# Patient Record
Sex: MALE | Race: WHITE | NOT HISPANIC OR LATINO | Employment: OTHER | URBAN - METROPOLITAN AREA
[De-identification: names, ages, dates, MRNs, and addresses within clinical notes are randomized per-mention and may not be internally consistent; named-entity substitution may affect disease eponyms.]

---

## 2017-01-11 ENCOUNTER — ANTI-COAG VISIT (OUTPATIENT)
Dept: CARDIOLOGY | Facility: CLINIC | Age: 62
End: 2017-01-11
Payer: MEDICARE

## 2017-01-11 DIAGNOSIS — Z86.718 HISTORY OF DVT (DEEP VEIN THROMBOSIS): ICD-10-CM

## 2017-01-11 DIAGNOSIS — Z86.711 HISTORY OF PULMONARY EMBOLISM: ICD-10-CM

## 2017-01-11 DIAGNOSIS — Z79.01 CHRONIC ANTICOAGULATION: ICD-10-CM

## 2017-01-11 DIAGNOSIS — Z79.01 LONG TERM (CURRENT) USE OF ANTICOAGULANTS: Primary | ICD-10-CM

## 2017-01-11 LAB
CTP QC/QA: NORMAL
INR PPP: 2.8 (ref 2–3)

## 2017-01-11 PROCEDURE — 99211 OFF/OP EST MAY X REQ PHY/QHP: CPT | Mod: 25,S$GLB,,

## 2017-01-11 PROCEDURE — 85610 PROTHROMBIN TIME: CPT | Mod: QW,S$GLB,,

## 2017-01-11 NOTE — PROGRESS NOTES
Patient's INR is on an upward trend. I am increasing his vitamin K intake to account for this change in INR. Patient is stable on this dose. He will continue this dose until follow-up. I advised him and his wife to contact us with any changes or problems.

## 2017-02-01 ENCOUNTER — TELEPHONE (OUTPATIENT)
Dept: SMOKING CESSATION | Facility: CLINIC | Age: 62
End: 2017-02-01

## 2017-02-02 ENCOUNTER — ANTI-COAG VISIT (OUTPATIENT)
Dept: CARDIOLOGY | Facility: CLINIC | Age: 62
End: 2017-02-02
Payer: MEDICARE

## 2017-02-02 DIAGNOSIS — Z86.711 HISTORY OF PULMONARY EMBOLISM: ICD-10-CM

## 2017-02-02 DIAGNOSIS — Z86.718 HISTORY OF DVT (DEEP VEIN THROMBOSIS): ICD-10-CM

## 2017-02-02 DIAGNOSIS — Z79.01 CHRONIC ANTICOAGULATION: Primary | ICD-10-CM

## 2017-02-02 LAB — INR PPP: 2.7 (ref 2–3)

## 2017-02-02 PROCEDURE — 85610 PROTHROMBIN TIME: CPT | Mod: QW,S$GLB,, | Performed by: PHARMACIST

## 2017-02-02 PROCEDURE — 99211 OFF/OP EST MAY X REQ PHY/QHP: CPT | Mod: 25,S$GLB,, | Performed by: PHARMACIST

## 2017-02-02 NOTE — PROGRESS NOTES
The patient's INR was within therapeutic range today.  The patient denies any changes in medications, any missed doses, or changes in dietary habits.  The patient will continue the warfarin regimen without any changes.  He will f/u with us in 4 weeks.  The patient understands to contact us if there are any new changes with medications, health, or dietary habits.

## 2017-02-14 ENCOUNTER — TELEPHONE (OUTPATIENT)
Dept: SMOKING CESSATION | Facility: CLINIC | Age: 62
End: 2017-02-14

## 2017-03-02 ENCOUNTER — ANTI-COAG VISIT (OUTPATIENT)
Dept: CARDIOLOGY | Facility: CLINIC | Age: 62
End: 2017-03-02
Payer: MEDICARE

## 2017-03-02 DIAGNOSIS — Z79.01 CHRONIC ANTICOAGULATION: Primary | ICD-10-CM

## 2017-03-02 DIAGNOSIS — Z86.718 HISTORY OF DVT (DEEP VEIN THROMBOSIS): ICD-10-CM

## 2017-03-02 DIAGNOSIS — Z86.711 HISTORY OF PULMONARY EMBOLISM: ICD-10-CM

## 2017-03-02 LAB — INR PPP: 2.1 (ref 2–3)

## 2017-03-02 PROCEDURE — 99211 OFF/OP EST MAY X REQ PHY/QHP: CPT | Mod: 25,S$GLB,, | Performed by: PHARMACIST

## 2017-03-02 PROCEDURE — 85610 PROTHROMBIN TIME: CPT | Mod: QW,S$GLB,, | Performed by: PHARMACIST

## 2017-03-02 NOTE — PROGRESS NOTES
Patient denies any changes in diet, medications, or health that would effect warfarin therapy.  INR on lower end of range. Since he cannot return until 1 month, I will boost dose today then rechallenge this dose to assure INR does not continue to trend down.

## 2017-03-03 ENCOUNTER — HOSPITAL ENCOUNTER (OUTPATIENT)
Dept: RADIOLOGY | Facility: OTHER | Age: 62
Discharge: HOME | End: 2017-03-03
Attending: ORTHOPAEDIC SURGERY
Payer: MEDICARE

## 2017-03-03 DIAGNOSIS — M75.121 COMPLETE TEAR OF RIGHT ROTATOR CUFF: ICD-10-CM

## 2017-03-03 PROCEDURE — 73221 MRI JOINT UPR EXTREM W/O DYE: CPT | Mod: 26,RT,, | Performed by: RADIOLOGY

## 2017-03-03 PROCEDURE — 73221 MRI JOINT UPR EXTREM W/O DYE: CPT | Mod: TC,RT

## 2017-03-08 ENCOUNTER — CLINICAL SUPPORT (OUTPATIENT)
Dept: SMOKING CESSATION | Facility: CLINIC | Age: 62
End: 2017-03-08
Payer: COMMERCIAL

## 2017-03-08 DIAGNOSIS — F17.200 NICOTINE DEPENDENCE: Primary | ICD-10-CM

## 2017-03-08 PROCEDURE — 99407 BEHAV CHNG SMOKING > 10 MIN: CPT | Mod: S$GLB,,, | Performed by: INTERNAL MEDICINE

## 2017-03-13 DIAGNOSIS — F41.8 DEPRESSION WITH ANXIETY: ICD-10-CM

## 2017-03-13 RX ORDER — ESCITALOPRAM OXALATE 20 MG/1
20 TABLET ORAL DAILY
Qty: 90 TABLET | Refills: 3 | Status: SHIPPED | OUTPATIENT
Start: 2017-03-13 | End: 2017-07-29 | Stop reason: SDUPTHER

## 2017-03-13 NOTE — TELEPHONE ENCOUNTER
----- Message from Sallie Snyder MA sent at 3/13/2017 10:17 AM CDT -----  Contact: [phone]  Type: Rx    Name of medication(s): escitalopram oxalate (LEXAPRO) 20 MG tablet    Is this a refill? New rx? Refill    Who prescribed medication?    Pharmacy Name, Phone, & Location: [facility] [address]    Comments: Please advise. Thanks!

## 2017-03-14 ENCOUNTER — OFFICE VISIT (OUTPATIENT)
Dept: INTERNAL MEDICINE | Facility: CLINIC | Age: 62
End: 2017-03-14
Payer: MEDICARE

## 2017-03-14 ENCOUNTER — CLINICAL SUPPORT (OUTPATIENT)
Dept: INFECTIOUS DISEASES | Facility: CLINIC | Age: 62
End: 2017-03-14
Payer: MEDICARE

## 2017-03-14 VITALS
BODY MASS INDEX: 25.78 KG/M2 | SYSTOLIC BLOOD PRESSURE: 103 MMHG | WEIGHT: 151 LBS | DIASTOLIC BLOOD PRESSURE: 58 MMHG | HEART RATE: 78 BPM | RESPIRATION RATE: 17 BRPM | HEIGHT: 64 IN | TEMPERATURE: 98 F

## 2017-03-14 DIAGNOSIS — Z90.81 S/P SPLENECTOMY: ICD-10-CM

## 2017-03-14 DIAGNOSIS — F41.9 ANXIETY: Primary | ICD-10-CM

## 2017-03-14 DIAGNOSIS — R73.02 IGT (IMPAIRED GLUCOSE TOLERANCE): ICD-10-CM

## 2017-03-14 DIAGNOSIS — Z87.898 HISTORY OF SEIZURE: ICD-10-CM

## 2017-03-14 DIAGNOSIS — E78.5 HYPERLIPIDEMIA, UNSPECIFIED HYPERLIPIDEMIA TYPE: ICD-10-CM

## 2017-03-14 DIAGNOSIS — Z86.711 HISTORY OF PULMONARY EMBOLISM: ICD-10-CM

## 2017-03-14 DIAGNOSIS — Z23 NEED FOR MENINGITIS VACCINATION: ICD-10-CM

## 2017-03-14 DIAGNOSIS — F33.40 MDD (RECURRENT MAJOR DEPRESSIVE DISORDER) IN REMISSION: ICD-10-CM

## 2017-03-14 DIAGNOSIS — Z79.01 CHRONIC ANTICOAGULATION: ICD-10-CM

## 2017-03-14 DIAGNOSIS — Z23 NEED FOR STREPTOCOCCUS PNEUMONIAE VACCINATION: ICD-10-CM

## 2017-03-14 DIAGNOSIS — I70.0 AORTIC ATHEROSCLEROSIS: ICD-10-CM

## 2017-03-14 DIAGNOSIS — I25.10 ATHEROSCLEROSIS OF NATIVE CORONARY ARTERY OF NATIVE HEART WITHOUT ANGINA PECTORIS: ICD-10-CM

## 2017-03-14 PROCEDURE — 99999 PR PBB SHADOW E&M-EST. PATIENT-LVL III: CPT | Mod: PBBFAC,,, | Performed by: INTERNAL MEDICINE

## 2017-03-14 PROCEDURE — 99214 OFFICE O/P EST MOD 30 MIN: CPT | Mod: S$GLB,,, | Performed by: INTERNAL MEDICINE

## 2017-03-14 PROCEDURE — 90670 PCV13 VACCINE IM: CPT | Mod: S$GLB,,, | Performed by: INTERNAL MEDICINE

## 2017-03-14 PROCEDURE — 99999 PR PBB SHADOW E&M-EST. PATIENT-LVL I: CPT | Mod: PBBFAC,,,

## 2017-03-14 PROCEDURE — 90471 IMMUNIZATION ADMIN: CPT | Mod: S$GLB,,, | Performed by: INTERNAL MEDICINE

## 2017-03-14 PROCEDURE — G0009 ADMIN PNEUMOCOCCAL VACCINE: HCPCS | Mod: 59,S$GLB,, | Performed by: INTERNAL MEDICINE

## 2017-03-14 PROCEDURE — 1160F RVW MEDS BY RX/DR IN RCRD: CPT | Mod: S$GLB,,, | Performed by: INTERNAL MEDICINE

## 2017-03-14 PROCEDURE — 99499 UNLISTED E&M SERVICE: CPT | Mod: S$PBB,,, | Performed by: INTERNAL MEDICINE

## 2017-03-14 PROCEDURE — 90734 MENACWYD/MENACWYCRM VACC IM: CPT | Mod: S$GLB,,, | Performed by: INTERNAL MEDICINE

## 2017-03-14 NOTE — PROGRESS NOTES
"Subjective:       Patient ID: Edu Choi is a 61 y.o. male.    Chief Complaint: Depression; Anxiety; and Hyperlipidemia    HPI presents w/ wife.     R shoulder pain - seeing outside ortho. Just had MRI last Friday. Has follow up appt tomorrow. Pain is less severe. Take tylenol prn. No swelling.     H/O DVT and PE - on coumadin. Follows w/ coumadin clinic. Last INR was 2.1.   No issues w/ bleed. No blood in the stool. No SOB/leg swelling/CP.     Depression/anxiety - at last visit 9/2016, increased lexapro from 10 to 20mg daily. No depression/anxiety. Wakes up early.     HLD on atorva 40mg daily. Atherosclerosis of coronary a - on atorva 40 daily. No muscle pains other than the right arm.     Epilepsy - keppra 1000mg bid. Last seizure about a year ago. 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   Saw Dr. Ventura 12/16/16 - f/u in 1 yr.    2d echo 5/2/15 - concentric hypertrophy but otherwise normal studies.    Reports walking daily. No trouble walking.     Review of Systems  as above in HPI.     Objective:      Physical Exam    BP (!) 103/58  Pulse 78  Temp 97.5 °F (36.4 °C) (Oral)   Resp 17  Ht 5' 4" (1.626 m)  Wt 68.5 kg (151 lb 0.2 oz)  BMI 25.92 kg/m2    Gen - A+OX4, NAD  HEENT - PERRL, OP clear. MMM.   Neck - no LAD  CV - RRR, no m/r  Chest - CTAB, no wheezing/rhonchi  Abd - S/NT/ND/+BS  Ext -2 + B radial pulses. No LE edema.       CT LUNG SCREEN 6/2016  Narrative   CT LUNG SCREENING EXAMINATION    Technique: CT of the thorax was performed with low dose, lung screening protocol.  No contrast was administered.  Sagittal and coronal reconstructions were obtained.    Comparison: Chest CT 4/2015.    Findings:    Lungs: There is a 1mm nodule within the inferior aspect of the anterior segment of the right upper lung. This lesion is unchanged when compared to prior CT. The clinical course a bone or significance of this lesion is uncertain. No additional lesions are detected. Incidental noted is " made of an azygos fissure.    Pleura: No effusion.    Heart and pericardium: Normal size without effusion.    Aorta and vasculature: Dense atherosclerosis  including coronary arteries.    Chest wall and skeletal structures: There is irregularity of the right clavicle, unchanged when compared to the prior CT. Several remote left rib fractures are present.    Upper abdomen: Unremarkable.   Impression   1 mm nodule within the inferior aspect of the anterior segment of the right upper lung. This is unchanged when compared to the previous chest CT examination any clinical significance is uncertain.    Dense atherosclerosis of the aorta and coronary vasculature.    Other findings as above.           Assessment/Plan     Edu was seen today for depression, anxiety and hyperlipidemia.    Diagnoses and all orders for this visit:    Anxiety - controlled on Lexapro 20 mg daily.  -     Comprehensive metabolic panel; Future    MDD (recurrent major depressive disorder) in remission - as above.  -     Comprehensive metabolic panel; Future  -     TSH; Future    Hyperlipidemia, unspecified hyperlipidemia type - continue atorvastatin 40 mg daily.  -     Lipid panel; Future  -     Comprehensive metabolic panel; Future    History of seizure - continue Keppra 1000 mg twice a day.  -     Levetiracetam level; Future  -     Comprehensive metabolic panel; Future    Aortic atherosclerosis - continue atorvastatin 40 mg daily.  -     Lipid panel; Future    Atherosclerosis of native coronary artery of native heart without angina pectoris - continue atorvastatin 40 mg daily.  -     Lipid panel; Future    S/P splenectomy - s/p pneumovax 2015.  -     Meningococcal Conjugate - MCV4P (MENACTRA)  -     Ambulatory referral to Infectious Disease Injection Dept  -     Pneumococcal Conjugate Vaccine (13 Valent) (IM); Future    Need for meningitis vaccination  -     Meningococcal Conjugate - MCV4P (MENACTRA)  -     Ambulatory referral to Infectious  Disease Injection Dept    IGT (impaired glucose tolerance)  -     Comprehensive metabolic panel; Future  -     Hemoglobin A1c; Future    History of pulmonary embolism - cont coumadin and f/u w/ coumadin clinic.  -     CBC auto differential; Future    Chronic anticoagulation  -     CBC auto differential; Future    Need for Streptococcus pneumoniae vaccination  -     Pneumococcal Conjugate Vaccine (13 Valent) (IM); Future    Return in about 6 months (around 9/14/2017).      Gladis Blankenship MD  Department of Internal Medicine - [facility]  2:50 PM

## 2017-03-14 NOTE — MR AVS SNAPSHOT
[facility]  [address]  [facility] [address]  Phone: [phone]  Fax: [phone]                  Edu Choi   3/14/2017 2:40 PM   Office Visit    Description:  Male : 1955   Provider:  Gladis Blankenship MD   Department:  [facility]           Reason for Visit     Depression     Anxiety     Hyperlipidemia           Diagnoses this Visit        Comments    Anxiety    -  Primary     MDD (recurrent major depressive disorder) in remission         Hyperlipidemia, unspecified hyperlipidemia type         History of seizure         Aortic atherosclerosis         Atherosclerosis of native coronary artery of native heart without angina pectoris         S/P splenectomy         Need for meningitis vaccination         IGT (impaired glucose tolerance)         History of pulmonary embolism         Chronic anticoagulation         Need for Streptococcus pneumoniae vaccination                To Do List           Future Appointments        Provider Department Dept Phone    3/14/2017 3:50 PM INJECTION, INFECTIOUS DISEASES [facility] [phone]    3/18/2017 8:40 AM LAB, APPOINTMENT [facility] [phone]    3/29/2017 3:30 PM Mp Storey, PharmD [facility] Coumadin [phone]      Goals (5 Years of Data)     None      Follow-Up and Disposition     Return in about 6 months (around 2017).      [facility]     [facility] Care [facility] -  Assistance  Registered nurses in the [facility] provide clinical advisement, health education, appointment booking, and other advisory services.  Call for this free service at [phone].             Medications           Message regarding Medications     Verify the changes and/or additions to your medication regime listed below are the same as discussed with your clinician today.  If any of these changes or additions are incorrect, please notify  "your healthcare provider.        STOP taking these medications     nicotine (NICODERM CQ) 7 mg/24 hr Place 1 patch onto the skin once daily.           Verify that the below list of medications is an accurate representation of the medications you are currently taking.  If none reported, the list may be blank. If incorrect, please contact your healthcare provider. Carry this list with you in case of emergency.           Current Medications     atorvastatin (LIPITOR) 40 MG tablet Take 1 tablet (40 mg total) by mouth every evening.    escitalopram oxalate (LEXAPRO) 20 MG tablet Take 1 tablet (20 mg total) by mouth once daily.    levetiracetam (KEPPRA) 1000 MG tablet Take 1 tablet (1,000 mg total) by mouth 2 (two) times daily.    warfarin (COUMADIN) 5 MG tablet Take 1 tablet (5 mg total) by mouth Daily.           Clinical Reference Information           Your Vitals Were     BP Pulse Temp Resp Height Weight    103/58 78 97.5 °F (36.4 °C) (Oral) 17 5' 4" (1.626 m) 68.5 kg (151 lb 0.2 oz)    BMI                25.92 kg/m2          Blood Pressure          Most Recent Value    BP  (!)  103/58      Allergies as of 3/14/2017     No Known Drug Allergies      Immunizations Administered on Date of Encounter - 3/14/2017     Name Date Dose VIS Date Route    MENINGOCOCCAL  Incomplete 0.5 mL 3/31/2016 Intramuscular      Orders Placed During Today's Visit      Normal Orders This Visit    Ambulatory referral to Infectious Disease Injection Dept     Meningococcal Conjugate - MCV4P (MENACTRA)     Future Labs/Procedures Expected by Expires    CBC auto differential  3/14/2017 5/13/2018    Comprehensive metabolic panel  3/14/2017 (Approximate) 5/13/2018    Hemoglobin A1c  3/14/2017 (Approximate) 5/13/2018    Levetiracetam level  3/14/2017 5/13/2018    Lipid panel  3/14/2017 5/13/2018    TSH  3/14/2017 5/13/2018    Pneumococcal Conjugate Vaccine (13 Valent) (IM)  As directed 3/14/2018      [facility] Sign-Up     Activating your [facility] " account is as easy as 1-2-3!     1) Visit [email or web address], select Sign Up Now, enter this activation code and your date of birth, then select Next.  Activation code not generated  Current Patient Portal Status: Account disabled      2) Create a username and password to use when you visit [facility] in the future and select a security question in case you lose your password and select Next.    3) Enter your e-mail address and click Sign Up!    Additional Information  If you have questions, please e-mail [email or web address] or call [phone] to talk to our [facility] staff. Remember, [facility] is NOT to be used for urgent needs. For medical emergencies, dial 911.         Smoking Cessation     If you would like to quit smoking:   You may be eligible for free services if you are a [place] resident and started smoking cigarettes before September 1, 1988.  Call the Smoking Cessation Trust ([facility]) toll free at (029) [phone] or ([phone].   Call 2-969-QUIT-NOW if you do not meet the above criteria.            Language Assistance Services     ATTENTION: Language assistance services are available, free of charge. Please call [phone].      ATENCIÓN: Si habla español, tiene a justin disposición servicios gratuitos de asistencia lingüística. Llame al [phone].     ANISH Ý: N?u b?n nói Ti?ng Vi?t, có các d?ch v? h? tr? ngôn ng? mi?n phí dành cho b?n. G?i s? [phone].         [facility] complies with applicable Federal civil rights laws and does not discriminate on the basis of race, color, national origin, age, disability, or sex.

## 2017-03-18 ENCOUNTER — LAB VISIT (OUTPATIENT)
Dept: LAB | Facility: HOSPITAL | Age: 62
End: 2017-03-18
Attending: INTERNAL MEDICINE
Payer: MEDICARE

## 2017-03-18 DIAGNOSIS — I70.0 AORTIC ATHEROSCLEROSIS: ICD-10-CM

## 2017-03-18 DIAGNOSIS — F41.9 ANXIETY: ICD-10-CM

## 2017-03-18 DIAGNOSIS — I25.10 ATHEROSCLEROSIS OF NATIVE CORONARY ARTERY OF NATIVE HEART WITHOUT ANGINA PECTORIS: ICD-10-CM

## 2017-03-18 DIAGNOSIS — Z87.898 HISTORY OF SEIZURE: ICD-10-CM

## 2017-03-18 DIAGNOSIS — F33.40 MDD (RECURRENT MAJOR DEPRESSIVE DISORDER) IN REMISSION: ICD-10-CM

## 2017-03-18 DIAGNOSIS — R73.02 IGT (IMPAIRED GLUCOSE TOLERANCE): ICD-10-CM

## 2017-03-18 DIAGNOSIS — E78.5 HYPERLIPIDEMIA, UNSPECIFIED HYPERLIPIDEMIA TYPE: ICD-10-CM

## 2017-03-18 DIAGNOSIS — Z79.01 CHRONIC ANTICOAGULATION: ICD-10-CM

## 2017-03-18 DIAGNOSIS — Z86.711 HISTORY OF PULMONARY EMBOLISM: ICD-10-CM

## 2017-03-18 LAB
ALBUMIN SERPL BCP-MCNC: 3.9 G/DL
ALP SERPL-CCNC: 88 U/L
ALT SERPL W/O P-5'-P-CCNC: 37 U/L
ANION GAP SERPL CALC-SCNC: 9 MMOL/L
AST SERPL-CCNC: 32 U/L
BASOPHILS # BLD AUTO: 0.05 K/UL
BASOPHILS NFR BLD: 0.6 %
BILIRUB SERPL-MCNC: 0.4 MG/DL
BUN SERPL-MCNC: 19 MG/DL
CALCIUM SERPL-MCNC: 9.3 MG/DL
CHLORIDE SERPL-SCNC: 107 MMOL/L
CHOLEST/HDLC SERPL: 2.4 {RATIO}
CO2 SERPL-SCNC: 24 MMOL/L
CREAT SERPL-MCNC: 1 MG/DL
DIFFERENTIAL METHOD: ABNORMAL
EOSINOPHIL # BLD AUTO: 0.2 K/UL
EOSINOPHIL NFR BLD: 2.1 %
ERYTHROCYTE [DISTWIDTH] IN BLOOD BY AUTOMATED COUNT: 14.7 %
EST. GFR  (AFRICAN AMERICAN): >60 ML/MIN/1.73 M^2
EST. GFR  (NON AFRICAN AMERICAN): >60 ML/MIN/1.73 M^2
GLUCOSE SERPL-MCNC: 99 MG/DL
HCT VFR BLD AUTO: 42.9 %
HDL/CHOLESTEROL RATIO: 41 %
HDLC SERPL-MCNC: 144 MG/DL
HDLC SERPL-MCNC: 59 MG/DL
HGB BLD-MCNC: 14.4 G/DL
LDLC SERPL CALC-MCNC: 75.6 MG/DL
LYMPHOCYTES # BLD AUTO: 2.2 K/UL
LYMPHOCYTES NFR BLD: 28.5 %
MCH RBC QN AUTO: 31.9 PG
MCHC RBC AUTO-ENTMCNC: 33.6 %
MCV RBC AUTO: 95 FL
MONOCYTES # BLD AUTO: 1 K/UL
MONOCYTES NFR BLD: 12.5 %
NEUTROPHILS # BLD AUTO: 4.4 K/UL
NEUTROPHILS NFR BLD: 56 %
NONHDLC SERPL-MCNC: 85 MG/DL
PLATELET # BLD AUTO: 277 K/UL
PMV BLD AUTO: 11.6 FL
POTASSIUM SERPL-SCNC: 4.6 MMOL/L
PROT SERPL-MCNC: 7.4 G/DL
RBC # BLD AUTO: 4.52 M/UL
SODIUM SERPL-SCNC: 140 MMOL/L
TRIGL SERPL-MCNC: 47 MG/DL
TSH SERPL DL<=0.005 MIU/L-ACNC: 1.94 UIU/ML
WBC # BLD AUTO: 7.79 K/UL

## 2017-03-18 PROCEDURE — 80053 COMPREHEN METABOLIC PANEL: CPT

## 2017-03-18 PROCEDURE — 36415 COLL VENOUS BLD VENIPUNCTURE: CPT

## 2017-03-18 PROCEDURE — 80061 LIPID PANEL: CPT

## 2017-03-18 PROCEDURE — 84443 ASSAY THYROID STIM HORMONE: CPT

## 2017-03-18 PROCEDURE — 83036 HEMOGLOBIN GLYCOSYLATED A1C: CPT

## 2017-03-18 PROCEDURE — 85025 COMPLETE CBC W/AUTO DIFF WBC: CPT

## 2017-03-18 PROCEDURE — 80177 DRUG SCRN QUAN LEVETIRACETAM: CPT

## 2017-03-20 ENCOUNTER — TELEPHONE (OUTPATIENT)
Dept: INTERNAL MEDICINE | Facility: CLINIC | Age: 62
End: 2017-03-20

## 2017-03-20 LAB
ESTIMATED AVG GLUCOSE: 140 MG/DL
HBA1C MFR BLD HPLC: 6.5 %

## 2017-03-21 LAB — LEVETIRACETAM SERPL-MCNC: 46.4 UG/ML (ref 3–60)

## 2017-03-24 ENCOUNTER — TELEPHONE (OUTPATIENT)
Dept: INTERNAL MEDICINE | Facility: CLINIC | Age: 62
End: 2017-03-24

## 2017-03-24 DIAGNOSIS — R73.02 IGT (IMPAIRED GLUCOSE TOLERANCE): Primary | ICD-10-CM

## 2017-03-24 NOTE — TELEPHONE ENCOUNTER
Called pt. No answer. Left message for pt to call back.    If he calls back, please let hime know that his cholesterol is controlled. Keppra is at a good level. His a1c is in the diabetes range but his fasting sugar is in the normal range. I would like to repeat his a1c to confirm diabetes and make sure it's not a lab error.

## 2017-03-29 ENCOUNTER — ANTI-COAG VISIT (OUTPATIENT)
Dept: CARDIOLOGY | Facility: CLINIC | Age: 62
End: 2017-03-29
Payer: MEDICARE

## 2017-03-29 DIAGNOSIS — Z79.01 CHRONIC ANTICOAGULATION: Primary | ICD-10-CM

## 2017-03-29 DIAGNOSIS — Z86.718 HISTORY OF DVT (DEEP VEIN THROMBOSIS): ICD-10-CM

## 2017-03-29 DIAGNOSIS — Z86.711 HISTORY OF PULMONARY EMBOLISM: ICD-10-CM

## 2017-03-29 LAB — INR PPP: 2.7 (ref 2–3)

## 2017-03-29 PROCEDURE — 85610 PROTHROMBIN TIME: CPT | Mod: QW,S$GLB,, | Performed by: PHARMACIST

## 2017-03-29 NOTE — PROGRESS NOTES
Patient reports taking dose as prescribed. He reports no missed doses.  Patient was re-educated on situations that would require placing a call to the coumadin clinic, including bleeding or unusual bruising issues, changes in health, diet or medications, upcoming procedures that require warfarin interruption, and missed coumadin dose(s). Patient expressed understanding that avoidance of consistency with these parameters could cause fluctuations in INR, leading to more frequent visits and increase risk of adverse events.

## 2017-04-15 ENCOUNTER — LAB VISIT (OUTPATIENT)
Dept: LAB | Facility: HOSPITAL | Age: 62
End: 2017-04-15
Attending: INTERNAL MEDICINE
Payer: MEDICARE

## 2017-04-15 DIAGNOSIS — R73.02 IGT (IMPAIRED GLUCOSE TOLERANCE): ICD-10-CM

## 2017-04-15 PROCEDURE — 83036 HEMOGLOBIN GLYCOSYLATED A1C: CPT

## 2017-04-15 PROCEDURE — 36415 COLL VENOUS BLD VENIPUNCTURE: CPT

## 2017-04-16 LAB
ESTIMATED AVG GLUCOSE: 140 MG/DL
HBA1C MFR BLD HPLC: 6.5 %

## 2017-04-18 ENCOUNTER — TELEPHONE (OUTPATIENT)
Dept: INTERNAL MEDICINE | Facility: CLINIC | Age: 62
End: 2017-04-18

## 2017-04-18 DIAGNOSIS — E11.9 NEWLY DIAGNOSED DIABETES: ICD-10-CM

## 2017-04-18 NOTE — TELEPHONE ENCOUNTER
Called and spoke w/ pt.     Please schedule for DM education for the end May (29, 30, 31) and then send appt reminder. Thanks!

## 2017-04-18 NOTE — TELEPHONE ENCOUNTER
Called pt. No answer. Left message for pt to call back.    If pt calls back, please let him know: Repeat a1c is 6.5, which means pt is in fact diabetic. I am going to refer him to diabetes education. Also please see if pt can come back sooner (in the next month or so) so that I can further discuss with him in regards to diabetes, risks and things to help improve the sugars.

## 2017-04-26 ENCOUNTER — ANTI-COAG VISIT (OUTPATIENT)
Dept: CARDIOLOGY | Facility: CLINIC | Age: 62
End: 2017-04-26
Payer: MEDICARE

## 2017-04-26 DIAGNOSIS — Z86.711 HISTORY OF PULMONARY EMBOLISM: ICD-10-CM

## 2017-04-26 DIAGNOSIS — Z86.718 HISTORY OF DVT (DEEP VEIN THROMBOSIS): ICD-10-CM

## 2017-04-26 DIAGNOSIS — Z79.01 CHRONIC ANTICOAGULATION: Primary | ICD-10-CM

## 2017-04-26 LAB — INR PPP: 2.5 (ref 2–3)

## 2017-04-26 PROCEDURE — 99211 OFF/OP EST MAY X REQ PHY/QHP: CPT | Mod: 25,S$GLB,, | Performed by: PHARMACIST

## 2017-04-26 PROCEDURE — 85610 PROTHROMBIN TIME: CPT | Mod: QW,S$GLB,, | Performed by: PHARMACIST

## 2017-04-26 NOTE — PROGRESS NOTES
Patient denies any changes in diet, medications, or health that would effect warfarin therapy.  Patient inquired about having salads. Therefore, I  re-educated him on dietary considerations with coumadin Patient expressed understanding that avoidance of consistency with these parameters could cause fluctuations in INR, leading to more frequent visits and increase risk of adverse events.

## 2017-05-30 ENCOUNTER — CLINICAL SUPPORT (OUTPATIENT)
Dept: DIABETES | Facility: CLINIC | Age: 62
End: 2017-05-30
Payer: MEDICARE

## 2017-05-30 DIAGNOSIS — E11.9 NEWLY DIAGNOSED DIABETES: Primary | ICD-10-CM

## 2017-05-30 PROCEDURE — G0108 DIAB MANAGE TRN  PER INDIV: HCPCS | Mod: S$GLB,,, | Performed by: DIETITIAN, REGISTERED

## 2017-05-30 NOTE — PROGRESS NOTES
Diabetes Education  Author: Eugenia Lang RD  Date: 5/30/2017    Diabetes Education Visit  Diabetes Education Record Assessment/Progress: Initial (newly diagnosed)    Diabetes Type  Diabetes Type : Type II (A1c 6.5)    Diabetes History  Diabetes Diagnosis: 0-1 year    Nutrition  Meal Planning:  (drinks instant tea; will drink sweet tea sometimes)  Meal Plan 24 Hour Recall - Breakfast:  (coffee with splenda and whole milk; sometimes skips breakfast)  Meal Plan 24 Hour Recall - Lunch:  (sandwhich, with chips or cookies)  Meal Plan 24 Hour Recall - Dinner:  (pasta with cheese and meat)  Meal Plan 24 Hour Recall - Snack:  (cookies, watermelon)    Monitoring   Self Monitoring :  (did not have meter until today)    Exercise   Exercise Type:  (goes to the [employer] 1-2 times per week; rides stationary bike)    Current Diabetes Treatment   Current Treatment:  (no DM meds yet)    Social History  Preferred Learning Method: Face to Face  Primary Support: Self, Spouse (wife present at visit)  Alcohol Use: Rarely    Barriers to Change  Barriers to Change: None  Learning Challenges : None    Readiness to Learn   Readiness to Learn : Acceptance    Cultural Influences  Cultural Influences: No      Diabetes Education Assessment/Progress  Acute Complications (preventing, detecting, and treating acute complications): Discussion, Individual Session, Competent (verbalizes/demonstrates), Written Materials Provided, Instructed (Reviewed hypo symptoms and how to treat. Discussed pt not at risk for lows.)    Chronic Complications (preventing, detecting, and treating chronic complications): Discussion, Competent (verbalizes/demonstrates), Individual Session, Written Materials Provided, Instructed (Encouraged regular feet checks and eye appointment yearly.)    Diabetes Disease Process (diabetes disease process and treatment options): Discussion, Written Materials Provided, Individual Session, Competent (verbalizes/demonstrates)    Nutrition  (Incorporating nutritional management into one's lifestyle): Discussion, Instructed, Written Materials Provided, Individual Session, Competent (verbalizes/demonstrates) (Discussed carb vs non-carb foods. Reviewed appropriate amount of carb servings to have with meals/snacks. Discussed appropriate serving sizes of individual carb items. Discussed multiple meal/snack ideas amenable to pt.)    Physical Activity (incorporating physical activity into one's lifestyle): Discussion, Competent (verbalizes/demonstrates), Individual Session, Written Materials Provided, Instructed (Not currently meeting recommendations. Encouraged 3 hours weekly. )    Monitoring (monitoring blood glucose/other parameters & using results): Demonstration, Discussion, Return Demonstration, Instructed, Individual Session, Written Materials Provided, Competent (verbalizes/demonstrates) (Provided True Metrix meter today; trained on usage. Fasting BG at visit: 82 mg/dL. Instructed pt to check BG once daily several times a week at varying times. Bring logs to all appts. Logs provided. )    Goal Setting and Problem Solving (verbalizes behavior change strategies & sets realistic goals): Discussion, Individual Session        Goals  Physical Activity: Set (Increase PA to 3 hours weekly)  Monitoring: Set (SMBG several times per week at varying times)         Diabetes Care Plan/Intervention  Education Plan/Intervention: Individual Follow-Up DSMT    Diabetes Meal Plan  Restrictions: Restricted Carbohydrate  Carbohydrate Per Meal: 45-60g  Carbohydrate Per Snack : 7-15g    Education Units of Time   Time Spent: 60 min      Health Maintenance Topics with due status: Not Due       Topic Last Completion Date    TETANUS VACCINE 02/11/2014    Pneumococcal PPSV23 (Medium Risk) 01/01/2015    Influenza Vaccine 09/13/2016    Colonoscopy 10/14/2016    Lipid Panel 03/18/2017     Health Maintenance Due   Topic Date Due    Zoster Vaccine  03/25/2013

## 2017-06-07 ENCOUNTER — ANTI-COAG VISIT (OUTPATIENT)
Dept: CARDIOLOGY | Facility: CLINIC | Age: 62
End: 2017-06-07
Payer: MEDICARE

## 2017-06-07 DIAGNOSIS — Z79.01 CHRONIC ANTICOAGULATION: Primary | ICD-10-CM

## 2017-06-07 DIAGNOSIS — Z86.718 HISTORY OF DVT (DEEP VEIN THROMBOSIS): ICD-10-CM

## 2017-06-07 DIAGNOSIS — Z86.711 HISTORY OF PULMONARY EMBOLISM: ICD-10-CM

## 2017-06-07 LAB — INR PPP: 2.2 (ref 2–3)

## 2017-06-07 PROCEDURE — 85610 PROTHROMBIN TIME: CPT | Mod: QW,S$GLB,, | Performed by: PHARMACIST

## 2017-06-15 DIAGNOSIS — G40.909 SEIZURE DISORDER: ICD-10-CM

## 2017-06-15 DIAGNOSIS — E11.9 DIABETES MELLITUS WITHOUT COMPLICATION: Primary | ICD-10-CM

## 2017-06-15 RX ORDER — CALCIUM CITRATE/VITAMIN D3 200MG-6.25
1 TABLET ORAL DAILY
Qty: 100 STRIP | Refills: 11 | Status: ON HOLD | OUTPATIENT
Start: 2017-06-15 | End: 2020-03-29 | Stop reason: HOSPADM

## 2017-06-15 RX ORDER — LEVETIRACETAM 1000 MG/1
TABLET ORAL
Qty: 180 TABLET | Refills: 3 | Status: SHIPPED | OUTPATIENT
Start: 2017-06-15 | End: 2018-05-08 | Stop reason: SDUPTHER

## 2017-06-15 RX ORDER — BLOOD-GLUCOSE CONTROL, NORMAL
1 EACH MISCELLANEOUS DAILY
Qty: 100 EACH | Refills: 11 | Status: ON HOLD | OUTPATIENT
Start: 2017-06-15 | End: 2020-03-29 | Stop reason: HOSPADM

## 2017-06-22 ENCOUNTER — OFFICE VISIT (OUTPATIENT)
Dept: INTERNAL MEDICINE | Facility: CLINIC | Age: 62
End: 2017-06-22
Payer: MEDICARE

## 2017-06-22 ENCOUNTER — LAB VISIT (OUTPATIENT)
Dept: LAB | Facility: HOSPITAL | Age: 62
End: 2017-06-22
Attending: INTERNAL MEDICINE
Payer: MEDICARE

## 2017-06-22 VITALS
BODY MASS INDEX: 24.96 KG/M2 | WEIGHT: 146.19 LBS | TEMPERATURE: 99 F | RESPIRATION RATE: 18 BRPM | DIASTOLIC BLOOD PRESSURE: 60 MMHG | HEART RATE: 82 BPM | HEIGHT: 64 IN | SYSTOLIC BLOOD PRESSURE: 106 MMHG

## 2017-06-22 DIAGNOSIS — R56.9 SEIZURE: ICD-10-CM

## 2017-06-22 DIAGNOSIS — Z79.01 CHRONIC ANTICOAGULATION: ICD-10-CM

## 2017-06-22 DIAGNOSIS — Z23 NEED FOR ZOSTER VACCINATION: ICD-10-CM

## 2017-06-22 DIAGNOSIS — Z72.0 TOBACCO USE: ICD-10-CM

## 2017-06-22 DIAGNOSIS — E11.9 DIABETES MELLITUS TYPE 2 IN NONOBESE: ICD-10-CM

## 2017-06-22 DIAGNOSIS — I70.0 ATHEROSCLEROSIS OF AORTA: ICD-10-CM

## 2017-06-22 DIAGNOSIS — R63.4 WEIGHT LOSS: ICD-10-CM

## 2017-06-22 DIAGNOSIS — Z90.81 S/P SPLENECTOMY: ICD-10-CM

## 2017-06-22 DIAGNOSIS — I25.10 ATHEROSCLEROSIS OF NATIVE CORONARY ARTERY OF NATIVE HEART WITHOUT ANGINA PECTORIS: ICD-10-CM

## 2017-06-22 DIAGNOSIS — Z12.83 SKIN CANCER SCREENING: ICD-10-CM
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
## 2017-06-22 DIAGNOSIS — R20.2 PARESTHESIA OF SKIN: ICD-10-CM

## 2017-06-22 DIAGNOSIS — I95.9 HYPOTENSION, UNSPECIFIED HYPOTENSION TYPE: ICD-10-CM

## 2017-06-22 DIAGNOSIS — Z23 NEED FOR PROPHYLACTIC VACCINATION AGAINST HEMOPHILUS INFLUENZA TYPE B (HIB): ICD-10-CM

## 2017-06-22 DIAGNOSIS — G62.9 NEUROPATHY: ICD-10-CM
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
## 2017-06-22 LAB
ALBUMIN SERPL BCP-MCNC: 4.2 G/DL
ALP SERPL-CCNC: 86 U/L
ALT SERPL W/O P-5'-P-CCNC: 27 U/L
ANION GAP SERPL CALC-SCNC: 9 MMOL/L
AST SERPL-CCNC: 29 U/L
BASOPHILS # BLD AUTO: 0.05 K/UL
BASOPHILS NFR BLD: 0.6 %
BILIRUB SERPL-MCNC: 0.4 MG/DL
BILIRUB UR QL STRIP: NEGATIVE
BUN SERPL-MCNC: 19 MG/DL
CALCIUM SERPL-MCNC: 9.5 MG/DL
CHLORIDE SERPL-SCNC: 105 MMOL/L
CLARITY UR REFRACT.AUTO: CLEAR
CO2 SERPL-SCNC: 25 MMOL/L
COLOR UR AUTO: YELLOW
CORTIS SERPL-MCNC: 6.3 UG/DL
CREAT SERPL-MCNC: 1.2 MG/DL
CREAT UR-MCNC: 153 MG/DL
DIFFERENTIAL METHOD: ABNORMAL
EOSINOPHIL # BLD AUTO: 0.1 K/UL
EOSINOPHIL NFR BLD: 1.4 %
ERYTHROCYTE [DISTWIDTH] IN BLOOD BY AUTOMATED COUNT: 13.9 %
EST. GFR  (AFRICAN AMERICAN): >60 ML/MIN/1.73 M^2
EST. GFR  (NON AFRICAN AMERICAN): >60 ML/MIN/1.73 M^2
GLUCOSE SERPL-MCNC: 91 MG/DL
GLUCOSE UR QL STRIP: NEGATIVE
HCT VFR BLD AUTO: 41.5 %
HGB BLD-MCNC: 13.5 G/DL
HGB UR QL STRIP: NEGATIVE
INR PPP: 1.1
KETONES UR QL STRIP: NEGATIVE
LEUKOCYTE ESTERASE UR QL STRIP: NEGATIVE
LYMPHOCYTES # BLD AUTO: 2.8 K/UL
LYMPHOCYTES NFR BLD: 35.2 %
MCH RBC QN AUTO: 31.8 PG
MCHC RBC AUTO-ENTMCNC: 32.5 %
MCV RBC AUTO: 98 FL
MICROALBUMIN UR DL<=1MG/L-MCNC: 7 UG/ML
MICROALBUMIN/CREATININE RATIO: 4.6 UG/MG
MICROSCOPIC COMMENT: ABNORMAL
MONOCYTES # BLD AUTO: 1 K/UL
MONOCYTES NFR BLD: 13.1 %
NEUTROPHILS # BLD AUTO: 3.9 K/UL
NEUTROPHILS NFR BLD: 49.4 %
NITRITE UR QL STRIP: NEGATIVE
NON-SQ EPI CELLS #/AREA URNS AUTO: 1 /HPF
PH UR STRIP: 5 [PH] (ref 5–8)
PLATELET # BLD AUTO: 293 K/UL
PMV BLD AUTO: 11.7 FL
POTASSIUM SERPL-SCNC: 4.6 MMOL/L
PROT SERPL-MCNC: 7.6 G/DL
PROT UR QL STRIP: NEGATIVE
PROTHROMBIN TIME: 11.6 SEC
RBC # BLD AUTO: 4.24 M/UL
RBC #/AREA URNS AUTO: 0 /HPF (ref 0–4)
SODIUM SERPL-SCNC: 139 MMOL/L
SP GR UR STRIP: 1.02 (ref 1–1.03)
TSH SERPL DL<=0.005 MIU/L-ACNC: 1.74 UIU/ML
URN SPEC COLLECT METH UR: NORMAL
UROBILINOGEN UR STRIP-ACNC: NEGATIVE EU/DL
VIT B12 SERPL-MCNC: 530 PG/ML
WBC # BLD AUTO: 7.87 K/UL
WBC #/AREA URNS AUTO: 1 /HPF (ref 0–5)

## 2017-06-22 PROCEDURE — 80177 DRUG SCRN QUAN LEVETIRACETAM: CPT

## 2017-06-22 PROCEDURE — 99215 OFFICE O/P EST HI 40 MIN: CPT | Mod: S$GLB,,, | Performed by: INTERNAL MEDICINE

## 2017-06-22 PROCEDURE — 80053 COMPREHEN METABOLIC PANEL: CPT

## 2017-06-22 PROCEDURE — 84443 ASSAY THYROID STIM HORMONE: CPT

## 2017-06-22 PROCEDURE — 82533 TOTAL CORTISOL: CPT

## 2017-06-22 PROCEDURE — 99999 PR PBB SHADOW E&M-EST. PATIENT-LVL V: CPT | Mod: PBBFAC,,, | Performed by: INTERNAL MEDICINE

## 2017-06-22 PROCEDURE — 85610 PROTHROMBIN TIME: CPT

## 2017-06-22 PROCEDURE — 82607 VITAMIN B-12: CPT

## 2017-06-22 PROCEDURE — 3044F HG A1C LEVEL LT 7.0%: CPT | Mod: S$GLB,,, | Performed by: INTERNAL MEDICINE

## 2017-06-22 PROCEDURE — 99499 UNLISTED E&M SERVICE: CPT | Mod: S$GLB,,, | Performed by: INTERNAL MEDICINE

## 2017-06-22 PROCEDURE — 85025 COMPLETE CBC W/AUTO DIFF WBC: CPT

## 2017-06-22 NOTE — PROGRESS NOTES
"INTERNAL MEDICINE ANNUAL VISIT NOTE      CHIEF COMPLAINT     Chief Complaint   Patient presents with    Annual Exam       HPI     Edu Choi is a 62 y.o. C male who presents for his annual exam.    R shoulder pain - seeing outside ortho. Just had MRI last Friday. Has follow up appt tomorrow. Pain is less severe. Take tylenol prn. No swelling.      H/O DVT and PE - on coumadin. Follows w/ coumadin clinic. Last INR was 2.2 6/7/17.   No issues w/ bleed. No blood in the stool. No SOB/leg swelling/CP.      Depression/anxiety - at last visit 9/2016, increased lexapro from 10 to 20mg daily. No depression/anxiety. Wakes up early.      HLD on atorva 40mg daily. Atherosclerosis of coronary a - on atorva 40 daily. No muscle pains other than the right arm.      Epilepsy - keppra 1000mg bid. 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   Last seizure was last Wed. He got up early to brush his teeth. Wife heard "thud" and rushed to find pt on the floor w/ tightness on all extremities and unresponsive (similar to his previous seizures) - reports seizing lasted for a min. He's confused after. 15 min later, he's able to converse w/ wife again. No incontinence. Pt doesn't remember anything about the episode. Didn't get evaluation.   Saw Dr. Ventura 12/16/16 - f/u in 1 yr.  No palpitations/CP/SOB.      2d echo 5/2/15 - concentric hypertrophy but otherwise normal studies.    DM2 - was in preDM range up until 3 mo ago and a1c is 6.5.  DM education 5/30/17  Checks glucose intermittently and has been running .  Weight loss of about 5lbs since last visit.   Exercises at [employer] a few times but not often.  Hasn't had eye exam in a while.     Constipation - chronic. No abdominal pain/nausea/vomiting/issues w/ urination. Good appetite. Denies any pain anywhere.   No fevers/chills. Pt reports feeling a little dizzy this morning but not anymore.    Past Medical History:  Past Medical History:   Diagnosis Date    Depression  "    DVT (deep venous thrombosis)     Hyperlipidemia     Measles complicated by meningitis     during childhood, with subsequent seizures    Newly diagnosed diabetes 4/18/2017    PE (pulmonary embolism)     Pre-diabetes     Seizures     Splenomegaly     Thrombophlebitis leg     bilateral legs       Past Surgical History:  Past Surgical History:   Procedure Laterality Date    ANAL FISTULOTOMY      COLONOSCOPY N/A 10/14/2016    Procedure: COLONOSCOPY;  Surgeon: Edu Stratton MD;  Location: [address]);  Service: Endoscopy;  Laterality: N/A;  Patient may hold Coumadin x 3 days prior to procedure with out Lovenox bridge as per C.C./svn/coumadin lab 1st    SPLENECTOMY, TOTAL         Allergies:  Review of patient's allergies indicates:   Allergen Reactions    No known drug allergies        Home Medications:  Prior to Admission medications    Medication Sig Start Date End Date Taking? Authorizing Provider   escitalopram oxalate (LEXAPRO) 20 MG tablet Take 1 tablet (20 mg total) by mouth once daily. 3/13/17 3/13/18 Yes Gladis Blankenship MD   lancets 30 gauge Misc 1 lancet by Misc.(Non-Drug; Combo Route) route once daily. 6/15/17 6/15/18 Yes Gladis Blankenship MD   levetiracetam (KEPPRA) 1000 MG tablet TAKE 1 TABLET TWICE DAILY 6/15/17  Yes Gladis Blankenship MD   TRUE METRIX GLUCOSE TEST STRIP Strp 1 strip by Misc.(Non-Drug; Combo Route) route once daily. 6/15/17  Yes Gladis Blankenship MD   warfarin (COUMADIN) 5 MG tablet Take 1 tablet (5 mg total) by mouth Daily. 5/30/16  Yes Gladis Blankenship MD   atorvastatin (LIPITOR) 40 MG tablet Take 1 tablet (40 mg total) by mouth every evening. 9/13/16 9/13/17  Gladis Blankenship MD       Family History:  Family History   Problem Relation Age of Onset    Thrombophlebitis Father     Heart failure Father     Heart attack Father     Hypertension Father        Social History:  Social History   Substance Use Topics    Smoking status: Current Every Day Smoker     Packs/day: 0.25     Years: 33.00    Smokeless  "tobacco: Never Used    Alcohol use No       Review of Systems:  Review of Systems Comprehensive review of systems otherwise negative. See history/subjective section for more details.    Health Maintainence:   Td 2/11/14  Flu yearly in the fall  Prevnar 3/14/17  Pneumovax 2015  Zoster - need one.  Meningococcal 3-14-17  Hib - went through his hospitalization records and no mention of Hib vaccine. Will update.  C-SCOPE 10/14/16  Hep C screening 5/30/16  CT lung screening 6/6/16 - 1mm nodule at the RUL, unchanged compared to previous CT. Dense atherosclerosis of aorta and coronary vasculature.    PHYSICAL EXAM     BP (!) 88/52   Pulse 82   Temp 98.6 °F (37 °C) (Oral)   Resp 18   Ht 5' 4" (1.626 m)   Wt 66.3 kg (146 lb 2.6 oz)   BMI 25.09 kg/m²     GEN - A+OX4, NAD   Psych - flat affect.  HEENT - PERRL, EOMI, OP clear. MMM.   Neck - No thyromegaly or cervical LAD. No thyroid masses felt.  CV - RRR, no m/r   Chest - CTAB, no wheezing or rhonchi  Abd - S/NT/ND/+BS.   Ext - 1+BDP and radial pulses. No LE edema.   Protective Sensation (w/ 10 gram monofilament):  Right: Decreased  Left: Decreased    Visual Inspection:  Normal -  Bilateral and Onychomycosis -  Bilateral    Pedal Pulses:   Right: Present  Left: Present    Posterior tibialis:   Right:Present  Left: Present      Neuro - PERRL, EOMI, no nystagmus, eyebrow raise, facial sensation, hearing, m of mastication, smile, palatal raise, shoulder shrug, tongue protrusion symmetric and intact. 5/5 BUE and BLE strength. Sensation to light touch intact throughout. 2+ DTRs. Normal gait.   MSK - No spinal tenderness to palpation. Normal gait.   Skin - No rash. Some atypical nevi of L shoulder. Actinic keratosis of RUE. BUE w/ small bruises.    LABS     Previous labs reviewed.    ASSESSMENT/PLAN     Edu Choi is a 62 y.o. male with  Edu was seen today for annual exam.    Diagnoses and all orders for this visit:    Fall, initial encounter  -     CT Head Without " Contrast; Future; Expected date: 06/22/2017  -     Levetiracetam level; Future; Expected date: 06/22/2017  -     Ambulatory Referral to Neurology  -     X-Ray Chest PA And Lateral; Future; Expected date: 06/22/2017    Diabetes mellitus type 2 in nonobese - diet control. Foot exam done today.  -     Ambulatory Referral to Optometry  -     MICROALBUMIN / CREATININE RATIO URINE  -     Exercise stress echo; Future    Seizure - f/u w/ Dr. Ventura  -     CT Head Without Contrast; Future; Expected date: 06/22/2017  -     Comprehensive metabolic panel; Future; Expected date: 06/22/2017  -     Levetiracetam level; Future; Expected date: 06/22/2017  -     Ambulatory Referral to Neurology  -     Urinalysis  -     Urinalysis Microscopic    Chronic anticoagulation  -     CT Head Without Contrast; Future; Expected date: 06/22/2017  -     Protime-INR; Future; Expected date: 06/22/2017  -     CBC auto differential; Future; Expected date: 06/22/2017  -     X-Ray Chest PA And Lateral; Future; Expected date: 06/22/2017    S/P splenectomy  -     (In Office Administered) HiB (PRP-OMP) Conjugate Vaccine 3 Dose (IM); Future  -     Ambulatory referral to Infectious Disease Injection Dept    Need for prophylactic vaccination against Hemophilus influenza type B (Hib)  -     (In Office Administered) HiB (PRP-OMP) Conjugate Vaccine 3 Dose (IM); Future  -     Ambulatory referral to Infectious Disease Injection Dept    Need for zoster vaccination  -     Ambulatory referral to Infectious Disease Injection Dept  -     (In Office Administered) Zoster Vaccine - Live; Future    Tobacco use  -     Exercise stress echo; Future    Skin cancer screening  -     Ambulatory Referral to Dermatology    Hypotension, unspecified hypotension type - Not orthostatic. BP returned to 100-106/58-61s.   -     TSH; Future; Expected date: 06/22/2017  -     CORTISOL, RANDOM; Future; Expected date: 06/22/2017  -     IN OFFICE EKG 12-LEAD (to Muse)    Weight loss  -      TSH; Future; Expected date: 06/22/2017    Atherosclerosis of native coronary artery of native heart without angina pectoris - LDL 75.6. On atorvastatin 40mg daily.  -     IN OFFICE EKG 12-LEAD (to Muse)  -     Exercise stress echo; Future    Atherosclerosis of aorta - LDL 75.6. On atorvastatin 40mg daily.  On coumadin.      RTC in 2 months, sooner if needed and depending on labs.    Gladis Blankenship MD  Department of Internal Medicine - [facility]  9:03 AM

## 2017-06-26 ENCOUNTER — TELEPHONE (OUTPATIENT)
Dept: INTERNAL MEDICINE | Facility: CLINIC | Age: 62
End: 2017-06-26

## 2017-06-26 LAB — LEVETIRACETAM SERPL-MCNC: 49.2 UG/ML (ref 3–60)

## 2017-06-26 NOTE — LETTER
June 26, 2017    Edu Choi  [address] [place] [id number]           [facility] - Internal Medicine  Internal Medicine  [address]  [place] [address]  Phone: [phone]  Fax: [phone]   Dear Mr. Edu Choi:    Below are the results from your recent visit:    Resulted Orders   Protime-INR   Result Value Ref Range    Prothrombin Time 11.6 9.0 - 12.5 sec    INR 1.1 0.8 - 1.2      Comment:      Coumadin Therapy:  2.0 - 3.0 for INR for all indicators except mechanical heart valves  and antiphospholipid syndromes which should use 2.5 - 3.5.     CBC auto differential   Result Value Ref Range    WBC 7.87 3.90 - 12.70 K/uL    RBC 4.24 (L) 4.60 - 6.20 M/uL    Hemoglobin 13.5 (L) 14.0 - 18.0 g/dL    Hematocrit 41.5 40.0 - 54.0 %    MCV 98 82 - 98 fL    MCH 31.8 (H) 27.0 - 31.0 pg    MCHC 32.5 32.0 - 36.0 %    RDW 13.9 11.5 - 14.5 %    Platelets 293 150 - 350 K/uL    MPV 11.7 9.2 - 12.9 fL    Gran # 3.9 1.8 - 7.7 K/uL    Lymph # 2.8 1.0 - 4.8 K/uL    Mono # 1.0 0.3 - 1.0 K/uL    Eos # 0.1 0.0 - 0.5 K/uL    Baso # 0.05 0.00 - 0.20 K/uL    Gran% 49.4 38.0 - 73.0 %    Lymph% 35.2 18.0 - 48.0 %    Mono% 13.1 4.0 - 15.0 %    Eosinophil% 1.4 0.0 - 8.0 %    Basophil% 0.6 0.0 - 1.9 %    Differential Method Automated    Comprehensive metabolic panel   Result Value Ref Range    Sodium 139 136 - 145 mmol/L    Potassium 4.6 3.5 - 5.1 mmol/L    Chloride 105 95 - 110 mmol/L    CO2 25 23 - 29 mmol/L    Glucose 91 70 - 110 mg/dL    BUN, Bld 19 8 - 23 mg/dL    Creatinine 1.2 0.5 - 1.4 mg/dL    Calcium 9.5 8.7 - 10.5 mg/dL    Total Protein 7.6 6.0 - 8.4 g/dL    Albumin 4.2 3.5 - 5.2 g/dL    Total Bilirubin 0.4 0.1 - 1.0 mg/dL      Comment:      For infants and newborns, interpretation of results should be based  on gestational age, weight and in agreement with clinical  observations.  Premature Infant recommended reference ranges:  Up to 24 hours.............<8.0 mg/dL  Up to 48 hours............<12.0 mg/dL  3-5  days..................<15.0 mg/dL  6-29 days.................<15.0 mg/dL      Alkaline Phosphatase 86 55 - 135 U/L    AST 29 10 - 40 U/L    ALT 27 10 - 44 U/L    Anion Gap 9 8 - 16 mmol/L    eGFR if African American >60.0 >60 mL/min/1.73 m^2    eGFR if non African American >60.0 >60 mL/min/1.73 m^2      Comment:      Calculation used to obtain the estimated glomerular filtration  rate (eGFR) is the CKD-EPI equation. Since race is unknown   in our information system, the eGFR values for   -American and Non--American patients are given   for each creatinine result.     Levetiracetam level   Result Value Ref Range    Levetiracetam Lvl 49.2 3.0 - 60.0 ug/mL      Comment:      Steady state trough serum or plasma levels following  doses of 1000 to 3000 mg/Day:  3 to 37 ug/mL. The same  dosage regimen will typically result in peak levels of   10 to 60 ug/mL, at approximately 1.5 hours post dose.  If applicable, any drug confirmation testing reported  here was developed and the performance characteristics  determined by [facility]. This   confirmation testing has not been cleared or approved  by the FDA. The laboratory is regulated under CLIA as  qualified to perform high-complexity testing. This test  is used for patient testing purposes. It should not be  regarded as investigational or for research.  Test performed at [facility],  [address] , [place]  [address]     [phone]  Noel Hyatt MD  - Medical Director     TSH   Result Value Ref Range    TSH 1.739 0.400 - 4.000 uIU/mL   CORTISOL, RANDOM   Result Value Ref Range    Cortisol 6.3 ug/dL      Comment:      Cortisol Reference Range:  Before 10:00 am: 4.46-22.7 ug/dL  After 5:00 pm:    1.7-14.1 ug/dL     Vitamin B12   Result Value Ref Range    Vitamin B-12 530 210 - 950 pg/mL     Your INR (blood clotting ability) is normal. Blood counts, liver, kidney and thyroid functions are normal except for persistent mild  anemia. Your vitamin B12 is normal. Your cortisol is in the normal range.  Keppra level is normal.  Continue current dose of Keppra.  Proceed with CT scan as discussed.      Sincerely,    Gladis Blankenship MD

## 2017-06-27 NOTE — ADDENDUM NOTE
Encounter addended by: Jackeline Hou MA on: 6/27/2017  1:44 PM<BR>    Actions taken: Letter status changed

## 2017-07-10 ENCOUNTER — HOSPITAL ENCOUNTER (OUTPATIENT)
Dept: CARDIOLOGY | Facility: CLINIC | Age: 62
Discharge: HOME | End: 2017-07-10
Payer: MEDICARE

## 2017-07-10 DIAGNOSIS — Z72.0 TOBACCO USE: ICD-10-CM

## 2017-07-10 DIAGNOSIS — E11.9 DIABETES MELLITUS TYPE 2 IN NONOBESE: ICD-10-CM

## 2017-07-10 DIAGNOSIS — I25.10 ATHEROSCLEROSIS OF NATIVE CORONARY ARTERY OF NATIVE HEART WITHOUT ANGINA PECTORIS: ICD-10-CM

## 2017-07-10 LAB
DIASTOLIC DYSFUNCTION: NO
RETIRED EF AND QEF - SEE NOTES: 60 (ref 55–65)

## 2017-07-10 PROCEDURE — 93000 ELECTROCARDIOGRAM COMPLETE: CPT | Mod: S$GLB,,, | Performed by: INTERNAL MEDICINE

## 2017-07-10 PROCEDURE — 93351 STRESS TTE COMPLETE: CPT | Mod: S$GLB,,, | Performed by: INTERNAL MEDICINE

## 2017-07-10 PROCEDURE — 93321 DOPPLER ECHO F-UP/LMTD STD: CPT | Mod: S$GLB,,, | Performed by: INTERNAL MEDICINE

## 2017-07-12 ENCOUNTER — HOSPITAL ENCOUNTER (OUTPATIENT)
Dept: RADIOLOGY | Facility: HOSPITAL | Age: 62
Discharge: HOME | End: 2017-07-12
Attending: INTERNAL MEDICINE
Payer: MEDICARE

## 2017-07-12 DIAGNOSIS — R56.9 SEIZURE: ICD-10-CM
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
## 2017-07-12 DIAGNOSIS — Z79.01 CHRONIC ANTICOAGULATION: ICD-10-CM

## 2017-07-12 PROCEDURE — 70450 CT HEAD/BRAIN W/O DYE: CPT | Mod: 26,,, | Performed by: RADIOLOGY

## 2017-07-12 PROCEDURE — 70450 CT HEAD/BRAIN W/O DYE: CPT | Mod: TC

## 2017-07-13 ENCOUNTER — TELEPHONE (OUTPATIENT)
Dept: INTERNAL MEDICINE | Facility: CLINIC | Age: 62
End: 2017-07-13

## 2017-07-13 DIAGNOSIS — Z86.73 HISTORY OF CVA (CEREBROVASCULAR ACCIDENT): ICD-10-CM

## 2017-07-14 ENCOUNTER — OFFICE VISIT (OUTPATIENT)
Dept: NEUROLOGY | Facility: CLINIC | Age: 62
End: 2017-07-14
Payer: MEDICARE

## 2017-07-14 VITALS
DIASTOLIC BLOOD PRESSURE: 59 MMHG | HEIGHT: 64 IN | WEIGHT: 148.13 LBS | SYSTOLIC BLOOD PRESSURE: 100 MMHG | BODY MASS INDEX: 25.29 KG/M2 | HEART RATE: 69 BPM

## 2017-07-14 DIAGNOSIS — G40.209 PARTIAL EPILEPSY WITH IMPAIRMENT OF CONSCIOUSNESS: Primary | Chronic | ICD-10-CM

## 2017-07-14 DIAGNOSIS — F41.8 DEPRESSION WITH ANXIETY: ICD-10-CM

## 2017-07-14 PROCEDURE — 99499 UNLISTED E&M SERVICE: CPT | Mod: S$GLB,,, | Performed by: PSYCHIATRY & NEUROLOGY

## 2017-07-14 PROCEDURE — 99999 PR PBB SHADOW E&M-EST. PATIENT-LVL III: CPT | Mod: PBBFAC,,, | Performed by: PSYCHIATRY & NEUROLOGY

## 2017-07-14 PROCEDURE — 99214 OFFICE O/P EST MOD 30 MIN: CPT | Mod: S$GLB,,, | Performed by: PSYCHIATRY & NEUROLOGY

## 2017-07-14 NOTE — LETTER
July 14, 2017      Gladis Blankenship MD  [address]  [place] [address]           [facility] Neurology  [address] [place] [address], [address]  [facility] [address]  Phone: [phone]          Patient: Edu Choi   MR Number: [id number]   YOB: 1955   Date of Visit: 7/14/2017       Dear Dr. Gladis Blankenship:    Thank you for referring Edu Choi to me for evaluation. Attached you will find relevant portions of my assessment and plan of care.    If you have questions, please do not hesitate to call me. I look forward to following Edu Choi along with you.    Sincerely,    Bismark Ventura MD    Enclosure  CC:  No Recipients    If you would like to receive this communication electronically, please contact [email or web address] or [phone] to request more information on [employer] Link access.    For providers and/or their staff who would like to refer a patient to [facility], please contact us through our one-stop-shop provider referral line, [facility] , at [phone].    If you feel you have received this communication in error or would no longer like to receive these types of communications, please e-mail [email or web address]

## 2017-07-14 NOTE — PATIENT INSTRUCTIONS
Discussed with patient and spouse.  Seizure precautions including compliance stressed.  Recommend OTC melatonin for sleep.  Continue Keppra.

## 2017-07-14 NOTE — PROGRESS NOTES
Subjective:       Patient ID: Edu Choi is a 62 y.o. male.    Chief Complaint:  No chief complaint on file.      History of Present Illness  HPI   This is a 62-year-old male who was being followed by me with a history of seizures since age 4 related to complications secondary to measles during childhood. Seizures are described as transient loss of consciousness during which time he may display repetitive behaviors and he has no recollection of the episode. He has had occasional generalized convulsions that are infrequent.  He had a seizure in May 2017, noted on awakening in the morning.  He did report that he had been having sleep difficulty and is not sure if he missed his medicine the night before.  Prior to that his last seizure was in 2015.  He has had seizures since being on Keppra 1000 mg twice a day except for that.   He continues to be on Coumadin for his bleeding disorder. His spouse was present today.      A CT scan of the brain done in May 2015 was normal.  An EEG done at around the same time was abnormal. The findings were consistent with left hemispheric cerebral dysfunction that is maximal and epileptogenic in the left temporal head region. In addition, there were scattered sharp waves in the right frontopolar head region, which if significant, would indicate that technically, the patient had secondarily generalized epilepsy.  The patient had a CT scan of the brain done more recently after he returns his head.  This showed evidence of small vessel ischemic disease and possible remote infarcts in the cerebellar region.  However the patient denied neurological symptoms.  His spouse does report that he is continued to smoke cigarettes.  He has been through a smoking cessation program at [facility] but relapsed.       Review of Systems  Review of Systems   Constitutional: Negative.    HENT: Negative for hearing loss.    Eyes: Negative.  Negative for visual disturbance.   Respiratory: Negative.  Negative  for shortness of breath.    Cardiovascular: Negative.  Negative for chest pain and palpitations.   Gastrointestinal: Negative.    Genitourinary: Negative.    Musculoskeletal: Negative.  Negative for neck pain and neck stiffness.   Skin: Negative.    Neurological: Positive for seizures.   Psychiatric/Behavioral: Negative for behavioral problems, confusion, decreased concentration and sleep disturbance. The patient is nervous/anxious.        Objective:      Neurologic Exam      Physical Exam   Constitutional: He appears well-developed and well-nourished.   HENT:   Head: Normocephalic and atraumatic.   Eyes:   Fundus examination shows sharp disc margins.   Neck: Normal range of motion. Neck supple. Carotid bruit is not present.   Neurological: He is alert. He has normal reflexes. He displays no atrophy. No cranial nerve deficit (visual fields were normal at bedside testing.  EOM were normal.  No facial asymmetry was noted.  Facial sensation is symmetrical.  Corneals and gag reflexes were normal.  Tongue and palate movements were normal.  Shoulder shrug was normal.) or sensory deficit. He exhibits normal muscle tone. He displays a negative Romberg sign. Coordination and gait normal.   Mental status examination: Patient is fully oriented and able to give an adequate history.  Recall of recent and past information is good.  Immediate recall is normal.  Attention span and concentration was mildly impaired.  Fund of knowledge was fair.  Judgment and insight is normal.  Language functions are intact with no evidence of aphasia or dysarthria.  Comprehension is unimpaired.  Affect is appropriate, mood was even.  No thought disorder is noted.   Vitals reviewed.        Assessment:        1. Partial epilepsy with impairment of consciousness    2. Depression with anxiety             Plan:       Discussed with patient and spouse.  Seizure precautions including compliance stressed.  Recommend OTC melatonin for sleep.  Continue  Keppra.  Follow-up in one year if stable.

## 2017-07-19 ENCOUNTER — CLINICAL SUPPORT (OUTPATIENT)
Dept: INFECTIOUS DISEASES | Facility: CLINIC | Age: 62
End: 2017-07-19
Payer: MEDICARE

## 2017-07-19 ENCOUNTER — ANTI-COAG VISIT (OUTPATIENT)
Dept: CARDIOLOGY | Facility: CLINIC | Age: 62
End: 2017-07-19
Payer: MEDICARE

## 2017-07-19 ENCOUNTER — INITIAL CONSULT (OUTPATIENT)
Dept: OPTOMETRY | Facility: CLINIC | Age: 62
End: 2017-07-19
Payer: MEDICARE

## 2017-07-19 DIAGNOSIS — Z86.711 HISTORY OF PULMONARY EMBOLISM: ICD-10-CM

## 2017-07-19 DIAGNOSIS — Z86.718 HISTORY OF DVT (DEEP VEIN THROMBOSIS): ICD-10-CM

## 2017-07-19 DIAGNOSIS — H26.9 CORTICAL CATARACT OF BOTH EYES: ICD-10-CM

## 2017-07-19 DIAGNOSIS — Z23 NEED FOR ZOSTER VACCINATION: ICD-10-CM

## 2017-07-19 DIAGNOSIS — Z23 NEED FOR PROPHYLACTIC VACCINATION AGAINST HEMOPHILUS INFLUENZA TYPE B (HIB): ICD-10-CM

## 2017-07-19 DIAGNOSIS — H52.203 ASTIGMATISM OF BOTH EYES, UNSPECIFIED TYPE: ICD-10-CM

## 2017-07-19 DIAGNOSIS — E11.9 TYPE 2 DIABETES MELLITUS WITHOUT OPHTHALMIC MANIFESTATIONS: ICD-10-CM

## 2017-07-19 DIAGNOSIS — Z79.01 CHRONIC ANTICOAGULATION: Primary | ICD-10-CM

## 2017-07-19 DIAGNOSIS — H52.4 PRESBYOPIA OU: ICD-10-CM

## 2017-07-19 DIAGNOSIS — Z90.81 S/P SPLENECTOMY: ICD-10-CM

## 2017-07-19 DIAGNOSIS — H25.13 NUCLEAR SCLEROSIS OF BOTH EYES: Primary | ICD-10-CM

## 2017-07-19 LAB — INR PPP: 1.9 (ref 2–3)

## 2017-07-19 PROCEDURE — 99211 OFF/OP EST MAY X REQ PHY/QHP: CPT | Mod: 25,S$GLB,, | Performed by: PHARMACIST

## 2017-07-19 PROCEDURE — 92015 DETERMINE REFRACTIVE STATE: CPT | Mod: S$GLB,,, | Performed by: OPTOMETRIST

## 2017-07-19 PROCEDURE — 99999 PR PBB SHADOW E&M-EST. PATIENT-LVL I: CPT | Mod: PBBFAC,,,

## 2017-07-19 PROCEDURE — 92004 COMPRE OPH EXAM NEW PT 1/>: CPT | Mod: S$GLB,,, | Performed by: OPTOMETRIST

## 2017-07-19 PROCEDURE — 90648 HIB PRP-T VACCINE 4 DOSE IM: CPT | Mod: S$GLB,,, | Performed by: INTERNAL MEDICINE

## 2017-07-19 PROCEDURE — 90471 IMMUNIZATION ADMIN: CPT | Mod: S$GLB,,, | Performed by: INTERNAL MEDICINE

## 2017-07-19 PROCEDURE — 85610 PROTHROMBIN TIME: CPT | Mod: QW,S$GLB,, | Performed by: PHARMACIST

## 2017-07-19 PROCEDURE — 99999 PR PBB SHADOW E&M-EST. PATIENT-LVL II: CPT | Mod: PBBFAC,,, | Performed by: OPTOMETRIST

## 2017-07-19 PROCEDURE — 99499 UNLISTED E&M SERVICE: CPT | Mod: S$GLB,,, | Performed by: OPTOMETRIST

## 2017-07-19 NOTE — PROGRESS NOTES
INr is slightly low today. Patient reports he may have forgotten one day last week. Otherwise, Patient denies any changes in diet, medications, or health that would effect warfarin therapy.

## 2017-07-19 NOTE — LETTER
July 22, 2017      Gladis Blankenship MD  [address]  [facility] [address]           [facility] Optometry  [address]  [facility] [address]  Phone: [phone]  Fax: [phone]          Patient: Edu Choi   MR Number: [id number]   YOB: 1955   Date of Visit: 7/19/2017       Dear Dr. Gladis Blankenship:    Thank you for referring Edu Choi to me for evaluation. Attached you will find relevant portions of my assessment and plan of care.    If you have questions, please do not hesitate to call me. I look forward to following Edu Choi along with you.    Sincerely,        Enclosure  CC:  No Recipients    If you would like to receive this communication electronically, please contact [email or web address] or [phone] to request more information on [employer] Link access.    For providers and/or their staff who would like to refer a patient to [facility], please contact us through our one-stop-shop provider referral line, [facility], at [phone].    If you feel you have received this communication in error or would no longer like to receive these types of communications, please e-mail [email or web address]

## 2017-07-19 NOTE — PROGRESS NOTES
HPI     Diabetic Eye Exam    Additional comments: Diabetic eye examination.  No acute ocular/vision   problems.  Reports no noticeable decrease in VA.            Comments   Patient's age: 62 y.o. WM   Occupation:  Retired.   Approximate date of last eye examination:  5 yrs ago   Name of last eye doctor seen: Unsure   Wears glasses? yes     If yes, wears  Full-time or part-time?  Full time   Present glasses are: Bifocal, SV Distance, SV Reading?  Bifocals   Approximate age of present glasses:  3 yrs old    Got new glasses following last exam, or subsequently?:  Yes    Any problem with VA with glasses?  No  Wears CLs?:  No  Headaches?  No  Eye pain/discomfort?  No                                                                                     Flashes?  No  Floaters?  No  Diplopia/Double vision?  No  Patient's Ocular History:         Any eye surgeries? No         Any eye injury?  No         Any treatment for eye disease?  No  Family history of eye disease?  no  Significant patient medical history:         1. Diabetes?  yes       If yes, IDDM or NIDDM?   No meds at present - diagnosed approximately   six months ago.   ..Hemoglobin A1C       Date                     Value               Ref Range             Status                04/15/2017               6.5 (H)             4.5 - 6.2 %           Final              Comment:    According to ADA guidelines, hemoglobin A1C <7.0% represents  optimal   control in non-pregnant diabetic patients.  Different  metrics may apply   to specific populations.   Standards of Medical Care in Diabetes -   2016.  For the purpose of screening for the presence of diabetes:  <5.7%       Consistent with the absence of diabetes  5.7-6.4%  Consistent with   increasing risk for diabetes   (prediabetes)  >or=6.5%  Consistent with   diabetes  Currently no consensus exists for use of hemoglobin A1C  for   diagnosis of diabetes for children.         03/18/2017               6.5 (H)             4.5  - 6.2 %           Final              Comment:    According to ADA guidelines, hemoglobin A1C <7.0% represents  optimal   control in non-pregnant diabetic patients.  Different  metrics may apply   to specific populations.   Standards of Medical Care in Diabetes -   2016.  For the purpose of screening for the presence of diabetes:  <5.7%       Consistent with the absence of diabetes  5.7-6.4%  Consistent with   increasing risk for diabetes   (prediabetes)  >or=6.5%  Consistent with   diabetes  Currently no consensus exists for use of hemoglobin A1C  for   diagnosis of diabetes for children.         09/24/2016               6.3 (H)             4.5 - 6.2 %           Final              Comment:    According to ADA guidelines, hemoglobin A1C <7.0% represents  optimal   control in non-pregnant diabetic patients.  Different  metrics may apply   to specific populations.   Standards of Medical Care in Diabetes -   2016.  For the purpose of screening for the presence of diabetes:  <5.7%       Consistent with the absence of diabetes  5.7-6.4%  Consistent with   increasing risk for diabetes   (prediabetes)  >or=6.5%  Consistent with   diabetes  Currently no consensus exists for use of hemoglobin A1C  for   diagnosis of diabetes for children.    ----------   2. HBP?  No              3. Other (describe):   Epilepsy.     ! OTC eyedrops currently using:  No   ! Prescription eye meds currently using:  No   ! Any history of allergy/adverse reaction to any eye meds used   previously?  No    ! Any history of allergy/adverse reaction to eyedrops used during prior   eye exam(s)? No    ! Any history of allergy/adverse reaction to Novacaine or similar meds?   No   ! Any history of allergy/adverse reaction to Epinephrine or similar meds?   No    ! Patient okay with use of anesthetic eyedrops to check eye pressure?    yes        ! Patient okay with use of eyedrops to dilate pupils today?  yes   !  Allergies/Medications/Medical History/Family  "History reviewed today?    Yes       PD =   61/57  Desired reading distance =  14.5"                                                                Last edited by Georgi Ponce, OD on 7/19/2017 10:22 AM. (History)            Assessment /Plan     For exam results, see Encounter Report.    1. Nuclear sclerosis of both eyes     2. Cortical cataract of both eyes     3. Astigmatism of both eyes, unspecified type     4. Presbyopia OU     5. Type 2 diabetes mellitus without ophthalmic manifestations                Early nuclear sclerosis of lens of both eyes, consistent with age.  Early peripheral cortical cataract in both eyes.   No need for cataract surgery in either eye.  No evidence of diabetic retinopathy in either eye.  Hyperopia with astigmatism in each eye, with satisfactory correctable VA in each eye.  Presbyopia.  New spectacle lens RX issued for full-time wear.  Recheck in 12 months - or prior if any problems or changes in vision noted in the interim.         "

## 2017-07-19 NOTE — PATIENT INSTRUCTIONS
Early nuclear sclerosis of lens of both eyes, consistent with age.  Early peripheral cortical cataract in both eyes.   No need for cataract surgery in either eye.  No evidence of diabetic retinopathy in either eye.  Hyperopia with astigmatism in each eye, with satisfactory correctable VA in each eye.  Presbyopia.  New spectacle lens RX issued for full-time wear.  Recheck in 12 months - or prior if any problems or changes in vision noted in the interim.

## 2017-07-19 NOTE — PROGRESS NOTES
Patient received Act Hib vaccine. Tolerated well and left in NAD. Put off shingles vaccine till granddaughter fully vaccinated.

## 2017-07-29 DIAGNOSIS — F41.8 DEPRESSION WITH ANXIETY: ICD-10-CM

## 2017-07-29 DIAGNOSIS — E78.5 HYPERLIPIDEMIA, UNSPECIFIED HYPERLIPIDEMIA TYPE: ICD-10-CM

## 2017-07-30 RX ORDER — ESCITALOPRAM OXALATE 20 MG/1
TABLET ORAL
Qty: 90 TABLET | Refills: 3 | Status: SHIPPED | OUTPATIENT
Start: 2017-07-30 | End: 2018-07-16 | Stop reason: SDUPTHER

## 2017-07-30 RX ORDER — ATORVASTATIN CALCIUM 40 MG/1
TABLET, FILM COATED ORAL
Qty: 90 TABLET | Refills: 3 | Status: SHIPPED | OUTPATIENT
Start: 2017-07-30 | End: 2018-07-09 | Stop reason: SDUPTHER

## 2017-08-11 ENCOUNTER — ANTI-COAG VISIT (OUTPATIENT)
Dept: CARDIOLOGY | Facility: CLINIC | Age: 62
End: 2017-08-11
Payer: MEDICARE

## 2017-08-11 DIAGNOSIS — Z86.711 HISTORY OF PULMONARY EMBOLISM: ICD-10-CM

## 2017-08-11 DIAGNOSIS — Z79.01 CHRONIC ANTICOAGULATION: Primary | ICD-10-CM

## 2017-08-11 DIAGNOSIS — Z86.718 HISTORY OF DVT (DEEP VEIN THROMBOSIS): ICD-10-CM

## 2017-08-11 LAB — INR PPP: 1.6 (ref 2–3)

## 2017-08-11 PROCEDURE — 99211 OFF/OP EST MAY X REQ PHY/QHP: CPT | Mod: 25,S$GLB,, | Performed by: PHARMACIST

## 2017-08-11 PROCEDURE — 85610 PROTHROMBIN TIME: CPT | Mod: QW,S$GLB,, | Performed by: PHARMACIST

## 2017-08-11 NOTE — PROGRESS NOTES
INR continues to hover low with no reported changes. I will increase his weekly dose. Patient was re-educated on situations that would require placing a call to the coumadin clinic, including bleeding or unusual bruising issues, changes in health, diet or medications, upcoming procedures that require warfarin interruption, and missed coumadin dose(s). Patient expressed understanding that avoidance of consistency with these parameters could cause fluctuations in INR, leading to more frequent visits and increase risk of adverse events.

## 2017-08-21 ENCOUNTER — ANTI-COAG VISIT (OUTPATIENT)
Dept: CARDIOLOGY | Facility: CLINIC | Age: 62
End: 2017-08-21
Payer: MEDICARE

## 2017-08-21 DIAGNOSIS — Z79.01 CHRONIC ANTICOAGULATION: Primary | ICD-10-CM

## 2017-08-21 DIAGNOSIS — Z86.711 HISTORY OF PULMONARY EMBOLISM: ICD-10-CM

## 2017-08-21 DIAGNOSIS — Z86.718 HISTORY OF DVT (DEEP VEIN THROMBOSIS): ICD-10-CM

## 2017-08-21 LAB — INR PPP: 3.1 (ref 2–3)

## 2017-08-21 PROCEDURE — 99211 OFF/OP EST MAY X REQ PHY/QHP: CPT | Mod: 25,S$GLB,, | Performed by: PHARMACIST

## 2017-08-21 PROCEDURE — 85610 PROTHROMBIN TIME: CPT | Mod: QW,S$GLB,, | Performed by: PHARMACIST

## 2017-08-21 NOTE — PROGRESS NOTES
Patient denies any changes in diet, medications, or health that would effect warfarin therapy.  Patient was re-educated on situations that would require placing a call to the coumadin clinic, including bleeding or unusual bruising issues, changes in health, diet or medications, upcoming procedures that require warfarin interruption, and missed coumadin dose(s). Patient expressed understanding that avoidance of consistency with these parameters could cause fluctuations in INR, leading to more frequent visits and increase risk of adverse events.     I will lower slightly to avoid continued rise of INR.

## 2017-08-22 ENCOUNTER — CLINICAL SUPPORT (OUTPATIENT)
Dept: SMOKING CESSATION | Facility: CLINIC | Age: 62
End: 2017-08-22
Payer: COMMERCIAL

## 2017-08-22 DIAGNOSIS — F17.200 NICOTINE DEPENDENCE: Primary | ICD-10-CM

## 2017-08-22 PROCEDURE — 99407 BEHAV CHNG SMOKING > 10 MIN: CPT | Mod: S$GLB,,, | Performed by: INTERNAL MEDICINE

## 2017-08-30 ENCOUNTER — INITIAL CONSULT (OUTPATIENT)
Dept: DERMATOLOGY | Facility: CLINIC | Age: 62
End: 2017-08-30
Payer: MEDICARE

## 2017-08-30 DIAGNOSIS — L82.1 SEBORRHEIC KERATOSES: ICD-10-CM

## 2017-08-30 DIAGNOSIS — L70.0 OPEN COMEDONE: ICD-10-CM

## 2017-08-30 DIAGNOSIS — D23.9 INTRADERMAL NEVUS: ICD-10-CM

## 2017-08-30 DIAGNOSIS — L57.0 ACTINIC KERATOSIS: Primary | ICD-10-CM

## 2017-08-30 PROCEDURE — 99999 PR PBB SHADOW E&M-EST. PATIENT-LVL II: CPT | Mod: PBBFAC,,, | Performed by: DERMATOLOGY

## 2017-08-30 PROCEDURE — 17000 DESTRUCT PREMALG LESION: CPT | Mod: S$GLB,,, | Performed by: DERMATOLOGY

## 2017-08-30 PROCEDURE — 3008F BODY MASS INDEX DOCD: CPT | Mod: S$GLB,,, | Performed by: DERMATOLOGY

## 2017-08-30 PROCEDURE — 99202 OFFICE O/P NEW SF 15 MIN: CPT | Mod: 25,S$GLB,, | Performed by: DERMATOLOGY

## 2017-08-30 NOTE — PROGRESS NOTES
"  Subjective:       Patient ID:  Edu Choi is a 62 y.o. male who presents for   Chief Complaint   Patient presents with    Skin Check     ubse     Mr. Choi is a 61 yo man here for UBSE. His PCP was concerned about a "crusty spot" on his R forearm present x 2-3 months; asymptomatic. No prior treatments.  He does not wear hats or sunscreen.     Denies personal or family h/o skin cancer    Past Medical History:   Diagnosis Date    Atherosclerosis of aorta 6/22/2017    Atherosclerosis of native coronary artery of native heart without angina pectoris 6/22/2017    Blood clotting tendency     Depression     Diabetes mellitus type 2 in nonobese     DVT (deep venous thrombosis)     Hyperlipidemia     Measles complicated by meningitis     during childhood, with subsequent seizures    Newly diagnosed diabetes 4/18/2017    PE (pulmonary embolism)     Seizures     Splenomegaly     Thrombophlebitis leg     bilateral legs     Review of Systems   Skin: Negative for itching, rash, daily sunscreen use, activity-related sunscreen use and recent sunburn.   Hematologic/Lymphatic: Does not bruise/bleed easily.        Objective:    Physical Exam   Constitutional: He appears well-developed and well-nourished. No distress.   Neurological: He is alert and oriented to person, place, and time. He is not disoriented.   Psychiatric: He has a normal mood and affect.   Skin:   Areas Examined (abnormalities noted in diagram):   Head / Face Inspection Performed  Neck Inspection Performed  Chest / Axilla Inspection Performed  Abdomen Inspection Performed  Back Inspection Performed  RUE Inspected  LUE Inspection Performed                   Diagram Legend     Erythematous scaling macule/papule c/w actinic keratosis       Vascular papule c/w angioma      Pigmented verrucoid papule/plaque c/w seborrheic keratosis      Yellow umbilicated papule c/w sebaceous hyperplasia      Irregularly shaped tan macule c/w lentigo     1-2 mm " smooth white papules consistent with Milia      Movable subcutaneous cyst with punctum c/w epidermal inclusion cyst      Subcutaneous movable cyst c/w pilar cyst      Firm pink to brown papule c/w dermatofibroma      Pedunculated fleshy papule(s) c/w skin tag(s)      Evenly pigmented macule c/w junctional nevus     Mildly variegated pigmented, slightly irregular-bordered macule c/w mildly atypical nevus      Flesh colored to evenly pigmented papule c/w intradermal nevus       Pink pearly papule/plaque c/w basal cell carcinoma      Erythematous hyperkeratotic cursted plaque c/w SCC      Surgical scar with no sign of skin cancer recurrence      Open and closed comedones      Inflammatory papules and pustules      Verrucoid papule consistent consistent with wart     Erythematous eczematous patches and plaques     Dystrophic onycholytic nail with subungual debris c/w onychomycosis     Umbilicated papule    Erythematous-base heme-crusted tan verrucoid plaque consistent with inflamed seborrheic keratosis     Erythematous Silvery Scaling Plaque c/w Psoriasis     See annotation      Assessment / Plan:        Actinic keratosis  Treated one lesion on R forearm with cryodestruction.  Explained lesion is from chronic sun damage and is technically pre-cancerous.  Will need annual skin checks.     Seborrheic keratoses  Reassurance given to patient. No treatment is necessary.   Treatment of benign, asymptomatic lesions may be considered cosmetic.    Intradermal nevus  Pink papule on back c/w IDN.   Reassurance given to patient. No treatment is necessary.   Treatment of benign, asymptomatic lesions may be considered cosmetic.    Open comedone  Nodular elastosis of Favre and Racchristellet from smoking.  Patient not bothered by this.     RTC 1 year.

## 2017-08-30 NOTE — LETTER
September 3, 2017      Gladis Blankenship MD  [address]  [place] [address]           [facility] - Dermatology  [address]  [place] [address]  Phone: [phone]  Fax: [phone]          Patient: Edu Choi   MR Number: [id number]   YOB: 1955   Date of Visit: 8/30/2017       Dear Dr. Gladis Blankenship:    Thank you for referring Edu Choi to me for evaluation. Attached you will find relevant portions of my assessment and plan of care.    If you have questions, please do not hesitate to call me. I look forward to following Edu Choi along with you.    Sincerely,    Lianet Day MD    Enclosure  CC:  No Recipients    If you would like to receive this communication electronically, please contact [email or web address] or [phone] to request more information on [facility] Link access.    For providers and/or their staff who would like to refer a patient to [facility], please contact us through our one-stop-shop provider referral line, [facility], at [phone].    If you feel you have received this communication in error or would no longer like to receive these types of communications, please e-mail [email or web address]

## 2017-09-06 ENCOUNTER — ANTI-COAG VISIT (OUTPATIENT)
Dept: CARDIOLOGY | Facility: CLINIC | Age: 62
End: 2017-09-06
Payer: MEDICARE

## 2017-09-06 DIAGNOSIS — Z86.711 HISTORY OF PULMONARY EMBOLISM: ICD-10-CM

## 2017-09-06 DIAGNOSIS — Z79.01 CHRONIC ANTICOAGULATION: Primary | ICD-10-CM

## 2017-09-06 DIAGNOSIS — Z86.718 HISTORY OF DVT (DEEP VEIN THROMBOSIS): ICD-10-CM

## 2017-09-06 LAB — INR PPP: 1.6 (ref 2–3)

## 2017-09-06 PROCEDURE — 85610 PROTHROMBIN TIME: CPT | Mod: QW,S$GLB,, | Performed by: PHARMACIST

## 2017-09-06 PROCEDURE — 99211 OFF/OP EST MAY X REQ PHY/QHP: CPT | Mod: 25,S$GLB,, | Performed by: PHARMACIST

## 2017-09-06 NOTE — PROGRESS NOTES
Patient denies any changes in diet, medications, or health that would effect warfarin therapy.  INR low today with no changes. I will increase weekly dose and watch closely for now. Patient was re-educated on situations that would require placing a call to the coumadin clinic, including bleeding or unusual bruising issues, changes in health, diet or medications, upcoming procedures that require warfarin interruption, and missed coumadin dose(s). Patient expressed understanding that avoidance of consistency with these parameters could cause fluctuations in INR, leading to more frequent visits and increase risk of adverse events.

## 2017-09-19 ENCOUNTER — CLINICAL SUPPORT (OUTPATIENT)
Dept: INFECTIOUS DISEASES | Facility: CLINIC | Age: 62
End: 2017-09-19
Payer: MEDICARE

## 2017-09-19 PROCEDURE — 90736 HZV VACCINE LIVE SUBQ: CPT | Mod: S$GLB,,, | Performed by: INTERNAL MEDICINE

## 2017-09-19 PROCEDURE — 90471 IMMUNIZATION ADMIN: CPT | Mod: S$GLB,,, | Performed by: INTERNAL MEDICINE

## 2017-09-19 NOTE — PROGRESS NOTES
Pt received the Zoster vaccination. Vaccination handout provided. Pt educated that this vaccination is a live vaccination. Pt reports he will not be around small children or immunocompromised adults for the next week. Pt tolerated the injection well. Pt left the unit in NAD.

## 2017-09-21 ENCOUNTER — ANTI-COAG VISIT (OUTPATIENT)
Dept: CARDIOLOGY | Facility: CLINIC | Age: 62
End: 2017-09-21
Payer: MEDICARE

## 2017-09-21 DIAGNOSIS — Z79.01 CHRONIC ANTICOAGULATION: Primary | ICD-10-CM

## 2017-09-21 DIAGNOSIS — Z86.718 HISTORY OF DVT (DEEP VEIN THROMBOSIS): ICD-10-CM

## 2017-09-21 DIAGNOSIS — Z86.711 HISTORY OF PULMONARY EMBOLISM: ICD-10-CM

## 2017-09-21 LAB — INR PPP: 3.4 (ref 2–3)

## 2017-09-21 PROCEDURE — 85610 PROTHROMBIN TIME: CPT | Mod: QW,S$GLB,, | Performed by: PHARMACIST

## 2017-09-21 PROCEDURE — 99211 OFF/OP EST MAY X REQ PHY/QHP: CPT | Mod: 25,S$GLB,, | Performed by: PHARMACIST

## 2017-09-21 NOTE — PROGRESS NOTES
INR elevated. Patient denies any changes in diet, medications, or health that would effect warfarin therapy.  Patient was re-educated on situations that would require placing a call to the coumadin clinic, including bleeding or unusual bruising issues, changes in health, diet or medications, upcoming procedures that require warfarin interruption, and missed coumadin dose(s). Patient expressed understanding that avoidance of consistency with these parameters could cause fluctuations in INR, leading to more frequent visits and increase risk of adverse events.

## 2017-09-26 DIAGNOSIS — Z86.711 HISTORY OF PULMONARY EMBOLISM: ICD-10-CM

## 2017-09-26 DIAGNOSIS — Z79.01 CHRONIC ANTICOAGULATION: ICD-10-CM

## 2017-09-26 DIAGNOSIS — Z86.718 HISTORY OF DVT (DEEP VEIN THROMBOSIS): ICD-10-CM

## 2017-09-26 RX ORDER — WARFARIN SODIUM 5 MG/1
TABLET ORAL
Qty: 90 TABLET | Refills: 3 | Status: SHIPPED | OUTPATIENT
Start: 2017-09-26 | End: 2018-11-26 | Stop reason: SDUPTHER

## 2017-09-27 ENCOUNTER — IMMUNIZATION (OUTPATIENT)
Dept: INTERNAL MEDICINE | Facility: CLINIC | Age: 62
End: 2017-09-27
Payer: MEDICARE

## 2017-09-27 ENCOUNTER — LAB VISIT (OUTPATIENT)
Dept: LAB | Facility: HOSPITAL | Age: 62
End: 2017-09-27
Payer: MEDICARE

## 2017-09-27 ENCOUNTER — OFFICE VISIT (OUTPATIENT)
Dept: INTERNAL MEDICINE | Facility: CLINIC | Age: 62
End: 2017-09-27
Payer: MEDICARE

## 2017-09-27 VITALS
HEIGHT: 64 IN | HEART RATE: 78 BPM | BODY MASS INDEX: 25.16 KG/M2 | SYSTOLIC BLOOD PRESSURE: 104 MMHG | TEMPERATURE: 98 F | RESPIRATION RATE: 16 BRPM | DIASTOLIC BLOOD PRESSURE: 56 MMHG | WEIGHT: 147.38 LBS

## 2017-09-27 DIAGNOSIS — E11.9 DIABETES MELLITUS TYPE 2 IN NONOBESE: ICD-10-CM

## 2017-09-27 DIAGNOSIS — D64.9 NORMOCYTIC ANEMIA: ICD-10-CM

## 2017-09-27 DIAGNOSIS — Z79.01 CHRONIC ANTICOAGULATION: ICD-10-CM

## 2017-09-27 DIAGNOSIS — R10.9 ABDOMINAL PAIN, UNSPECIFIED LOCATION: ICD-10-CM

## 2017-09-27 DIAGNOSIS — E11.9 DIABETES MELLITUS TYPE 2 IN NONOBESE: Primary | ICD-10-CM

## 2017-09-27 DIAGNOSIS — G47.19 EXCESSIVE DAYTIME SLEEPINESS: ICD-10-CM

## 2017-09-27 DIAGNOSIS — Z86.718 HISTORY OF DVT (DEEP VEIN THROMBOSIS): ICD-10-CM

## 2017-09-27 DIAGNOSIS — R06.83 SNORING: ICD-10-CM

## 2017-09-27 DIAGNOSIS — Z86.711 HISTORY OF PULMONARY EMBOLISM: ICD-10-CM

## 2017-09-27 LAB
BASOPHILS # BLD AUTO: 0.06 K/UL
BASOPHILS NFR BLD: 0.8 %
DIFFERENTIAL METHOD: ABNORMAL
EOSINOPHIL # BLD AUTO: 0.2 K/UL
EOSINOPHIL NFR BLD: 1.9 %
ERYTHROCYTE [DISTWIDTH] IN BLOOD BY AUTOMATED COUNT: 13.7 %
ESTIMATED AVG GLUCOSE: 126 MG/DL
FERRITIN SERPL-MCNC: 75 NG/ML
HBA1C MFR BLD HPLC: 6 %
HCT VFR BLD AUTO: 41 %
HGB BLD-MCNC: 13.5 G/DL
INR PPP: 1.4
IRON SERPL-MCNC: 115 UG/DL
LYMPHOCYTES # BLD AUTO: 3.4 K/UL
LYMPHOCYTES NFR BLD: 41.9 %
MCH RBC QN AUTO: 31.7 PG
MCHC RBC AUTO-ENTMCNC: 32.9 G/DL
MCV RBC AUTO: 96 FL
MONOCYTES # BLD AUTO: 1.2 K/UL
MONOCYTES NFR BLD: 14.6 %
NEUTROPHILS # BLD AUTO: 3.3 K/UL
NEUTROPHILS NFR BLD: 40.7 %
PLATELET # BLD AUTO: 269 K/UL
PMV BLD AUTO: 10.7 FL
PROTHROMBIN TIME: 15 SEC
RBC # BLD AUTO: 4.26 M/UL
SATURATED IRON: 31 %
TOTAL IRON BINDING CAPACITY: 374 UG/DL
TRANSFERRIN SERPL-MCNC: 253 MG/DL
WBC # BLD AUTO: 7.99 K/UL

## 2017-09-27 PROCEDURE — G0008 ADMIN INFLUENZA VIRUS VAC: HCPCS | Mod: S$GLB,,, | Performed by: INTERNAL MEDICINE

## 2017-09-27 PROCEDURE — 85610 PROTHROMBIN TIME: CPT

## 2017-09-27 PROCEDURE — 3008F BODY MASS INDEX DOCD: CPT | Mod: S$GLB,,, | Performed by: INTERNAL MEDICINE

## 2017-09-27 PROCEDURE — 85025 COMPLETE CBC W/AUTO DIFF WBC: CPT

## 2017-09-27 PROCEDURE — 83036 HEMOGLOBIN GLYCOSYLATED A1C: CPT

## 2017-09-27 PROCEDURE — 99999 PR PBB SHADOW E&M-EST. PATIENT-LVL IV: CPT | Mod: PBBFAC,,, | Performed by: INTERNAL MEDICINE

## 2017-09-27 PROCEDURE — 82728 ASSAY OF FERRITIN: CPT

## 2017-09-27 PROCEDURE — 90686 IIV4 VACC NO PRSV 0.5 ML IM: CPT | Mod: S$GLB,,, | Performed by: INTERNAL MEDICINE

## 2017-09-27 PROCEDURE — 83540 ASSAY OF IRON: CPT

## 2017-09-27 PROCEDURE — 99214 OFFICE O/P EST MOD 30 MIN: CPT | Mod: S$GLB,,, | Performed by: INTERNAL MEDICINE

## 2017-09-27 PROCEDURE — 36415 COLL VENOUS BLD VENIPUNCTURE: CPT

## 2017-09-27 PROCEDURE — 99499 UNLISTED E&M SERVICE: CPT | Mod: S$GLB,,, | Performed by: INTERNAL MEDICINE

## 2017-09-27 NOTE — PROGRESS NOTES
"Subjective:       Patient ID: Edu Choi is a 62 y.o. male.    Chief Complaint: Diabetes; Hyperlipidemia; and Fatigue    HPI   C/o diffuse weakness. Reports feels pretty good int he mornings but then by the afternoon sometimes feels like he's about to fall. Fatigued also. Takes naps in the daytime. No falls. Pt reports rides the lawnmower to cut the grass. But still w/ some difficulty w/ fatigue even when he rides the lawnmower.   No wheezing. C/o R sided CP in the past, which is less frequent and not exertional.   TSH 6/22/17 - WNL  Cortisol normal. B12 normal.   Sleeps about 7-8 hours at night - wakes up about 1-2x/day; falls back asleep pretty quickly. When waking up, does feel refreshed but soon after feels tired again. Naps. Can fall asleep any time.   On lexapro 20mg daily for anxiety/depression.     DM2 - A1C 3/2017 was 6.5. Checks glucose at home and 70s-100s. Diet controlled.   Eye 7/19/17  Foot exam-6/22/17  A1c 4/15/17  Urine microalbumin 6/22/17 neg  LDL-75.6 on 3/18/17.  Atorvastatin 40 mg daily.    H/O DVT and PE - on coumadin. Follows w/ coumadin clinic. Last INR was 2.2 6/7/17. No bloody stools/abdominal pain/diarrhea/melena. No fevers/chills/rhinorrhea/congestion. No night sweats. Changed diet so did have some weight loss.     No HA. Cough a few days ago. No wheezing/SOB.    Review of Systems  as above in HPI.     Objective:      Physical Exam    BP (!) 104/56   Pulse 78   Temp 98.3 °F (36.8 °C) (Oral)   Resp 16   Ht 5' 4" (1.626 m)   Wt 66.9 kg (147 lb 6.4 oz)   BMI 25.30 kg/m²     GEN - A+OX4, NAD   Psych - flat affect.  HEENT - PERRL, EOMI, OP clear. MMM.   Neck - No thyromegaly or cervical LAD. No thyroid masses felt.  CV - RRR, no m/r   Chest - CTAB, no wheezing or rhonchi  Abd - S/NT/ND/+BS. Bulge in the ventral abdomen on exertion but no definitive defect noted. Some pain in the RUQ when sitting up and laying down on exam. No rebound or guarding.  Ext - 1+BDP and radial pulses. No " LE edema.   Neuro - PERRL, EOMI, no nystagmus, eyebrow raise, facial sensation, hearing, m of mastication, smile, palatal raise, shoulder shrug, tongue protrusion symmetric and intact. 5/5 BUE and BLE strength. Sensation to light touch intact throughout. 2+ DTRs. Normal gait.   Skin - abdominal scar well healed.    Labs reviewed.    Assessment/Plan     Edu was seen today for diabetes, hyperlipidemia and fatigue.    Diagnoses and all orders for this visit:    Diabetes mellitus type 2 in nonobese  -     Hemoglobin A1c; Future    Excessive daytime sleepiness - uncertain cause of fatigue. Possible CHRIS? If neg, will refer to sleep medicine MD.   -     Polysomnogram (CPAP will be added if patient meets diagnostic criteria.); Future    Snoring  -     Polysomnogram (CPAP will be added if patient meets diagnostic criteria.); Future    Normocytic anemia  -     CBC auto differential; Future  -     Ferritin; Future  -     Iron and TIBC; Future    Chronic anticoagulation - f/u w/ coumadin clinic.     Abdominal pain, unspecified location  -     US Abdomen Complete; Future    Flu vaccine today.  Return in about 4 months (around 1/27/2018).      Gladis Blankenship MD  Department of Internal Medicine - [facility]  2:47 PM

## 2017-09-28 ENCOUNTER — ANTI-COAG VISIT (OUTPATIENT)
Dept: CARDIOLOGY | Facility: CLINIC | Age: 62
End: 2017-09-28

## 2017-09-28 DIAGNOSIS — Z79.01 CHRONIC ANTICOAGULATION: ICD-10-CM

## 2017-09-28 DIAGNOSIS — Z86.718 HISTORY OF DVT (DEEP VEIN THROMBOSIS): ICD-10-CM

## 2017-09-28 DIAGNOSIS — Z86.711 HISTORY OF PULMONARY EMBOLISM: ICD-10-CM

## 2017-10-03 ENCOUNTER — TELEPHONE (OUTPATIENT)
Dept: SLEEP MEDICINE | Facility: OTHER | Age: 62
End: 2017-10-03

## 2017-10-06 ENCOUNTER — ANTI-COAG VISIT (OUTPATIENT)
Dept: CARDIOLOGY | Facility: CLINIC | Age: 62
End: 2017-10-06
Payer: MEDICARE

## 2017-10-06 DIAGNOSIS — Z79.01 CHRONIC ANTICOAGULATION: Primary | ICD-10-CM

## 2017-10-06 DIAGNOSIS — Z86.718 HISTORY OF DVT (DEEP VEIN THROMBOSIS): ICD-10-CM

## 2017-10-06 DIAGNOSIS — Z86.711 HISTORY OF PULMONARY EMBOLISM: ICD-10-CM

## 2017-10-06 LAB — INR PPP: 3 (ref 2–3)

## 2017-10-06 PROCEDURE — 99211 OFF/OP EST MAY X REQ PHY/QHP: CPT | Mod: 25,S$GLB,, | Performed by: PHARMACIST

## 2017-10-06 PROCEDURE — 85610 PROTHROMBIN TIME: CPT | Mod: QW,S$GLB,, | Performed by: PHARMACIST

## 2017-10-06 NOTE — PROGRESS NOTES
INR increased rapidly since last week but this is with the boosted 5mg doses.  Over the next week he will receive a lower weekly dose.  We will watch closely. Patient was re-educated on situations that would require placing a call to the coumadin clinic, including bleeding or unusual bruising issues, changes in health, diet or medications, upcoming procedures that require warfarin interruption, and missed coumadin dose(s). Patient expressed understanding that avoidance of consistency with these parameters could cause fluctuations in INR, leading to more frequent visits and increase risk of adverse events.

## 2017-10-07 ENCOUNTER — HOSPITAL ENCOUNTER (OUTPATIENT)
Dept: RADIOLOGY | Facility: HOSPITAL | Age: 62
Discharge: HOME | End: 2017-10-07
Attending: INTERNAL MEDICINE
Payer: MEDICARE

## 2017-10-07 DIAGNOSIS — R10.9 ABDOMINAL PAIN, UNSPECIFIED LOCATION: ICD-10-CM

## 2017-10-07 PROCEDURE — 76700 US EXAM ABDOM COMPLETE: CPT | Mod: 26,,, | Performed by: RADIOLOGY

## 2017-10-07 PROCEDURE — 76700 US EXAM ABDOM COMPLETE: CPT | Mod: TC

## 2017-10-12 ENCOUNTER — TELEPHONE (OUTPATIENT)
Dept: INTERNAL MEDICINE | Facility: CLINIC | Age: 62
End: 2017-10-12

## 2017-10-12 DIAGNOSIS — K80.20 CALCULUS OF GALLBLADDER WITHOUT CHOLECYSTITIS WITHOUT OBSTRUCTION: ICD-10-CM

## 2017-10-12 NOTE — TELEPHONE ENCOUNTER
Spoke w/ pt in regards to cholelithiasis. No issues currently. Would like to defer gen surg referral. If worsens, will let me know. If fevers/chills/intractable n/v or intol to PO, need to go to ED.

## 2017-10-16 ENCOUNTER — ANTI-COAG VISIT (OUTPATIENT)
Dept: CARDIOLOGY | Facility: CLINIC | Age: 62
End: 2017-10-16
Payer: MEDICARE

## 2017-10-16 DIAGNOSIS — Z86.718 HISTORY OF DVT (DEEP VEIN THROMBOSIS): ICD-10-CM

## 2017-10-16 DIAGNOSIS — Z86.711 HISTORY OF PULMONARY EMBOLISM: ICD-10-CM

## 2017-10-16 DIAGNOSIS — Z79.01 CHRONIC ANTICOAGULATION: Primary | ICD-10-CM

## 2017-10-16 LAB — INR PPP: 2.3 (ref 2–3)

## 2017-10-16 PROCEDURE — 85610 PROTHROMBIN TIME: CPT | Mod: QW,S$GLB,, | Performed by: PHARMACIST

## 2017-10-30 ENCOUNTER — ANTI-COAG VISIT (OUTPATIENT)
Dept: CARDIOLOGY | Facility: CLINIC | Age: 62
End: 2017-10-30
Payer: MEDICARE

## 2017-10-30 DIAGNOSIS — Z86.711 HISTORY OF PULMONARY EMBOLISM: ICD-10-CM

## 2017-10-30 DIAGNOSIS — Z79.01 CHRONIC ANTICOAGULATION: Primary | ICD-10-CM

## 2017-10-30 DIAGNOSIS — Z86.718 HISTORY OF DVT (DEEP VEIN THROMBOSIS): ICD-10-CM

## 2017-10-30 LAB — INR PPP: 1.6 (ref 2–3)

## 2017-10-30 PROCEDURE — 99211 OFF/OP EST MAY X REQ PHY/QHP: CPT | Mod: 25,S$GLB,, | Performed by: PHARMACIST

## 2017-10-30 PROCEDURE — 85610 PROTHROMBIN TIME: CPT | Mod: QW,S$GLB,, | Performed by: PHARMACIST

## 2017-10-30 NOTE — PROGRESS NOTES
INR low today at 1.6. Patient denies any changes in diet, medications, or health that would effect warfarin therapy.  I will increase his weekly warfarin dose at this time. Patient was re-educated on situations that would require placing a call to the coumadin clinic, including bleeding or unusual bruising issues, changes in health, diet or medications, upcoming procedures that require warfarin interruption, and missed coumadin dose(s). Patient expressed understanding that avoidance of consistency with these parameters could cause fluctuations in INR, leading to more frequent visits and increase risk of adverse events.

## 2017-11-13 ENCOUNTER — ANTI-COAG VISIT (OUTPATIENT)
Dept: CARDIOLOGY | Facility: CLINIC | Age: 62
End: 2017-11-13
Payer: MEDICARE

## 2017-11-13 DIAGNOSIS — Z79.01 CHRONIC ANTICOAGULATION: Primary | ICD-10-CM

## 2017-11-13 DIAGNOSIS — Z86.718 HISTORY OF DVT (DEEP VEIN THROMBOSIS): ICD-10-CM

## 2017-11-13 DIAGNOSIS — Z86.711 HISTORY OF PULMONARY EMBOLISM: ICD-10-CM

## 2017-11-13 LAB — INR PPP: 1.8 (ref 2–3)

## 2017-11-13 PROCEDURE — 99211 OFF/OP EST MAY X REQ PHY/QHP: CPT | Mod: 25,S$GLB,, | Performed by: PHARMACIST

## 2017-11-13 PROCEDURE — 85610 PROTHROMBIN TIME: CPT | Mod: QW,S$GLB,, | Performed by: PHARMACIST

## 2017-11-13 NOTE — PROGRESS NOTES
INR continues to hover low. Patient reports no changes or problems, no new meds. I will increase dose and keep close watch. Patient was re-educated on situations that would require placing a call to the coumadin clinic, including bleeding or unusual bruising issues, changes in health, diet or medications, upcoming procedures that require warfarin interruption, and missed coumadin dose(s). Patient expressed understanding that avoidance of consistency with these parameters could cause fluctuations in INR, leading to more frequent visits and increase risk of adverse events.

## 2017-11-17 ENCOUNTER — HOSPITAL ENCOUNTER (OUTPATIENT)
Dept: SLEEP MEDICINE | Facility: HOSPITAL | Age: 62
Discharge: HOME | End: 2017-11-17
Attending: INTERNAL MEDICINE
Payer: MEDICARE

## 2017-11-17 DIAGNOSIS — G47.19 EXCESSIVE DAYTIME SLEEPINESS: ICD-10-CM

## 2017-11-17 DIAGNOSIS — R06.83 SNORING: ICD-10-CM

## 2017-11-17 DIAGNOSIS — G47.33 OSA (OBSTRUCTIVE SLEEP APNEA): ICD-10-CM

## 2017-11-17 PROCEDURE — 95810 PR POLYSOMNOGRAPHY, 4 OR MORE: ICD-10-PCS | Mod: 26,,, | Performed by: PSYCHIATRY & NEUROLOGY

## 2017-11-17 PROCEDURE — 95810 POLYSOM 6/> YRS 4/> PARAM: CPT | Mod: 26,,, | Performed by: PSYCHIATRY & NEUROLOGY

## 2017-11-17 PROCEDURE — 95810 POLYSOM 6/> YRS 4/> PARAM: CPT

## 2017-11-18 NOTE — PROGRESS NOTES
Edu Choi was admitted to the [facility] ambulatory for Gladis Blankenship MD.  The patient is scheduled to have a PSG and if criteria are met will do a split.  The call system, wire purpose and procedures were explained to the patient. The difference in the two procedures was explained. The patient understands he may be asked to wear a mask.  Questions and concerns were addressed.    The EKG revealed:  NSR    The following leads were fixed due to artifacts:  nasal pressure replaced    The patient did not qualify for a split study. Hypopneas/apneas with de-saturations to the 80's > in supine REM. Leg movements were observed.     The patient felt tired  in the AM.     The patient has a ride home.    The patient was given the post-test expectations sheet with contact information.

## 2017-11-21 ENCOUNTER — ANTI-COAG VISIT (OUTPATIENT)
Dept: CARDIOLOGY | Facility: CLINIC | Age: 62
End: 2017-11-21
Payer: MEDICARE

## 2017-11-21 ENCOUNTER — TELEPHONE (OUTPATIENT)
Dept: INTERNAL MEDICINE | Facility: CLINIC | Age: 62
End: 2017-11-21

## 2017-11-21 DIAGNOSIS — Z86.718 HISTORY OF DVT (DEEP VEIN THROMBOSIS): ICD-10-CM

## 2017-11-21 DIAGNOSIS — Z79.01 CHRONIC ANTICOAGULATION: Primary | ICD-10-CM

## 2017-11-21 DIAGNOSIS — Z86.711 HISTORY OF PULMONARY EMBOLISM: ICD-10-CM

## 2017-11-21 LAB — INR PPP: 2 (ref 2–3)

## 2017-11-21 PROCEDURE — 85610 PROTHROMBIN TIME: CPT | Mod: QW,S$GLB,, | Performed by: PHARMACIST

## 2017-11-21 PROCEDURE — 99211 OFF/OP EST MAY X REQ PHY/QHP: CPT | Mod: 25,S$GLB,, | Performed by: PHARMACIST

## 2017-11-21 NOTE — TELEPHONE ENCOUNTER
----- Message from Alison Ponce sent at 11/21/2017  3:23 PM CST -----  Self  [phone]    Pt is having some dental work done and he need to talk to you about his warfarin he is on.  Please call dental done on Monday

## 2017-11-21 NOTE — PROGRESS NOTES
Patient reports that yesterday am he had a breakthrough seizure and had a fall and cut his lip and had a small laceration behind his ear. He reports he was taken to [facility] and his wife reports that Head scan was normal and the Drs there did not change or add any new medications. He did miss his coumadin last night due to forgetting to take it due to the stress of the day. INR on lower end and will likely drop more since we are not yet seeing the full impact of yesterday's missed coumadin dose. Patient was re-educated on situations that would require placing a call to the coumadin clinic, including bleeding or unusual bruising issues, changes in health, diet or medications, upcoming procedures that require warfarin interruption, and missed coumadin dose(s). Patient expressed understanding that avoidance of consistency with these parameters could cause fluctuations in INR, leading to more frequent visits and increase risk of adverse events.

## 2017-11-22 ENCOUNTER — TELEPHONE (OUTPATIENT)
Dept: NEUROLOGY | Facility: CLINIC | Age: 62
End: 2017-11-22

## 2017-11-22 NOTE — TELEPHONE ENCOUNTER
----- Message from Jordan Bob sent at 11/22/2017 10:12 AM CST -----  Contact: Pt's Spouse Nicole called [phone]  Pt had a seizure Monday and was treated at [facility].  He was told he needed to make a [facility] f/u apt with his Neurologist.  I checked the schedule in [place]  Dr Ventura is booked until Dec 29th and then I checked the [facility] schedule he's booked until Jan 2nd.  Please call pt's wife to discuss making him an apt.  I also informed pt's wife she may have to take him to the [facility] location because he's not coming to [place] for a couple of weeks.

## 2017-11-22 NOTE — PROGRESS NOTES
"I received a clearance form for the pt to schedule a "full mouth extraction." I have tried to call his dental office - [facility] in [place], but have never been able to get through, to confirm a date for this procedure.  I have tried to reach the pt to get a good phone number, but had to LMFCB with him as well.  "

## 2017-11-27 ENCOUNTER — OFFICE VISIT (OUTPATIENT)
Dept: INTERNAL MEDICINE | Facility: CLINIC | Age: 62
End: 2017-11-27
Payer: MEDICARE

## 2017-11-27 VITALS
RESPIRATION RATE: 16 BRPM | HEART RATE: 93 BPM | HEIGHT: 64 IN | SYSTOLIC BLOOD PRESSURE: 110 MMHG | OXYGEN SATURATION: 96 % | WEIGHT: 145.5 LBS | BODY MASS INDEX: 24.84 KG/M2 | DIASTOLIC BLOOD PRESSURE: 60 MMHG

## 2017-11-27 DIAGNOSIS — Z48.02 VISIT FOR SUTURE REMOVAL: Primary | ICD-10-CM

## 2017-11-27 PROCEDURE — 99024 POSTOP FOLLOW-UP VISIT: CPT | Mod: S$GLB,,, | Performed by: NURSE PRACTITIONER

## 2017-11-27 PROCEDURE — 99212 OFFICE O/P EST SF 10 MIN: CPT | Mod: S$GLB,,, | Performed by: NURSE PRACTITIONER

## 2017-11-27 PROCEDURE — 99499 UNLISTED E&M SERVICE: CPT | Mod: S$GLB,,, | Performed by: NURSE PRACTITIONER

## 2017-11-27 PROCEDURE — 99999 PR PBB SHADOW E&M-EST. PATIENT-LVL IV: CPT | Mod: PBBFAC,,, | Performed by: NURSE PRACTITIONER

## 2017-11-27 NOTE — PROCEDURES
Suture Removal  Date/Time: 11/27/2017 5:21 PM  Performed by: IFEOMA EARLY  Authorized by: IFEOMA EARLY   Body area: head/neck  Location details: scalp  Description of findings: 2 sutures posterior to left ear, well healed laceration   Wound Appearance: clean and well healed  Sutures Removed: 2  Post-removal: no dressing applied  Complications: No  Estimated blood loss (mL): 0  Specimens: No  Implants: No  Patient tolerance: Patient tolerated the procedure well with no immediate complications

## 2017-11-27 NOTE — PROGRESS NOTES
I was able to reach the pt this morning.  He did confirm that all his teeth will be pulled with this procedure and knows to inform us once a date is determined.  Clearance faxed to [facility] (P: [phone]; F: [phone]).  Of note, he was approved, per Dr. Blankenship, to hold coumadin without lovenox for a c-scope last year and I am providing him the same approval for this procedure - to hold coumadin 3-4 days prior without lovenox.

## 2017-11-28 ENCOUNTER — ANTI-COAG VISIT (OUTPATIENT)
Dept: CARDIOLOGY | Facility: CLINIC | Age: 62
End: 2017-11-28
Payer: MEDICARE

## 2017-11-28 DIAGNOSIS — Z79.01 CHRONIC ANTICOAGULATION: ICD-10-CM

## 2017-11-28 DIAGNOSIS — Z86.718 HISTORY OF DVT (DEEP VEIN THROMBOSIS): ICD-10-CM

## 2017-11-28 DIAGNOSIS — Z79.01 LONG-TERM (CURRENT) USE OF ANTICOAGULANTS: Primary | ICD-10-CM

## 2017-11-28 DIAGNOSIS — Z86.711 HISTORY OF PULMONARY EMBOLISM: ICD-10-CM

## 2017-11-28 LAB — INR PPP: 3.6 (ref 2–3)

## 2017-11-28 PROCEDURE — 85610 PROTHROMBIN TIME: CPT | Mod: QW,S$GLB,,

## 2017-11-28 NOTE — PROGRESS NOTES
I have reviewed the nurse's initial findings and agree with their assessment.  I have personally spoken with and assessed the patient in clinic to devise care plan.

## 2017-11-28 NOTE — PROGRESS NOTES
INR high today for no apparent reason. Bruise on arm from use. Denies any bleeding or changes. Patient has upcoming teeth extraction procedure 12/14 and will need instructions at next visit. Will hold dose today and then resume weekly dose and follow-up in 10 days. Advised him and his wife to call with any changes or concerns.

## 2017-11-28 NOTE — PROGRESS NOTES
Subjective:       Patient ID: Edu Choi is a 62 y.o. male.    Chief Complaint: Suture / Staple Removal    Mr. Choi fell while having a seizure 7 days ago. He was transported via EMS to [facility] and the laceration behind his left ear was sutured. He is due to have the sutures removed.        Suture / Staple Removal   The sutures were placed 7 to 10 days ago. He tried nothing since the wound repair. The treatment provided significant relief. There has been no drainage from the wound. There is no redness present. There is no swelling present. There is no pain present.     Review of Systems   Constitutional: Negative for fever.   HENT: Negative for facial swelling.    Eyes: Negative for visual disturbance.   Respiratory: Negative for shortness of breath.    Cardiovascular: Negative for chest pain.   Gastrointestinal: Negative for diarrhea, nausea and vomiting.   Genitourinary: Negative for dysuria.   Musculoskeletal: Negative for neck pain.   Skin: Positive for wound.   Neurological: Positive for seizures.   Psychiatric/Behavioral: Negative for confusion.       Objective:      Physical Exam   Constitutional: He is oriented to person, place, and time. He appears well-developed and well-nourished. No distress.   HENT:   Head: Normocephalic and atraumatic.   Eyes: No scleral icterus.   Neck: Normal range of motion. Neck supple.   Cardiovascular: Normal rate, regular rhythm and normal heart sounds.    Lymphadenopathy:     He has no cervical adenopathy.   Neurological: He is alert and oriented to person, place, and time.   Skin: Skin is warm and dry. He is not diaphoretic.        Psychiatric: His behavior is normal.   Nursing note and vitals reviewed.      Assessment:       1. Visit for suture removal        Plan:   1. Visit for suture removal  - SUTURE REMOVAL      Pt has been given instructions populated from [employer] database and has verbalized understanding of the after visit summary and information contained  wherein.    Follow up with a primary care provider. May go to ER for acute shortness of breath, lightheadedness, fever, or any other emergent complaints or changes in condition.

## 2017-12-01 ENCOUNTER — TELEPHONE (OUTPATIENT)
Dept: INTERNAL MEDICINE | Facility: CLINIC | Age: 62
End: 2017-12-01

## 2017-12-01 DIAGNOSIS — G47.33 OSA (OBSTRUCTIVE SLEEP APNEA): Primary | ICD-10-CM

## 2017-12-01 NOTE — TELEPHONE ENCOUNTER
Please call and let pt know that his sleep study did show sleep apnea (he stopped breathing on average of 21.6 times in an hour) with decreased oxygenation while sleeping. I recommend getting a CPAP titration study to determine what's the optimal pressure for CPAP to prevent sleep apnea. I'll order the CPAP titration. He'll have to call to schedule. Once that's done, I can place the orders for the CPAP machine. Please see if he prefers a full face mask or a nasal mask.

## 2017-12-08 ENCOUNTER — ANTI-COAG VISIT (OUTPATIENT)
Dept: CARDIOLOGY | Facility: CLINIC | Age: 62
End: 2017-12-08
Payer: MEDICARE

## 2017-12-08 DIAGNOSIS — Z86.718 HISTORY OF DVT (DEEP VEIN THROMBOSIS): ICD-10-CM

## 2017-12-08 DIAGNOSIS — Z79.01 CHRONIC ANTICOAGULATION: Primary | ICD-10-CM

## 2017-12-08 DIAGNOSIS — Z86.711 HISTORY OF PULMONARY EMBOLISM: ICD-10-CM

## 2017-12-08 LAB — INR PPP: 3.6 (ref 2–3)

## 2017-12-08 PROCEDURE — 85610 PROTHROMBIN TIME: CPT | Mod: QW,S$GLB,, | Performed by: PHARMACIST

## 2017-12-08 NOTE — PROGRESS NOTES
Quick follow up for high INR 11/28. INR still high today for no apparent reason. Patient denies any bleeding, bruising, or changes. He has an upcoming teeth extraction on 12/14. Verbal and written procedure instructions given to him and his wife. Will hold coumadin today and resume weekly dose for 1 day till patient starts holding for procedure. Will resume weekly dose when dentist approves until follow-up appointment 12/21. Advised him and his wife to call with any changes or concerns.

## 2017-12-08 NOTE — TELEPHONE ENCOUNTER
Spoke to patient, results given via phone as instructed, patient expressed understanding verbally, no additional questions.  Pt does not know about mask type, will call after study or will follow up when results are received

## 2017-12-14 ENCOUNTER — TELEPHONE (OUTPATIENT)
Dept: INTERNAL MEDICINE | Facility: CLINIC | Age: 62
End: 2017-12-14

## 2017-12-14 NOTE — TELEPHONE ENCOUNTER
----- Message from Marsha Howard sent at 12/14/2017  2:20 PM CST -----  Contact: Marylou/[phone]      Patient is returning a phone call.  Who left a message for the patient: Jackeline  Does patient know what this is regarding: returning call    Comments: patient has a appointment at 3 pm. Please call and advise. URGENT. Thank you!!!

## 2017-12-15 ENCOUNTER — TELEPHONE (OUTPATIENT)
Dept: SLEEP MEDICINE | Facility: OTHER | Age: 62
End: 2017-12-15

## 2017-12-21 ENCOUNTER — OFFICE VISIT (OUTPATIENT)
Dept: INTERNAL MEDICINE | Facility: CLINIC | Age: 62
End: 2017-12-21
Payer: MEDICARE

## 2017-12-21 ENCOUNTER — ANTI-COAG VISIT (OUTPATIENT)
Dept: CARDIOLOGY | Facility: CLINIC | Age: 62
End: 2017-12-21
Payer: MEDICARE

## 2017-12-21 VITALS
RESPIRATION RATE: 16 BRPM | TEMPERATURE: 99 F | WEIGHT: 147 LBS | HEART RATE: 84 BPM | SYSTOLIC BLOOD PRESSURE: 114 MMHG | HEIGHT: 64 IN | DIASTOLIC BLOOD PRESSURE: 58 MMHG | BODY MASS INDEX: 25.1 KG/M2

## 2017-12-21 DIAGNOSIS — Z86.711 HISTORY OF PULMONARY EMBOLISM: ICD-10-CM

## 2017-12-21 DIAGNOSIS — Z79.01 CHRONIC ANTICOAGULATION: ICD-10-CM

## 2017-12-21 DIAGNOSIS — Z79.01 LONG-TERM (CURRENT) USE OF ANTICOAGULANTS: Primary | ICD-10-CM

## 2017-12-21 DIAGNOSIS — Z01.818 PREOPERATIVE CLEARANCE: ICD-10-CM

## 2017-12-21 DIAGNOSIS — I95.9 HYPOTENSION, UNSPECIFIED HYPOTENSION TYPE: Primary | ICD-10-CM

## 2017-12-21 DIAGNOSIS — Z86.718 HISTORY OF DVT (DEEP VEIN THROMBOSIS): ICD-10-CM

## 2017-12-21 DIAGNOSIS — G40.909 SEIZURE DISORDER: ICD-10-CM

## 2017-12-21 LAB — INR PPP: 3.9 (ref 2–3)

## 2017-12-21 PROCEDURE — 85610 PROTHROMBIN TIME: CPT | Mod: QW,S$GLB,,

## 2017-12-21 PROCEDURE — 99214 OFFICE O/P EST MOD 30 MIN: CPT | Mod: S$GLB,,, | Performed by: INTERNAL MEDICINE

## 2017-12-21 PROCEDURE — 99999 PR PBB SHADOW E&M-EST. PATIENT-LVL III: CPT | Mod: PBBFAC,,, | Performed by: INTERNAL MEDICINE

## 2017-12-21 PROCEDURE — 99211 OFF/OP EST MAY X REQ PHY/QHP: CPT | Mod: 25,S$GLB,,

## 2017-12-21 NOTE — PROGRESS NOTES
Quick follow up for high INR on 12/8. INR high today. Patient states that he took an extra 2.5 mg yesterday because he confused the day and wasn't sure if he took it or not. Patient denies any bleeding, bruising or other changes. We will hold his coumadin tonight and decrease his weekly dose. Follow up in 1 week. Advised patient to notify us of any changes or concerns.

## 2017-12-21 NOTE — PROGRESS NOTES
"Subjective:       Patient ID: Edu Choi is a 62 y.o. male.    Chief Complaint: Follow-up; Advice Only (pt wants dental work); and Fall (from seizure caused laceration behind left ear)    HPI   Had a seizure Thanksgiving morning - wife heard him fall and found him on the floor. Called EMS. Found to have low BP. Brought to [facility]. Had laceration behind the ear and s/p repair. BP returned to normal then. And by the time he came around from seizure alert and oriented and was discharged. Has h/o seizures d/o. On Keppra 1000mg BID. Has upcoming appt w/ Dr. Ventura.     Needs dental clearance. Apparently at the last dental appointment, pt had local anesthesia and it caused his BP to drop. The dentist's office gave him oxygen and he felt better.  Pt's BP is always on the lower side of normal. Random cortisol is WNL. Exercise stress echo 7/10/17 although exercise capacity is severely impaired. Neg but sensitivity impaired 2/2 not reaching target HR.   Sometimes w/ dizziness when standing.    Sleep study w/ CHRIS. Scheduled CPAP titration in feb per wife.     Review of Systems  no CP/palpitations/SOB. Comprehensive review of systems otherwise negative. See history/subjective section for more details.    Objective:      Physical Exam    BP (!) 114/58   Pulse 84   Temp 98.5 °F (36.9 °C) (Oral)   Resp 16   Ht 5' 4" (1.626 m)   Wt 66.7 kg (147 lb)   BMI 25.23 kg/m²     GEN - A+OX4, NAD   Psych - flat affect.  HEENT - PERRL, EOMI, OP clear. MMM.   Neck - No thyromegaly or cervical LAD. No thyroid masses felt.  CV - RRR, no m/r   Chest - CTAB, no wheezing or rhonchi  Abd - S/NT/ND/+BS. Bulge in the ventral abdomen on exertion but no definitive defect noted. Some pain in the RUQ when sitting up and laying down on exam. No rebound or guarding.  Ext - 1+BDP and radial pulses. No LE edema.     Labs reviewed.    Assessment/Plan     Edu was seen today for follow-up, advice only and fall.    Diagnoses and all orders for this " visit:    Hypotension, unspecified hypotension type - response to anesthesia. Recommend against same anesthesia that caused the response. Consider tylenol the morning of an possibly try topical anesthesia instead. Will refer to cardiology to determine if further work up needed.  -     Ambulatory Referral to Cardiology    Preoperative clearance  -     Ambulatory Referral to Cardiology    Seizure disorder - f/u w/ Dr. Ventura. On keppra 1000mg BID, which is unchanged.     No Follow-up on file.      Gladis Blankenship MD  Department of Internal Medicine - [facility]  3:05 PM

## 2017-12-21 NOTE — PROGRESS NOTES
Pt initially seen by Miracle RAMESH. I have reviewed her initial findings and agree with her assessment. He also reported that his dental work was not done due to his BP. He confirmed greens once per week. Care plan made together. INR trending on high end. Decrease dose

## 2017-12-22 ENCOUNTER — CLINICAL SUPPORT (OUTPATIENT)
Dept: CARDIOLOGY | Facility: CLINIC | Age: 62
End: 2017-12-22
Attending: INTERNAL MEDICINE
Payer: MEDICARE

## 2017-12-22 ENCOUNTER — TELEPHONE (OUTPATIENT)
Dept: CARDIOLOGY | Facility: CLINIC | Age: 62
End: 2017-12-22

## 2017-12-22 ENCOUNTER — OFFICE VISIT (OUTPATIENT)
Dept: CARDIOLOGY | Facility: CLINIC | Age: 62
End: 2017-12-22
Payer: MEDICARE

## 2017-12-22 VITALS
DIASTOLIC BLOOD PRESSURE: 58 MMHG | HEART RATE: 68 BPM | WEIGHT: 149.69 LBS | BODY MASS INDEX: 25.56 KG/M2 | HEIGHT: 64 IN | SYSTOLIC BLOOD PRESSURE: 100 MMHG

## 2017-12-22 DIAGNOSIS — Z79.01 CHRONIC ANTICOAGULATION: ICD-10-CM

## 2017-12-22 DIAGNOSIS — I95.9 HYPOTENSION, UNSPECIFIED HYPOTENSION TYPE: ICD-10-CM

## 2017-12-22 DIAGNOSIS — Z86.718 HISTORY OF DVT (DEEP VEIN THROMBOSIS): ICD-10-CM

## 2017-12-22 DIAGNOSIS — Z72.0 TOBACCO ABUSE: ICD-10-CM

## 2017-12-22 DIAGNOSIS — G40.209 PARTIAL EPILEPSY WITH IMPAIRMENT OF CONSCIOUSNESS: Primary | Chronic | ICD-10-CM

## 2017-12-22 DIAGNOSIS — G47.33 OSA (OBSTRUCTIVE SLEEP APNEA): ICD-10-CM

## 2017-12-22 DIAGNOSIS — G40.209 PARTIAL EPILEPSY WITH IMPAIRMENT OF CONSCIOUSNESS: Chronic | ICD-10-CM

## 2017-12-22 DIAGNOSIS — I25.10 ATHEROSCLEROSIS OF NATIVE CORONARY ARTERY OF NATIVE HEART WITHOUT ANGINA PECTORIS: Chronic | ICD-10-CM

## 2017-12-22 DIAGNOSIS — E78.00 PURE HYPERCHOLESTEROLEMIA: ICD-10-CM

## 2017-12-22 DIAGNOSIS — E11.9 DIABETES MELLITUS TYPE 2 IN NONOBESE: ICD-10-CM

## 2017-12-22 DIAGNOSIS — Z86.711 HISTORY OF PULMONARY EMBOLISM: ICD-10-CM

## 2017-12-22 LAB — INTERNAL CAROTID STENOSIS: NORMAL

## 2017-12-22 PROCEDURE — 99204 OFFICE O/P NEW MOD 45 MIN: CPT | Mod: S$GLB,,, | Performed by: INTERNAL MEDICINE

## 2017-12-22 PROCEDURE — 99999 PR PBB SHADOW E&M-EST. PATIENT-LVL III: CPT | Mod: PBBFAC,,, | Performed by: INTERNAL MEDICINE

## 2017-12-22 PROCEDURE — 93880 EXTRACRANIAL BILAT STUDY: CPT | Mod: S$GLB,,, | Performed by: INTERNAL MEDICINE

## 2017-12-22 PROCEDURE — 99499 UNLISTED E&M SERVICE: CPT | Mod: S$GLB,,, | Performed by: INTERNAL MEDICINE

## 2017-12-22 NOTE — PROGRESS NOTES
Subjective:   Patient ID:  Edu Choi is a 62 y.o. male who presents for evaluation of Hypotension      HPI: He is accompanied today by his wife. He has a history of recurrent DVT/ PE and is on lifelong anticoagulation. He recently went to have his teeth pulled and after receiving lidocaine, his blood pressure dropped, and he developed ptosis of his right eye and pain and and numbness in his right shoulder and hand. The procedure was aborted, and his symptoms subsequently resolved and have not recurred. He has CAD as seen on a CT scan done in June. He subsequently had an exercise stress echo done, however he only exercise for 1.8 minutes and achieved 69% of his MPHR. His LV function was normal.  He has no exertional symptoms. He is actively smoking. He denies any history of stroke. He has a seizure about once every few month. The last was Thanksgiving week.    ECG: (7/10/17): NSR 74.    Past Medical History:   Diagnosis Date    Atherosclerosis of aorta 6/22/2017    Atherosclerosis of native coronary artery of native heart without angina pectoris 6/22/2017    Blood clotting tendency     Depression     Diabetes mellitus type 2 in nonobese     DVT (deep venous thrombosis)     Hyperlipidemia     Measles complicated by meningitis     during childhood, with subsequent seizures    Newly diagnosed diabetes 4/18/2017    PE (pulmonary embolism)     Seizures     Splenomegaly     Thrombophlebitis leg     bilateral legs       Past Surgical History:   Procedure Laterality Date    ANAL FISTULOTOMY      COLONOSCOPY N/A 10/14/2016    Procedure: COLONOSCOPY;  Surgeon: Edu Stratton MD;  Location: [facility] ([address] [place] [address]);  Service: Endoscopy;  Laterality: N/A;  Patient may hold Coumadin x 3 days prior to procedure with out Lovenox bridge as per C.C./svn/coumadin lab 1st    SPLENECTOMY, TOTAL         Social History     Social History    Marital status:      Spouse name: N/A    Number of children: N/A     Years of education: N/A     Social History Main Topics    Smoking status: Current Every Day Smoker     Packs/day: 0.25     Years: 33.00    Smokeless tobacco: Never Used    Alcohol use No    Drug use: No    Sexual activity: No     Other Topics Concern    None     Social History Narrative    None       Family History   Problem Relation Age of Onset    Thrombophlebitis Father     Heart failure Father     Heart attack Father     Hypertension Father     Melanoma Neg Hx        Patient's Medications   New Prescriptions    No medications on file   Previous Medications    ATORVASTATIN (LIPITOR) 40 MG TABLET    TAKE 1 TABLET EVERY EVENING    ESCITALOPRAM OXALATE (LEXAPRO) 20 MG TABLET    TAKE 1 TABLET ONE TIME DAILY    LANCETS 30 GAUGE MISC    1 lancet by Misc.(Non-Drug; Combo Route) route once daily.    LEVETIRACETAM (KEPPRA) 1000 MG TABLET    TAKE 1 TABLET TWICE DAILY    TRUE METRIX GLUCOSE TEST STRIP STRP    1 strip by Misc.(Non-Drug; Combo Route) route once daily.    WARFARIN (COUMADIN) 5 MG TABLET    TAKE 1 TABLET EVERY DAY   Modified Medications    No medications on file   Discontinued Medications    No medications on file       Review of Systems   Constitution: Negative for weakness, malaise/fatigue and weight gain.   HENT: Negative for hearing loss.    Eyes: Negative for visual disturbance.   Cardiovascular: Negative for chest pain, claudication, dyspnea on exertion, leg swelling, near-syncope, orthopnea, palpitations, paroxysmal nocturnal dyspnea and syncope.   Respiratory: Negative for cough, shortness of breath, sleep disturbances due to breathing, snoring and wheezing.    Endocrine: Negative for cold intolerance, heat intolerance, polydipsia, polyphagia and polyuria.   Hematologic/Lymphatic: Negative for bleeding problem. Does not bruise/bleed easily.   Skin: Negative for rash and suspicious lesions.   Musculoskeletal: Negative for arthritis, falls, joint pain, muscle weakness and myalgias.  "  Gastrointestinal: Negative for abdominal pain, change in bowel habit, constipation, diarrhea, heartburn, hematochezia, melena and nausea.   Genitourinary: Negative for hematuria and nocturia.   Neurological: Positive for excessive daytime sleepiness and seizures. Negative for dizziness, headaches, light-headedness and loss of balance.   Psychiatric/Behavioral: Negative for depression. The patient is not nervous/anxious.    Allergic/Immunologic: Negative for environmental allergies.       BP (!) 100/58   Pulse 68   Ht 5' 4" (1.626 m)   Wt 67.9 kg (149 lb 11.1 oz)   BMI 25.69 kg/m²     Objective:   Physical Exam   Constitutional: He is oriented to person, place, and time. He appears well-developed and well-nourished.        HENT:   Head: Normocephalic and atraumatic.   Mouth/Throat: Oropharynx is clear and moist.   Eyes: Conjunctivae and EOM are normal. Pupils are equal, round, and reactive to light. No scleral icterus.   Neck: Normal range of motion. Neck supple. No hepatojugular reflux and no JVD present. No tracheal deviation present. No thyromegaly present.   Cardiovascular: Normal rate, regular rhythm, normal heart sounds and intact distal pulses.  PMI is not displaced.    Pulses:       Carotid pulses are 2+ on the right side, and 2+ on the left side.       Radial pulses are 0 on the right side, and 2+ on the left side.        Dorsalis pedis pulses are 0 on the right side, and 2+ on the left side.        Posterior tibial pulses are 2+ on the right side, and 2+ on the left side.   Pulmonary/Chest: Effort normal and breath sounds normal.   Abdominal: Soft. Bowel sounds are normal. He exhibits no distension and no mass. There is no hepatosplenomegaly. There is no tenderness.   Musculoskeletal: He exhibits no edema or tenderness.   Lymphadenopathy:     He has no cervical adenopathy.   Neurological: He is alert and oriented to person, place, and time.   Skin: Skin is warm and dry. No rash noted. No cyanosis or " erythema. Nails show no clubbing.   Psychiatric: He has a normal mood and affect. His speech is normal and behavior is normal.       Lab Results   Component Value Date     06/22/2017    K 4.6 06/22/2017     06/22/2017    CO2 25 06/22/2017    BUN 19 06/22/2017    CREATININE 1.2 06/22/2017    GLU 91 06/22/2017    HGBA1C 6.0 (H) 09/27/2017    MG 2.1 05/11/2015    AST 29 06/22/2017    ALT 27 06/22/2017    ALBUMIN 4.2 06/22/2017    PROT 7.6 06/22/2017    BILITOT 0.4 06/22/2017    WBC 7.99 09/27/2017    HGB 13.5 (L) 09/27/2017    HCT 41.0 09/27/2017    MCV 96 09/27/2017     09/27/2017    INR 3.9 12/21/2017    INR 1.4 (H) 09/27/2017    TSH 1.739 06/22/2017    CHOL 144 03/18/2017    HDL 59 03/18/2017    LDLCALC 75.6 03/18/2017    TRIG 47 03/18/2017     (H) 05/02/2015       Assessment:     1. Partial epilepsy with impairment of consciousness    2. Atherosclerosis of native coronary artery of native heart without angina pectoris : I am ordering a pharm SPECT given the inconclusive nature of his DIONNA. He is on high intensity therapy with atorvastatin. He is not on asa, but he is high bleeding risk being on coumadin with his seizure disorder.   3. Pure hypercholesterolemia : Continue atorvastatin.   4. Chronic anticoagulation    5. History of DVT (deep vein thrombosis)    6. History of pulmonary embolism : He is on chronic anti-coagulation with coumadin.   7. Diabetes mellitus type 2 in nonobese    8. CHRIS (obstructive sleep apnea) : He is currently being evaluated by sleep medicine.   9. Tobacco abuse : Smoking cessation encouraged.   10. Hypotension, unspecified hypotension type : He had some neurologic symptoms during this episode which was likely a vagal episode. I am checking carotid US for further evaluation.       Plan:     Edu was seen today for hypotension.    Diagnoses and all orders for this visit:    Partial epilepsy with impairment of consciousness  -     CAR Ultrasound doppler carotid  bliateral; Future  -     EKG 12-LEAD; Future  -     NM Multi Pharm Stress Cardiac Component; Future    Atherosclerosis of native coronary artery of native heart without angina pectoris  -     CAR Ultrasound doppler carotid bliateral; Future  -     EKG 12-LEAD; Future  -     NM Multi Pharm Stress Cardiac Component; Future    Pure hypercholesterolemia  -     CAR Ultrasound doppler carotid bliateral; Future  -     EKG 12-LEAD; Future  -     NM Multi Pharm Stress Cardiac Component; Future    Chronic anticoagulation  -     CAR Ultrasound doppler carotid bliateral; Future  -     EKG 12-LEAD; Future  -     NM Multi Pharm Stress Cardiac Component; Future    History of DVT (deep vein thrombosis)  -     CAR Ultrasound doppler carotid bliateral; Future  -     EKG 12-LEAD; Future  -     NM Multi Pharm Stress Cardiac Component; Future    History of pulmonary embolism    Diabetes mellitus type 2 in nonobese  -     CAR Ultrasound doppler carotid bliateral; Future  -     EKG 12-LEAD; Future  -     NM Multi Pharm Stress Cardiac Component; Future    CHRIS (obstructive sleep apnea)  -     CAR Ultrasound doppler carotid bliateral; Future  -     EKG 12-LEAD; Future  -     NM Multi Pharm Stress Cardiac Component; Future    Tobacco abuse    Hypotension, unspecified hypotension type  -     CAR Ultrasound doppler carotid bliateral; Future  -     EKG 12-LEAD; Future  -     NM Multi Pharm Stress Cardiac Component; Future        Thank you for allowing me to participate in this patient's care. Please do not hesitate to contact me with any questions or concerns.

## 2017-12-22 NOTE — TELEPHONE ENCOUNTER
Results of Carotid u/s were given to patient.    Notes Recorded by Nelli Tong MD on 12/22/2017 at 3:28 PM CST  Mild bilateral carotid plaque is present without any focal obstruction.

## 2017-12-22 NOTE — LETTER
December 22, 2017      Gladis Blankenship MD  [address]  [place] [address]           [facility]  [address]  [place] [address]  Phone: [phone]          Patient: Edu Choi   MR Number: [id number]   YOB: 1955   Date of Visit: 12/22/2017       Dear Dr. Gladis Blankenship:    Thank you for referring Edu Choi to me for evaluation. Attached you will find relevant portions of my assessment and plan of care.    If you have questions, please do not hesitate to call me. I look forward to following Edu Choi along with you.    Sincerely,    Nelli Tong MD    Enclosure  CC:  No Recipients    If you would like to receive this communication electronically, please contact [email or web address] or [phone] to request more information on [employer] access.    For providers and/or their staff who would like to refer a patient to [facility], please contact us through our one-stop-shop provider referral line, [facility], at [phone].    If you feel you have received this communication in error or would no longer like to receive these types of communications, please e-mail [email or web address]

## 2017-12-27 ENCOUNTER — ANTI-COAG VISIT (OUTPATIENT)
Dept: CARDIOLOGY | Facility: CLINIC | Age: 62
End: 2017-12-27
Payer: MEDICARE

## 2017-12-27 DIAGNOSIS — Z79.01 CHRONIC ANTICOAGULATION: Primary | ICD-10-CM

## 2017-12-27 DIAGNOSIS — Z86.718 HISTORY OF DVT (DEEP VEIN THROMBOSIS): ICD-10-CM

## 2017-12-27 DIAGNOSIS — Z86.711 HISTORY OF PULMONARY EMBOLISM: ICD-10-CM

## 2017-12-27 LAB — INR PPP: 4.3 (ref 2–3)

## 2017-12-27 PROCEDURE — 85610 PROTHROMBIN TIME: CPT | Mod: QW,S$GLB,, | Performed by: INTERNAL MEDICINE

## 2017-12-27 NOTE — PROGRESS NOTES
Quick follow-up for elevated INR and decreased dose 12/21. INR higher today. Patient denies any bleeding, bruising or diet changes. He states that he did have a glass of wine for [place] which is a change. Will hold coumadin dose today and decrease weekly dose and follow-up in 1 week. Advised him and his wife to call with any changes or concerns.

## 2017-12-28 NOTE — PROGRESS NOTES
Pt seen by Stacie RAMESH. I have reviewed her initial findings and agree with her assessment and plan

## 2017-12-29 ENCOUNTER — CLINICAL SUPPORT (OUTPATIENT)
Dept: CARDIOLOGY | Facility: CLINIC | Age: 62
End: 2017-12-29
Attending: INTERNAL MEDICINE
Payer: MEDICARE

## 2017-12-29 ENCOUNTER — LAB VISIT (OUTPATIENT)
Dept: LAB | Facility: HOSPITAL | Age: 62
End: 2017-12-29
Attending: INTERNAL MEDICINE
Payer: MEDICARE

## 2017-12-29 ENCOUNTER — OFFICE VISIT (OUTPATIENT)
Dept: NEUROLOGY | Facility: CLINIC | Age: 62
End: 2017-12-29
Payer: MEDICARE

## 2017-12-29 ENCOUNTER — TELEPHONE (OUTPATIENT)
Dept: INTERNAL MEDICINE | Facility: CLINIC | Age: 62
End: 2017-12-29

## 2017-12-29 VITALS
DIASTOLIC BLOOD PRESSURE: 64 MMHG | WEIGHT: 151.69 LBS | HEART RATE: 79 BPM | SYSTOLIC BLOOD PRESSURE: 118 MMHG | BODY MASS INDEX: 25.9 KG/M2 | HEIGHT: 64 IN

## 2017-12-29 DIAGNOSIS — I25.10 ATHEROSCLEROSIS OF NATIVE CORONARY ARTERY OF NATIVE HEART WITHOUT ANGINA PECTORIS: Chronic | ICD-10-CM

## 2017-12-29 DIAGNOSIS — Z86.718 HISTORY OF DVT (DEEP VEIN THROMBOSIS): ICD-10-CM

## 2017-12-29 DIAGNOSIS — F41.8 DEPRESSION WITH ANXIETY: ICD-10-CM

## 2017-12-29 DIAGNOSIS — E11.9 DIABETES MELLITUS TYPE 2 IN NONOBESE: ICD-10-CM

## 2017-12-29 DIAGNOSIS — E78.00 PURE HYPERCHOLESTEROLEMIA: ICD-10-CM

## 2017-12-29 DIAGNOSIS — I95.9 HYPOTENSION, UNSPECIFIED HYPOTENSION TYPE: ICD-10-CM

## 2017-12-29 DIAGNOSIS — Z20.828 EXPOSURE TO THE FLU: Primary | ICD-10-CM

## 2017-12-29 DIAGNOSIS — G40.209 PARTIAL EPILEPSY WITH IMPAIRMENT OF CONSCIOUSNESS: Chronic | ICD-10-CM

## 2017-12-29 DIAGNOSIS — Z79.01 CHRONIC ANTICOAGULATION: ICD-10-CM

## 2017-12-29 DIAGNOSIS — G40.209 PARTIAL EPILEPSY WITH IMPAIRMENT OF CONSCIOUSNESS: Primary | Chronic | ICD-10-CM

## 2017-12-29 DIAGNOSIS — I70.0 ATHEROSCLEROSIS OF AORTA: ICD-10-CM

## 2017-12-29 DIAGNOSIS — G47.33 OSA (OBSTRUCTIVE SLEEP APNEA): ICD-10-CM

## 2017-12-29 DIAGNOSIS — Z86.73 HISTORY OF CVA (CEREBROVASCULAR ACCIDENT): ICD-10-CM

## 2017-12-29 PROCEDURE — A9502 TC99M TETROFOSMIN: HCPCS | Mod: S$GLB,,, | Performed by: INTERNAL MEDICINE

## 2017-12-29 PROCEDURE — 78452 HT MUSCLE IMAGE SPECT MULT: CPT | Mod: S$GLB,,, | Performed by: INTERNAL MEDICINE

## 2017-12-29 PROCEDURE — 99999 PR PBB SHADOW E&M-EST. PATIENT-LVL I: CPT | Mod: PBBFAC,,,

## 2017-12-29 PROCEDURE — 36415 COLL VENOUS BLD VENIPUNCTURE: CPT | Mod: PO

## 2017-12-29 PROCEDURE — 99214 OFFICE O/P EST MOD 30 MIN: CPT | Mod: S$GLB,,, | Performed by: PSYCHIATRY & NEUROLOGY

## 2017-12-29 PROCEDURE — 99999 PR PBB SHADOW E&M-EST. PATIENT-LVL III: CPT | Mod: PBBFAC,,, | Performed by: PSYCHIATRY & NEUROLOGY

## 2017-12-29 PROCEDURE — 80177 DRUG SCRN QUAN LEVETIRACETAM: CPT | Mod: PO

## 2017-12-29 PROCEDURE — 93015 CV STRESS TEST SUPVJ I&R: CPT | Mod: S$GLB,,, | Performed by: INTERNAL MEDICINE

## 2017-12-29 PROCEDURE — 99499 UNLISTED E&M SERVICE: CPT | Mod: S$GLB,,, | Performed by: PSYCHIATRY & NEUROLOGY

## 2017-12-29 RX ORDER — WARFARIN SODIUM 5 MG/1
TABLET ORAL
COMMUNITY
End: 2017-12-29

## 2017-12-29 RX ORDER — ATORVASTATIN CALCIUM 40 MG/1
40 TABLET, FILM COATED ORAL
COMMUNITY
End: 2017-12-29

## 2017-12-29 RX ORDER — LEVETIRACETAM 1000 MG/1
1000 TABLET ORAL
COMMUNITY
End: 2017-12-29

## 2017-12-29 RX ORDER — OSELTAMIVIR PHOSPHATE 75 MG/1
75 CAPSULE ORAL DAILY
Qty: 10 CAPSULE | Refills: 0 | Status: SHIPPED | OUTPATIENT
Start: 2017-12-29 | End: 2018-01-31

## 2017-12-29 NOTE — TELEPHONE ENCOUNTER
----- Message from Marylou Wick sent at 12/29/2017  8:57 AM CST -----  Contact: pt's wife [phone]  Pt's wife called to request if Dr Blankenship could call in an Rx for the Flu preventative medicine.  Their grandson was diagnosed yesterday with the flu.  They would like to use the [facility] in [place].  Please call pt's wife to let her know if and when she can expect to have this done.  They can be reached at [phone]    Thanks  taj

## 2017-12-29 NOTE — PROGRESS NOTES
Subjective:       Patient ID: Edu Choi is a 62 y.o. male.    Chief Complaint:  Seizures      History of Present Illness  HPI   This is a 62-year-old male who was being followed by me with a history of seizures since age 4 related to complications secondary to measles during childhood. Seizures are described as transient loss of consciousness during which time he may display repetitive behaviors and he has no recollection of the episode. He has had occasional generalized convulsions that are infrequent.  He had a seizure in May 2017, noted on awakening in the morning.  He did report that he had been having sleep difficulty and is not sure if he missed his medicine the night before.  Subsequently was seen at the [facility] emergency room after having had a seizure in November 2017.  He apparently had a witnessed seizure at home described as generalized convulsion with loss of consciousness.  A small laceration behind the ear.  At presentation, patient had a GCS of 13, which has improved to 15 over the course of his ED stay. Exam shows small 1.5 laceration behind the left ear.  Emergent CT of the head and c-spine without evidence of ICH or bony injury. CXR WNL. EKG shows no ischemic changes nor arrhythmia.  He denied missing his medications.  He continues to be on Coumadin for his bleeding disorder. His spouse was present today.      A CT scan of the brain done in May 2015 was normal.  An EEG done at around the same time was abnormal. The findings were consistent with left hemispheric cerebral dysfunction that is maximal and epileptogenic in the left temporal head region. In addition, there were scattered sharp waves in the right frontopolar head region, which if significant, would indicate that technically, the patient had secondarily generalized epilepsy.         Review of Systems  Review of Systems   Constitutional: Negative.    HENT: Negative for hearing loss.    Eyes: Negative.  Negative for visual disturbance.    Respiratory: Negative.  Negative for shortness of breath.    Cardiovascular: Negative.  Negative for chest pain and palpitations.   Gastrointestinal: Negative.    Genitourinary: Negative.    Musculoskeletal: Negative.  Negative for neck pain and neck stiffness.   Skin: Negative.    Neurological: Positive for seizures.   Psychiatric/Behavioral: Negative for behavioral problems, confusion, decreased concentration and sleep disturbance. The patient is nervous/anxious.        Objective:      Neurologic Exam      Physical Exam   Constitutional: He appears well-developed and well-nourished.   HENT:   Head: Normocephalic and atraumatic.   Eyes:   Fundus examination shows sharp disc margins.   Neck: Normal range of motion. Neck supple. Carotid bruit is not present.   Neurological: He is alert. He has normal reflexes. He displays no atrophy. No cranial nerve deficit (visual fields were normal at bedside testing.  EOM were normal.  No facial asymmetry was noted.  Facial sensation is symmetrical.  Corneals and gag reflexes were normal.  Tongue and palate movements were normal.  Shoulder shrug was normal.) or sensory deficit. He exhibits normal muscle tone. He displays a negative Romberg sign. Coordination and gait normal.   Mental status examination: Patient is fully oriented and able to give an adequate history.  Recall of recent and past information is good.  Immediate recall is normal.  Attention span and concentration was mildly impaired.  Fund of knowledge was fair.  Judgment and insight is normal.  Language functions are intact with no evidence of aphasia or dysarthria.  Comprehension is unimpaired.  Affect is appropriate, mood was even.  No thought disorder is noted.   Vitals reviewed.        Assessment:        1. Partial epilepsy with impairment of consciousness    2. Depression with anxiety    3. Diabetes mellitus type 2 in nonobese    4. History of CVA (cerebrovascular accident)    5. History of DVT (deep vein  thrombosis)    6. Atherosclerosis of aorta    7. Atherosclerosis of native coronary artery of native heart without angina pectoris             Plan:       Discussed with patient and spouse.  Seizure precautions including compliance stressed.  Recommend OTC melatonin for sleep.  Continue Keppra.  We will get a Keppra level done and contact patient with results to see if any changes need to be made.  Follow-up in 6 months if stable.

## 2017-12-29 NOTE — PROGRESS NOTES
Patient verified by 2 identifiers and allergies reviewed.  22g IV placed to Rt AC.  Nuclear Regadenoson testing explained to patient, patient verbalized understanding & consent signed.  Regadenosen & Myoview administered, IV flushed post testing.  Post study discharge instructions reviewed with patient, patient verbalized understanding.  Patient returned to waiting room in stable condition to await stress images.

## 2017-12-29 NOTE — PATIENT INSTRUCTIONS
Discussed with patient and spouse.  Seizure precautions including compliance stressed.  Recommend OTC melatonin for sleep.  Continue Keppra.  We will get a Keppra level done and contact patient with results to see if any changes need to be made.

## 2018-01-02 LAB — LEVETIRACETAM SERPL-MCNC: 27.6 UG/ML (ref 3–60)

## 2018-01-03 ENCOUNTER — TELEPHONE (OUTPATIENT)
Dept: CARDIOLOGY | Facility: CLINIC | Age: 63
End: 2018-01-03

## 2018-01-03 ENCOUNTER — ANTI-COAG VISIT (OUTPATIENT)
Dept: CARDIOLOGY | Facility: CLINIC | Age: 63
End: 2018-01-03
Payer: MEDICARE

## 2018-01-03 DIAGNOSIS — Z86.711 HISTORY OF PULMONARY EMBOLISM: ICD-10-CM

## 2018-01-03 DIAGNOSIS — Z79.01 CHRONIC ANTICOAGULATION: Primary | ICD-10-CM

## 2018-01-03 DIAGNOSIS — Z86.718 HISTORY OF DVT (DEEP VEIN THROMBOSIS): ICD-10-CM

## 2018-01-03 LAB — INR PPP: 2.6 (ref 2–3)

## 2018-01-03 PROCEDURE — 85610 PROTHROMBIN TIME: CPT | Mod: QW,S$GLB,, | Performed by: PHARMACIST

## 2018-01-03 NOTE — TELEPHONE ENCOUNTER
----- Message from Nelli Tong MD sent at 1/2/2018  8:50 AM CST -----  The stress test was normal. There was no evidence of any significant blockages in the coronary arteries.

## 2018-01-03 NOTE — PROGRESS NOTES
Quick follow up from high INR 12/27 and a decreased dose. INR in normal range. Patient and his wife reeducated verbally and written on vitamin k foods and importance on consistency. Denies any bleeding, bruising or other changes. Will maintain weekly dose until follow up in 10 days. Advised patient and wife to call with any changes or concerns.

## 2018-01-04 NOTE — PROGRESS NOTES
Pt seen by Stacie RAMESH . I have reviewed her initial findings and agree with her assessment and plan

## 2018-01-12 ENCOUNTER — ANTI-COAG VISIT (OUTPATIENT)
Dept: CARDIOLOGY | Facility: CLINIC | Age: 63
End: 2018-01-12
Payer: MEDICARE

## 2018-01-12 DIAGNOSIS — Z79.01 CHRONIC ANTICOAGULATION: Primary | ICD-10-CM

## 2018-01-12 DIAGNOSIS — Z86.711 HISTORY OF PULMONARY EMBOLISM: ICD-10-CM

## 2018-01-12 DIAGNOSIS — Z86.718 HISTORY OF DVT (DEEP VEIN THROMBOSIS): ICD-10-CM

## 2018-01-12 LAB — INR PPP: >8 (ref 2–3)

## 2018-01-12 PROCEDURE — 99211 OFF/OP EST MAY X REQ PHY/QHP: CPT | Mod: 25,S$GLB,, | Performed by: PHARMACIST

## 2018-01-12 PROCEDURE — 85610 PROTHROMBIN TIME: CPT | Mod: QW,S$GLB,, | Performed by: PHARMACIST

## 2018-01-12 NOTE — PROGRESS NOTES
Patient reports no bleeding or bruising, no new medications and no diet changes.  He reports no alcohol intake, no stress, no extra doses of warfarin and no recent illness.  I am asking him to have greens today and hold coumadin over the weekend.  He will return on Monday for a repeat INR.  I reminded the patient to call with any problems, changes or questions before the next visit.

## 2018-01-15 ENCOUNTER — ANTI-COAG VISIT (OUTPATIENT)
Dept: CARDIOLOGY | Facility: CLINIC | Age: 63
End: 2018-01-15
Payer: MEDICARE

## 2018-01-15 ENCOUNTER — PES CALL (OUTPATIENT)
Dept: ADMINISTRATIVE | Facility: CLINIC | Age: 63
End: 2018-01-15

## 2018-01-15 DIAGNOSIS — Z86.718 HISTORY OF DVT (DEEP VEIN THROMBOSIS): ICD-10-CM

## 2018-01-15 DIAGNOSIS — Z79.01 CHRONIC ANTICOAGULATION: Primary | ICD-10-CM

## 2018-01-15 DIAGNOSIS — Z86.711 HISTORY OF PULMONARY EMBOLISM: ICD-10-CM

## 2018-01-15 LAB — INR PPP: 4.6 (ref 2–3)

## 2018-01-15 PROCEDURE — 85610 PROTHROMBIN TIME: CPT | Mod: QW,S$GLB,, | Performed by: PHARMACIST

## 2018-01-15 NOTE — PROGRESS NOTES
Quick follow up from high INR 1/12. INR is high today. Patient reports no bleeding, bruising or other changes. He has held coumadin as instructed. Will hold for the next 2 days and recheck on 1/17. Patient advised to call with any concerns or changes.

## 2018-01-15 NOTE — PROGRESS NOTES
Pt seen by Terri RAMESH. I have reviewed her initial findings and agree with her assessment and plan

## 2018-01-17 ENCOUNTER — ANTI-COAG VISIT (OUTPATIENT)
Dept: CARDIOLOGY | Facility: CLINIC | Age: 63
End: 2018-01-17
Payer: MEDICARE

## 2018-01-17 DIAGNOSIS — Z86.711 HISTORY OF PULMONARY EMBOLISM: ICD-10-CM

## 2018-01-17 DIAGNOSIS — Z79.01 CHRONIC ANTICOAGULATION: Primary | ICD-10-CM

## 2018-01-17 DIAGNOSIS — Z86.718 HISTORY OF DVT (DEEP VEIN THROMBOSIS): ICD-10-CM

## 2018-01-17 LAB — INR PPP: 2.1 (ref 2–3)

## 2018-01-17 PROCEDURE — 85610 PROTHROMBIN TIME: CPT | Mod: QW,S$GLB,, | Performed by: PHARMACIST

## 2018-01-17 NOTE — PROGRESS NOTES
Quick follow-up for elevated INR 1/12 and 1/15. INR within normal range today. Patient with bruises from use. Denies any bleeding or other changes. Will try a new lower weekly dose until follow-up next week. Advised him and his wife to call with any changes or concerns.

## 2018-01-17 NOTE — PROGRESS NOTES
Pt seen by Stacie RAMESH. I have reviewed her documentation and agree with her assessment and plan.

## 2018-01-24 ENCOUNTER — ANTI-COAG VISIT (OUTPATIENT)
Dept: CARDIOLOGY | Facility: CLINIC | Age: 63
End: 2018-01-24
Payer: MEDICARE

## 2018-01-24 DIAGNOSIS — Z86.711 HISTORY OF PULMONARY EMBOLISM: ICD-10-CM

## 2018-01-24 DIAGNOSIS — Z79.01 CHRONIC ANTICOAGULATION: ICD-10-CM

## 2018-01-24 DIAGNOSIS — Z79.01 LONG TERM (CURRENT) USE OF ANTICOAGULANTS: Primary | ICD-10-CM

## 2018-01-24 DIAGNOSIS — Z86.718 HISTORY OF DVT (DEEP VEIN THROMBOSIS): ICD-10-CM

## 2018-01-24 LAB — INR PPP: 1.7 (ref 2–3)

## 2018-01-24 PROCEDURE — 99211 OFF/OP EST MAY X REQ PHY/QHP: CPT | Mod: 25,S$GLB,, | Performed by: PHARMACIST

## 2018-01-24 PROCEDURE — 85610 PROTHROMBIN TIME: CPT | Mod: QW,S$GLB,, | Performed by: PHARMACIST

## 2018-01-24 NOTE — PROGRESS NOTES
Quick follow-up from decreased dose 1/17. INR low today. Patient with bruises from use. Denies any bleeding or changes. Will increase weekly dose with increase starting today and follow-up in 1 week. Advised him and his wife to call with any changes or concerns.

## 2018-01-31 ENCOUNTER — OFFICE VISIT (OUTPATIENT)
Dept: INTERNAL MEDICINE | Facility: CLINIC | Age: 63
End: 2018-01-31
Payer: MEDICARE

## 2018-01-31 ENCOUNTER — ANTI-COAG VISIT (OUTPATIENT)
Dept: CARDIOLOGY | Facility: CLINIC | Age: 63
End: 2018-01-31
Payer: MEDICARE

## 2018-01-31 VITALS
SYSTOLIC BLOOD PRESSURE: 104 MMHG | TEMPERATURE: 99 F | WEIGHT: 146.19 LBS | HEIGHT: 64 IN | BODY MASS INDEX: 24.96 KG/M2 | DIASTOLIC BLOOD PRESSURE: 52 MMHG | RESPIRATION RATE: 16 BRPM | HEART RATE: 95 BPM

## 2018-01-31 DIAGNOSIS — Z79.01 CHRONIC ANTICOAGULATION: ICD-10-CM

## 2018-01-31 DIAGNOSIS — E78.5 HYPERLIPIDEMIA ASSOCIATED WITH TYPE 2 DIABETES MELLITUS: ICD-10-CM

## 2018-01-31 DIAGNOSIS — G40.909 SEIZURE DISORDER: ICD-10-CM

## 2018-01-31 DIAGNOSIS — I77.9 BILATERAL CAROTID ARTERY DISEASE: ICD-10-CM

## 2018-01-31 DIAGNOSIS — Z86.711 HISTORY OF PULMONARY EMBOLISM: ICD-10-CM

## 2018-01-31 DIAGNOSIS — E11.9 CONTROLLED TYPE 2 DIABETES MELLITUS WITHOUT COMPLICATION, WITHOUT LONG-TERM CURRENT USE OF INSULIN: Primary | ICD-10-CM

## 2018-01-31 DIAGNOSIS — Z86.718 HISTORY OF DVT (DEEP VEIN THROMBOSIS): ICD-10-CM

## 2018-01-31 DIAGNOSIS — E11.69 HYPERLIPIDEMIA ASSOCIATED WITH TYPE 2 DIABETES MELLITUS: ICD-10-CM

## 2018-01-31 DIAGNOSIS — Z79.01 CHRONIC ANTICOAGULATION: Primary | ICD-10-CM

## 2018-01-31 DIAGNOSIS — F33.40 MDD (RECURRENT MAJOR DEPRESSIVE DISORDER) IN REMISSION: ICD-10-CM

## 2018-01-31 DIAGNOSIS — I70.0 AORTIC ATHEROSCLEROSIS: ICD-10-CM

## 2018-01-31 LAB
CREAT UR-MCNC: 242 MG/DL
INR PPP: 2.8 (ref 2–3)
MICROALBUMIN UR DL<=1MG/L-MCNC: 13 UG/ML
MICROALBUMIN/CREATININE RATIO: 5.4 UG/MG

## 2018-01-31 PROCEDURE — 82043 UR ALBUMIN QUANTITATIVE: CPT

## 2018-01-31 PROCEDURE — 99214 OFFICE O/P EST MOD 30 MIN: CPT | Mod: S$GLB,,, | Performed by: INTERNAL MEDICINE

## 2018-01-31 PROCEDURE — 99999 PR PBB SHADOW E&M-EST. PATIENT-LVL III: CPT | Mod: PBBFAC,,, | Performed by: INTERNAL MEDICINE

## 2018-01-31 PROCEDURE — 3008F BODY MASS INDEX DOCD: CPT | Mod: S$GLB,,, | Performed by: INTERNAL MEDICINE

## 2018-01-31 PROCEDURE — 99499 UNLISTED E&M SERVICE: CPT | Mod: S$GLB,,, | Performed by: INTERNAL MEDICINE

## 2018-01-31 PROCEDURE — 85610 PROTHROMBIN TIME: CPT | Mod: QW,S$GLB,, | Performed by: PHARMACIST

## 2018-01-31 NOTE — PROGRESS NOTES
Quick follow-up for low INR 1/24. INR within normal range today. Patient states that he did not eat his servings of greens this past week but will resume his normal intake and be consistent. Denies any bleeding, bruising or other changes. Will maintain weekly dose until follow-up in 2 weeks. Advised him and his wife to call with any changes or concerns.

## 2018-01-31 NOTE — PROGRESS NOTES
"Subjective:       Patient ID: Edu Choi is a 62 y.o. male.    Chief Complaint: follow up for DM    HPI   DM2 - checks glucose at home and 70s-100s. Diet controlled.   A1C 9/27/17 - 6.  Eye 7/19/17  Foot exam-6/22/17  A1c 9/27/17  Urine microalbumin 6/22/17 neg  LDL-75.6 on 3/18/17.  Atorvastatin 40 mg daily.    B carotid US 12/22/17 - 20-39% B ICA stenosis.   Stress test neg.    H/O DVT and PE - on coumadin. Follows w/ coumadin clinic.   No bloody stools or hematuria. Does have constipation.     Excessive daytime sleepiness. Home sleep study done 12/1/7, which showed mod CHRIS. Recommended CPAP titration study - scheduled for titration on Friday. FFM preferred.     Seizure d/o. keppra 1000mg BID. Last seizure w/in last few months. Follows w/ Dr. Ventura. Keppra level WNL.     Moods been doing well. On lexapro.    CT chest - aorta atherosclerosis.    Review of Systems  Comprehensive review of systems otherwise negative. See history/subjective section for more details.    Objective:      Physical Exam    BP (!) 104/52   Pulse 95   Temp 98.5 °F (36.9 °C) (Oral)   Resp 16   Ht 5' 4" (1.626 m)   Wt 66.3 kg (146 lb 2.6 oz)   BMI 25.09 kg/m²     GEN - A+OX4, NAD   HEENT - PERRL, EOMI, OP clear  Neck - No thyromegaly or cervical LAD. No thyroid masses felt.  CV - RRR, no m/r   Chest - CTAB, no wheezing or rhonchi  Abd - S/NT/ND/+BS.   Ext - 1+BDP and radial pulses. No LE edema.   Feet - dec sensation to monofilament. No open lesions.  Skin - No rash.    Labs reviewed.    Assessment/Plan     Edu was seen today for hypotension.    Diagnoses and all orders for this visit:    Controlled type 2 diabetes mellitus without complication, without long-term current use of insulin - diet control  -     Hemoglobin A1c; Future  -     MICROALBUMIN / CREATININE RATIO URINE    Bilateral carotid artery disease - on atorvastatin daily.   -     Comprehensive metabolic panel; Future  -     Lipid panel; Future    History of DVT (deep " vein thrombosis) - lifetime coumadin.     Chronic anticoagulation - INR today at goal. Cont coumadin.   -     CBC auto differential; Future    Seizure disorder - cont keppra 1000mg BID.     Hyperlipidemia associated with type 2 diabetes mellitus - on atorvastatin daily.   -     Comprehensive metabolic panel; Future  -     Lipid panel; Future    Aortic atherosclerosis - on atorvastatin daily.     MDD (recurrent major depressive disorder) in remission  Controlled on Lexapro.    Constipation - Miralax over the counter daily. Drink 8 glasses of 8oz water daily. High fiber diet (can use metamucil OTC)    Follow-up in about 5 months (around 6/30/2018).      Gladis Blankenship MD  Department of Internal Medicine - [facility]  3:20 PM

## 2018-02-02 ENCOUNTER — HOSPITAL ENCOUNTER (OUTPATIENT)
Dept: SLEEP MEDICINE | Facility: HOSPITAL | Age: 63
Discharge: HOME | End: 2018-02-02
Attending: INTERNAL MEDICINE
Payer: MEDICARE

## 2018-02-02 DIAGNOSIS — G47.33 OSA (OBSTRUCTIVE SLEEP APNEA): ICD-10-CM

## 2018-02-02 PROCEDURE — 95811 POLYSOM 6/>YRS CPAP 4/> PARM: CPT

## 2018-02-02 PROCEDURE — 95811 POLYSOM 6/>YRS CPAP 4/> PARM: CPT | Mod: 26,,, | Performed by: PSYCHIATRY & NEUROLOGY

## 2018-02-02 PROCEDURE — 95811 PR POLYSOMNOGRAPHY W/CPAP: ICD-10-PCS | Mod: 26,,, | Performed by: PSYCHIATRY & NEUROLOGY

## 2018-02-03 ENCOUNTER — LAB VISIT (OUTPATIENT)
Dept: LAB | Facility: HOSPITAL | Age: 63
End: 2018-02-03
Attending: INTERNAL MEDICINE
Payer: MEDICARE

## 2018-02-03 DIAGNOSIS — E78.5 HYPERLIPIDEMIA ASSOCIATED WITH TYPE 2 DIABETES MELLITUS: ICD-10-CM

## 2018-02-03 DIAGNOSIS — Z79.01 CHRONIC ANTICOAGULATION: ICD-10-CM

## 2018-02-03 DIAGNOSIS — E11.9 CONTROLLED TYPE 2 DIABETES MELLITUS WITHOUT COMPLICATION, WITHOUT LONG-TERM CURRENT USE OF INSULIN: ICD-10-CM

## 2018-02-03 DIAGNOSIS — E11.69 HYPERLIPIDEMIA ASSOCIATED WITH TYPE 2 DIABETES MELLITUS: ICD-10-CM

## 2018-02-03 DIAGNOSIS — I77.9 BILATERAL CAROTID ARTERY DISEASE: ICD-10-CM

## 2018-02-03 LAB
ALBUMIN SERPL BCP-MCNC: 3.9 G/DL
ALP SERPL-CCNC: 91 U/L
ALT SERPL W/O P-5'-P-CCNC: 20 U/L
ANION GAP SERPL CALC-SCNC: 8 MMOL/L
AST SERPL-CCNC: 19 U/L
BASOPHILS # BLD AUTO: 0.05 K/UL
BASOPHILS NFR BLD: 0.7 %
BILIRUB SERPL-MCNC: 0.4 MG/DL
BUN SERPL-MCNC: 13 MG/DL
CALCIUM SERPL-MCNC: 9.5 MG/DL
CHLORIDE SERPL-SCNC: 105 MMOL/L
CHOLEST SERPL-MCNC: 120 MG/DL
CHOLEST/HDLC SERPL: 2.5 {RATIO}
CO2 SERPL-SCNC: 25 MMOL/L
CREAT SERPL-MCNC: 1.1 MG/DL
DIFFERENTIAL METHOD: ABNORMAL
EOSINOPHIL # BLD AUTO: 0.2 K/UL
EOSINOPHIL NFR BLD: 2.4 %
ERYTHROCYTE [DISTWIDTH] IN BLOOD BY AUTOMATED COUNT: 13.9 %
EST. GFR  (AFRICAN AMERICAN): >60 ML/MIN/1.73 M^2
EST. GFR  (NON AFRICAN AMERICAN): >60 ML/MIN/1.73 M^2
ESTIMATED AVG GLUCOSE: 120 MG/DL
GLUCOSE SERPL-MCNC: 100 MG/DL
HBA1C MFR BLD HPLC: 5.8 %
HCT VFR BLD AUTO: 42.1 %
HDLC SERPL-MCNC: 48 MG/DL
HDLC SERPL: 40 %
HGB BLD-MCNC: 13.9 G/DL
LDLC SERPL CALC-MCNC: 56.6 MG/DL
LYMPHOCYTES # BLD AUTO: 2.2 K/UL
LYMPHOCYTES NFR BLD: 30.1 %
MCH RBC QN AUTO: 31.5 PG
MCHC RBC AUTO-ENTMCNC: 33 G/DL
MCV RBC AUTO: 96 FL
MONOCYTES # BLD AUTO: 1.1 K/UL
MONOCYTES NFR BLD: 15.5 %
NEUTROPHILS # BLD AUTO: 3.7 K/UL
NEUTROPHILS NFR BLD: 51.2 %
NONHDLC SERPL-MCNC: 72 MG/DL
PLATELET # BLD AUTO: 304 K/UL
PMV BLD AUTO: 11.2 FL
POTASSIUM SERPL-SCNC: 5.1 MMOL/L
PROT SERPL-MCNC: 7.2 G/DL
RBC # BLD AUTO: 4.41 M/UL
SODIUM SERPL-SCNC: 138 MMOL/L
TRIGL SERPL-MCNC: 77 MG/DL
WBC # BLD AUTO: 7.21 K/UL

## 2018-02-03 PROCEDURE — 36415 COLL VENOUS BLD VENIPUNCTURE: CPT

## 2018-02-03 PROCEDURE — 80061 LIPID PANEL: CPT

## 2018-02-03 PROCEDURE — 83036 HEMOGLOBIN GLYCOSYLATED A1C: CPT

## 2018-02-03 PROCEDURE — 80053 COMPREHEN METABOLIC PANEL: CPT

## 2018-02-03 PROCEDURE — 85025 COMPLETE CBC W/AUTO DIFF WBC: CPT

## 2018-02-03 NOTE — PROGRESS NOTES
"Education was done via explanation of sleep study process and procedure. All questions were answeered.    Pt. tolerated CPAP well. Pt had poor sleep efficiency. Optimal pressure of 7 appearered to have eliminated most respiratory events. REM sleep was obtained.    Low sat of 92% was observed in study. EKG revealed NSR. Medium Eson Nasal Mask was used during CPAP titration. Pt. responds to titration in a.m "It was alright"  Thank you letter was given in a.m.  "

## 2018-02-07 ENCOUNTER — HOSPITAL ENCOUNTER (EMERGENCY)
Facility: HOSPITAL | Age: 63
Discharge: HOME | End: 2018-02-07
Attending: EMERGENCY MEDICINE
Payer: MEDICARE

## 2018-02-07 VITALS
WEIGHT: 160 LBS | HEIGHT: 64 IN | SYSTOLIC BLOOD PRESSURE: 122 MMHG | TEMPERATURE: 99 F | RESPIRATION RATE: 20 BRPM | HEART RATE: 80 BPM | DIASTOLIC BLOOD PRESSURE: 80 MMHG | OXYGEN SATURATION: 98 % | BODY MASS INDEX: 27.31 KG/M2

## 2018-02-07 DIAGNOSIS — R56.9 SEIZURE: Primary | ICD-10-CM

## 2018-02-07 LAB
ALBUMIN SERPL BCP-MCNC: 3.6 G/DL
ALP SERPL-CCNC: 95 U/L
ALT SERPL W/O P-5'-P-CCNC: 24 U/L
ANION GAP SERPL CALC-SCNC: 9 MMOL/L
AST SERPL-CCNC: 24 U/L
BASOPHILS # BLD AUTO: 0.04 K/UL
BASOPHILS NFR BLD: 0.4 %
BILIRUB SERPL-MCNC: 0.3 MG/DL
BUN SERPL-MCNC: 16 MG/DL
CALCIUM SERPL-MCNC: 8.9 MG/DL
CHLORIDE SERPL-SCNC: 106 MMOL/L
CO2 SERPL-SCNC: 26 MMOL/L
CREAT SERPL-MCNC: 1 MG/DL
DIFFERENTIAL METHOD: ABNORMAL
EOSINOPHIL # BLD AUTO: 0.1 K/UL
EOSINOPHIL NFR BLD: 0.8 %
ERYTHROCYTE [DISTWIDTH] IN BLOOD BY AUTOMATED COUNT: 13.8 %
EST. GFR  (AFRICAN AMERICAN): >60 ML/MIN/1.73 M^2
EST. GFR  (NON AFRICAN AMERICAN): >60 ML/MIN/1.73 M^2
ETHANOL SERPL-MCNC: <10 MG/DL
GLUCOSE SERPL-MCNC: 101 MG/DL
HCT VFR BLD AUTO: 41.1 %
HGB BLD-MCNC: 13.6 G/DL
LYMPHOCYTES # BLD AUTO: 1.6 K/UL
LYMPHOCYTES NFR BLD: 15.4 %
MCH RBC QN AUTO: 31.6 PG
MCHC RBC AUTO-ENTMCNC: 33.1 G/DL
MCV RBC AUTO: 95 FL
MONOCYTES # BLD AUTO: 0.9 K/UL
MONOCYTES NFR BLD: 9.1 %
NEUTROPHILS # BLD AUTO: 7.6 K/UL
NEUTROPHILS NFR BLD: 74.1 %
PLATELET # BLD AUTO: 308 K/UL
PMV BLD AUTO: 11 FL
POTASSIUM SERPL-SCNC: 4.5 MMOL/L
PROT SERPL-MCNC: 7 G/DL
RBC # BLD AUTO: 4.31 M/UL
SODIUM SERPL-SCNC: 141 MMOL/L
WBC # BLD AUTO: 10.31 K/UL

## 2018-02-07 PROCEDURE — 80053 COMPREHEN METABOLIC PANEL: CPT

## 2018-02-07 PROCEDURE — 99284 EMERGENCY DEPT VISIT MOD MDM: CPT

## 2018-02-07 PROCEDURE — 85025 COMPLETE CBC W/AUTO DIFF WBC: CPT

## 2018-02-07 PROCEDURE — 80320 DRUG SCREEN QUANTALCOHOLS: CPT

## 2018-02-07 NOTE — ED NOTES
Report received from renetta limon patient awake alert oriented to self and place only. Patient lethargic has delayed responses at this time patient placed on cardiac monitors bp cuff pulse ox seizure precautions initiated bed padded patient moved closer to nursing station nurse will continue to monitor

## 2018-02-07 NOTE — ED PROVIDER NOTES
Encounter Date: 2/7/2018    SCRIBE #1 NOTE: Leonardo HERNÁNDEZ, am scribing for, and in the presence of, Dr. Gautam.       History     Chief Complaint   Patient presents with    possible seizure     family member witnessed pt. fall down while walking down outside stairs of home and describes possible seizure activity. ems reports pt. was initially post-ictal with increase in loc but pt. does not remeber falling or events leading up to arrival.  accucheck-133 per ems.  c-collar intact per ems.      Time patient was seen by the provider: 6:30 AM      The patient is a 62 y.o. male with hx: diabetes mellitus, PE, and seizures who presents to the ED for evaluation after a possible seizure. Patient's family member witnessed the patient fall while walking down stairs outside the patient's home. Family member described possible seizure activity. EMS reports patient was postictal and does not remember what happened. CBG was 133 on scene. Patient asked multiple times what day it is. He reports neck pain.      The history is provided by the patient, the EMS personnel and a relative.     Review of patient's allergies indicates:   Allergen Reactions    No known drug allergies      Past Medical History:   Diagnosis Date    Atherosclerosis of aorta 6/22/2017    Atherosclerosis of native coronary artery of native heart without angina pectoris 6/22/2017    Blood clotting tendency     Depression     Diabetes mellitus type 2 in nonobese     DVT (deep venous thrombosis)     Hyperlipidemia     Measles complicated by meningitis     during childhood, with subsequent seizures    Newly diagnosed diabetes 4/18/2017    PE (pulmonary embolism)     Seizures     Splenomegaly     Thrombophlebitis leg     bilateral legs     Past Surgical History:   Procedure Laterality Date    ANAL FISTULOTOMY      COLONOSCOPY N/A 10/14/2016    Procedure: COLONOSCOPY;  Surgeon: Edu Stratton MD;  [facility]: [facility] ([address] [place] [address]);  Service: Endoscopy;   Laterality: N/A;  Patient may hold Coumadin x 3 days prior to procedure with out Lovenox bridge as per C.C./svn/coumadin lab 1st    SPLENECTOMY, TOTAL       Family History   Problem Relation Age of Onset    Thrombophlebitis Father     Heart failure Father     Heart attack Father     Hypertension Father     Melanoma Neg Hx      Social History   Substance Use Topics    Smoking status: Current Every Day Smoker     Packs/day: 0.25     Years: 33.00    Smokeless tobacco: Never Used    Alcohol use No     Review of Systems   Musculoskeletal: Positive for neck pain.   Neurological: Positive for seizures.   All other systems reviewed and are negative.      Physical Exam     Initial Vitals [02/07/18 0608]   BP Pulse Resp Temp SpO2   (!) 108/54 84 20 98.6 °F (37 °C) 98 %      MAP       72         Physical Exam    Nursing note and vitals reviewed.  Constitutional: He appears well-developed and well-nourished.   HENT:   Head: Normocephalic and atraumatic.   Eyes: Conjunctivae and EOM are normal. Pupils are equal, round, and reactive to light.   Neck: Normal range of motion. Neck supple. No tracheal deviation present.   Cardiovascular: Normal rate, regular rhythm, normal heart sounds and intact distal pulses. Exam reveals no gallop and no friction rub.    No murmur heard.  Pulmonary/Chest: Breath sounds normal. He has no wheezes. He has no rhonchi. He has no rales.   Abdominal: Soft. He exhibits no distension. There is no tenderness.   Musculoskeletal: Normal range of motion.   Neurological: He is alert and oriented to person, place, and time. No cranial nerve deficit.   No facial droop, moving all extremities   Skin:   Abrasion to right elbow   Psychiatric: He has a normal mood and affect. Judgment and thought content normal.         ED Course   Procedures  Labs Reviewed   CBC W/ AUTO DIFFERENTIAL - Abnormal; Notable for the following:        Result Value    RBC 4.31 (*)     Hemoglobin 13.6 (*)     MCH 31.6 (*)     Gran%  74.1 (*)     Lymph% 15.4 (*)     All other components within normal limits   COMPREHENSIVE METABOLIC PANEL   ALCOHOL,MEDICAL (ETHANOL)   URINALYSIS   DRUG SCREEN PANEL, URINE EMERGENCY   POCT GLUCOSE MONITORING CONTINUOUS        Imaging Results          CT Cervical Spine Without Contrast (Final result)  Result time 02/07/18 07:59:58    Final result by Lilly Poole MD (02/07/18 07:59:58)                 Impression:         No evidence of acute fracture or traumatic subluxation of the cervical spine.      Electronically signed by: LILLY POOLE  Date:     02/07/18  Time:    07:59              Narrative:    Procedure comment: 2-mm axial images through the cervical spine were obtained without the use of IV contrast.  Sagittal, coronal, and axial reformatted images were reviewed.    Comparison: 4/30/2015    Findings:    Sagittal reformatted images demonstrate adequate alignment of the cervical spine.  There is no evidence of fracture or dislocation. Vertebral body heights appear well-maintained. There is multilevel degenerative change, most pronounced at C5-C6. There is multilevel bilateral neural foraminal narrowing.    The prevertebral soft tissues appear within normal limits.  There is apparent emphysematous change of the visualized lung apices.  The visualized portions of the brain demonstrate no significant abnormality.                             CT Head Without Contrast (Final result)  Result time 02/07/18 07:57:07    Final result by Lilly Poole MD (02/07/18 07:57:07)                 Impression:        No CT evidence of acute intracranial abnormality.    Chronic microvascular ischemic change and generalized cerebral volume loss.    Paranasal sinus disease as above.      Electronically signed by: LILLY POOLE  Date:     02/07/18  Time:    07:57              Narrative:    Procedure comments: CT examination of the head was performed from the skull base through the vertex without the use of intravenous  contrast using 5-mm axial images.    Comparison: 7/12/2017    Findings:    There is age appropriate generalized cerebral atrophy with compensatory ventricular enlargement and sulcal widening. There are patchy regions of hypoattenuation in the supratentorial white matter likely consistent with chronic microvascular ischemic changes.No evidence of acute intracranial hemorrhage or midline shift.The basal cisterns are patent. No extra-axial collections are appreciated.There is mucosal thickening with aerated secretions in the sphenoid sinus. The remaining paranasal sinuses and mastoid air cells appear relatively well-aerated.The calvarium is intact.                                 Medical Decision Making:   History:   Old Medical Records: I decided to obtain old medical records.  Clinical Tests:   Lab Tests: Ordered and Reviewed  Radiological Study: Ordered and Reviewed                   ED Course    work up wnl. Family states pt back at baseline. Usually has a seizure every 2-3 months. Pt unable to urinate at this time. Offered catheter but pt wishes to go home. Will dc to f/u w neurologist for medical management  Clinical Impression:   The encounter diagnosis was Seizure.    Disposition:   Disposition: Discharged  Condition: Stable   I, Dr. Noel Gautam, personally performed the services described in this documentation. All medical record entries made by the scribe were at my direction and in my presence.  I have reviewed the chart and agree that the record reflects my personal performance and is accurate and complete. Noel Gautam MD MPH.  11:41 AM 02/07/2018                        Noel Gautam MD  02/07/18 1141

## 2018-02-15 ENCOUNTER — ANTI-COAG VISIT (OUTPATIENT)
Dept: CARDIOLOGY | Facility: CLINIC | Age: 63
End: 2018-02-15
Payer: MEDICARE

## 2018-02-15 DIAGNOSIS — Z86.718 HISTORY OF DVT (DEEP VEIN THROMBOSIS): ICD-10-CM

## 2018-02-15 DIAGNOSIS — Z79.01 CHRONIC ANTICOAGULATION: Primary | ICD-10-CM

## 2018-02-15 DIAGNOSIS — Z86.711 HISTORY OF PULMONARY EMBOLISM: ICD-10-CM

## 2018-02-15 LAB — INR PPP: 4.1 (ref 2–3)

## 2018-02-15 PROCEDURE — 85610 PROTHROMBIN TIME: CPT | Mod: QW,S$GLB,,

## 2018-02-15 PROCEDURE — 99211 OFF/OP EST MAY X REQ PHY/QHP: CPT | Mod: 25,S$GLB,,

## 2018-02-15 NOTE — PROGRESS NOTES
INR elevated today for no apparent reason. Patient states that he had a seizure 2/7 and had an ER visit with a normal CT scan. He has bruises from use, denies any bleeding or other changes. Will hold coumadin dose today and eat an extra servings of greens then resume weekly dose until follow-up in 2 weeks. Advised him and his wife to call with any changes or concerns.

## 2018-02-20 ENCOUNTER — TELEPHONE (OUTPATIENT)
Dept: INTERNAL MEDICINE | Facility: CLINIC | Age: 63
End: 2018-02-20

## 2018-02-20 DIAGNOSIS — G47.33 OSA (OBSTRUCTIVE SLEEP APNEA): Primary | ICD-10-CM

## 2018-02-28 ENCOUNTER — ANTI-COAG VISIT (OUTPATIENT)
Dept: CARDIOLOGY | Facility: CLINIC | Age: 63
End: 2018-02-28
Payer: MEDICARE

## 2018-02-28 DIAGNOSIS — Z86.711 HISTORY OF PULMONARY EMBOLISM: ICD-10-CM

## 2018-02-28 DIAGNOSIS — Z79.01 CHRONIC ANTICOAGULATION: Primary | ICD-10-CM

## 2018-02-28 DIAGNOSIS — Z86.718 HISTORY OF DVT (DEEP VEIN THROMBOSIS): ICD-10-CM

## 2018-02-28 LAB — INR PPP: 2.4 (ref 2–3)

## 2018-02-28 PROCEDURE — 85610 PROTHROMBIN TIME: CPT | Mod: QW,S$GLB,,

## 2018-02-28 NOTE — PROGRESS NOTES
Patient seen by Stacie RAMESH. I have reviewed her initial findings and agree with her assessment.  Care plan made together.

## 2018-02-28 NOTE — PROGRESS NOTES
Quick follow-up for elevated INR 2/15. INR within normal range today. Patient denies any bruising, bleeding or changes. Will maintain weekly dose until follow-up in 3 weeks. Advised him to call with any changes or concerns.

## 2018-03-21 ENCOUNTER — ANTI-COAG VISIT (OUTPATIENT)
Dept: CARDIOLOGY | Facility: CLINIC | Age: 63
End: 2018-03-21
Payer: MEDICARE

## 2018-03-21 DIAGNOSIS — Z79.01 CHRONIC ANTICOAGULATION: Primary | ICD-10-CM

## 2018-03-21 DIAGNOSIS — Z86.711 HISTORY OF PULMONARY EMBOLISM: ICD-10-CM

## 2018-03-21 DIAGNOSIS — Z86.718 HISTORY OF DVT (DEEP VEIN THROMBOSIS): ICD-10-CM

## 2018-03-21 LAB — INR PPP: 1.8 (ref 2–3)

## 2018-03-21 PROCEDURE — 99211 OFF/OP EST MAY X REQ PHY/QHP: CPT | Mod: 25,S$GLB,,

## 2018-03-21 PROCEDURE — 85610 PROTHROMBIN TIME: CPT | Mod: QW,S$GLB,,

## 2018-03-21 NOTE — PROGRESS NOTES
INR low today for no apparent reason. Patient denies any bruising, bleeding or changes. Will boost dose today and then re challenge weekly dose until follow-up in 2 weeks. Advised him to call with any changes or concerns.

## 2018-04-04 ENCOUNTER — ANTI-COAG VISIT (OUTPATIENT)
Dept: CARDIOLOGY | Facility: CLINIC | Age: 63
End: 2018-04-04
Payer: MEDICARE

## 2018-04-04 DIAGNOSIS — Z79.01 LONG TERM (CURRENT) USE OF ANTICOAGULANTS: Primary | ICD-10-CM

## 2018-04-04 DIAGNOSIS — Z86.711 HISTORY OF PULMONARY EMBOLISM: ICD-10-CM

## 2018-04-04 DIAGNOSIS — Z86.718 HISTORY OF DVT (DEEP VEIN THROMBOSIS): ICD-10-CM

## 2018-04-04 DIAGNOSIS — Z79.01 CHRONIC ANTICOAGULATION: ICD-10-CM

## 2018-04-04 LAB — INR PPP: 1.4 (ref 2–3)

## 2018-04-04 PROCEDURE — 99211 OFF/OP EST MAY X REQ PHY/QHP: CPT | Mod: 25,S$GLB,, | Performed by: PHARMACIST

## 2018-04-04 PROCEDURE — 85610 PROTHROMBIN TIME: CPT | Mod: QW,S$GLB,, | Performed by: PHARMACIST

## 2018-04-04 NOTE — PROGRESS NOTES
Quick follow-up for low INR 3/21. INR lower today for no apparent reason. Patient with bruises from use, denies any bleeding or changes. Will boost dose today and increase weekly dose until follow-up in 2 weeks. Advised him and his wife to call with any changes or concerns.

## 2018-04-18 ENCOUNTER — ANTI-COAG VISIT (OUTPATIENT)
Dept: CARDIOLOGY | Facility: CLINIC | Age: 63
End: 2018-04-18
Payer: MEDICARE

## 2018-04-18 DIAGNOSIS — Z79.01 LONG TERM (CURRENT) USE OF ANTICOAGULANTS: Primary | ICD-10-CM

## 2018-04-18 DIAGNOSIS — Z86.711 HISTORY OF PULMONARY EMBOLISM: ICD-10-CM

## 2018-04-18 DIAGNOSIS — Z86.718 HISTORY OF DVT (DEEP VEIN THROMBOSIS): ICD-10-CM

## 2018-04-18 DIAGNOSIS — Z79.01 CHRONIC ANTICOAGULATION: ICD-10-CM

## 2018-04-18 LAB — INR PPP: 1.9 (ref 2–3)

## 2018-04-18 PROCEDURE — 85610 PROTHROMBIN TIME: CPT | Mod: QW,S$GLB,, | Performed by: PHARMACIST

## 2018-04-18 NOTE — PROGRESS NOTES
Quick follow-up for low INR and increased dose 4/4. INR improving but still slightly low. Patient with bruises from use, denies any bleeding or changes. Will boost dose today and retry newly increased weekly dose until follow-up in 2 weeks. Advised him to call with any changes or concerns.

## 2018-04-29 ENCOUNTER — HOSPITAL ENCOUNTER (EMERGENCY)
Facility: HOSPITAL | Age: 63
Discharge: HOME | End: 2018-04-29
Attending: EMERGENCY MEDICINE
Payer: MEDICARE

## 2018-04-29 VITALS
OXYGEN SATURATION: 9 % | HEART RATE: 73 BPM | SYSTOLIC BLOOD PRESSURE: 107 MMHG | HEIGHT: 64 IN | TEMPERATURE: 98 F | RESPIRATION RATE: 18 BRPM | BODY MASS INDEX: 24.75 KG/M2 | DIASTOLIC BLOOD PRESSURE: 57 MMHG | WEIGHT: 145 LBS

## 2018-04-29 DIAGNOSIS — M79.603 ARM PAIN: ICD-10-CM

## 2018-04-29 DIAGNOSIS — M25.519 SHOULDER PAIN: ICD-10-CM

## 2018-04-29 PROCEDURE — 99284 EMERGENCY DEPT VISIT MOD MDM: CPT

## 2018-04-29 PROCEDURE — 25000003 PHARM REV CODE 250: Performed by: EMERGENCY MEDICINE

## 2018-04-29 PROCEDURE — 93005 ELECTROCARDIOGRAM TRACING: CPT

## 2018-04-29 RX ORDER — HYDROCODONE BITARTRATE AND ACETAMINOPHEN 5; 325 MG/1; MG/1
1 TABLET ORAL
Status: COMPLETED | OUTPATIENT
Start: 2018-04-29 | End: 2018-04-29

## 2018-04-29 RX ORDER — HYDROCODONE BITARTRATE AND ACETAMINOPHEN 5; 325 MG/1; MG/1
1 TABLET ORAL EVERY 4 HOURS PRN
Qty: 10 TABLET | Refills: 0 | Status: SHIPPED | OUTPATIENT
Start: 2018-04-29 | End: 2018-06-29

## 2018-04-29 RX ADMIN — HYDROCODONE BITARTRATE AND ACETAMINOPHEN 1 TABLET: 5; 325 TABLET ORAL at 11:04

## 2018-04-29 NOTE — ED NOTES
APPEARANCE: Alert, oriented and in no acute distress.  CARDIAC: Normal rate and rhythm, no murmur heard.   PERIPHERAL VASCULAR: peripheral pulses present. Normal cap refill. No edema. Warm to touch.    RESPIRATORY:Normal rate and effort, breath sounds clear bilaterally throughout chest. Respirations are equal and unlabored no obvious signs of distress.  GASTRO: soft, bowel sounds normal, no tenderness, no abdominal distention.  MUSC: Limited ROM to right shoulder and right arm. No bony tenderness or soft tissue tenderness. No obvious deformity. Complains of right shoulder and right arm pain.   SKIN: Skin is warm and dry, normal skin turgor, mucous membranes moist.  NEURO: 5/5 strength major flexors/extensors bilaterally. Sensory intact to light touch bilaterally. [place] coma scale: eyes open spontaneously-4, oriented & converses-5, obeys commands-6. No neurological abnormalities.   MENTAL STATUS: awake, alert and aware of environment.  EYE: PERRL, both eyes: pupils brisk and reactive to light. Normal size.  ENT: EARS: no obvious drainage. NOSE: no active bleeding.   Pt complains of right shoulder pain radiating down right arm.

## 2018-04-29 NOTE — ED PROVIDER NOTES
"Encounter Date: 4/29/2018    SCRIBE #1 NOTE: I, Sukhdeep Ramos, am scribing for, and in the presence of,  Dr. Vanessa Rodríguez. I have scribed the entire note.       History     Chief Complaint   Patient presents with    Arm Pain     c/o right shoulder pain that radiates to right elbow. Pain began about 3 weeks ago. Pt went to his pcp on Wednesday and was diagnosed with bursitis and arthritis and prescribed therapy. Received a cortisone shot, but denies any improvement in pain     Edu Choi is a 63 y.o. male who  has a past medical history of Atherosclerosis of aorta (6/22/2017); Atherosclerosis of native coronary artery of native heart without angina pectoris (6/22/2017); Blood clotting tendency; Depression; Diabetes mellitus type 2 in nonobese; DVT (deep venous thrombosis); Hyperlipidemia; Measles complicated by meningitis; Newly diagnosed diabetes (4/18/2017); PE (pulmonary embolism); Seizures; Splenomegaly; and Thrombophlebitis leg.    The patient presents to the ED due to right shoulder pain with radiation to the elbow. Pain began 2 weeks ago. Denies trauma, strenuous activity. Denies swelling, numbness In the arm. He last experienced this shoulder pain last year. Pain has prevented sleep, c/o "bone on bone" pain. Per wife, symptoms seemed to begin following a seizure (epileptic) two weeks prior and have gradually worsened. Upon presentation to PCP for shoulder pain last week, PCP treating with steroid shot based on MRI dx of bursitis and arthritis. Pt also has hx of DVT/PE, takes anticoagulants. Denies chest pain and SOB.         The history is provided by the patient and the spouse.     Review of patient's allergies indicates:   Allergen Reactions    No known drug allergies      Past Medical History:   Diagnosis Date    Atherosclerosis of aorta 6/22/2017    Atherosclerosis of native coronary artery of native heart without angina pectoris 6/22/2017    Blood clotting tendency     Depression     " Diabetes mellitus type 2 in nonobese     DVT (deep venous thrombosis)     Hyperlipidemia     Measles complicated by meningitis     during childhood, with subsequent seizures    Newly diagnosed diabetes 4/18/2017    PE (pulmonary embolism)     Seizures     Splenomegaly     Thrombophlebitis leg     bilateral legs     Past Surgical History:   Procedure Laterality Date    ANAL FISTULOTOMY      COLONOSCOPY N/A 10/14/2016    Procedure: COLONOSCOPY;  Surgeon: Edu Stratton MD;  Location: [address]);  Service: Endoscopy;  Laterality: N/A;  Patient may hold Coumadin x 3 days prior to procedure with out Lovenox bridge as per C.C./svn/coumadin lab 1st    SPLENECTOMY, TOTAL       Family History   Problem Relation Age of Onset    Thrombophlebitis Father     Heart failure Father     Heart attack Father     Hypertension Father     Melanoma Neg Hx      Social History   Substance Use Topics    Smoking status: Current Every Day Smoker     Packs/day: 0.25     Years: 33.00    Smokeless tobacco: Never Used    Alcohol use No     Review of Systems   Constitutional: Negative for activity change, appetite change, chills and fever.   Respiratory: Negative for chest tightness and shortness of breath.    Cardiovascular: Negative for chest pain and palpitations.   Gastrointestinal: Negative for nausea and vomiting.   Musculoskeletal: Positive for arthralgias. Negative for back pain, joint swelling, neck pain and neck stiffness.        R shoulder pain with radiation to the elbow   Skin: Negative for color change, pallor and rash.   Neurological: Negative for dizziness, weakness and numbness.   Hematological: Does not bruise/bleed easily.   All other systems reviewed and are negative.      Physical Exam     Initial Vitals [04/29/18 1007]   BP Pulse Resp Temp SpO2   (!) 124/58 80 18 97.9 °F (36.6 °C) 98 %      MAP       80         Physical Exam    Nursing note and vitals reviewed.  Constitutional: He appears  well-developed and well-nourished. He is not diaphoretic. No distress.   HENT:   Head: Normocephalic and atraumatic.   Mouth/Throat: Oropharynx is clear and moist.   Eyes: Conjunctivae are normal.   Neck: Normal range of motion. Neck supple.   Cardiovascular: Normal rate, regular rhythm and normal heart sounds. Exam reveals no gallop and no friction rub.    No murmur heard.  Pulmonary/Chest: Breath sounds normal. No respiratory distress. He has no wheezes. He has no rhonchi. He has no rales.   Abdominal: Soft. He exhibits no distension. There is no tenderness.   Musculoskeletal: He exhibits tenderness. He exhibits no edema.        Right shoulder: He exhibits tenderness and pain. He exhibits no swelling, no effusion, no crepitus, no deformity, no laceration, normal pulse and normal strength.        Arms:  Diffuse R shoulder pain exacerbated with Abduction  No glenohumeral step-off  Motor and sensation intact  2+ distal pulses   Neurological: He is alert and oriented to person, place, and time. He has normal strength. No sensory deficit.   Skin: Skin is warm and dry. No rash noted. No pallor.   Psychiatric: He has a normal mood and affect. His behavior is normal.         ED Course   Procedures  Labs Reviewed - No data to display  EKG Readings: (Independently Interpreted)   Initial Reading: No STEMI. Previous EKG: Compared with most recent EKG Previous EKG Date: 12/29/17. Rhythm: Normal Sinus Rhythm. Heart Rate: 77. Ectopy: No Ectopy. Conduction: Normal. Clinical Impression: Normal Sinus Rhythm      Imaging Results    None           X-Rays:   Independently Interpreted Readings:   Other Readings:  Right shoulder xray: no acute fracture or dislocation    Medical Decision Making:   History:   Old Medical Records: I decided to obtain old medical records.  Initial Assessment:   64 y/o male with hx R shoulder bursitis and arthritis, dx by MRI one year prior presents with acute exacerbation R shoulder pain s/p seizure 2 weeks  prior. No relief with cortisone shot given by PCP last week. No trauma, no numbness or weakness  Differential Diagnosis:   Bursitis, arthritis, muscle strain, dislocation, fracture  Independently Interpreted Test(s):   I have ordered and independently interpreted X-rays - see prior notes.  Clinical Tests:   Radiological Study: Ordered and Reviewed  ED Management:   Pt xray is neg for acute change.  Pt has improvement of symptoms after pain meds in ED.  Pt will be d/c home with rx for norco and instructions to f/u with pcp.  He is scheduled to start PT on Tuesday for tx of symptoms.  The patient wife will observe for any side effects of medications.  Pt does not drive. Pt will f/u with pcp    Pt counseled on their diagnosis and the importance of following up with PCP.  Pt also cautioned on when to return to ED.  Pt verbalizes understanding of discharge plan and will return to ED immediately if symptoms worsen                        Clinical Impression:   Diagnoses of Arm pain and Shoulder pain were pertinent to this visit.    Disposition:   Disposition: Discharged  Condition: Stable         I, Dr. Vanessa Rodríguez, personally performed the services described in this documentation. All medical record entries made by the scribe were at my direction and in my presence.  I have reviewed the chart and agree that the record reflects my personal performance and is accurate and complete. Vanessa Rodríguez MD.  11:36 AM 04/29/2018                   Vanessa Rodríguez MD  04/29/18 1133

## 2018-05-02 ENCOUNTER — ANTI-COAG VISIT (OUTPATIENT)
Dept: CARDIOLOGY | Facility: CLINIC | Age: 63
End: 2018-05-02
Payer: MEDICARE

## 2018-05-02 DIAGNOSIS — Z79.01 LONG TERM (CURRENT) USE OF ANTICOAGULANTS: Primary | ICD-10-CM

## 2018-05-02 DIAGNOSIS — Z86.718 HISTORY OF DVT (DEEP VEIN THROMBOSIS): ICD-10-CM

## 2018-05-02 DIAGNOSIS — Z86.711 HISTORY OF PULMONARY EMBOLISM: ICD-10-CM

## 2018-05-02 DIAGNOSIS — Z79.01 CHRONIC ANTICOAGULATION: ICD-10-CM

## 2018-05-02 LAB — INR PPP: 2.5 (ref 2–3)

## 2018-05-02 PROCEDURE — 85610 PROTHROMBIN TIME: CPT | Mod: QW,S$GLB,, | Performed by: INTERNAL MEDICINE

## 2018-05-02 NOTE — PROGRESS NOTES
Quick follow-up for low INR 4/18. INR within normal range today. Patient states that he is having shoulder pain and taking norco as needed for pain, no DDI expected. He has bruises on body from use, denies any bleeding or other changes. Will maintain weekly dose until follow-up in 3 weeks. Advised him and his wife to call with any changes or concerns.

## 2018-05-07 DIAGNOSIS — G40.909 SEIZURE DISORDER: ICD-10-CM

## 2018-05-08 RX ORDER — LEVETIRACETAM 1000 MG/1
TABLET ORAL
Qty: 180 TABLET | Refills: 3 | Status: SHIPPED | OUTPATIENT
Start: 2018-05-08 | End: 2018-11-26 | Stop reason: SDUPTHER

## 2018-05-08 NOTE — TELEPHONE ENCOUNTER
Please let pt know that I forwarded his Keppra refill request to Dr. Ventura. Remind him to f/u w/ him in June.

## 2018-05-15 NOTE — PROGRESS NOTES
Pt seen by Stacie RAMESH. I have reviewed her documentation and agree with her assessment and plan.   n/a

## 2018-05-23 ENCOUNTER — ANTI-COAG VISIT (OUTPATIENT)
Dept: CARDIOLOGY | Facility: CLINIC | Age: 63
End: 2018-05-23
Payer: MEDICARE

## 2018-05-23 DIAGNOSIS — Z79.01 LONG TERM (CURRENT) USE OF ANTICOAGULANTS: Primary | ICD-10-CM

## 2018-05-23 DIAGNOSIS — Z86.711 HISTORY OF PULMONARY EMBOLISM: ICD-10-CM

## 2018-05-23 DIAGNOSIS — Z79.01 CHRONIC ANTICOAGULATION: ICD-10-CM

## 2018-05-23 DIAGNOSIS — Z86.718 HISTORY OF DVT (DEEP VEIN THROMBOSIS): ICD-10-CM

## 2018-05-23 LAB — INR PPP: 6.3 (ref 2–3)

## 2018-05-23 PROCEDURE — 85610 PROTHROMBIN TIME: CPT | Mod: QW,S$GLB,,

## 2018-05-23 NOTE — PROGRESS NOTES
INR very elevated today. Patient states that he his still having shoulder pain. He is also having decreased appetite from the pain. He has bruises from use, denies any bleeding or other changes. Discussed with him bleed risk and extra safety precautions, monitor closely for any bleeding. He will eat a serving of high vitamin K today and tomorrow and will hold coumadin until he returns for follow-up 5/25. Advised him to call with any changes or concerns.

## 2018-05-23 NOTE — PROGRESS NOTES
I have reviewed the nurse's initial findings and agree with their assessment.  I have  assessed the patient in clinic to devise care plan.

## 2018-05-25 ENCOUNTER — ANTI-COAG VISIT (OUTPATIENT)
Dept: CARDIOLOGY | Facility: CLINIC | Age: 63
End: 2018-05-25
Payer: MEDICARE

## 2018-05-25 DIAGNOSIS — Z86.711 HISTORY OF PULMONARY EMBOLISM: ICD-10-CM

## 2018-05-25 DIAGNOSIS — Z79.01 LONG TERM (CURRENT) USE OF ANTICOAGULANTS: Primary | ICD-10-CM

## 2018-05-25 DIAGNOSIS — Z79.01 CHRONIC ANTICOAGULATION: ICD-10-CM

## 2018-05-25 DIAGNOSIS — Z86.718 HISTORY OF DVT (DEEP VEIN THROMBOSIS): ICD-10-CM

## 2018-05-25 LAB — INR PPP: 2.6 (ref 2–3)

## 2018-05-25 PROCEDURE — 99211 OFF/OP EST MAY X REQ PHY/QHP: CPT | Mod: 25,S$GLB,,

## 2018-05-25 PROCEDURE — 85610 PROTHROMBIN TIME: CPT | Mod: QW,S$GLB,, | Performed by: INTERNAL MEDICINE

## 2018-05-25 NOTE — PROGRESS NOTES
Quick follow up from high INR on 5/23. INR in therapeutic range. Patient reports no bleeding, bruising or other changes. Will resume with weekly dose until follow up in 4 days. Advised patient to call with any changes or concerns.

## 2018-05-29 ENCOUNTER — ANTI-COAG VISIT (OUTPATIENT)
Dept: CARDIOLOGY | Facility: CLINIC | Age: 63
End: 2018-05-29

## 2018-05-29 ENCOUNTER — LAB VISIT (OUTPATIENT)
Dept: LAB | Facility: HOSPITAL | Age: 63
End: 2018-05-29
Attending: INTERNAL MEDICINE
Payer: MEDICARE

## 2018-05-29 DIAGNOSIS — Z86.718 HISTORY OF DVT (DEEP VEIN THROMBOSIS): ICD-10-CM

## 2018-05-29 DIAGNOSIS — Z79.01 CHRONIC ANTICOAGULATION: ICD-10-CM

## 2018-05-29 DIAGNOSIS — Z79.01 LONG TERM (CURRENT) USE OF ANTICOAGULANTS: ICD-10-CM

## 2018-05-29 DIAGNOSIS — Z86.711 HISTORY OF PULMONARY EMBOLISM: ICD-10-CM

## 2018-05-29 LAB
INR PPP: 2.5
PROTHROMBIN TIME: 24 SEC

## 2018-05-29 PROCEDURE — 85610 PROTHROMBIN TIME: CPT

## 2018-05-29 PROCEDURE — 36415 COLL VENOUS BLD VENIPUNCTURE: CPT | Mod: PO

## 2018-06-05 ENCOUNTER — LAB VISIT (OUTPATIENT)
Dept: LAB | Facility: HOSPITAL | Age: 63
End: 2018-06-05
Attending: INTERNAL MEDICINE
Payer: MEDICARE

## 2018-06-05 DIAGNOSIS — Z86.711 HISTORY OF PULMONARY EMBOLISM: ICD-10-CM

## 2018-06-05 DIAGNOSIS — Z86.718 HISTORY OF DVT (DEEP VEIN THROMBOSIS): ICD-10-CM

## 2018-06-05 DIAGNOSIS — Z79.01 CHRONIC ANTICOAGULATION: ICD-10-CM

## 2018-06-05 LAB
INR PPP: 1.2
PROTHROMBIN TIME: 12.2 SEC

## 2018-06-05 PROCEDURE — 36415 COLL VENOUS BLD VENIPUNCTURE: CPT | Mod: PO

## 2018-06-05 PROCEDURE — 85610 PROTHROMBIN TIME: CPT

## 2018-06-06 ENCOUNTER — ANTI-COAG VISIT (OUTPATIENT)
Dept: CARDIOLOGY | Facility: CLINIC | Age: 63
End: 2018-06-06

## 2018-06-06 DIAGNOSIS — Z86.711 HISTORY OF PULMONARY EMBOLISM: ICD-10-CM

## 2018-06-06 DIAGNOSIS — Z86.718 HISTORY OF DVT (DEEP VEIN THROMBOSIS): ICD-10-CM

## 2018-06-06 DIAGNOSIS — Z79.01 CHRONIC ANTICOAGULATION: ICD-10-CM

## 2018-06-12 ENCOUNTER — LAB VISIT (OUTPATIENT)
Dept: LAB | Facility: HOSPITAL | Age: 63
End: 2018-06-12
Attending: INTERNAL MEDICINE
Payer: MEDICARE

## 2018-06-12 DIAGNOSIS — Z86.718 HISTORY OF DVT (DEEP VEIN THROMBOSIS): ICD-10-CM

## 2018-06-12 DIAGNOSIS — Z86.711 HISTORY OF PULMONARY EMBOLISM: ICD-10-CM

## 2018-06-12 DIAGNOSIS — Z79.01 CHRONIC ANTICOAGULATION: ICD-10-CM

## 2018-06-12 LAB
INR PPP: 3.3
PROTHROMBIN TIME: 32.1 SEC

## 2018-06-12 PROCEDURE — 36415 COLL VENOUS BLD VENIPUNCTURE: CPT | Mod: PO

## 2018-06-12 PROCEDURE — 85610 PROTHROMBIN TIME: CPT

## 2018-06-13 ENCOUNTER — HOSPITAL ENCOUNTER (OUTPATIENT)
Dept: RADIOLOGY | Facility: OTHER | Age: 63
Discharge: HOME | End: 2018-06-13
Attending: ORTHOPAEDIC SURGERY
Payer: MEDICARE

## 2018-06-13 ENCOUNTER — ANTI-COAG VISIT (OUTPATIENT)
Dept: CARDIOLOGY | Facility: CLINIC | Age: 63
End: 2018-06-13

## 2018-06-13 DIAGNOSIS — M24.411 SHOULDER DISLOCATION, RECURRENT, RIGHT: ICD-10-CM

## 2018-06-13 DIAGNOSIS — Z86.711 HISTORY OF PULMONARY EMBOLISM: ICD-10-CM

## 2018-06-13 DIAGNOSIS — S43.439A LABRAL TEAR OF SHOULDER: ICD-10-CM

## 2018-06-13 DIAGNOSIS — Z79.01 CHRONIC ANTICOAGULATION: ICD-10-CM

## 2018-06-13 DIAGNOSIS — Z86.718 HISTORY OF DVT (DEEP VEIN THROMBOSIS): ICD-10-CM

## 2018-06-13 PROCEDURE — 73221 MRI JOINT UPR EXTREM W/O DYE: CPT | Mod: 26,RT,, | Performed by: RADIOLOGY

## 2018-06-13 PROCEDURE — 73040 CONTRAST X-RAY OF SHOULDER: CPT | Mod: TC

## 2018-06-13 PROCEDURE — 23350 INJECTION FOR SHOULDER X-RAY: CPT | Mod: RT,,, | Performed by: RADIOLOGY

## 2018-06-13 PROCEDURE — 73040 CONTRAST X-RAY OF SHOULDER: CPT | Mod: 26,RT,, | Performed by: RADIOLOGY

## 2018-06-13 PROCEDURE — 25500020 PHARM REV CODE 255: Performed by: ORTHOPAEDIC SURGERY

## 2018-06-13 PROCEDURE — 25000003 PHARM REV CODE 250: Performed by: ORTHOPAEDIC SURGERY

## 2018-06-13 PROCEDURE — 73221 MRI JOINT UPR EXTREM W/O DYE: CPT | Mod: TC,RT

## 2018-06-13 PROCEDURE — A9585 GADOBUTROL INJECTION: HCPCS | Performed by: ORTHOPAEDIC SURGERY

## 2018-06-13 RX ORDER — GADOBUTROL 604.72 MG/ML
6 INJECTION INTRAVENOUS
Status: COMPLETED | OUTPATIENT
Start: 2018-06-13 | End: 2018-06-13

## 2018-06-13 RX ORDER — LIDOCAINE HYDROCHLORIDE 20 MG/ML
20 INJECTION, SOLUTION INFILTRATION; PERINEURAL ONCE
Status: COMPLETED | OUTPATIENT
Start: 2018-06-13 | End: 2018-06-13

## 2018-06-13 RX ADMIN — GADOBUTROL 6 ML: 604.72 INJECTION INTRAVENOUS at 03:06

## 2018-06-13 RX ADMIN — LIDOCAINE HYDROCHLORIDE 20 ML: 20 INJECTION, SOLUTION INFILTRATION; PERINEURAL at 04:06

## 2018-06-13 RX ADMIN — IOHEXOL 6 ML: 350 INJECTION, SOLUTION INTRAVENOUS at 03:06

## 2018-06-13 NOTE — PROCEDURES
Radiology Post-Procedure Note    Pre Op Diagnosis: R shoulder pain  Post Op Diagnosis: Same    Procedure: arthrogram    Procedure performed by: Luther Gutierrez MD    Written Informed Consent Obtained: Yes  Specimen Removed: NO  Estimated Blood Loss: Minimal    Findings:   Successful R shoulder arthrogram.  See imaging report for details.    Patient tolerated procedure well.    @SIG@

## 2018-06-22 ENCOUNTER — ANTI-COAG VISIT (OUTPATIENT)
Dept: CARDIOLOGY | Facility: CLINIC | Age: 63
End: 2018-06-22
Payer: MEDICARE

## 2018-06-22 DIAGNOSIS — Z86.711 HISTORY OF PULMONARY EMBOLISM: ICD-10-CM

## 2018-06-22 DIAGNOSIS — Z86.718 HISTORY OF DVT (DEEP VEIN THROMBOSIS): ICD-10-CM

## 2018-06-22 DIAGNOSIS — Z79.01 LONG TERM (CURRENT) USE OF ANTICOAGULANTS: Primary | ICD-10-CM

## 2018-06-22 DIAGNOSIS — Z79.01 CHRONIC ANTICOAGULATION: ICD-10-CM

## 2018-06-22 LAB — INR PPP: 3 (ref 2–3)

## 2018-06-22 PROCEDURE — 85610 PROTHROMBIN TIME: CPT | Mod: QW,S$GLB,, | Performed by: INTERNAL MEDICINE

## 2018-06-22 NOTE — PROGRESS NOTES
Patient seen by Terri RAMESH. I have reviewed her initial findings and agree with her assessment.  Care plan made together.

## 2018-06-22 NOTE — PROGRESS NOTES
Quick follow up from high INR on 6/12. Patient INR in therapeutic range today. Reports no bleeding, bruising or other changes. Will maintain current weekly dose until follow up in 2 weeks. Advised patient to call with any concerns or changes.

## 2018-06-26 ENCOUNTER — TELEPHONE (OUTPATIENT)
Dept: NEUROLOGY | Facility: CLINIC | Age: 63
End: 2018-06-26

## 2018-06-26 NOTE — TELEPHONE ENCOUNTER
----- Message from Mary Ac sent at 6/26/2018 12:25 PM CDT -----  Contact: Self            Name of Who is Calling: #Self      What is the request in detail: Pt states he was returning a call to reschedule his 06/29 appt. Attempt to reschedule, first available is 07/25. Pt is requesting a sooner date.      Can the clinic reply by [facility]: N      What Number to Call Back if not in [facility]: [phone]

## 2018-06-29 ENCOUNTER — OFFICE VISIT (OUTPATIENT)
Dept: NEUROLOGY | Facility: CLINIC | Age: 63
End: 2018-06-29
Payer: MEDICARE

## 2018-06-29 VITALS
HEIGHT: 64 IN | BODY MASS INDEX: 24.62 KG/M2 | DIASTOLIC BLOOD PRESSURE: 61 MMHG | SYSTOLIC BLOOD PRESSURE: 102 MMHG | HEART RATE: 88 BPM | WEIGHT: 144.19 LBS

## 2018-06-29 DIAGNOSIS — F41.8 DEPRESSION WITH ANXIETY: ICD-10-CM

## 2018-06-29 DIAGNOSIS — I25.10 ATHEROSCLEROSIS OF NATIVE CORONARY ARTERY OF NATIVE HEART WITHOUT ANGINA PECTORIS: Chronic | ICD-10-CM

## 2018-06-29 DIAGNOSIS — Z86.718 HISTORY OF DVT (DEEP VEIN THROMBOSIS): ICD-10-CM

## 2018-06-29 DIAGNOSIS — E11.9 DIABETES MELLITUS TYPE 2 IN NONOBESE: ICD-10-CM

## 2018-06-29 DIAGNOSIS — Z86.73 HISTORY OF CVA (CEREBROVASCULAR ACCIDENT): ICD-10-CM

## 2018-06-29 DIAGNOSIS — I70.0 ATHEROSCLEROSIS OF AORTA: ICD-10-CM

## 2018-06-29 DIAGNOSIS — G40.209 PARTIAL EPILEPSY WITH IMPAIRMENT OF CONSCIOUSNESS: Primary | Chronic | ICD-10-CM

## 2018-06-29 PROCEDURE — 3044F HG A1C LEVEL LT 7.0%: CPT | Mod: CPTII,S$GLB,, | Performed by: PSYCHIATRY & NEUROLOGY

## 2018-06-29 PROCEDURE — 99499 UNLISTED E&M SERVICE: CPT | Mod: S$GLB,,, | Performed by: PSYCHIATRY & NEUROLOGY

## 2018-06-29 PROCEDURE — 99214 OFFICE O/P EST MOD 30 MIN: CPT | Mod: S$GLB,,, | Performed by: PSYCHIATRY & NEUROLOGY

## 2018-06-29 PROCEDURE — 99999 PR PBB SHADOW E&M-EST. PATIENT-LVL III: CPT | Mod: PBBFAC,,, | Performed by: PSYCHIATRY & NEUROLOGY

## 2018-06-29 PROCEDURE — 3008F BODY MASS INDEX DOCD: CPT | Mod: CPTII,S$GLB,, | Performed by: PSYCHIATRY & NEUROLOGY

## 2018-06-29 RX ORDER — TRAMADOL HYDROCHLORIDE 50 MG/1
TABLET ORAL
COMMUNITY
Start: 2018-06-09 | End: 2018-06-29

## 2018-06-29 NOTE — PROGRESS NOTES
Subjective:       Patient ID: Edu Choi is a 63 y.o. male.    Chief Complaint:  Seizures      History of Present Illness  HPI   This is a  63year-old male who was being followed by me with a history of seizures since age 4 related to complications secondary to measles during childhood. Seizures are described as transient loss of consciousness during which time he may display repetitive behaviors and he has no recollection of the episode. He has had occasional generalized convulsions that are infrequent.  He had a seizure in May 2017, noted on awakening in the morning.  He did report that he had been having sleep difficulty and is not sure if he missed his medicine the night before.  Subsequently was seen at the [facility] emergency room after having had a seizure in November 2017.   He subsequently had another brief seizure in February 2018 and was seen at the emergency room and had CT scan of brain that was essentially unchanged.   He continues to be on Coumadin for his bleeding disorder. His spouse was present today.   She did report that his seizure in February 2018 may have been related to stress as she had been hospitalized and he was alone at home and may been stressed out.  He did have some sleep difficulties.  She has started him on melatonin at bedtime for sleep which has helped.      An EEG done in 2017 was abnormal.   The findings were consistent with left hemispheric cerebral dysfunction that is maximal and epileptogenic in the left temporal head region. In addition, there were scattered sharp waves in the right frontopolar head region, which if significant, would indicate that technically, the patient had secondarily generalized epilepsy.         Review of Systems  Review of Systems   Constitutional: Negative.    HENT: Negative for hearing loss.    Eyes: Negative.  Negative for visual disturbance.   Respiratory: Negative.  Negative for shortness of breath.    Cardiovascular: Negative.  Negative for  chest pain and palpitations.   Gastrointestinal: Negative.    Genitourinary: Negative.    Musculoskeletal: Negative.  Negative for neck pain and neck stiffness.   Skin: Negative.    Neurological: Positive for seizures.   Psychiatric/Behavioral: Negative for behavioral problems, confusion, decreased concentration and sleep disturbance. The patient is nervous/anxious.        Objective:      Neurologic Exam      Physical Exam   Constitutional: He appears well-developed and well-nourished.   HENT:   Head: Normocephalic and atraumatic.   Eyes:   Fundus examination shows sharp disc margins.   Neck: Normal range of motion. Neck supple. Carotid bruit is not present.   Neurological: He is alert. He has normal reflexes. He displays no atrophy. No cranial nerve deficit (visual fields were normal at bedside testing.  EOM were normal.  No facial asymmetry was noted.  Facial sensation is symmetrical.  Corneals and gag reflexes were normal.  Tongue and palate movements were normal.  Shoulder shrug was normal.) or sensory deficit. He exhibits normal muscle tone. He displays a negative Romberg sign. Coordination and gait normal.   Mental status examination: Patient is fully oriented and able to give an adequate history.  Recall of recent and past information is good.  Immediate recall is normal.  Attention span and concentration was mildly impaired.  Fund of knowledge was fair.  Judgment and insight is normal.  Language functions are intact with no evidence of aphasia or dysarthria.  Comprehension is unimpaired.  Affect is appropriate, mood was even.  No thought disorder is noted.   Vitals reviewed.        Assessment:        1. Partial epilepsy with impairment of consciousness    2. Depression with anxiety    3. History of DVT (deep vein thrombosis)    4. Diabetes mellitus type 2 in nonobese    5. History of CVA (cerebrovascular accident)    6. Atherosclerosis of native coronary artery of native heart without angina pectoris    7.  Atherosclerosis of aorta             Plan:       Discussed with patient and spouse.  Seizure precautions including compliance stressed.  Recommend OTC melatonin for sleep.  Continue Keppra.   Follow-up in 6 months if stable.

## 2018-07-06 ENCOUNTER — ANTI-COAG VISIT (OUTPATIENT)
Dept: CARDIOLOGY | Facility: CLINIC | Age: 63
End: 2018-07-06
Payer: MEDICARE

## 2018-07-06 DIAGNOSIS — Z79.01 CHRONIC ANTICOAGULATION: ICD-10-CM

## 2018-07-06 DIAGNOSIS — Z86.711 HISTORY OF PULMONARY EMBOLISM: ICD-10-CM

## 2018-07-06 DIAGNOSIS — Z79.01 LONG TERM (CURRENT) USE OF ANTICOAGULANTS: Primary | ICD-10-CM

## 2018-07-06 DIAGNOSIS — Z86.718 HISTORY OF DVT (DEEP VEIN THROMBOSIS): ICD-10-CM

## 2018-07-06 LAB — INR PPP: 1.6 (ref 2–3)

## 2018-07-06 PROCEDURE — 85610 PROTHROMBIN TIME: CPT | Mod: QW,S$GLB,, | Performed by: INTERNAL MEDICINE

## 2018-07-06 PROCEDURE — 99211 OFF/OP EST MAY X REQ PHY/QHP: CPT | Mod: 25,S$GLB,, | Performed by: PHARMACIST

## 2018-07-06 NOTE — PROGRESS NOTES
INR subtherapeutic today. Bruising noted to arms from use. He reports increased pain to shoulder not relieved by acetaminophen. He would like to take aleve 3x/week. I advised patient that aleve is not recommended for patients on warfarin due to bleed risk. He states that he understands. We advised that he do not take it more than 3x/week if he must take it and to watch for signs of bleeding. We will boost his dose today then resume. Follow up in 2 weeks. Patient reminded to call coumadin clinic with any changes or concerns.

## 2018-07-06 NOTE — PROGRESS NOTES
Pt seen by Miracle RAMESH . I have reviewed her initial findings and agree with her assessment and plan

## 2018-07-09 DIAGNOSIS — E78.5 HYPERLIPIDEMIA, UNSPECIFIED HYPERLIPIDEMIA TYPE: ICD-10-CM

## 2018-07-10 RX ORDER — ATORVASTATIN CALCIUM 40 MG/1
TABLET, FILM COATED ORAL
Qty: 90 TABLET | Refills: 1 | Status: SHIPPED | OUTPATIENT
Start: 2018-07-10 | End: 2018-11-26 | Stop reason: SDUPTHER

## 2018-07-16 DIAGNOSIS — F41.8 DEPRESSION WITH ANXIETY: ICD-10-CM

## 2018-07-16 RX ORDER — ESCITALOPRAM OXALATE 20 MG/1
TABLET ORAL
Qty: 90 TABLET | Refills: 3 | Status: SHIPPED | OUTPATIENT
Start: 2018-07-16 | End: 2018-09-24 | Stop reason: SDUPTHER

## 2018-07-16 NOTE — TELEPHONE ENCOUNTER
----- Message from Holli Huerta sent at 2018 10:25 AM CDT -----  Contact: Patient [phone]      ----- Message -----  From: Lyle Thakkar  Sent: 2018  10:16 AM  To: Pattie Griffith Staff    Patient is calling wanting to know if still needs to take Rx escitalopram oxalate (LEXAPRO) 20 MG tablet, which is , requesting a call back from office regarding an answer please.    Please call an advise  Thank you

## 2018-07-20 ENCOUNTER — ANTI-COAG VISIT (OUTPATIENT)
Dept: CARDIOLOGY | Facility: CLINIC | Age: 63
End: 2018-07-20
Payer: MEDICARE

## 2018-07-20 DIAGNOSIS — Z86.718 HISTORY OF DVT (DEEP VEIN THROMBOSIS): ICD-10-CM

## 2018-07-20 DIAGNOSIS — Z79.01 CHRONIC ANTICOAGULATION: Primary | ICD-10-CM

## 2018-07-20 DIAGNOSIS — Z86.711 HISTORY OF PULMONARY EMBOLISM: ICD-10-CM

## 2018-07-20 LAB — INR PPP: 2.6 (ref 2–3)

## 2018-07-20 PROCEDURE — 99211 OFF/OP EST MAY X REQ PHY/QHP: CPT | Mod: 25,S$GLB,, | Performed by: PHARMACIST

## 2018-07-20 PROCEDURE — 85610 PROTHROMBIN TIME: CPT | Mod: QW,S$GLB,, | Performed by: INTERNAL MEDICINE

## 2018-07-20 NOTE — PROGRESS NOTES
Pt seen by Miracle RAMESH. I have reviewed her initial findings and agree with her assessment and plan

## 2018-07-20 NOTE — PROGRESS NOTES
Quick follow-up for low INR 7/6. INR within therapeutic range. Patient with bruises from use, denies any bleeding or other changes. Will follow up in 2 weeks

## 2018-07-27 DIAGNOSIS — E11.9 TYPE 2 DIABETES MELLITUS WITHOUT COMPLICATION, UNSPECIFIED WHETHER LONG TERM INSULIN USE: ICD-10-CM

## 2018-07-30 ENCOUNTER — OFFICE VISIT (OUTPATIENT)
Dept: GASTROENTEROLOGY | Facility: CLINIC | Age: 63
End: 2018-07-30
Payer: MEDICARE

## 2018-07-30 ENCOUNTER — OFFICE VISIT (OUTPATIENT)
Dept: INTERNAL MEDICINE | Facility: CLINIC | Age: 63
End: 2018-07-30
Payer: MEDICARE

## 2018-07-30 VITALS
SYSTOLIC BLOOD PRESSURE: 100 MMHG | HEIGHT: 64 IN | BODY MASS INDEX: 24.81 KG/M2 | WEIGHT: 145.31 LBS | OXYGEN SATURATION: 95 % | TEMPERATURE: 98 F | DIASTOLIC BLOOD PRESSURE: 58 MMHG | HEART RATE: 85 BPM

## 2018-07-30 VITALS
DIASTOLIC BLOOD PRESSURE: 55 MMHG | WEIGHT: 145.31 LBS | SYSTOLIC BLOOD PRESSURE: 94 MMHG | HEIGHT: 64 IN | BODY MASS INDEX: 24.81 KG/M2 | HEART RATE: 90 BPM

## 2018-07-30 DIAGNOSIS — K59.00 CONSTIPATION, UNSPECIFIED CONSTIPATION TYPE: ICD-10-CM

## 2018-07-30 DIAGNOSIS — K59.00 CONSTIPATION, UNSPECIFIED CONSTIPATION TYPE: Primary | ICD-10-CM

## 2018-07-30 DIAGNOSIS — F41.8 DEPRESSION WITH ANXIETY: ICD-10-CM

## 2018-07-30 DIAGNOSIS — Z72.0 TOBACCO ABUSE: ICD-10-CM

## 2018-07-30 DIAGNOSIS — Z86.711 HISTORY OF PULMONARY EMBOLISM: ICD-10-CM

## 2018-07-30 DIAGNOSIS — E11.9 TYPE 2 DIABETES MELLITUS WITHOUT COMPLICATION, WITHOUT LONG-TERM CURRENT USE OF INSULIN: Primary | ICD-10-CM

## 2018-07-30 PROCEDURE — 99999 PR PBB SHADOW E&M-EST. PATIENT-LVL V: CPT | Mod: PBBFAC,,, | Performed by: PHYSICIAN ASSISTANT

## 2018-07-30 PROCEDURE — 3008F BODY MASS INDEX DOCD: CPT | Mod: CPTII,S$GLB,, | Performed by: NURSE PRACTITIONER

## 2018-07-30 PROCEDURE — 3008F BODY MASS INDEX DOCD: CPT | Mod: CPTII,S$GLB,, | Performed by: PHYSICIAN ASSISTANT

## 2018-07-30 PROCEDURE — 99203 OFFICE O/P NEW LOW 30 MIN: CPT | Mod: S$GLB,,, | Performed by: NURSE PRACTITIONER

## 2018-07-30 PROCEDURE — 3044F HG A1C LEVEL LT 7.0%: CPT | Mod: CPTII,S$GLB,, | Performed by: PHYSICIAN ASSISTANT

## 2018-07-30 PROCEDURE — 99214 OFFICE O/P EST MOD 30 MIN: CPT | Mod: S$GLB,,, | Performed by: PHYSICIAN ASSISTANT

## 2018-07-30 PROCEDURE — 99999 PR PBB SHADOW E&M-EST. PATIENT-LVL IV: CPT | Mod: PBBFAC,,, | Performed by: NURSE PRACTITIONER

## 2018-07-30 RX ORDER — POLYETHYLENE GLYCOL 3350 17 G/17G
17 POWDER, FOR SOLUTION ORAL 2 TIMES DAILY PRN
Refills: 0
Start: 2018-07-30 | End: 2018-08-02

## 2018-07-30 NOTE — PROGRESS NOTES
[facility] Note    Reason for Visit:  The encounter diagnosis was Constipation, unspecified constipation type.    PCP: Gladis Blankenship   [id number] / [place] [id number]    HPI:  This is a 63 y.o. male here for evaluation of constipation.    Duration - 6 months, prior to this with 1 formed BM/day  Bowel movement frequency - 1 bristol  BM 3 days per week, with straining, feelings of incomplete evacuation.  Stool consistency - bristol type 3  Blood in stools - none  OB complications - N/A  Digital assistance -no   Laxatives used -MOM causes diarrhea w/ accidents, colace PRN with some improvement, enema PRN with some improvement   Fiber/water usage -no fiber supplement/usually does not drink water.   Probiotics-none  Constipating medications - none    Abdominal pain - no  Reflux - no  Dysphagia/odynophagia - no   GI bleeding - denies hematochezia, hematemesis, melena, BRBPR, black/tarry stools, and coffee ground emesis  NSAID usage - none    ROS:  Constitutional: No fevers, no chills, No unintentional weight loss, no fatigue,   ENT: No allergies  CV: No chest pain, no palpitations, no perif. edema, no sob on exertion  Pulm: No cough, No shortness of breath, no wheezes, no sputum  Ophtho: No vision changes  GI: see HPI; also no nausea, no vomiting, no change in appetite  Derm: No rash  Heme: No lymphadenopathy, No bruising  MSK: No arthritis, no muscle pain, no muscle weakness  : No dysuria, No hematuria  Endo: No hot or cold intolerance  Neuro: No syncope, No seizure,     Medical History:  has a past medical history of Atherosclerosis of aorta (6/22/2017); Atherosclerosis of native coronary artery of native heart without angina pectoris (6/22/2017); Blood clotting tendency; Depression; Diabetes mellitus type 2 in nonobese; DVT (deep venous thrombosis); Hyperlipidemia; Measles complicated by meningitis; Newly diagnosed diabetes (4/18/2017); PE (pulmonary embolism); Seizures; Splenomegaly; and  "Thrombophlebitis leg.    Surgical History:  has a past surgical history that includes Splenectomy, total; Anal fistulotomy; and Colonoscopy (N/A, 10/14/2016).    Family History: family history includes Heart attack in his father; Heart failure in his father; Hypertension in his father; Thrombophlebitis in his father..     Social History:  reports that he has been smoking.  He has a 8.25 pack-year smoking history. He has never used smokeless tobacco. He reports that he does not drink alcohol or use drugs.    Review of patient's allergies indicates:   Allergen Reactions    No known drug allergies        Current Outpatient Prescriptions   Medication Sig    atorvastatin (LIPITOR) 40 MG tablet TAKE 1 TABLET EVERY EVENING    escitalopram oxalate (LEXAPRO) 20 MG tablet TAKE 1 TABLET ONE TIME DAILY    levETIRAcetam (KEPPRA) 1000 MG tablet TAKE 1 TABLET TWICE DAILY    polyethylene glycol (MIRALAX) 17 gram PwPk Take 17 g by mouth 2 (two) times daily as needed (Constipation).    TRUE METRIX GLUCOSE TEST STRIP Strp 1 strip by Misc.(Non-Drug; Combo Route) route once daily.    warfarin (COUMADIN) 5 MG tablet TAKE 1 TABLET EVERY DAY    lancets 30 gauge Misc 1 lancet by Misc.(Non-Drug; Combo Route) route once daily.     No current facility-administered medications for this visit.        Objective Findings:    Vital Signs:  BP (!) 94/55   Pulse 90   Ht 5' 4" (1.626 m)   Wt 65.9 kg (145 lb 4.5 oz)   BMI 24.94 kg/m²   Body mass index is 24.94 kg/m².    Physical Exam:  General Appearance: Well appearing in no acute distress  Head:   Normocephalic, without obvious abnormality  Eyes:    No scleral icterus, EOMI  ENT: Neck supple, Lips, mucosa, and tongue normal; teeth and gums normal  Lungs: CTA bilaterally in anterior and posterior fields, no wheezes, no crackles.  Heart:  Regular rate and rhythm, S1, S2 normal, no murmurs heard  Abdomen: Soft, non tender, non distended with positive bowel sounds in all four quadrants. No " hepatosplenomegaly, ascites, or mass  Extremities: 2+ radial pulses, no clubbing, cyanosis or edema  Skin: No rash to exposed areas  Neurologic: A&Ox4      Labs:  Lab Results   Component Value Date    WBC 10.31 02/07/2018    HGB 13.6 (L) 02/07/2018    HCT 41.1 02/07/2018     02/07/2018    CHOL 120 02/03/2018    TRIG 77 02/03/2018    HDL 48 02/03/2018    ALT 24 02/07/2018    AST 24 02/07/2018     07/30/2018    K 5.0 07/30/2018     07/30/2018    CREATININE 1.1 07/30/2018    BUN 11 07/30/2018    CO2 24 07/30/2018    TSH 1.739 06/22/2017    INR 2.6 07/20/2018    HGBA1C 5.8 (H) 02/03/2018         Endoscopy:    EGD-none  Colon 1/14/2016:poor prep to cecum. 9 mm sigmoid polyp (TA). Rpt in 3 yrs.   Assessment:  1. Constipation, unspecified constipation type           Recommendations:  1. Constipation- take miralax 17 g daily. Can increase to twice daily if not better after 1 week. Drink at least 6-8 glasses of water daily. Will consider rpt colonoscopy if not better at f/u.     Follow-up in about 3 months (around 10/30/2018) for constipation.      Order summary:         Thank you so much for allowing me to participate in the care of Edu Johnston, APRN, FNP-C

## 2018-07-30 NOTE — PATIENT INSTRUCTIONS
For constipation:  Drink at least 6-8 cups of water daily.  Take miralax 17 g (one cap/one packet) once daily. If not better in 1 weeks, increase it to twice daily.

## 2018-07-30 NOTE — PROGRESS NOTES
Subjective:       Patient ID: Edu Choi is a 63 y.o. male.        Chief Complaint: Diabetes    Edu Choi is an established patient of Gladis Blankenship MD here today for 6 month f/u visit.    Right shoulder pain: had MRI, seeing Dr. Cruz at [facility], evidently on intervention at this time, doing PT but not much help.    Tobacco abuse    Hyperlipidemia: on lipitor.    MDD: on lexapro.    Seizure disorder: followed by Dr. Ventura, on keppra.    DM: not checking blood sugar, last hemoglobin A1C 5.8 2/2018, due for eye exam, foot exam 1/31/18.    Constipation: started 6 months ago, discussed with Dr. Blankenship, never actually tried miralax or fiber as recommended, no abdominal pain/bleeding/black tarry stool, last colonoscopy 10/2016 with f/u due in 3 years.    History of PE/DVT: on lifetime coumadin, last INR 7/20 2.6.           Review of Systems   Constitutional: Negative for appetite change, chills, fatigue and fever.   HENT: Negative for congestion and sore throat.    Eyes: Negative for visual disturbance.   Respiratory: Negative for cough, chest tightness and shortness of breath.    Cardiovascular: Negative for chest pain, palpitations and leg swelling.   Gastrointestinal: Positive for constipation. Negative for abdominal pain, blood in stool, diarrhea, nausea and vomiting.   Genitourinary: Negative for dysuria, frequency, hematuria and urgency.   Musculoskeletal: Negative for arthralgias and back pain.   Skin: Negative for rash.   Neurological: Negative for dizziness, syncope, weakness and headaches.   Psychiatric/Behavioral: Negative for dysphoric mood and sleep disturbance. The patient is not nervous/anxious.        Objective:      Physical Exam   Constitutional: He appears well-developed and well-nourished. No distress.   HENT:   Head: Normocephalic and atraumatic.   Right Ear: Tympanic membrane, external ear and ear canal normal.   Left Ear: Tympanic membrane, external ear and ear canal normal.   Nose:  "Nose normal.   Mouth/Throat: Oropharynx is clear and moist.   Eyes: Conjunctivae are normal. Pupils are equal, round, and reactive to light.   Cardiovascular: Normal rate, regular rhythm and normal heart sounds.  Exam reveals no gallop and no friction rub.    No murmur heard.  Pulmonary/Chest: Effort normal and breath sounds normal. No respiratory distress.   Abdominal: Soft. There is no tenderness. There is no rebound and no guarding.   Musculoskeletal: He exhibits no edema.   Neurological: He is alert.   Skin: Skin is warm and dry. He is not diaphoretic.   Psychiatric: He has a normal mood and affect.   Nursing note and vitals reviewed.      Assessment:       1. Type 2 diabetes mellitus without complication, without long-term current use of insulin    2. Constipation, unspecified constipation type    3. History of pulmonary embolism    4. Tobacco abuse    5. Depression with anxiety        Plan:       Edu was seen today for diabetes.    Diagnoses and all orders for this visit:    Type 2 diabetes mellitus without complication, without long-term current use of insulin  -     Hemoglobin A1c; Future  -     Basic metabolic panel; Future  -     Ambulatory referral to Optometry    Constipation, unspecified constipation type  -     Ambulatory referral to Gastroenterology  -     polyethylene glycol (MIRALAX) 17 gram PwPk; Take 17 g by mouth 2 (two) times daily as needed (Constipation).    History of pulmonary embolism: on lifetime coumadin, last INR 2.6 7/20    Tobacco abuse: not ready to quit    Depression with anxiety: stable and controlled on effexor    Pt has been given instructions populated from [employer] database and has verbalized understanding of the after visit summary and information contained wherein.    Follow up with a primary care provider. May go to ER for acute shortness of breath, lightheadedness, fever, or any other emergent complaints or changes in condition.    "This note will be shared with the " "patient"    Future Appointments  Date Time Provider Department Center   8/3/2018 10:30 AM LAB, COUMADIN 2 [facility] COUMAD [place] [facility]   8/13/2018 3:00 PM Lisandro Wilks OD [facility] OPTOMTY [place]   10/31/2018 1:30 PM Suma Johnston NP [facility] GASTRO [place] [facility]   12/28/2018 1:20 PM Bismark Ventura MD [facility] NEURO [place]               "

## 2018-07-30 NOTE — LETTER
July 30, 2018      Guerline Mason PA-C  [address]  [place] [address]           [facility] - Gastroenterology  [address]  [facility] [address]  Phone: [phone]  Fax: [phone]          Patient: Edu Choi   MR Number: [id number]   YOB: 1955   Date of Visit: 7/30/2018       Dear Guerline Mason:    Thank you for referring Edu Choi to me for evaluation. Attached you will find relevant portions of my assessment and plan of care.    If you have questions, please do not hesitate to call me. I look forward to following Edu Cohi along with you.    Sincerely,    Suma Johnston, IRAIDA    Enclosure  CC:  No Recipients    If you would like to receive this communication electronically, please contact [email or web address] or [phone] to request more information on [facility] Link access.    For providers and/or their staff who would like to refer a patient to [facility], please contact us through our one-stop-shop provider referral line, [facility], at [phone].    If you feel you have received this communication in error or would no longer like to receive these types of communications, please e-mail [email or web address]

## 2018-07-31 ENCOUNTER — TELEPHONE (OUTPATIENT)
Dept: INTERNAL MEDICINE | Facility: CLINIC | Age: 63
End: 2018-07-31

## 2018-07-31 NOTE — TELEPHONE ENCOUNTER
Please call patient.  Hemoglobin A1C (3 month blood sugar average) is stable compared to prior.  I do recommend checking blood sugar twice weekly for routine monitoring.

## 2018-08-03 ENCOUNTER — ANTI-COAG VISIT (OUTPATIENT)
Dept: CARDIOLOGY | Facility: CLINIC | Age: 63
End: 2018-08-03
Payer: MEDICARE

## 2018-08-03 DIAGNOSIS — Z86.711 HISTORY OF PULMONARY EMBOLISM: ICD-10-CM

## 2018-08-03 DIAGNOSIS — Z86.718 HISTORY OF DVT (DEEP VEIN THROMBOSIS): ICD-10-CM

## 2018-08-03 DIAGNOSIS — Z79.01 CHRONIC ANTICOAGULATION: Primary | ICD-10-CM

## 2018-08-03 LAB — INR PPP: 2 (ref 2–3)

## 2018-08-03 PROCEDURE — 85610 PROTHROMBIN TIME: CPT | Mod: QW,S$GLB,, | Performed by: INTERNAL MEDICINE

## 2018-08-03 NOTE — PROGRESS NOTES
INR within range today with downward trend. Will maintain and follow up in 3 weeks. Patient states he has not been doing ensure once a week.  Instructed patient on importance of consistency with ensure and greens with plan of care which patient verbalized understanding.  He plans on continuing with ensure once per week.  Bruising from use but denies bleeding or other issues.  ERIKA Capellan D assisted with dosing

## 2018-08-13 ENCOUNTER — OFFICE VISIT (OUTPATIENT)
Dept: OPTOMETRY | Facility: CLINIC | Age: 63
End: 2018-08-13
Payer: MEDICARE

## 2018-08-13 DIAGNOSIS — H52.203 ASTIGMATISM OF BOTH EYES, UNSPECIFIED TYPE: ICD-10-CM

## 2018-08-13 DIAGNOSIS — Z13.5 GLAUCOMA SCREENING: ICD-10-CM

## 2018-08-13 DIAGNOSIS — E11.9 DIABETES MELLITUS TYPE 2 WITHOUT RETINOPATHY: Primary | ICD-10-CM

## 2018-08-13 DIAGNOSIS — H25.13 NUCLEAR SCLEROSIS OF BOTH EYES: ICD-10-CM

## 2018-08-13 PROCEDURE — 99499 UNLISTED E&M SERVICE: CPT | Mod: S$GLB,,, | Performed by: OPTOMETRIST

## 2018-08-13 PROCEDURE — 99999 PR PBB SHADOW E&M-EST. PATIENT-LVL III: CPT | Mod: PBBFAC,,, | Performed by: OPTOMETRIST

## 2018-08-13 PROCEDURE — 92014 COMPRE OPH EXAM EST PT 1/>: CPT | Mod: S$GLB,,, | Performed by: OPTOMETRIST

## 2018-08-13 PROCEDURE — 92015 DETERMINE REFRACTIVE STATE: CPT | Mod: S$GLB,,, | Performed by: OPTOMETRIST

## 2018-08-13 NOTE — LETTER
August 13, 2018      Guerline Mason PA-C  [address]  [facility] [address]           [facility]  [address]  [facility] [address]  Phone: [phone]  Fax: [phone]          Patient: Edu Choi   MR Number: [id number]   YOB: 1955   Date of Visit: 8/13/2018       Dear Guerline Mason:    Thank you for referring Edu Choi to me for evaluation. Attached you will find relevant portions of my assessment and plan of care.    If you have questions, please do not hesitate to call me. I look forward to following Edu Choi along with you.    Sincerely,    Lisandro Wilks, OD    Enclosure  CC:  No Recipients    If you would like to receive this communication electronically, please contact [email or web address] or [phone] to request more information on [facility] Link access.    For providers and/or their staff who would like to refer a patient to [facility], please contact us through our one-stop-shop provider referral line, [facility] , at [phone].    If you feel you have received this communication in error or would no longer like to receive these types of communications, please e-mail [email or web address]

## 2018-08-13 NOTE — PROGRESS NOTES
[facility]     Mr. Edu Choi for annual eye exam Patient complains of blurry   vision distance  -Would patient like a refraction today? yes    (-)drops  (-)flashes  (-)floaters  (-)diplopia    Diabetic  Hemoglobin A1C       Date                     Value               Ref Range             Status                07/30/2018               6.0 (H)             4.0 - 5.6 %           Final                OCULAR HISTORY  Last Eye Exam 2017 with Lashaun  (-)eye surgery   (-)diagnosed or treated for any eye conditions or diseases -    FAMILY HISTORY  (-)Glaucoma -        Last edited by Lisandro Wilks, OD on 8/13/2018  3:44 PM. (History)            Assessment /Plan     For exam results, see Encounter Report.    Diabetes mellitus type 2 without retinopathy  -No retinopathy noted today.  Continued control with primary care physician and annual comprehensive eye exam.    Nuclear sclerosis of both eyes  -Educated patient on presence of cataracts at today's exam, monitor at annual dilated fundus exam. 5+ years surgical estimate.  -warned of smoking's impact    Glaucoma screening  -Monitor with annual eye exam and IOP check    Astigmatism of both eyes, unspecified type  Eyeglass Final Rx     Eyeglass Final Rx       Sphere Cylinder Axis Dist VA Add    Right +0.50 +1.25 015 20/30-/+ +2.50    Left +0.25 +1.75 175 20/25 +2.50    Type:  Bifocal    Expiration Date:  8/14/2019                  RTC 1 yr

## 2018-08-23 ENCOUNTER — PES CALL (OUTPATIENT)
Dept: ADMINISTRATIVE | Facility: CLINIC | Age: 63
End: 2018-08-23

## 2018-08-24 ENCOUNTER — ANTI-COAG VISIT (OUTPATIENT)
Dept: CARDIOLOGY | Facility: CLINIC | Age: 63
End: 2018-08-24
Payer: MEDICARE

## 2018-08-24 DIAGNOSIS — Z79.01 LONG TERM (CURRENT) USE OF ANTICOAGULANTS: Primary | ICD-10-CM

## 2018-08-24 DIAGNOSIS — Z86.711 HISTORY OF PULMONARY EMBOLISM: ICD-10-CM

## 2018-08-24 DIAGNOSIS — Z79.01 CHRONIC ANTICOAGULATION: ICD-10-CM

## 2018-08-24 DIAGNOSIS — Z86.718 HISTORY OF DVT (DEEP VEIN THROMBOSIS): ICD-10-CM

## 2018-08-24 LAB — INR PPP: 2.7 (ref 2–3)

## 2018-08-24 PROCEDURE — 85610 PROTHROMBIN TIME: CPT | Mod: QW,S$GLB,, | Performed by: INTERNAL MEDICINE

## 2018-08-24 NOTE — PROGRESS NOTES
INR within range today.  Will maintain and follow up in 3 weeks.  Bruising from use but denies bleeding or other issues.  Patient will call with concerns or questions.

## 2018-08-31 ENCOUNTER — TELEPHONE (OUTPATIENT)
Dept: NEUROLOGY | Facility: CLINIC | Age: 63
End: 2018-08-31

## 2018-08-31 NOTE — TELEPHONE ENCOUNTER
----- Message from Mary Ac sent at 8/31/2018 10:05 AM CDT -----  Contact: Self            Name of Who is Calling: Self      What is the request in detail: Pt states he needs to speak to the clinical team . Pt declined to provide any details.      Can the clinic reply by [facility]: N      What Number [facility] Back [facility]: [phone]

## 2018-09-14 ENCOUNTER — ANTI-COAG VISIT (OUTPATIENT)
Dept: CARDIOLOGY | Facility: CLINIC | Age: 63
End: 2018-09-14
Payer: MEDICARE

## 2018-09-14 DIAGNOSIS — Z86.718 HISTORY OF DVT (DEEP VEIN THROMBOSIS): ICD-10-CM

## 2018-09-14 DIAGNOSIS — Z79.01 LONG TERM (CURRENT) USE OF ANTICOAGULANTS: Primary | ICD-10-CM

## 2018-09-14 DIAGNOSIS — Z79.01 CHRONIC ANTICOAGULATION: ICD-10-CM

## 2018-09-14 DIAGNOSIS — Z86.711 HISTORY OF PULMONARY EMBOLISM: ICD-10-CM

## 2018-09-14 LAB — INR PPP: 4.1 (ref 2–3)

## 2018-09-14 PROCEDURE — 85610 PROTHROMBIN TIME: CPT | Mod: PBBFAC

## 2018-09-14 PROCEDURE — 99211 OFF/OP EST MAY X REQ PHY/QHP: CPT | Mod: S$PBB,25,, | Performed by: INTERNAL MEDICINE

## 2018-09-14 NOTE — PROGRESS NOTES
INR elevated today. Bruising noted to arms from use. Patient reports that he has not been eating greens or drinking Ensure. He would like to make this a permanent change. He denies any other changes. We will hold his dose today then decrease his weekly dose. Follow up in 10 days. Patient reminded to call clinic with any changes or concerns. Care plan made with LYNSEY Capellan D

## 2018-09-24 ENCOUNTER — ANTI-COAG VISIT (OUTPATIENT)
Dept: CARDIOLOGY | Facility: CLINIC | Age: 63
End: 2018-09-24
Payer: MEDICARE

## 2018-09-24 DIAGNOSIS — F41.8 DEPRESSION WITH ANXIETY: ICD-10-CM

## 2018-09-24 DIAGNOSIS — Z86.711 HISTORY OF PULMONARY EMBOLISM: ICD-10-CM

## 2018-09-24 DIAGNOSIS — Z79.01 LONG TERM (CURRENT) USE OF ANTICOAGULANTS: Primary | ICD-10-CM

## 2018-09-24 DIAGNOSIS — Z79.01 CHRONIC ANTICOAGULATION: ICD-10-CM

## 2018-09-24 DIAGNOSIS — Z86.718 HISTORY OF DVT (DEEP VEIN THROMBOSIS): ICD-10-CM

## 2018-09-24 LAB — INR PPP: 1.5 (ref 2–3)

## 2018-09-24 PROCEDURE — 99211 OFF/OP EST MAY X REQ PHY/QHP: CPT | Mod: S$PBB,25,, | Performed by: INTERNAL MEDICINE

## 2018-09-24 PROCEDURE — 85610 PROTHROMBIN TIME: CPT | Mod: PBBFAC

## 2018-09-24 RX ORDER — ESCITALOPRAM OXALATE 20 MG/1
TABLET ORAL
Qty: 90 TABLET | Refills: 0 | Status: SHIPPED | OUTPATIENT
Start: 2018-09-24 | End: 2018-11-26 | Stop reason: SDUPTHER

## 2018-09-24 NOTE — PROGRESS NOTES
Quick follow up for elevated INR on 9/14. INR subtherapeutic today. Patient denies any bleeding, bruising or other changes that may affect warfarin therapy. We will increase his weekly dose and follow up in 1 week. Patient reminded to call clinic with any changes or concerns. Care plan made by A Yun Pharm D

## 2018-10-01 ENCOUNTER — ANTI-COAG VISIT (OUTPATIENT)
Dept: CARDIOLOGY | Facility: CLINIC | Age: 63
End: 2018-10-01
Payer: MEDICARE

## 2018-10-01 DIAGNOSIS — Z86.711 HISTORY OF PULMONARY EMBOLISM: ICD-10-CM

## 2018-10-01 DIAGNOSIS — Z79.01 LONG TERM (CURRENT) USE OF ANTICOAGULANTS: Primary | ICD-10-CM

## 2018-10-01 DIAGNOSIS — Z86.718 HISTORY OF DVT (DEEP VEIN THROMBOSIS): ICD-10-CM

## 2018-10-01 DIAGNOSIS — Z79.01 CHRONIC ANTICOAGULATION: ICD-10-CM

## 2018-10-01 LAB — INR PPP: 2.5 (ref 2–3)

## 2018-10-01 PROCEDURE — 99211 OFF/OP EST MAY X REQ PHY/QHP: CPT | Mod: S$PBB,25,,

## 2018-10-01 PROCEDURE — 85610 PROTHROMBIN TIME: CPT | Mod: PBBFAC

## 2018-10-01 NOTE — PROGRESS NOTES
Quick follow up for subtherapeutic INR on 9/24. INR within therapeutic range today. Bruising noted to arms from use. Patient denies any bleeding or other changes that may affect warfarin therapy. We will resume his weekly dose and follow up in 2 weeks. Patient reminded to call clinic with any changes or concerns.

## 2018-10-15 ENCOUNTER — ANTI-COAG VISIT (OUTPATIENT)
Dept: CARDIOLOGY | Facility: CLINIC | Age: 63
End: 2018-10-15
Payer: MEDICARE

## 2018-10-15 DIAGNOSIS — Z86.718 HISTORY OF DVT (DEEP VEIN THROMBOSIS): ICD-10-CM

## 2018-10-15 DIAGNOSIS — Z86.711 HISTORY OF PULMONARY EMBOLISM: ICD-10-CM

## 2018-10-15 DIAGNOSIS — Z79.01 CHRONIC ANTICOAGULATION: ICD-10-CM

## 2018-10-15 DIAGNOSIS — Z79.01 LONG TERM (CURRENT) USE OF ANTICOAGULANTS: Primary | ICD-10-CM

## 2018-10-15 LAB — INR PPP: 2.2 (ref 2–3)

## 2018-10-15 PROCEDURE — 99211 OFF/OP EST MAY X REQ PHY/QHP: CPT | Mod: S$PBB,25,, | Performed by: PHARMACIST

## 2018-10-15 PROCEDURE — 85610 PROTHROMBIN TIME: CPT | Mod: PBBFAC

## 2018-10-15 NOTE — PROGRESS NOTES
INR within therapeutic range today. Bruising noted to arms from use. Patient denies any bleeding or other changes that would affect warfarin therapy. Will maintain current dose and follow up in 3 weeks . Patient advised to call coumadin clinic with any changes or concerns. Care plan made with A Peck Pharm D

## 2018-10-23 ENCOUNTER — TELEPHONE (OUTPATIENT)
Dept: INTERNAL MEDICINE | Facility: CLINIC | Age: 63
End: 2018-10-23

## 2018-10-23 NOTE — TELEPHONE ENCOUNTER
----- Message from Wendy Armenta sent at 10/23/2018 10:34 AM CDT -----  Contact: self/[phone]  Pt called in regards to finding his Rx for atorvastatin (LIPITOR) 40 MG tablet 90 tablet 1 7/10/2018 No  TAKE 1 TABLET EVERY EVENING.      Please advise

## 2018-11-05 ENCOUNTER — LAB VISIT (OUTPATIENT)
Dept: LAB | Facility: HOSPITAL | Age: 63
End: 2018-11-05
Attending: INTERNAL MEDICINE
Payer: MEDICARE

## 2018-11-05 ENCOUNTER — ANTI-COAG VISIT (OUTPATIENT)
Dept: CARDIOLOGY | Facility: CLINIC | Age: 63
End: 2018-11-05

## 2018-11-05 DIAGNOSIS — Z79.01 CHRONIC ANTICOAGULATION: ICD-10-CM

## 2018-11-05 DIAGNOSIS — Z86.718 HISTORY OF DVT (DEEP VEIN THROMBOSIS): ICD-10-CM

## 2018-11-05 DIAGNOSIS — Z86.711 HISTORY OF PULMONARY EMBOLISM: ICD-10-CM

## 2018-11-05 LAB
INR PPP: 1.4
PROTHROMBIN TIME: 14.3 SEC

## 2018-11-05 PROCEDURE — 85610 PROTHROMBIN TIME: CPT

## 2018-11-05 PROCEDURE — 36415 COLL VENOUS BLD VENIPUNCTURE: CPT | Mod: PO

## 2018-11-06 NOTE — PROGRESS NOTES
Pt taking higher dose on incorrect daily, will aboost and adjust to pts schedule for decreased INR.

## 2018-11-19 ENCOUNTER — ANTI-COAG VISIT (OUTPATIENT)
Dept: CARDIOLOGY | Facility: CLINIC | Age: 63
End: 2018-11-19
Payer: MEDICARE

## 2018-11-19 DIAGNOSIS — Z86.718 HISTORY OF DVT (DEEP VEIN THROMBOSIS): ICD-10-CM

## 2018-11-19 DIAGNOSIS — Z79.01 LONG TERM (CURRENT) USE OF ANTICOAGULANTS: Primary | ICD-10-CM

## 2018-11-19 DIAGNOSIS — Z86.711 HISTORY OF PULMONARY EMBOLISM: ICD-10-CM

## 2018-11-19 DIAGNOSIS — Z79.01 CHRONIC ANTICOAGULATION: ICD-10-CM

## 2018-11-19 LAB — INR PPP: 2.2 (ref 2–3)

## 2018-11-19 PROCEDURE — 85610 PROTHROMBIN TIME: CPT | Mod: QW,S$GLB,, | Performed by: INTERNAL MEDICINE

## 2018-11-19 NOTE — PROGRESS NOTES
INR within therapeutic range today. Bruising noted to arms from use. Patient denies any bleeding or other changes that would affect warfarin therapy. Will maintain current dose and follow up in 2.5 weeks . Patient advised to call coumadin clinic with any changes or concerns. Care plan made with J Cordaro Pharm D.

## 2018-11-26 DIAGNOSIS — Z79.01 CHRONIC ANTICOAGULATION: ICD-10-CM

## 2018-11-26 DIAGNOSIS — F41.8 DEPRESSION WITH ANXIETY: ICD-10-CM

## 2018-11-26 DIAGNOSIS — E78.5 HYPERLIPIDEMIA, UNSPECIFIED HYPERLIPIDEMIA TYPE: ICD-10-CM

## 2018-11-26 DIAGNOSIS — Z86.711 HISTORY OF PULMONARY EMBOLISM: ICD-10-CM

## 2018-11-26 DIAGNOSIS — G40.909 SEIZURE DISORDER: ICD-10-CM

## 2018-11-26 DIAGNOSIS — Z86.718 HISTORY OF DVT (DEEP VEIN THROMBOSIS): ICD-10-CM

## 2018-11-26 RX ORDER — LEVETIRACETAM 1000 MG/1
1000 TABLET ORAL 2 TIMES DAILY
Qty: 180 TABLET | Refills: 3 | Status: SHIPPED | OUTPATIENT
Start: 2018-11-26 | End: 2019-03-28 | Stop reason: SDUPTHER

## 2018-11-26 RX ORDER — ESCITALOPRAM OXALATE 20 MG/1
TABLET ORAL
Qty: 90 TABLET | Refills: 0 | Status: SHIPPED | OUTPATIENT
Start: 2018-11-26 | End: 2019-01-28 | Stop reason: SDUPTHER

## 2018-11-26 NOTE — TELEPHONE ENCOUNTER
----- Message from Giorgi Saunders sent at 11/26/2018 10:21 AM CST -----  Rx Refill/Request     Is this a Refill or New Rx: Refill  Rx Name and Strength:  escitalopram oxalate (LEXAPRO) 20 MG tablet and levETIRAcetam (KEPPRA) 1000 MG tablet  Preferred Pharmacy with phone number: see below  Communication Preference: [phone]  Additional Information:     [facility] - [place], [place] - [address]  [address] [facility] [address]  Phone: [phone] Fax: [phone]

## 2018-11-26 NOTE — TELEPHONE ENCOUNTER
Summary: TAKE 1 TABLET EVERY DAY, Normal   Start: 9/24/2018  Ord/Sold: 9/24/2018 (O)  Report  Adh:   Long-term:   Pharmacy: [facility] [place] [id number]  Med Dose History        Patient Sig: TAKE 1 TABLET EVERY DAY       Ordered on: 9/24/2018       Authorized by: NICOLA GALAVIZ       Dispense: 90 tablet       Refills: 0 ordered       Admin Instructions: TAKE 1 TABLET EVERY DAY        levETIRAcetam (KEPPRA) 1000 MG tablet             Summary: TAKE 1 TABLET TWICE DAILY, Normal   Start: 5/8/2018  Ord/Sold: 5/8/2018 (O)  Report  Adh:   Long-term:   Pharmacy: [facility] - [facility] [address] Dose History       Patient Sig: TAKE 1 TABLET TWICE DAILY       Ordered on: 5/8/2018       Authorized by: MEET ROMAN       Dispense: 180 tablet       Refills: 3 ordered        Last office visit: 6/29/18

## 2018-11-27 RX ORDER — WARFARIN SODIUM 5 MG/1
TABLET ORAL
Qty: 90 TABLET | Refills: 3 | Status: SHIPPED | OUTPATIENT
Start: 2018-11-27 | End: 2019-12-30

## 2018-11-28 RX ORDER — ATORVASTATIN CALCIUM 40 MG/1
TABLET, FILM COATED ORAL
Qty: 90 TABLET | Refills: 1 | Status: SHIPPED | OUTPATIENT
Start: 2018-11-28 | End: 2019-12-14 | Stop reason: SDUPTHER

## 2018-12-07 ENCOUNTER — ANTI-COAG VISIT (OUTPATIENT)
Dept: CARDIOLOGY | Facility: CLINIC | Age: 63
End: 2018-12-07
Payer: MEDICARE

## 2018-12-07 DIAGNOSIS — Z79.01 CHRONIC ANTICOAGULATION: ICD-10-CM

## 2018-12-07 DIAGNOSIS — Z86.718 HISTORY OF DVT (DEEP VEIN THROMBOSIS): ICD-10-CM

## 2018-12-07 DIAGNOSIS — Z79.01 LONG TERM (CURRENT) USE OF ANTICOAGULANTS: Primary | ICD-10-CM

## 2018-12-07 DIAGNOSIS — Z86.711 HISTORY OF PULMONARY EMBOLISM: ICD-10-CM

## 2018-12-07 LAB — INR PPP: 2.5 (ref 2–3)

## 2018-12-07 PROCEDURE — 85610 PROTHROMBIN TIME: CPT | Mod: QW,S$GLB,, | Performed by: INTERNAL MEDICINE

## 2018-12-07 NOTE — PROGRESS NOTES
Pt seen by Terri RAMESH. I have reviewed her documentation and agree with her assessment and plan.

## 2018-12-28 ENCOUNTER — ANTI-COAG VISIT (OUTPATIENT)
Dept: CARDIOLOGY | Facility: CLINIC | Age: 63
End: 2018-12-28
Payer: MEDICARE

## 2018-12-28 ENCOUNTER — LAB VISIT (OUTPATIENT)
Dept: LAB | Facility: HOSPITAL | Age: 63
End: 2018-12-28
Attending: PSYCHIATRY & NEUROLOGY
Payer: MEDICARE

## 2018-12-28 ENCOUNTER — OFFICE VISIT (OUTPATIENT)
Dept: NEUROLOGY | Facility: CLINIC | Age: 63
End: 2018-12-28
Payer: MEDICARE

## 2018-12-28 VITALS
DIASTOLIC BLOOD PRESSURE: 55 MMHG | HEART RATE: 99 BPM | BODY MASS INDEX: 23.32 KG/M2 | SYSTOLIC BLOOD PRESSURE: 96 MMHG | WEIGHT: 136.63 LBS | HEIGHT: 64 IN

## 2018-12-28 DIAGNOSIS — Z79.01 LONG TERM (CURRENT) USE OF ANTICOAGULANTS: Primary | ICD-10-CM

## 2018-12-28 DIAGNOSIS — Z86.711 HISTORY OF PULMONARY EMBOLISM: ICD-10-CM

## 2018-12-28 DIAGNOSIS — I70.0 ATHEROSCLEROSIS OF AORTA: ICD-10-CM

## 2018-12-28 DIAGNOSIS — E11.9 DIABETES MELLITUS TYPE 2 IN NONOBESE: ICD-10-CM

## 2018-12-28 DIAGNOSIS — Z86.718 HISTORY OF DVT (DEEP VEIN THROMBOSIS): ICD-10-CM

## 2018-12-28 DIAGNOSIS — I25.10 ATHEROSCLEROSIS OF NATIVE CORONARY ARTERY OF NATIVE HEART WITHOUT ANGINA PECTORIS: Chronic | ICD-10-CM

## 2018-12-28 DIAGNOSIS — E78.00 PURE HYPERCHOLESTEROLEMIA: ICD-10-CM

## 2018-12-28 DIAGNOSIS — Z79.01 CHRONIC ANTICOAGULATION: ICD-10-CM

## 2018-12-28 DIAGNOSIS — G40.209 PARTIAL EPILEPSY WITH IMPAIRMENT OF CONSCIOUSNESS: Chronic | ICD-10-CM

## 2018-12-28 DIAGNOSIS — G40.209 PARTIAL EPILEPSY WITH IMPAIRMENT OF CONSCIOUSNESS: Primary | Chronic | ICD-10-CM

## 2018-12-28 DIAGNOSIS — F41.8 DEPRESSION WITH ANXIETY: ICD-10-CM

## 2018-12-28 DIAGNOSIS — Z86.73 HISTORY OF CVA (CEREBROVASCULAR ACCIDENT): ICD-10-CM

## 2018-12-28 LAB — INR PPP: 1.1 (ref 2–3)

## 2018-12-28 PROCEDURE — 99214 OFFICE O/P EST MOD 30 MIN: CPT | Mod: S$GLB,,, | Performed by: PSYCHIATRY & NEUROLOGY

## 2018-12-28 PROCEDURE — 85610 PROTHROMBIN TIME: CPT | Mod: QW,S$GLB,, | Performed by: INTERNAL MEDICINE

## 2018-12-28 PROCEDURE — 1101F PT FALLS ASSESS-DOCD LE1/YR: CPT | Mod: CPTII,S$GLB,, | Performed by: PSYCHIATRY & NEUROLOGY

## 2018-12-28 PROCEDURE — 80177 DRUG SCRN QUAN LEVETIRACETAM: CPT | Mod: PO

## 2018-12-28 PROCEDURE — 99999 PR PBB SHADOW E&M-EST. PATIENT-LVL III: CPT | Mod: PBBFAC,,, | Performed by: PSYCHIATRY & NEUROLOGY

## 2018-12-28 PROCEDURE — 36415 COLL VENOUS BLD VENIPUNCTURE: CPT | Mod: PO

## 2018-12-28 PROCEDURE — 3008F BODY MASS INDEX DOCD: CPT | Mod: CPTII,S$GLB,, | Performed by: PSYCHIATRY & NEUROLOGY

## 2018-12-28 PROCEDURE — 3044F HG A1C LEVEL LT 7.0%: CPT | Mod: CPTII,S$GLB,, | Performed by: PSYCHIATRY & NEUROLOGY

## 2018-12-28 NOTE — PROGRESS NOTES
Subjective:       Patient ID: Edu Choi is a 63 y.o. male.    Chief Complaint:  Seizures      History of Present Illness  HPI   This is a 63year-old male who is being followed by me with a history of seizures since age 4 related to complications secondary to measles during childhood. Seizures are described as transient loss of consciousness during which time he may display repetitive behaviors and he has no recollection of the episode. He has had occasional generalized convulsions that are infrequent.  He had a seizure in May 2017, noted on awakening in the morning.  He did report that he had been having sleep difficulty and is not sure if he missed his medicine the night before.  Subsequently was seen at the [facility] emergency room after having had a seizure in November 2017.   He subsequently had another brief seizure in February 2018 and was seen at the emergency room and had CT scan of brain that was essentially unchanged.   He continues to be on Coumadin for his bleeding disorder. His spouse was present today.   She did report that his seizure in February 2018 may have been related to stress as she had been hospitalized and he was alone at home and may been stressed out.  However, since then he has had 2 more seizures last being couple months ago.  No obvious precipitating factors were noted though the seizures very brief and he recovered almost immediately.      An EEG done in 2017 was abnormal.   The findings were consistent with left hemispheric cerebral dysfunction that is maximal and epileptogenic in the left temporal head region. In addition, there were scattered sharp waves in the right frontopolar head region, which if significant, would indicate that technically, the patient had secondarily generalized epilepsy.         Review of Systems  Review of Systems   Constitutional: Negative.    HENT: Negative for hearing loss.    Eyes: Negative.  Negative for visual disturbance.   Respiratory: Negative.   Negative for shortness of breath.    Cardiovascular: Negative.  Negative for chest pain and palpitations.   Gastrointestinal: Negative.    Genitourinary: Negative.    Musculoskeletal: Negative.  Negative for neck pain and neck stiffness.   Skin: Negative.    Neurological: Positive for seizures.   Psychiatric/Behavioral: Negative for behavioral problems, confusion, decreased concentration and sleep disturbance. The patient is nervous/anxious.        Objective:      Neurologic Exam      Physical Exam   Constitutional: He appears well-developed and well-nourished.   HENT:   Head: Normocephalic and atraumatic.   Eyes:   Fundus examination shows sharp disc margins.   Neck: Normal range of motion. Neck supple. Carotid bruit is not present.   Neurological: He is alert. He has normal reflexes. He displays no atrophy. No cranial nerve deficit (visual fields were normal at bedside testing.  EOM were normal.  No facial asymmetry was noted.  Facial sensation is symmetrical.  Corneals and gag reflexes were normal.  Tongue and palate movements were normal.  Shoulder shrug was normal.) or sensory deficit. He exhibits normal muscle tone. He displays a negative Romberg sign. Coordination and gait normal.   Mental status examination: Patient is fully oriented and able to give an adequate history.  Recall of recent and past information is good.  Immediate recall is normal.  Attention span and concentration was mildly impaired.  Fund of knowledge was fair.  Judgment and insight is normal.  Language functions are intact with no evidence of aphasia or dysarthria.  Comprehension is unimpaired.  Affect is appropriate, mood was even.  No thought disorder is noted.   Vitals reviewed.        Assessment:        1. Partial epilepsy with impairment of consciousness    2. History of CVA (cerebrovascular accident)    3. Depression with anxiety    4. Pure hypercholesterolemia    5. Atherosclerosis of native coronary artery of native heart without  angina pectoris    6. Atherosclerosis of aorta    7. History of DVT (deep vein thrombosis)    8. Diabetes mellitus type 2 in nonobese             Plan:       Discussed with patient and spouse.  Seizure precautions including compliance stressed.  Will get a Keppra level today because of continued brief seizures.  Recommend OTC melatonin for sleep.  Continue Keppra but may consider dosage change depending on blood level.   Follow-up in 6 months if stable.

## 2018-12-28 NOTE — PATIENT INSTRUCTIONS
Discussed with patient and spouse.  Seizure precautions including compliance stressed.  Will get a Keppra level today because of continued brief seizures.  Recommend OTC melatonin for sleep.  Continue Keppra but may consider dosage change depending on blood level.

## 2018-12-28 NOTE — PROGRESS NOTES
INR subtherapeutic today. Patient reports having a seizure on 12/19. He states he also had some extra greens over the past week. Bruising noted to arms from use. We will boost his dose today and tomorrow then resume weekly dose.Follow up in 1 week. Patient reminded to call clinic with any changes or concerns. Care plan made JALIL MARIE.

## 2018-12-31 LAB — LEVETIRACETAM SERPL-MCNC: 46.3 UG/ML (ref 3–60)

## 2019-01-03 ENCOUNTER — ANTI-COAG VISIT (OUTPATIENT)
Dept: CARDIOLOGY | Facility: CLINIC | Age: 64
End: 2019-01-03
Payer: MEDICARE

## 2019-01-03 DIAGNOSIS — Z86.711 HISTORY OF PULMONARY EMBOLISM: ICD-10-CM

## 2019-01-03 DIAGNOSIS — Z86.718 HISTORY OF DVT (DEEP VEIN THROMBOSIS): ICD-10-CM

## 2019-01-03 DIAGNOSIS — Z79.01 CHRONIC ANTICOAGULATION: ICD-10-CM

## 2019-01-03 DIAGNOSIS — Z79.01 LONG TERM (CURRENT) USE OF ANTICOAGULANTS: Primary | ICD-10-CM

## 2019-01-03 LAB — INR PPP: 2 (ref 2–3)

## 2019-01-03 PROCEDURE — 85610 PROTHROMBIN TIME: CPT | Mod: QW,S$GLB,, | Performed by: INTERNAL MEDICINE

## 2019-01-03 PROCEDURE — 85610 POCT INR: ICD-10-PCS | Mod: QW,S$GLB,, | Performed by: INTERNAL MEDICINE

## 2019-01-03 NOTE — PROGRESS NOTES
INR within range today at lowest end of range, patient reports eating cabbage this week for new years, will maintain, and follow up in 2 weeks.  Patient's plan of care is to continue NOTeating foods high in vitamin K/greens.  Denies having any other seizures since 12/19/18.

## 2019-01-17 ENCOUNTER — ANTI-COAG VISIT (OUTPATIENT)
Dept: CARDIOLOGY | Facility: CLINIC | Age: 64
End: 2019-01-17
Payer: MEDICARE

## 2019-01-17 DIAGNOSIS — Z79.01 LONG TERM (CURRENT) USE OF ANTICOAGULANTS: Primary | ICD-10-CM

## 2019-01-17 DIAGNOSIS — Z86.718 HISTORY OF DVT (DEEP VEIN THROMBOSIS): ICD-10-CM

## 2019-01-17 DIAGNOSIS — Z79.01 CHRONIC ANTICOAGULATION: ICD-10-CM

## 2019-01-17 DIAGNOSIS — Z86.711 HISTORY OF PULMONARY EMBOLISM: ICD-10-CM

## 2019-01-17 LAB — INR PPP: 1.4 (ref 2–3)

## 2019-01-17 PROCEDURE — 85610 PROTHROMBIN TIME: CPT | Mod: QW,S$GLB,, | Performed by: INTERNAL MEDICINE

## 2019-01-17 PROCEDURE — 85610 POCT INR: ICD-10-PCS | Mod: QW,S$GLB,, | Performed by: INTERNAL MEDICINE

## 2019-01-17 NOTE — PROGRESS NOTES
INR low for no apparent reason, no change to diet, meds, or health.  Will boost 1 tab today and do a weekly increase with follow up in 1 week.  Bruising from use but denies bleeding or other issues.  ERIKA Capellan D assisted with dosing

## 2019-01-24 ENCOUNTER — ANTI-COAG VISIT (OUTPATIENT)
Dept: CARDIOLOGY | Facility: CLINIC | Age: 64
End: 2019-01-24
Payer: MEDICARE

## 2019-01-24 DIAGNOSIS — Z86.718 HISTORY OF DVT (DEEP VEIN THROMBOSIS): ICD-10-CM

## 2019-01-24 DIAGNOSIS — Z86.711 HISTORY OF PULMONARY EMBOLISM: ICD-10-CM

## 2019-01-24 DIAGNOSIS — Z79.01 LONG TERM (CURRENT) USE OF ANTICOAGULANTS: Primary | ICD-10-CM

## 2019-01-24 DIAGNOSIS — Z79.01 CHRONIC ANTICOAGULATION: ICD-10-CM

## 2019-01-24 LAB — INR PPP: 1.4 (ref 2–3)

## 2019-01-24 PROCEDURE — 85610 POCT INR: ICD-10-PCS | Mod: QW,S$GLB,, | Performed by: INTERNAL MEDICINE

## 2019-01-24 PROCEDURE — 85610 PROTHROMBIN TIME: CPT | Mod: QW,S$GLB,, | Performed by: INTERNAL MEDICINE

## 2019-01-24 NOTE — PROGRESS NOTES
Quick follow up for subtherapeutic INR on 1/17. INR subtherapeutic today. Patient reports occasional bruising from use. He denies any bleeding or other changes. We will boost his dose today then increase his weekly dose. Follow up in 1 week. .Patient was re-educated on situations that would require placing a call to the Coumadin  Clinic, including bleeding or unusual bruising issues, changes in health, diet or medications,upcoming procedures that require warfarin interruption, and missed Coumadin dose(s). Patient expressed understanding that avoidance of consistency with these parameters could cause fluctuations in INR, leading to more frequent visits and increase risk of adverse events. Care plan made with JALIL HERRERA

## 2019-01-28 DIAGNOSIS — F41.8 DEPRESSION WITH ANXIETY: ICD-10-CM

## 2019-01-28 RX ORDER — ESCITALOPRAM OXALATE 20 MG/1
TABLET ORAL
Qty: 90 TABLET | Refills: 3 | Status: SHIPPED | OUTPATIENT
Start: 2019-01-28 | End: 2019-12-24

## 2019-01-30 ENCOUNTER — OFFICE VISIT (OUTPATIENT)
Dept: INTERNAL MEDICINE | Facility: CLINIC | Age: 64
End: 2019-01-30
Payer: MEDICARE

## 2019-01-30 ENCOUNTER — LAB VISIT (OUTPATIENT)
Dept: LAB | Facility: HOSPITAL | Age: 64
End: 2019-01-30
Payer: MEDICARE

## 2019-01-30 VITALS
BODY MASS INDEX: 24.1 KG/M2 | DIASTOLIC BLOOD PRESSURE: 60 MMHG | OXYGEN SATURATION: 98 % | HEIGHT: 64 IN | SYSTOLIC BLOOD PRESSURE: 100 MMHG | HEART RATE: 88 BPM | WEIGHT: 141.13 LBS

## 2019-01-30 DIAGNOSIS — E11.9 DIABETES MELLITUS TYPE 2 IN NONOBESE: ICD-10-CM

## 2019-01-30 DIAGNOSIS — Z12.9 SCREENING FOR CANCER: ICD-10-CM

## 2019-01-30 DIAGNOSIS — I25.10 ATHEROSCLEROSIS OF NATIVE CORONARY ARTERY OF NATIVE HEART WITHOUT ANGINA PECTORIS: Chronic | ICD-10-CM

## 2019-01-30 DIAGNOSIS — G47.33 OSA (OBSTRUCTIVE SLEEP APNEA): ICD-10-CM

## 2019-01-30 DIAGNOSIS — Z87.891 PERSONAL HISTORY OF NICOTINE DEPENDENCE: ICD-10-CM

## 2019-01-30 DIAGNOSIS — F41.8 DEPRESSION WITH ANXIETY: ICD-10-CM

## 2019-01-30 DIAGNOSIS — Z79.01 LONG TERM (CURRENT) USE OF ANTICOAGULANTS: ICD-10-CM

## 2019-01-30 DIAGNOSIS — G40.209 PARTIAL EPILEPSY WITH IMPAIRMENT OF CONSCIOUSNESS: Primary | Chronic | ICD-10-CM

## 2019-01-30 DIAGNOSIS — Z86.711 HISTORY OF PULMONARY EMBOLISM: ICD-10-CM

## 2019-01-30 DIAGNOSIS — Z13.83 SCREENING FOR RESPIRATORY CONDITION: ICD-10-CM

## 2019-01-30 DIAGNOSIS — Z90.81 S/P SPLENECTOMY: ICD-10-CM

## 2019-01-30 DIAGNOSIS — Z12.2 ENCOUNTER FOR SCREENING FOR LUNG CANCER: ICD-10-CM

## 2019-01-30 DIAGNOSIS — Z86.718 HISTORY OF DVT (DEEP VEIN THROMBOSIS): ICD-10-CM

## 2019-01-30 DIAGNOSIS — I70.0 ATHEROSCLEROSIS OF AORTA: ICD-10-CM

## 2019-01-30 DIAGNOSIS — R73.03 PREDIABETES: ICD-10-CM

## 2019-01-30 DIAGNOSIS — E78.00 PURE HYPERCHOLESTEROLEMIA: ICD-10-CM

## 2019-01-30 DIAGNOSIS — Z79.01 CHRONIC ANTICOAGULATION: ICD-10-CM

## 2019-01-30 DIAGNOSIS — Z86.73 HISTORY OF CVA (CEREBROVASCULAR ACCIDENT): ICD-10-CM

## 2019-01-30 LAB
INR PPP: 1.3
POST FEF 25 75: 2.32
POST FET 100: 4.35
POST FEV1 FVC: 84.77
POST FEV1: 1.98
POST FVC: 2.34
POST PEF: 5.47
PRE FEF 25 75: 1.85
PRE FET 100: 8.33
PRE FEV1 FVC: 78.56
PRE FEV1: 1.99
PRE FVC: 2.53
PRE PEF: 5.96
PREDICTED FEV1 FVC: NORMAL
PREDICTED FEV1: NORMAL
PREDICTED FVC: NORMAL
PROTHROMBIN TIME: 13.4 SEC

## 2019-01-30 PROCEDURE — 99213 OFFICE O/P EST LOW 20 MIN: CPT | Mod: HCNC,GC,S$GLB, | Performed by: STUDENT IN AN ORGANIZED HEALTH CARE EDUCATION/TRAINING PROGRAM

## 2019-01-30 PROCEDURE — 1101F PR PT FALLS ASSESS DOC 0-1 FALLS W/OUT INJ PAST YR: ICD-10-PCS | Mod: HCNC,CPTII,GC,S$GLB | Performed by: STUDENT IN AN ORGANIZED HEALTH CARE EDUCATION/TRAINING PROGRAM

## 2019-01-30 PROCEDURE — 99213 PR OFFICE/OUTPT VISIT, EST, LEVL III, 20-29 MIN: ICD-10-PCS | Mod: HCNC,GC,S$GLB, | Performed by: STUDENT IN AN ORGANIZED HEALTH CARE EDUCATION/TRAINING PROGRAM

## 2019-01-30 PROCEDURE — 3008F BODY MASS INDEX DOCD: CPT | Mod: HCNC,CPTII,GC,S$GLB | Performed by: STUDENT IN AN ORGANIZED HEALTH CARE EDUCATION/TRAINING PROGRAM

## 2019-01-30 PROCEDURE — 85610 PROTHROMBIN TIME: CPT

## 2019-01-30 PROCEDURE — 3008F PR BODY MASS INDEX (BMI) DOCUMENTED: ICD-10-PCS | Mod: HCNC,CPTII,GC,S$GLB | Performed by: STUDENT IN AN ORGANIZED HEALTH CARE EDUCATION/TRAINING PROGRAM

## 2019-01-30 PROCEDURE — 94640 PR INHAL RX, AIRWAY OBST/DX SPUTUM INDUCT: ICD-10-PCS | Mod: 59,S$GLB,, | Performed by: INTERNAL MEDICINE

## 2019-01-30 PROCEDURE — 3044F PR MOST RECENT HEMOGLOBIN A1C LEVEL <7.0%: ICD-10-PCS | Mod: HCNC,CPTII,GC,S$GLB | Performed by: STUDENT IN AN ORGANIZED HEALTH CARE EDUCATION/TRAINING PROGRAM

## 2019-01-30 PROCEDURE — 3044F HG A1C LEVEL LT 7.0%: CPT | Mod: HCNC,CPTII,GC,S$GLB | Performed by: STUDENT IN AN ORGANIZED HEALTH CARE EDUCATION/TRAINING PROGRAM

## 2019-01-30 PROCEDURE — 94640 AIRWAY INHALATION TREATMENT: CPT | Mod: 59,S$GLB,, | Performed by: INTERNAL MEDICINE

## 2019-01-30 PROCEDURE — 99999 PR PBB SHADOW E&M-EST. PATIENT-LVL IV: ICD-10-PCS | Mod: PBBFAC,GC,, | Performed by: STUDENT IN AN ORGANIZED HEALTH CARE EDUCATION/TRAINING PROGRAM

## 2019-01-30 PROCEDURE — 99999 PR PBB SHADOW E&M-EST. PATIENT-LVL IV: CPT | Mod: PBBFAC,GC,, | Performed by: STUDENT IN AN ORGANIZED HEALTH CARE EDUCATION/TRAINING PROGRAM

## 2019-01-30 PROCEDURE — 1101F PT FALLS ASSESS-DOCD LE1/YR: CPT | Mod: HCNC,CPTII,GC,S$GLB | Performed by: STUDENT IN AN ORGANIZED HEALTH CARE EDUCATION/TRAINING PROGRAM

## 2019-01-30 PROCEDURE — 36415 COLL VENOUS BLD VENIPUNCTURE: CPT

## 2019-01-30 RX ORDER — ALBUTEROL SULFATE 2.5 MG/.5ML
2.5 SOLUTION RESPIRATORY (INHALATION)
Status: COMPLETED | OUTPATIENT
Start: 2019-01-30 | End: 2019-01-30

## 2019-01-30 RX ORDER — METFORMIN HYDROCHLORIDE 500 MG/1
1000 TABLET ORAL
Qty: 180 TABLET | Refills: 3 | Status: CANCELLED | OUTPATIENT
Start: 2019-01-30 | End: 2020-01-30

## 2019-01-30 RX ADMIN — ALBUTEROL SULFATE 2.5 MG: 2.5 SOLUTION RESPIRATORY (INHALATION) at 04:01

## 2019-01-30 NOTE — PROGRESS NOTES
Subjective:       Patient ID: Edu Choi is a 63 y.o. male.    Chief Complaint: Follow-up    Edu Choi is an established patient of Gladis Blankenship MD here today for 6 month f/u visit.       He was last seen 07/2018. He has no current complaints, still smoking 6 cigs per day, he deferred from smoking cessation program at this visit. No seizures since 12/2018. No syncopal episodes.    Patient Active Problem List:     1- Partial epilepsy with impairment of consciousness: followed by Dr. Ventura, on keppra.     2- Depression with anxiety: on lexapro.     3- Tobacco abuse: still smoking 6 cigs /day.      4- History of DVT (deep vein thrombosis)/History of pulmonary embolism/Chronic anticoagulation: on lifetime coumadin, last INR 01/24/2019 1.4.     5- S/P splenectomy     6- Hyperlipidemia = on lipitor.     7- Diabetes mellitus type 2: not checking blood sugar, last hemoglobin A1C 6% 07/2018,foot exam 1/31/18.     8- Atherosclerosis of native coronary artery of native heart without angina pectoris    09- Calculus of gallbladder without cholecystitis without obstruction    10- CHRIS (obstructive sleep apnea) uses bipap.      Review of Systems   Constitutional: Negative for activity change, appetite change, chills and fever.   HENT: Negative for congestion and sore throat.    Eyes: Negative for pain and itching.   Respiratory: Negative for cough, chest tightness and shortness of breath.    Cardiovascular: Negative for chest pain, palpitations and leg swelling.   Gastrointestinal: Positive for constipation. Negative for abdominal pain, diarrhea, nausea and vomiting.   Endocrine: Negative for polyphagia and polyuria.   Genitourinary: Negative for dysuria, frequency and hematuria.   Musculoskeletal: Negative for back pain.   Skin: Negative for rash.   Neurological: Negative for light-headedness, numbness and headaches.   Psychiatric/Behavioral: Negative for agitation and behavioral problems.       Objective:      Physical  Exam   Constitutional: He is oriented to person, place, and time. He appears well-developed.   HENT:   Head: Normocephalic.   Cardiovascular: Normal rate, regular rhythm and normal heart sounds.   No murmur heard.  Pulmonary/Chest: Effort normal and breath sounds normal.   Abdominal: Soft. Bowel sounds are normal. He exhibits no distension. There is no tenderness.   Musculoskeletal: Normal range of motion.   Neurological: He is alert and oriented to person, place, and time.   Psychiatric: He has a normal mood and affect. His behavior is normal.       Assessment:       1. Partial epilepsy with impairment of consciousness    2. History of CVA (cerebrovascular accident)    3. Depression with anxiety    4. Atherosclerosis of native coronary artery of native heart without angina pectoris    5. Pure hypercholesterolemia    6. Atherosclerosis of aorta    7. S/P splenectomy    8. Screening for cancer    9. Diabetes mellitus type 2 in nonobese    10. CHRIS (obstructive sleep apnea)    11. Encounter for screening for lung cancer    12. Personal history of nicotine dependence     13. Screening for respiratory condition    14. Prediabetes        Plan:       Orders Placed This Encounter   Procedures    CT Chest Lung Screening Low Dose    Hemoglobin A1c    Lipid panel    TSH    Comprehensive metabolic panel     - pt is due for CT chest lung Ca screening (last one 2016).  - routine health maintenance labs ordered.  - Foot exam done was normal.      RTC with PCP in 6 months.            Christal Mathis MD  Internal Medicine  PGY-2

## 2019-01-31 ENCOUNTER — HOSPITAL ENCOUNTER (OUTPATIENT)
Dept: RADIOLOGY | Facility: HOSPITAL | Age: 64
Discharge: HOME | End: 2019-01-31
Attending: STUDENT IN AN ORGANIZED HEALTH CARE EDUCATION/TRAINING PROGRAM
Payer: MEDICARE

## 2019-01-31 ENCOUNTER — TELEPHONE (OUTPATIENT)
Dept: INTERNAL MEDICINE | Facility: CLINIC | Age: 64
End: 2019-01-31

## 2019-01-31 ENCOUNTER — ANTI-COAG VISIT (OUTPATIENT)
Dept: CARDIOLOGY | Facility: CLINIC | Age: 64
End: 2019-01-31

## 2019-01-31 DIAGNOSIS — Z86.711 HISTORY OF PULMONARY EMBOLISM: ICD-10-CM

## 2019-01-31 DIAGNOSIS — Z79.01 CHRONIC ANTICOAGULATION: ICD-10-CM

## 2019-01-31 DIAGNOSIS — Z87.891 PERSONAL HISTORY OF NICOTINE DEPENDENCE: ICD-10-CM

## 2019-01-31 DIAGNOSIS — Z12.9 SCREENING FOR CANCER: ICD-10-CM

## 2019-01-31 DIAGNOSIS — Z86.718 HISTORY OF DVT (DEEP VEIN THROMBOSIS): ICD-10-CM

## 2019-01-31 PROCEDURE — G0297 CT CHEST LUNG SCREENING LOW DOSE: ICD-10-PCS | Mod: 26,,, | Performed by: RADIOLOGY

## 2019-01-31 PROCEDURE — G0297 LDCT FOR LUNG CA SCREEN: HCPCS | Mod: TC

## 2019-01-31 PROCEDURE — G0297 LDCT FOR LUNG CA SCREEN: HCPCS | Mod: 26,,, | Performed by: RADIOLOGY

## 2019-01-31 NOTE — TELEPHONE ENCOUNTER
----- Message from Marsha Howard sent at 1/31/2019 11:44 AM CST -----  Contact: self/[phone]  Patient called in regards needing an excuse to release of jury duty. Patient is scheduled to go on 03/19. Please if letter can be fax # [phone]. Please call and advise. Thank you!!!

## 2019-01-31 NOTE — TELEPHONE ENCOUNTER
Called pt and asked why he couldn't go to jury duty and he stated it would be because he would be alone at jury duty and he could have a seizure.

## 2019-01-31 NOTE — TELEPHONE ENCOUNTER
Unfortunately that does not qualify to be exempt from jury duty as he won't be left alone during the process.

## 2019-02-01 ENCOUNTER — TELEPHONE (OUTPATIENT)
Dept: NEUROLOGY | Facility: CLINIC | Age: 64
End: 2019-02-01

## 2019-02-01 NOTE — TELEPHONE ENCOUNTER
Tried calling phone number left by [facility] pharmacy however it was a wrong number.  The patient has been getting his medications, Keppra, from [facility].  I have not heard from either the patient or the pharmacy.  Will wait for recall

## 2019-02-01 NOTE — TELEPHONE ENCOUNTER
----- Message from Mary Ac sent at 2/1/2019 10:55 AM CST -----  Contact: [facility]          Name of Who is Calling: [facility]      What is the request in detail: [facility] is calling to get prior Auth on the Rx levETIRAcetam (KEPPRA) 1000 MG tablet.Please contact to further discuss.      Can the clinic reply by [facility]: N      What Number to Call Back [facility]: [phone]  Paulie

## 2019-02-04 NOTE — PROGRESS NOTES
Closing chart for admin reasons. This INR has not been addressed due to patient not answering calls or calling us back. Will need repeat and current INR for assessment as this one is now too old.

## 2019-02-04 NOTE — PROGRESS NOTES
Patient advised to maintain dosage of warfarin. Patient verbalized understanding. Also peter for labs on 02/05/19.

## 2019-02-05 ENCOUNTER — LAB VISIT (OUTPATIENT)
Dept: LAB | Facility: HOSPITAL | Age: 64
End: 2019-02-05
Payer: MEDICARE

## 2019-02-05 ENCOUNTER — TELEPHONE (OUTPATIENT)
Dept: INTERNAL MEDICINE | Facility: CLINIC | Age: 64
End: 2019-02-05

## 2019-02-05 DIAGNOSIS — Z79.01 CHRONIC ANTICOAGULATION: ICD-10-CM

## 2019-02-05 DIAGNOSIS — Z86.711 HISTORY OF PULMONARY EMBOLISM: ICD-10-CM

## 2019-02-05 DIAGNOSIS — Z86.718 HISTORY OF DVT (DEEP VEIN THROMBOSIS): ICD-10-CM

## 2019-02-05 DIAGNOSIS — Z79.01 LONG TERM (CURRENT) USE OF ANTICOAGULANTS: ICD-10-CM

## 2019-02-05 LAB
INR PPP: 1.3
PROTHROMBIN TIME: 13 SEC

## 2019-02-05 PROCEDURE — 85610 PROTHROMBIN TIME: CPT

## 2019-02-05 PROCEDURE — 36415 COLL VENOUS BLD VENIPUNCTURE: CPT

## 2019-02-05 NOTE — TELEPHONE ENCOUNTER
Called the patient about the lab results, no change in medication needed.          Christal Mathis MD  Internal Medicine  PGY-2

## 2019-02-06 ENCOUNTER — ANTI-COAG VISIT (OUTPATIENT)
Dept: CARDIOLOGY | Facility: CLINIC | Age: 64
End: 2019-02-06

## 2019-02-06 DIAGNOSIS — Z86.711 HISTORY OF PULMONARY EMBOLISM: ICD-10-CM

## 2019-02-06 DIAGNOSIS — Z86.718 HISTORY OF DVT (DEEP VEIN THROMBOSIS): ICD-10-CM

## 2019-02-06 DIAGNOSIS — Z79.01 CHRONIC ANTICOAGULATION: ICD-10-CM

## 2019-02-12 ENCOUNTER — ANTI-COAG VISIT (OUTPATIENT)
Dept: CARDIOLOGY | Facility: CLINIC | Age: 64
End: 2019-02-12
Payer: MEDICARE

## 2019-02-12 DIAGNOSIS — Z86.711 HISTORY OF PULMONARY EMBOLISM: ICD-10-CM

## 2019-02-12 DIAGNOSIS — Z79.01 LONG TERM (CURRENT) USE OF ANTICOAGULANTS: Primary | ICD-10-CM

## 2019-02-12 DIAGNOSIS — Z79.01 CHRONIC ANTICOAGULATION: ICD-10-CM

## 2019-02-12 DIAGNOSIS — Z86.718 HISTORY OF DVT (DEEP VEIN THROMBOSIS): ICD-10-CM

## 2019-02-12 LAB — INR PPP: 1.6 (ref 2–3)

## 2019-02-12 PROCEDURE — 85610 POCT INR: ICD-10-PCS | Mod: QW,S$GLB,, | Performed by: INTERNAL MEDICINE

## 2019-02-12 PROCEDURE — 85610 PROTHROMBIN TIME: CPT | Mod: QW,S$GLB,, | Performed by: INTERNAL MEDICINE

## 2019-02-12 NOTE — PROGRESS NOTES
Quick follow up for subtherapeutic INR on 2/5. INR subtherapeutic today. Patient reports occasional bruising from use. He denies any bleeding or other changes. We will increase his weekly dose and follow up in 1 week. Patient was re-educated on situations that would require placing a call to the Coumadin  Clinic, including bleeding or unusual bruising issues, changes in health, diet or medications,upcoming procedures that require warfarin interruption, and missed Coumadin dose(s). Patient expressed understanding that avoidance of consistency with these parameters could cause fluctuations in INR, leading to more frequent visits and increase risk of adverse events. Care plan made with J Cordaro Pharm D.

## 2019-02-19 ENCOUNTER — ANTI-COAG VISIT (OUTPATIENT)
Dept: CARDIOLOGY | Facility: CLINIC | Age: 64
End: 2019-02-19
Payer: MEDICARE

## 2019-02-19 DIAGNOSIS — Z86.711 HISTORY OF PULMONARY EMBOLISM: ICD-10-CM

## 2019-02-19 DIAGNOSIS — Z86.718 HISTORY OF DVT (DEEP VEIN THROMBOSIS): ICD-10-CM

## 2019-02-19 DIAGNOSIS — Z79.01 LONG TERM (CURRENT) USE OF ANTICOAGULANTS: Primary | ICD-10-CM

## 2019-02-19 DIAGNOSIS — Z79.01 CHRONIC ANTICOAGULATION: ICD-10-CM

## 2019-02-19 LAB — INR PPP: 3.6 (ref 2–3)

## 2019-02-19 PROCEDURE — 85610 POCT INR: ICD-10-PCS | Mod: QW,HCNC,S$GLB, | Performed by: INTERNAL MEDICINE

## 2019-02-19 PROCEDURE — 85610 PROTHROMBIN TIME: CPT | Mod: QW,HCNC,S$GLB, | Performed by: INTERNAL MEDICINE

## 2019-02-19 NOTE — PROGRESS NOTES
Quick follow up for subtherapeutic INR on 2/12. INR elevated today. Patient denies any bleeding, bruising or other changes. We will hold his coumadin today then decrease. Follow up in 1 week. Patient was re-educated on situations that would require placing a call to the Coumadin  Clinic, including bleeding or unusual bruising issues, changes in health, diet or medications,upcoming procedures that require warfarin interruption, and missed Coumadin dose(s). Patient expressed understanding that avoidance of consistency with these parameters could cause fluctuations in INR, leading to more frequent visits and increase risk of adverse events. Care plan made with J Cordaro Pharm D.

## 2019-02-26 ENCOUNTER — ANTI-COAG VISIT (OUTPATIENT)
Dept: CARDIOLOGY | Facility: CLINIC | Age: 64
End: 2019-02-26
Payer: MEDICARE

## 2019-02-26 DIAGNOSIS — Z86.718 HISTORY OF DVT (DEEP VEIN THROMBOSIS): ICD-10-CM

## 2019-02-26 DIAGNOSIS — Z86.711 HISTORY OF PULMONARY EMBOLISM: ICD-10-CM

## 2019-02-26 DIAGNOSIS — Z79.01 CHRONIC ANTICOAGULATION: ICD-10-CM

## 2019-02-26 DIAGNOSIS — Z79.01 LONG TERM (CURRENT) USE OF ANTICOAGULANTS: Primary | ICD-10-CM

## 2019-02-26 LAB — INR PPP: 4.4 (ref 2–3)

## 2019-02-26 PROCEDURE — 85610 PROTHROMBIN TIME: CPT | Mod: QW,HCNC,S$GLB, | Performed by: INTERNAL MEDICINE

## 2019-02-26 PROCEDURE — 85610 POCT INR: ICD-10-PCS | Mod: QW,HCNC,S$GLB, | Performed by: INTERNAL MEDICINE

## 2019-02-26 NOTE — PROGRESS NOTES
Quick follow up for elevated INR on 2/19. INR elevated today. Patient denies any bleeding, bruising or other changes. We will hold his coumadin today then decrease his weekly dose. Follow up in 1 week. Patient was re-educated on situations that would require placing a call to the Coumadin  Clinic, including bleeding or unusual bruising issues, changes in health, diet or medications,upcoming procedures that require warfarin interruption, and missed Coumadin dose(s). Patient expressed understanding that avoidance of consistency with these parameters could cause fluctuations in INR, leading to more frequent visits and increase risk of adverse events. Care plan made with J Cordaro Pharm D.

## 2019-03-04 ENCOUNTER — ANTI-COAG VISIT (OUTPATIENT)
Dept: CARDIOLOGY | Facility: CLINIC | Age: 64
End: 2019-03-04
Payer: MEDICARE

## 2019-03-04 DIAGNOSIS — Z86.718 HISTORY OF DVT (DEEP VEIN THROMBOSIS): ICD-10-CM

## 2019-03-04 DIAGNOSIS — Z86.711 HISTORY OF PULMONARY EMBOLISM: ICD-10-CM

## 2019-03-04 DIAGNOSIS — Z79.01 LONG TERM (CURRENT) USE OF ANTICOAGULANTS: Primary | ICD-10-CM

## 2019-03-04 DIAGNOSIS — Z79.01 CHRONIC ANTICOAGULATION: ICD-10-CM

## 2019-03-04 LAB — INR PPP: 1.2 (ref 2–3)

## 2019-03-04 PROCEDURE — 85610 POCT INR: ICD-10-PCS | Mod: QW,HCNC,S$GLB, | Performed by: INTERNAL MEDICINE

## 2019-03-04 PROCEDURE — 85610 PROTHROMBIN TIME: CPT | Mod: QW,HCNC,S$GLB, | Performed by: INTERNAL MEDICINE

## 2019-03-04 NOTE — PROGRESS NOTES
Quick follow up for elevated INR on 2/26. INR subtherapeutic today. Patient denies any bleeding, bruising or other changes. We will increase his weekly dose and follow up in 1 week. Patient was re-educated on situations that would require placing a call to the Coumadin  Clinic, including bleeding or unusual bruising issues, changes in health, diet or medications,upcoming procedures that require warfarin interruption, and missed Coumadin dose(s). Patient expressed understanding that avoidance of consistency with these parameters could cause fluctuations in INR, leading to more frequent visits and increase risk of adverse events. Care plan made with JALIL Capellan D

## 2019-03-13 ENCOUNTER — ANTI-COAG VISIT (OUTPATIENT)
Dept: CARDIOLOGY | Facility: CLINIC | Age: 64
End: 2019-03-13
Payer: MEDICARE

## 2019-03-13 DIAGNOSIS — Z86.711 HISTORY OF PULMONARY EMBOLISM: ICD-10-CM

## 2019-03-13 DIAGNOSIS — Z86.718 HISTORY OF DVT (DEEP VEIN THROMBOSIS): ICD-10-CM

## 2019-03-13 DIAGNOSIS — Z79.01 LONG TERM (CURRENT) USE OF ANTICOAGULANTS: Primary | ICD-10-CM

## 2019-03-13 DIAGNOSIS — Z79.01 CHRONIC ANTICOAGULATION: ICD-10-CM

## 2019-03-13 LAB — INR PPP: 1.2 (ref 2–3)

## 2019-03-13 PROCEDURE — 99211 PR OFFICE/OUTPT VISIT, EST, LEVL I: ICD-10-PCS | Mod: 25,HCNC,S$GLB, | Performed by: INTERNAL MEDICINE

## 2019-03-13 PROCEDURE — 85610 POCT INR: ICD-10-PCS | Mod: QW,HCNC,S$GLB, | Performed by: INTERNAL MEDICINE

## 2019-03-13 PROCEDURE — 85610 PROTHROMBIN TIME: CPT | Mod: QW,HCNC,S$GLB, | Performed by: INTERNAL MEDICINE

## 2019-03-13 PROCEDURE — 99211 OFF/OP EST MAY X REQ PHY/QHP: CPT | Mod: 25,HCNC,S$GLB, | Performed by: INTERNAL MEDICINE

## 2019-03-14 NOTE — PROGRESS NOTES
Quick follow up for subtherapeutic INR on 3/4. INR subtherapeutic today. Minimal bruising noted to arms from use. Patient denies any bleeding or other changes that may affect warfarin therapy. We will increase his weekly dose and follow up in 1 week. Patient was re-educated on situations that would require placing a call to the Coumadin  Clinic, including bleeding or unusual bruising issues, changes in health, diet or medications,upcoming procedures that require warfarin interruption, and missed Coumadin dose(s). Patient expressed understanding that avoidance of consistency with these parameters could cause fluctuations in INR, leading to more frequent visits and increase risk of adverse events. Care plan made with JALIL MARIE

## 2019-03-20 ENCOUNTER — ANTI-COAG VISIT (OUTPATIENT)
Dept: CARDIOLOGY | Facility: CLINIC | Age: 64
End: 2019-03-20
Payer: MEDICARE

## 2019-03-20 DIAGNOSIS — Z79.01 CHRONIC ANTICOAGULATION: ICD-10-CM

## 2019-03-20 DIAGNOSIS — Z79.01 LONG TERM (CURRENT) USE OF ANTICOAGULANTS: Primary | ICD-10-CM

## 2019-03-20 DIAGNOSIS — Z86.718 HISTORY OF DVT (DEEP VEIN THROMBOSIS): ICD-10-CM

## 2019-03-20 DIAGNOSIS — Z86.711 HISTORY OF PULMONARY EMBOLISM: ICD-10-CM

## 2019-03-20 LAB — INR PPP: 1.6 (ref 2–3)

## 2019-03-20 PROCEDURE — 85610 POCT INR: ICD-10-PCS | Mod: QW,S$GLB,, | Performed by: INTERNAL MEDICINE

## 2019-03-20 PROCEDURE — 85610 PROTHROMBIN TIME: CPT | Mod: QW,S$GLB,, | Performed by: INTERNAL MEDICINE

## 2019-03-20 NOTE — PROGRESS NOTES
Quick follow up for subtherapeutic INR on 3/20. INR subtherapeutic today but improved. We will increase his weekly dose and follow up in 1 week. Patient was re-educated on situations that would require placing a call to the Coumadin  Clinic, including bleeding or unusual bruising issues, changes in health, diet or medications,upcoming procedures that require warfarin interruption, and missed Coumadin dose(s). Patient expressed understanding that avoidance of consistency with these parameters could cause fluctuations in INR, leading to more frequent visits and increase risk of adverse events. Care plan made with JALIL MARIE

## 2019-03-28 ENCOUNTER — TELEPHONE (OUTPATIENT)
Dept: NEUROLOGY | Facility: CLINIC | Age: 64
End: 2019-03-28

## 2019-03-28 ENCOUNTER — ANTI-COAG VISIT (OUTPATIENT)
Dept: CARDIOLOGY | Facility: CLINIC | Age: 64
End: 2019-03-28
Payer: MEDICARE

## 2019-03-28 DIAGNOSIS — Z79.01 LONG TERM (CURRENT) USE OF ANTICOAGULANTS: Primary | ICD-10-CM

## 2019-03-28 DIAGNOSIS — Z79.01 CHRONIC ANTICOAGULATION: ICD-10-CM

## 2019-03-28 DIAGNOSIS — Z86.718 HISTORY OF DVT (DEEP VEIN THROMBOSIS): ICD-10-CM

## 2019-03-28 DIAGNOSIS — G40.909 SEIZURE DISORDER: ICD-10-CM

## 2019-03-28 DIAGNOSIS — Z86.711 HISTORY OF PULMONARY EMBOLISM: ICD-10-CM

## 2019-03-28 LAB — INR PPP: 1.6 (ref 2–3)

## 2019-03-28 PROCEDURE — 85610 PROTHROMBIN TIME: CPT | Mod: QW,HCNC,S$GLB, | Performed by: INTERNAL MEDICINE

## 2019-03-28 PROCEDURE — 85610 POCT INR: ICD-10-PCS | Mod: QW,HCNC,S$GLB, | Performed by: INTERNAL MEDICINE

## 2019-03-28 RX ORDER — LEVETIRACETAM 1000 MG/1
1000 TABLET ORAL 2 TIMES DAILY
Qty: 180 TABLET | Refills: 3 | Status: SHIPPED | OUTPATIENT
Start: 2019-03-28 | End: 2019-04-02 | Stop reason: SDUPTHER

## 2019-03-28 NOTE — TELEPHONE ENCOUNTER
----- Message from Mitra Duncan sent at 3/28/2019 12:09 PM CDT -----  Contact: Pt wife:[phone]  .Rx Refill/Request     Is this a Refill or New Rx: Refill   Rx Name and Strength:levETIRAcetam (KEPPRA) 1000 MG tablet   Preferred Pharmacy with phone number: [facility] - [facility] [address] [phone] (Phone)  [phone] (Fax)          Communication Preference:Pt wife:[phone]  Additional Information: :

## 2019-03-28 NOTE — TELEPHONE ENCOUNTER
Needs new RX sent to [facility] as per wife there will be no co pay. He still has about a two week supply left.

## 2019-03-28 NOTE — PROGRESS NOTES
INR low for no apparent reason, denies change to health, medications, or diet; will increase and follow up in 1 1-/2 weeks. Bruising from use but denies bleeding or other issues. KAREN Clancy, Pharm D assisted with dosing    Patient was re-educated on situations that would require placing a call to the Coumadin Clinic, including bleeding or unusual bruising issues, changes in health, diet or medications, upcoming procedures that require Coumadin interruption, and missed Coumadin dose(s). Patient expressed understanding that avoidance of consistency with these parameters could cause fluctuations in INR, leading to more frequent visits and increase risk of adverse events.

## 2019-04-02 ENCOUNTER — TELEPHONE (OUTPATIENT)
Dept: NEUROLOGY | Facility: CLINIC | Age: 64
End: 2019-04-02

## 2019-04-02 DIAGNOSIS — G40.909 SEIZURE DISORDER: ICD-10-CM

## 2019-04-02 RX ORDER — LEVETIRACETAM 1000 MG/1
1000 TABLET ORAL 2 TIMES DAILY
Qty: 180 TABLET | Refills: 3 | Status: SHIPPED | OUTPATIENT
Start: 2019-04-02 | End: 2019-11-25 | Stop reason: SDUPTHER

## 2019-04-02 NOTE — TELEPHONE ENCOUNTER
----- Message from Mitra Duncan sent at 4/2/2019 12:41 PM CDT -----  Contact: Pt:[phone] (M)  .Rx Refill/Request     Is this a Refill or New Rx: refill   Rx Name and Strength:levETIRAcetam (KEPPRA) 1000 MG tablet    Preferred Pharmacy with phone number: [facility] - [facility] [address] [phone] (Phone)  [phone] (Fax)          Communication Preference:Pt:[phone] (M)  Additional Information:

## 2019-04-08 ENCOUNTER — ANTI-COAG VISIT (OUTPATIENT)
Dept: CARDIOLOGY | Facility: CLINIC | Age: 64
End: 2019-04-08
Payer: MEDICARE

## 2019-04-08 DIAGNOSIS — Z86.711 HISTORY OF PULMONARY EMBOLISM: ICD-10-CM

## 2019-04-08 DIAGNOSIS — Z79.01 CHRONIC ANTICOAGULATION: ICD-10-CM

## 2019-04-08 DIAGNOSIS — Z86.718 HISTORY OF DVT (DEEP VEIN THROMBOSIS): ICD-10-CM

## 2019-04-08 DIAGNOSIS — Z79.01 LONG TERM (CURRENT) USE OF ANTICOAGULANTS: Primary | ICD-10-CM

## 2019-04-08 LAB — INR PPP: 3.2 (ref 2–3)

## 2019-04-08 PROCEDURE — 85610 PROTHROMBIN TIME: CPT | Mod: QW,HCNC,S$GLB, | Performed by: INTERNAL MEDICINE

## 2019-04-08 PROCEDURE — 93793 PR ANTICOAGULANT MGMT FOR PT TAKING WARFARIN: ICD-10-PCS | Mod: HCNC,S$GLB,, | Performed by: PHARMACIST

## 2019-04-08 PROCEDURE — 85610 POCT INR: ICD-10-PCS | Mod: QW,HCNC,S$GLB, | Performed by: INTERNAL MEDICINE

## 2019-04-08 PROCEDURE — 93793 ANTICOAG MGMT PT WARFARIN: CPT | Mod: HCNC,S$GLB,, | Performed by: PHARMACIST

## 2019-04-08 NOTE — PROGRESS NOTES
Quick follow up for subtherapeutic INR on 3/28. INR elevated today. Patient denies any bleeding, bruising or other changes. We will decrease his weekly dose and follow up in 1 week. Patient was re-educated on situations that would require placing a call to the Coumadin  Clinic, including bleeding or unusual bruising issues, changes in health, diet or medications,upcoming procedures that require warfarin interruption, and missed Coumadin dose(s). Patient expressed understanding that avoidance of consistency with these parameters could cause fluctuations in INR, leading to more frequent visits and increase risk of adverse events. Care plan made with JALIL MARIE

## 2019-04-17 ENCOUNTER — ANTI-COAG VISIT (OUTPATIENT)
Dept: CARDIOLOGY | Facility: CLINIC | Age: 64
End: 2019-04-17
Payer: MEDICARE

## 2019-04-17 DIAGNOSIS — Z86.711 HISTORY OF PULMONARY EMBOLISM: ICD-10-CM

## 2019-04-17 DIAGNOSIS — Z86.718 HISTORY OF DVT (DEEP VEIN THROMBOSIS): ICD-10-CM

## 2019-04-17 DIAGNOSIS — Z79.01 LONG TERM (CURRENT) USE OF ANTICOAGULANTS: Primary | ICD-10-CM

## 2019-04-17 DIAGNOSIS — Z79.01 CHRONIC ANTICOAGULATION: ICD-10-CM

## 2019-04-17 LAB — INR PPP: 3.3 (ref 2–3)

## 2019-04-17 PROCEDURE — 85610 POCT INR: ICD-10-PCS | Mod: QW,HCNC,S$GLB, | Performed by: INTERNAL MEDICINE

## 2019-04-17 PROCEDURE — 93793 PR ANTICOAGULANT MGMT FOR PT TAKING WARFARIN: ICD-10-PCS | Mod: HCNC,S$GLB,, | Performed by: PHARMACIST

## 2019-04-17 PROCEDURE — 93793 ANTICOAG MGMT PT WARFARIN: CPT | Mod: HCNC,S$GLB,, | Performed by: PHARMACIST

## 2019-04-17 PROCEDURE — 85610 PROTHROMBIN TIME: CPT | Mod: QW,HCNC,S$GLB, | Performed by: INTERNAL MEDICINE

## 2019-04-17 NOTE — PROGRESS NOTES
Quick follow up for elevated INR on 4/8. INR elevated today. Patient denies any bleeding, bruising or other changes. We will decrease his weekly dose and follow up in 1.5 weeks. Care plan made with JALIL MARIE

## 2019-04-30 ENCOUNTER — ANTI-COAG VISIT (OUTPATIENT)
Dept: CARDIOLOGY | Facility: CLINIC | Age: 64
End: 2019-04-30
Payer: MEDICARE

## 2019-04-30 DIAGNOSIS — Z79.01 LONG TERM (CURRENT) USE OF ANTICOAGULANTS: Primary | ICD-10-CM

## 2019-04-30 DIAGNOSIS — Z86.711 HISTORY OF PULMONARY EMBOLISM: ICD-10-CM

## 2019-04-30 DIAGNOSIS — Z86.718 HISTORY OF DVT (DEEP VEIN THROMBOSIS): ICD-10-CM

## 2019-04-30 DIAGNOSIS — Z79.01 CHRONIC ANTICOAGULATION: ICD-10-CM

## 2019-04-30 LAB — INR PPP: 1.9 (ref 2–3)

## 2019-04-30 PROCEDURE — 93793 PR ANTICOAGULANT MGMT FOR PT TAKING WARFARIN: ICD-10-PCS | Mod: S$GLB,,, | Performed by: PHARMACIST

## 2019-04-30 PROCEDURE — 93793 ANTICOAG MGMT PT WARFARIN: CPT | Mod: S$GLB,,, | Performed by: PHARMACIST

## 2019-04-30 PROCEDURE — 85610 POCT INR: ICD-10-PCS | Mod: QW,S$GLB,, | Performed by: INTERNAL MEDICINE

## 2019-04-30 PROCEDURE — 85610 PROTHROMBIN TIME: CPT | Mod: QW,S$GLB,, | Performed by: INTERNAL MEDICINE

## 2019-04-30 NOTE — PROGRESS NOTES
Quick follow up for elevated INR on 4/17. INR subtherapeutic today. Minimal bruising noted to arms. Patient denies any bleeding, bruising or other changes. We will increase his weekly dose and follow up in 2 weeks. Care plan made with JALIL MARIE

## 2019-05-13 ENCOUNTER — LAB VISIT (OUTPATIENT)
Dept: LAB | Facility: HOSPITAL | Age: 64
End: 2019-05-13
Payer: MEDICARE

## 2019-05-13 ENCOUNTER — ANTI-COAG VISIT (OUTPATIENT)
Dept: CARDIOLOGY | Facility: CLINIC | Age: 64
End: 2019-05-13
Payer: MEDICARE

## 2019-05-13 DIAGNOSIS — Z79.01 CHRONIC ANTICOAGULATION: ICD-10-CM

## 2019-05-13 DIAGNOSIS — Z86.718 HISTORY OF DVT (DEEP VEIN THROMBOSIS): ICD-10-CM

## 2019-05-13 DIAGNOSIS — Z86.711 HISTORY OF PULMONARY EMBOLISM: ICD-10-CM

## 2019-05-13 DIAGNOSIS — Z79.01 LONG TERM (CURRENT) USE OF ANTICOAGULANTS: ICD-10-CM

## 2019-05-13 LAB
INR PPP: 1.3 (ref 0.8–1.2)
PROTHROMBIN TIME: 14.1 SEC (ref 9–12.5)

## 2019-05-13 PROCEDURE — 93793 ANTICOAG MGMT PT WARFARIN: CPT | Mod: S$GLB,,,

## 2019-05-13 PROCEDURE — 85610 PROTHROMBIN TIME: CPT

## 2019-05-13 PROCEDURE — 93793 PR ANTICOAGULANT MGMT FOR PT TAKING WARFARIN: ICD-10-PCS | Mod: S$GLB,,,

## 2019-05-13 PROCEDURE — 36415 COLL VENOUS BLD VENIPUNCTURE: CPT

## 2019-05-14 NOTE — PROGRESS NOTES
INR not at goal - no changes reported to account for low INR - strange after recent dose increase. Will boost and resume most recent dose. If INR still low next week will adjust maintenance plan. Medications, chart, and patient findings reviewed. See calendar for adjustments to dose and follow up plan.

## 2019-05-21 ENCOUNTER — ANTI-COAG VISIT (OUTPATIENT)
Dept: CARDIOLOGY | Facility: CLINIC | Age: 64
End: 2019-05-21
Payer: MEDICARE

## 2019-05-21 DIAGNOSIS — Z79.01 CHRONIC ANTICOAGULATION: Primary | ICD-10-CM

## 2019-05-21 DIAGNOSIS — Z86.718 HISTORY OF DVT (DEEP VEIN THROMBOSIS): ICD-10-CM

## 2019-05-21 DIAGNOSIS — Z86.711 HISTORY OF PULMONARY EMBOLISM: ICD-10-CM

## 2019-05-21 LAB — INR PPP: 1.2 (ref 2–3)

## 2019-05-21 PROCEDURE — 93793 ANTICOAG MGMT PT WARFARIN: CPT | Mod: HCNC,S$GLB,,

## 2019-05-21 PROCEDURE — 85610 PROTHROMBIN TIME: CPT | Mod: QW,HCNC,S$GLB, | Performed by: INTERNAL MEDICINE

## 2019-05-21 PROCEDURE — 93793 PR ANTICOAGULANT MGMT FOR PT TAKING WARFARIN: ICD-10-PCS | Mod: HCNC,S$GLB,,

## 2019-05-21 PROCEDURE — 85610 POCT INR: ICD-10-PCS | Mod: QW,HCNC,S$GLB, | Performed by: INTERNAL MEDICINE

## 2019-05-21 NOTE — PROGRESS NOTES
INR not at goal. Medications, chart, and patient findings reviewed. See calendar for adjustments to dose and follow up plan.  Pt remains subtherapeutic and denies any changes.  Will increase pt's dose and re-assess in 1 week.

## 2019-05-29 ENCOUNTER — ANTI-COAG VISIT (OUTPATIENT)
Dept: CARDIOLOGY | Facility: CLINIC | Age: 64
End: 2019-05-29
Payer: MEDICARE

## 2019-05-29 ENCOUNTER — LAB VISIT (OUTPATIENT)
Dept: LAB | Facility: HOSPITAL | Age: 64
End: 2019-05-29
Payer: MEDICARE

## 2019-05-29 DIAGNOSIS — Z86.711 HISTORY OF PULMONARY EMBOLISM: ICD-10-CM

## 2019-05-29 DIAGNOSIS — Z86.718 HISTORY OF DVT (DEEP VEIN THROMBOSIS): ICD-10-CM

## 2019-05-29 DIAGNOSIS — Z79.01 LONG TERM (CURRENT) USE OF ANTICOAGULANTS: Primary | ICD-10-CM

## 2019-05-29 DIAGNOSIS — Z79.01 CHRONIC ANTICOAGULATION: ICD-10-CM

## 2019-05-29 DIAGNOSIS — Z79.01 LONG TERM (CURRENT) USE OF ANTICOAGULANTS: ICD-10-CM

## 2019-05-29 LAB
INR PPP: 7.1 (ref 0.8–1.2)
PROTHROMBIN TIME: 72.6 SEC (ref 9–12.5)

## 2019-05-29 PROCEDURE — 93793 PR ANTICOAGULANT MGMT FOR PT TAKING WARFARIN: ICD-10-PCS | Mod: HCNC,S$GLB,,

## 2019-05-29 PROCEDURE — 36415 COLL VENOUS BLD VENIPUNCTURE: CPT | Mod: HCNC

## 2019-05-29 PROCEDURE — 85610 PROTHROMBIN TIME: CPT | Mod: HCNC

## 2019-05-29 PROCEDURE — 93793 ANTICOAG MGMT PT WARFARIN: CPT | Mod: HCNC,S$GLB,,

## 2019-05-29 NOTE — PROGRESS NOTES
Mini Sparrow with [facility] called with critical INR 7.1.  I routed to Eagle Ac MA  to question patient then advise per Pharm D orders.  I notified KAREN Clancy Pharmd of critical result.

## 2019-05-29 NOTE — PROGRESS NOTES
POC testing >6.0.  Pt sent to lab for  to verify.  Will call pt w/ results and further instructions. (5/29 @ 11:27am)

## 2019-05-31 ENCOUNTER — ANTI-COAG VISIT (OUTPATIENT)
Dept: CARDIOLOGY | Facility: CLINIC | Age: 64
End: 2019-05-31
Payer: MEDICARE

## 2019-05-31 DIAGNOSIS — Z79.01 CHRONIC ANTICOAGULATION: Primary | ICD-10-CM

## 2019-05-31 DIAGNOSIS — Z86.718 HISTORY OF DVT (DEEP VEIN THROMBOSIS): ICD-10-CM

## 2019-05-31 DIAGNOSIS — Z86.711 HISTORY OF PULMONARY EMBOLISM: ICD-10-CM

## 2019-05-31 LAB — INR PPP: 4.1 (ref 2–3)

## 2019-05-31 PROCEDURE — 93793 ANTICOAG MGMT PT WARFARIN: CPT | Mod: HCNC,S$GLB,,

## 2019-05-31 PROCEDURE — 85610 POCT INR: ICD-10-PCS | Mod: QW,HCNC,S$GLB, | Performed by: INTERNAL MEDICINE

## 2019-05-31 PROCEDURE — 85610 PROTHROMBIN TIME: CPT | Mod: QW,HCNC,S$GLB, | Performed by: INTERNAL MEDICINE

## 2019-05-31 PROCEDURE — 93793 PR ANTICOAGULANT MGMT FOR PT TAKING WARFARIN: ICD-10-PCS | Mod: HCNC,S$GLB,,

## 2019-05-31 NOTE — PROGRESS NOTES
INR not at goal. Medications, chart, and patient findings reviewed. See calendar for adjustments to dose and follow up plan.  Pt will be re-assessed 6/4. Pt still denies any changes.

## 2019-06-03 ENCOUNTER — PES CALL (OUTPATIENT)
Dept: ADMINISTRATIVE | Facility: CLINIC | Age: 64
End: 2019-06-03

## 2019-06-04 ENCOUNTER — ANTI-COAG VISIT (OUTPATIENT)
Dept: CARDIOLOGY | Facility: CLINIC | Age: 64
End: 2019-06-04
Payer: MEDICARE

## 2019-06-04 DIAGNOSIS — Z86.718 HISTORY OF DVT (DEEP VEIN THROMBOSIS): ICD-10-CM

## 2019-06-04 DIAGNOSIS — Z79.01 CHRONIC ANTICOAGULATION: Primary | ICD-10-CM

## 2019-06-04 DIAGNOSIS — Z86.711 HISTORY OF PULMONARY EMBOLISM: ICD-10-CM

## 2019-06-04 LAB — INR PPP: 1.5 (ref 2–3)

## 2019-06-04 PROCEDURE — 85610 PROTHROMBIN TIME: CPT | Mod: QW,HCNC,S$GLB, | Performed by: INTERNAL MEDICINE

## 2019-06-04 PROCEDURE — 93793 ANTICOAG MGMT PT WARFARIN: CPT | Mod: HCNC,S$GLB,,

## 2019-06-04 PROCEDURE — 93793 PR ANTICOAGULANT MGMT FOR PT TAKING WARFARIN: ICD-10-PCS | Mod: HCNC,S$GLB,,

## 2019-06-04 PROCEDURE — 85610 POCT INR: ICD-10-PCS | Mod: QW,HCNC,S$GLB, | Performed by: INTERNAL MEDICINE

## 2019-06-04 NOTE — PROGRESS NOTES
INR not at goal. Medications, chart, and patient findings reviewed. See calendar for adjustments to dose and follow up plan.  Pt findings: Pt had broccoli on Sunday and pt denies any other changes.  Will instruct pt to take 7.5mg 6/4 & dose per calendar.  Plan to re-assess in 1 week.

## 2019-06-10 ENCOUNTER — ANTI-COAG VISIT (OUTPATIENT)
Dept: CARDIOLOGY | Facility: CLINIC | Age: 64
End: 2019-06-10
Payer: MEDICARE

## 2019-06-10 DIAGNOSIS — Z86.718 HISTORY OF DVT (DEEP VEIN THROMBOSIS): ICD-10-CM

## 2019-06-10 DIAGNOSIS — Z79.01 CHRONIC ANTICOAGULATION: ICD-10-CM

## 2019-06-10 DIAGNOSIS — Z86.711 HISTORY OF PULMONARY EMBOLISM: ICD-10-CM

## 2019-06-10 DIAGNOSIS — Z79.01 LONG TERM (CURRENT) USE OF ANTICOAGULANTS: Primary | ICD-10-CM

## 2019-06-10 LAB — INR PPP: 5.4 (ref 2–3)

## 2019-06-10 PROCEDURE — 93793 PR ANTICOAGULANT MGMT FOR PT TAKING WARFARIN: ICD-10-PCS | Mod: HCNC,S$GLB,,

## 2019-06-10 PROCEDURE — 85610 PROTHROMBIN TIME: CPT | Mod: QW,HCNC,S$GLB, | Performed by: INTERNAL MEDICINE

## 2019-06-10 PROCEDURE — 93793 ANTICOAG MGMT PT WARFARIN: CPT | Mod: HCNC,S$GLB,,

## 2019-06-10 PROCEDURE — 85610 POCT INR: ICD-10-PCS | Mod: QW,HCNC,S$GLB, | Performed by: INTERNAL MEDICINE

## 2019-06-10 NOTE — PROGRESS NOTES
INR not at goal. Medications, chart, and patient findings reviewed. See calendar for adjustments to dose and follow up plan.  Pt denies any changes.  Will instruct pt to hold and re-assess lab 6/12.  Pt's new weekly dose might have to ~16mg.

## 2019-06-12 ENCOUNTER — LAB VISIT (OUTPATIENT)
Dept: LAB | Facility: HOSPITAL | Age: 64
End: 2019-06-12
Attending: INTERNAL MEDICINE
Payer: MEDICARE

## 2019-06-12 DIAGNOSIS — Z79.01 LONG TERM (CURRENT) USE OF ANTICOAGULANTS: ICD-10-CM

## 2019-06-12 DIAGNOSIS — Z86.718 HISTORY OF DVT (DEEP VEIN THROMBOSIS): ICD-10-CM

## 2019-06-12 DIAGNOSIS — Z86.711 HISTORY OF PULMONARY EMBOLISM: ICD-10-CM

## 2019-06-12 DIAGNOSIS — Z79.01 CHRONIC ANTICOAGULATION: ICD-10-CM

## 2019-06-12 LAB
INR PPP: 2.1 (ref 0.8–1.2)
PROTHROMBIN TIME: 20.2 SEC (ref 9–12.5)

## 2019-06-12 PROCEDURE — 36415 COLL VENOUS BLD VENIPUNCTURE: CPT | Mod: HCNC,PO

## 2019-06-12 PROCEDURE — 85610 PROTHROMBIN TIME: CPT | Mod: HCNC

## 2019-06-13 ENCOUNTER — ANTI-COAG VISIT (OUTPATIENT)
Dept: CARDIOLOGY | Facility: CLINIC | Age: 64
End: 2019-06-13
Payer: MEDICARE

## 2019-06-13 DIAGNOSIS — Z86.711 HISTORY OF PULMONARY EMBOLISM: ICD-10-CM

## 2019-06-13 DIAGNOSIS — Z86.718 HISTORY OF DVT (DEEP VEIN THROMBOSIS): ICD-10-CM

## 2019-06-13 DIAGNOSIS — Z79.01 CHRONIC ANTICOAGULATION: ICD-10-CM

## 2019-06-13 PROCEDURE — 93793 PR ANTICOAGULANT MGMT FOR PT TAKING WARFARIN: ICD-10-PCS | Mod: S$GLB,,,

## 2019-06-13 PROCEDURE — 93793 ANTICOAG MGMT PT WARFARIN: CPT | Mod: S$GLB,,,

## 2019-06-13 NOTE — PROGRESS NOTES
Patient called and was given lab result, verified holding coumadin, Patient was advised to resume coumadin: 5mg -Thursday and Tuesday and 2.5mg all other days and get lab drawn 6/17/19, Patient verbalized understanding, appointment booked

## 2019-06-13 NOTE — PROGRESS NOTES
INR at goal after holding for elevated INR. Medications, chart, and patient findings reviewed. See calendar for adjustments to dose and follow up plan.

## 2019-06-17 ENCOUNTER — ANTI-COAG VISIT (OUTPATIENT)
Dept: CARDIOLOGY | Facility: CLINIC | Age: 64
End: 2019-06-17
Payer: MEDICARE

## 2019-06-17 ENCOUNTER — LAB VISIT (OUTPATIENT)
Dept: LAB | Facility: HOSPITAL | Age: 64
End: 2019-06-17
Attending: INTERNAL MEDICINE
Payer: MEDICARE

## 2019-06-17 DIAGNOSIS — Z79.01 CHRONIC ANTICOAGULATION: ICD-10-CM

## 2019-06-17 DIAGNOSIS — Z86.711 HISTORY OF PULMONARY EMBOLISM: ICD-10-CM

## 2019-06-17 DIAGNOSIS — Z86.718 HISTORY OF DVT (DEEP VEIN THROMBOSIS): ICD-10-CM

## 2019-06-17 DIAGNOSIS — Z79.01 LONG TERM (CURRENT) USE OF ANTICOAGULANTS: ICD-10-CM

## 2019-06-17 LAB
INR PPP: 1.2 (ref 0.8–1.2)
PROTHROMBIN TIME: 12.3 SEC (ref 9–12.5)

## 2019-06-17 PROCEDURE — 93793 ANTICOAG MGMT PT WARFARIN: CPT | Mod: S$GLB,,, | Performed by: PHARMACIST

## 2019-06-17 PROCEDURE — 93793 PR ANTICOAGULANT MGMT FOR PT TAKING WARFARIN: ICD-10-PCS | Mod: S$GLB,,, | Performed by: PHARMACIST

## 2019-06-17 PROCEDURE — 85610 PROTHROMBIN TIME: CPT | Mod: HCNC

## 2019-06-17 PROCEDURE — 36415 COLL VENOUS BLD VENIPUNCTURE: CPT | Mod: HCNC,PO

## 2019-06-17 NOTE — PROGRESS NOTES
Patient advised to take (Warfarin 5mg Mon/Tues, except 2.5mg Wed). Stopped the (GReens & Ensure). Patient verbalized understanding.

## 2019-06-19 ENCOUNTER — DOCUMENTATION ONLY (OUTPATIENT)
Dept: INTERNAL MEDICINE | Facility: CLINIC | Age: 64
End: 2019-06-19

## 2019-06-20 ENCOUNTER — ANTI-COAG VISIT (OUTPATIENT)
Dept: CARDIOLOGY | Facility: CLINIC | Age: 64
End: 2019-06-20
Payer: MEDICARE

## 2019-06-20 ENCOUNTER — LAB VISIT (OUTPATIENT)
Dept: LAB | Facility: HOSPITAL | Age: 64
End: 2019-06-20
Attending: INTERNAL MEDICINE
Payer: MEDICARE

## 2019-06-20 DIAGNOSIS — Z79.01 CHRONIC ANTICOAGULATION: ICD-10-CM

## 2019-06-20 DIAGNOSIS — Z86.718 HISTORY OF DVT (DEEP VEIN THROMBOSIS): ICD-10-CM

## 2019-06-20 DIAGNOSIS — Z79.01 LONG TERM (CURRENT) USE OF ANTICOAGULANTS: ICD-10-CM

## 2019-06-20 DIAGNOSIS — Z86.711 HISTORY OF PULMONARY EMBOLISM: ICD-10-CM

## 2019-06-20 DIAGNOSIS — Z79.01 CHRONIC ANTICOAGULATION: Primary | ICD-10-CM

## 2019-06-20 LAB
INR PPP: 2.1 (ref 0.8–1.2)
PROTHROMBIN TIME: 20.6 SEC (ref 9–12.5)

## 2019-06-20 PROCEDURE — 36415 COLL VENOUS BLD VENIPUNCTURE: CPT | Mod: HCNC,PO

## 2019-06-20 PROCEDURE — 85610 PROTHROMBIN TIME: CPT | Mod: HCNC

## 2019-06-20 PROCEDURE — 93793 PR ANTICOAGULANT MGMT FOR PT TAKING WARFARIN: ICD-10-PCS | Mod: S$GLB,,, | Performed by: PHARMACIST

## 2019-06-20 PROCEDURE — 93793 ANTICOAG MGMT PT WARFARIN: CPT | Mod: S$GLB,,, | Performed by: PHARMACIST

## 2019-06-20 NOTE — PROGRESS NOTES
Wife called and was given lab result, verified coumadin: 6/17-5mg, 6/18-5mg, 6/18 -2.5mg, reports no other changes

## 2019-06-25 RX ORDER — WARFARIN SODIUM 5 MG/1
TABLET ORAL
Qty: 5 TABLET | Refills: 0 | Status: SHIPPED | OUTPATIENT
Start: 2019-06-25 | End: 2019-12-30

## 2019-06-25 NOTE — PROGRESS NOTES
6/25/19 patient called to report missed dose 6/24 and out of medication (away from home). Will send Rx for small number of tablets to nearby pharmacy. See calendar for dose and f/u plan.

## 2019-06-26 ENCOUNTER — PES CALL (OUTPATIENT)
Dept: ADMINISTRATIVE | Facility: CLINIC | Age: 64
End: 2019-06-26

## 2019-06-28 ENCOUNTER — LAB VISIT (OUTPATIENT)
Dept: LAB | Facility: HOSPITAL | Age: 64
End: 2019-06-28
Attending: INTERNAL MEDICINE
Payer: MEDICARE

## 2019-06-28 ENCOUNTER — OFFICE VISIT (OUTPATIENT)
Dept: NEUROLOGY | Facility: CLINIC | Age: 64
End: 2019-06-28
Payer: MEDICARE

## 2019-06-28 VITALS
BODY MASS INDEX: 23.64 KG/M2 | SYSTOLIC BLOOD PRESSURE: 88 MMHG | WEIGHT: 138.5 LBS | HEART RATE: 82 BPM | DIASTOLIC BLOOD PRESSURE: 52 MMHG | HEIGHT: 64 IN

## 2019-06-28 DIAGNOSIS — Z86.73 HISTORY OF CVA (CEREBROVASCULAR ACCIDENT): ICD-10-CM

## 2019-06-28 DIAGNOSIS — I25.10 ATHEROSCLEROSIS OF NATIVE CORONARY ARTERY OF NATIVE HEART WITHOUT ANGINA PECTORIS: Chronic | ICD-10-CM

## 2019-06-28 DIAGNOSIS — Z79.01 CHRONIC ANTICOAGULATION: ICD-10-CM

## 2019-06-28 DIAGNOSIS — F41.8 DEPRESSION WITH ANXIETY: ICD-10-CM

## 2019-06-28 DIAGNOSIS — Z86.711 HISTORY OF PULMONARY EMBOLISM: ICD-10-CM

## 2019-06-28 DIAGNOSIS — Z79.01 LONG TERM (CURRENT) USE OF ANTICOAGULANTS: ICD-10-CM

## 2019-06-28 DIAGNOSIS — Z86.718 HISTORY OF DVT (DEEP VEIN THROMBOSIS): ICD-10-CM

## 2019-06-28 DIAGNOSIS — G40.209 PARTIAL EPILEPSY WITH IMPAIRMENT OF CONSCIOUSNESS: Primary | Chronic | ICD-10-CM

## 2019-06-28 DIAGNOSIS — I70.0 ATHEROSCLEROSIS OF AORTA: ICD-10-CM

## 2019-06-28 LAB
INR PPP: 2.4 (ref 0.8–1.2)
PROTHROMBIN TIME: 22.9 SEC (ref 9–12.5)

## 2019-06-28 PROCEDURE — 99999 PR PBB SHADOW E&M-EST. PATIENT-LVL III: CPT | Mod: PBBFAC,HCNC,, | Performed by: PSYCHIATRY & NEUROLOGY

## 2019-06-28 PROCEDURE — 99214 OFFICE O/P EST MOD 30 MIN: CPT | Mod: HCNC,S$GLB,, | Performed by: PSYCHIATRY & NEUROLOGY

## 2019-06-28 PROCEDURE — 36415 COLL VENOUS BLD VENIPUNCTURE: CPT | Mod: HCNC,PO

## 2019-06-28 PROCEDURE — 99214 PR OFFICE/OUTPT VISIT, EST, LEVL IV, 30-39 MIN: ICD-10-PCS | Mod: HCNC,S$GLB,, | Performed by: PSYCHIATRY & NEUROLOGY

## 2019-06-28 PROCEDURE — 85610 PROTHROMBIN TIME: CPT | Mod: HCNC

## 2019-06-28 PROCEDURE — 1101F PR PT FALLS ASSESS DOC 0-1 FALLS W/OUT INJ PAST YR: ICD-10-PCS | Mod: HCNC,CPTII,S$GLB, | Performed by: PSYCHIATRY & NEUROLOGY

## 2019-06-28 PROCEDURE — 99499 RISK ADDL DX/OHS AUDIT: ICD-10-PCS | Mod: HCNC,S$GLB,, | Performed by: PSYCHIATRY & NEUROLOGY

## 2019-06-28 PROCEDURE — 1101F PT FALLS ASSESS-DOCD LE1/YR: CPT | Mod: HCNC,CPTII,S$GLB, | Performed by: PSYCHIATRY & NEUROLOGY

## 2019-06-28 PROCEDURE — 99999 PR PBB SHADOW E&M-EST. PATIENT-LVL III: ICD-10-PCS | Mod: PBBFAC,HCNC,, | Performed by: PSYCHIATRY & NEUROLOGY

## 2019-06-28 PROCEDURE — 99499 UNLISTED E&M SERVICE: CPT | Mod: HCNC,S$GLB,, | Performed by: PSYCHIATRY & NEUROLOGY

## 2019-06-28 NOTE — PATIENT INSTRUCTIONS
Discussed with patient and spouse.  Seizure precautions including compliance stressed.  No change in Keppra dosage.

## 2019-06-28 NOTE — PROGRESS NOTES
Subjective:       Patient ID: Edu Choi is a 64 y.o. male.    Chief Complaint:  Seizures      History of Present Illness  HPI   This is a 64 year-old male who is being followed by me with a history of seizures since age 4 related to complications secondary to measles during childhood. Seizures are described as transient loss of consciousness during which time he may display repetitive behaviors and he has no recollection of the episode. He has had occasional generalized convulsions that are infrequent.  He had a seizure in May 2017, noted on awakening in the morning.  He did report that he had been having sleep difficulty and is not sure if he missed his medicine the night before.  [place] [facility] [place] having had a seizure in November 2017.   He subsequently had another brief seizure in February 2018 and was seen at the emergency room and had CT scan of brain that was essentially unchanged.   He continues to be on Coumadin for his bleeding disorder. His spouse was present today.   She did report that his seizure in February 2018 may have been related to stress as she had been hospitalized and he was alone at home and may been stressed out.  However, since then he has had 2 more seizures last being couple months ago.  No obvious precipitating factors were noted though the seizures very brief and he recovered almost immediately.  He has had no further seizures since his last visit 6 months ago.    An EEG done in 2017 was abnormal.   The findings were consistent with left hemispheric cerebral dysfunction that is maximal and epileptogenic in the left temporal head region. In addition, there were scattered sharp waves in the right frontopolar head region, which if significant, would indicate that technically, the patient had secondarily generalized epilepsy.         Review of Systems  Review of Systems   Constitutional: Negative.    HENT: Negative for hearing loss.    Eyes:  Negative.  Negative for visual disturbance.   Respiratory: Negative.  Negative for shortness of breath.    Cardiovascular: Negative.  Negative for chest pain and palpitations.   Gastrointestinal: Negative.    Genitourinary: Negative.    Musculoskeletal: Negative.  Negative for neck pain and neck stiffness.   Skin: Negative.    Neurological: Positive for seizures.   Psychiatric/Behavioral: Negative for behavioral problems, confusion, decreased concentration and sleep disturbance. The patient is nervous/anxious.        Objective:      Neurologic Exam      Physical Exam   Constitutional: He appears well-developed and well-nourished.   HENT:   Head: Normocephalic and atraumatic.   Eyes:   Fundus examination shows sharp disc margins.   Neck: Normal range of motion. Neck supple. Carotid bruit is not present.   Neurological: He is alert. He has normal reflexes. He displays no atrophy. No cranial nerve deficit (visual fields were normal at bedside testing.  EOM were normal.  No facial asymmetry was noted.  Facial sensation is symmetrical.  Corneals and gag reflexes were normal.  Tongue and palate movements were normal.  Shoulder shrug was normal.) or sensory deficit. He exhibits normal muscle tone. He displays a negative Romberg sign. Coordination and gait normal.   Mental status examination: Patient is fully oriented and able to give an adequate history.  Recall of recent and past information is good.  Immediate recall is normal.  Attention span and concentration was mildly impaired.  Fund of knowledge was fair.  Judgment and insight is normal.  Language functions are intact with no evidence of aphasia or dysarthria.  Comprehension is unimpaired.  Affect is appropriate, mood was even.  No thought disorder is noted.   Vitals reviewed.        Assessment:        1. Partial epilepsy with impairment of consciousness    2. History of CVA (cerebrovascular accident)    3. Depression with anxiety    4. History of DVT (deep vein  thrombosis)    5. Atherosclerosis of aorta    6. Atherosclerosis of native coronary artery of native heart without angina pectoris             Plan:       Discussed with patient and spouse.  Seizure precautions including compliance stressed.  No change in Keppra dosage.   Follow-up in 6 months if stable.

## 2019-07-01 ENCOUNTER — ANTI-COAG VISIT (OUTPATIENT)
Dept: CARDIOLOGY | Facility: CLINIC | Age: 64
End: 2019-07-01
Payer: MEDICARE

## 2019-07-01 ENCOUNTER — LAB VISIT (OUTPATIENT)
Dept: LAB | Facility: HOSPITAL | Age: 64
End: 2019-07-01
Payer: MEDICARE

## 2019-07-01 ENCOUNTER — OFFICE VISIT (OUTPATIENT)
Dept: INTERNAL MEDICINE | Facility: CLINIC | Age: 64
End: 2019-07-01
Payer: MEDICARE

## 2019-07-01 VITALS
HEART RATE: 90 BPM | DIASTOLIC BLOOD PRESSURE: 60 MMHG | HEIGHT: 64 IN | OXYGEN SATURATION: 96 % | WEIGHT: 137 LBS | BODY MASS INDEX: 23.39 KG/M2 | SYSTOLIC BLOOD PRESSURE: 90 MMHG | TEMPERATURE: 98 F

## 2019-07-01 DIAGNOSIS — I70.0 ATHEROSCLEROSIS OF AORTA: ICD-10-CM

## 2019-07-01 DIAGNOSIS — E11.9 DIABETES MELLITUS WITHOUT COMPLICATION: ICD-10-CM

## 2019-07-01 DIAGNOSIS — Z72.0 TOBACCO ABUSE: ICD-10-CM

## 2019-07-01 DIAGNOSIS — Z00.00 ANNUAL PHYSICAL EXAM: Primary | ICD-10-CM

## 2019-07-01 DIAGNOSIS — F41.8 DEPRESSION WITH ANXIETY: ICD-10-CM

## 2019-07-01 DIAGNOSIS — G40.209 PARTIAL EPILEPSY WITH IMPAIRMENT OF CONSCIOUSNESS: Chronic | ICD-10-CM

## 2019-07-01 DIAGNOSIS — Z79.01 CHRONIC ANTICOAGULATION: ICD-10-CM

## 2019-07-01 DIAGNOSIS — G47.33 OSA (OBSTRUCTIVE SLEEP APNEA): ICD-10-CM

## 2019-07-01 DIAGNOSIS — Z86.718 HISTORY OF DVT (DEEP VEIN THROMBOSIS): ICD-10-CM

## 2019-07-01 DIAGNOSIS — Z86.711 HISTORY OF PULMONARY EMBOLISM: ICD-10-CM

## 2019-07-01 DIAGNOSIS — E78.5 HYPERLIPIDEMIA, UNSPECIFIED HYPERLIPIDEMIA TYPE: ICD-10-CM

## 2019-07-01 LAB
ALBUMIN SERPL BCP-MCNC: 4.3 G/DL (ref 3.5–5.2)
ALP SERPL-CCNC: 73 U/L (ref 55–135)
ALT SERPL W/O P-5'-P-CCNC: 16 U/L (ref 10–44)
ANION GAP SERPL CALC-SCNC: 9 MMOL/L (ref 8–16)
AST SERPL-CCNC: 22 U/L (ref 10–40)
BASOPHILS # BLD AUTO: 0.04 K/UL (ref 0–0.2)
BASOPHILS NFR BLD: 0.4 % (ref 0–1.9)
BILIRUB SERPL-MCNC: 0.5 MG/DL (ref 0.1–1)
BUN SERPL-MCNC: 16 MG/DL (ref 8–23)
CALCIUM SERPL-MCNC: 9.6 MG/DL (ref 8.7–10.5)
CHLORIDE SERPL-SCNC: 104 MMOL/L (ref 95–110)
CO2 SERPL-SCNC: 27 MMOL/L (ref 23–29)
CREAT SERPL-MCNC: 1.1 MG/DL (ref 0.5–1.4)
DIFFERENTIAL METHOD: ABNORMAL
EOSINOPHIL # BLD AUTO: 0.1 K/UL (ref 0–0.5)
EOSINOPHIL NFR BLD: 0.9 % (ref 0–8)
ERYTHROCYTE [DISTWIDTH] IN BLOOD BY AUTOMATED COUNT: 14.9 % (ref 11.5–14.5)
EST. GFR  (AFRICAN AMERICAN): >60 ML/MIN/1.73 M^2
EST. GFR  (NON AFRICAN AMERICAN): >60 ML/MIN/1.73 M^2
ESTIMATED AVG GLUCOSE: 128 MG/DL (ref 68–131)
GLUCOSE SERPL-MCNC: 105 MG/DL (ref 70–110)
HBA1C MFR BLD HPLC: 6.1 % (ref 4–5.6)
HCT VFR BLD AUTO: 43.4 % (ref 40–54)
HGB BLD-MCNC: 14.1 G/DL (ref 14–18)
LYMPHOCYTES # BLD AUTO: 2.3 K/UL (ref 1–4.8)
LYMPHOCYTES NFR BLD: 23.2 % (ref 18–48)
MCH RBC QN AUTO: 31.1 PG (ref 27–31)
MCHC RBC AUTO-ENTMCNC: 32.5 G/DL (ref 32–36)
MCV RBC AUTO: 96 FL (ref 82–98)
MONOCYTES # BLD AUTO: 1 K/UL (ref 0.3–1)
MONOCYTES NFR BLD: 9.7 % (ref 4–15)
NEUTROPHILS # BLD AUTO: 6.6 K/UL (ref 1.8–7.7)
NEUTROPHILS NFR BLD: 65.8 % (ref 38–73)
PLATELET # BLD AUTO: 244 K/UL (ref 150–350)
PMV BLD AUTO: 11.5 FL (ref 9.2–12.9)
POTASSIUM SERPL-SCNC: 4.9 MMOL/L (ref 3.5–5.1)
PROT SERPL-MCNC: 7.4 G/DL (ref 6–8.4)
RBC # BLD AUTO: 4.53 M/UL (ref 4.6–6.2)
SODIUM SERPL-SCNC: 140 MMOL/L (ref 136–145)
WBC # BLD AUTO: 10 K/UL (ref 3.9–12.7)

## 2019-07-01 PROCEDURE — 99397 PR PREVENTIVE VISIT,EST,65 & OVER: ICD-10-PCS | Mod: HCNC,S$GLB,, | Performed by: PHYSICIAN ASSISTANT

## 2019-07-01 PROCEDURE — 80053 COMPREHEN METABOLIC PANEL: CPT | Mod: HCNC

## 2019-07-01 PROCEDURE — 85025 COMPLETE CBC W/AUTO DIFF WBC: CPT | Mod: HCNC

## 2019-07-01 PROCEDURE — 93793 ANTICOAG MGMT PT WARFARIN: CPT | Mod: S$GLB,,, | Performed by: PHARMACIST

## 2019-07-01 PROCEDURE — 3044F PR MOST RECENT HEMOGLOBIN A1C LEVEL <7.0%: ICD-10-PCS | Mod: HCNC,CPTII,S$GLB, | Performed by: PHYSICIAN ASSISTANT

## 2019-07-01 PROCEDURE — 36415 COLL VENOUS BLD VENIPUNCTURE: CPT | Mod: HCNC

## 2019-07-01 PROCEDURE — 99999 PR PBB SHADOW E&M-EST. PATIENT-LVL IV: CPT | Mod: PBBFAC,HCNC,, | Performed by: PHYSICIAN ASSISTANT

## 2019-07-01 PROCEDURE — 83036 HEMOGLOBIN GLYCOSYLATED A1C: CPT | Mod: HCNC

## 2019-07-01 PROCEDURE — 3044F HG A1C LEVEL LT 7.0%: CPT | Mod: HCNC,CPTII,S$GLB, | Performed by: PHYSICIAN ASSISTANT

## 2019-07-01 PROCEDURE — 99999 PR PBB SHADOW E&M-EST. PATIENT-LVL IV: ICD-10-PCS | Mod: PBBFAC,HCNC,, | Performed by: PHYSICIAN ASSISTANT

## 2019-07-01 PROCEDURE — 93793 PR ANTICOAGULANT MGMT FOR PT TAKING WARFARIN: ICD-10-PCS | Mod: S$GLB,,, | Performed by: PHARMACIST

## 2019-07-01 PROCEDURE — 99397 PER PM REEVAL EST PAT 65+ YR: CPT | Mod: HCNC,S$GLB,, | Performed by: PHYSICIAN ASSISTANT

## 2019-07-01 NOTE — PROGRESS NOTES
INR at goal. Medications and chart reviewed. No changes noted to necessitate adjustment of warfarin or follow-up plan. See calendar.

## 2019-07-01 NOTE — PROGRESS NOTES
Subjective:       Patient ID: Edu Choi is a 64 y.o. male.    Chief Complaint: Annual Exam    HPI     Established pt of Gladis Blankenship MD (new to me)    Presents to clinic with spouse for annual exam. No acute complaints.     Depression/Anxiety: Stable and improved on Lexapro. No SI/HI    Hx of DVT/PE on lifelong coumadin.     HLD: On Lipitor 40mg, tolerating well. Chol levels have improved. LDL less than 60    DM: Currently diet controlled. Denies p/p/p. Does not monitor BG at home  Lab Results   Component Value Date    HGBA1C 6.1 (H) 01/30/2019   Eye exam DUE    CHRIS: Stable on CPAP     Tobacco Use: 4-5 cigs a day. last CT lung screening in Jan 2019 with Lung-RADS Category 2 -Benign Appearance. Recommend repeat LDCT in Jan 2020      Past Medical History:   Diagnosis Date    Atherosclerosis of aorta 6/22/2017    Atherosclerosis of native coronary artery of native heart without angina pectoris 6/22/2017    Blood clotting tendency     Depression     Diabetes mellitus type 2 in nonobese     DVT (deep venous thrombosis)     Hyperlipidemia     Measles complicated by meningitis     during childhood, with subsequent seizures    Newly diagnosed diabetes 4/18/2017    PE (pulmonary embolism)     Seizures     Splenomegaly     Thrombophlebitis leg     bilateral legs     Social History     Tobacco Use    Smoking status: Current Every Day Smoker     Packs/day: 0.25     Years: 33.00     Pack years: 8.25    Smokeless tobacco: Never Used   Substance Use Topics    Alcohol use: No    Drug use: No     Review of patient's allergies indicates:   Allergen Reactions    No known drug allergies      Health Maintenance Topics with due status: Not Due       Topic Last Completion Date    TETANUS VACCINE 02/11/2014    Colonoscopy 10/14/2016    Pneumococcal Vaccine (65+ Low/Medium Risk) 03/14/2017    Eye Exam 08/13/2018    Influenza Vaccine 08/14/2018    Foot Exam 01/30/2019    Lipid Panel 01/30/2019    Hemoglobin A1c 01/30/2019  "   Urine Microalbumin 05/31/2019    High Dose Statin 06/28/2019         Review of Systems   Constitutional: Negative for chills, fever and unexpected weight change.   Eyes: Negative for visual disturbance.   Respiratory: Negative for cough, shortness of breath and wheezing.    Cardiovascular: Negative for chest pain and leg swelling.   Gastrointestinal: Positive for constipation (occ; Last BM this AM, normal). Negative for abdominal pain, nausea and vomiting.   Endocrine: Negative for polydipsia, polyphagia and polyuria.   Skin: Negative for rash.   Neurological: Negative for weakness, light-headedness and headaches.       Objective: BP 90/60 Comment: manual recheck  Pulse 90   Temp 97.8 °F (36.6 °C) (Oral)   Ht 5' 4" (1.626 m)   Wt 62.1 kg (137 lb)   SpO2 96%   BMI 23.52 kg/m²         Physical Exam   Constitutional: He appears well-developed and well-nourished. No distress.   HENT:   Head: Normocephalic and atraumatic.   Right Ear: Tympanic membrane, external ear and ear canal normal.   Left Ear: Tympanic membrane, external ear and ear canal normal.   Mouth/Throat: Oropharynx is clear and moist.   Cardiovascular: Normal rate and regular rhythm. Exam reveals no friction rub.   No murmur heard.  Pulmonary/Chest: Effort normal and breath sounds normal. He has no wheezes. He has no rales.   Abdominal: Soft. Bowel sounds are normal. There is no tenderness.   Musculoskeletal: He exhibits no edema.   Lymphadenopathy:     He has no cervical adenopathy.   Neurological: He is alert.   Skin: Skin is warm and dry. Capillary refill takes less than 2 seconds. No rash noted.   Psychiatric: He has a normal mood and affect.   Vitals reviewed.      Assessment:       1. Annual physical exam    2. Depression with anxiety    3. Hyperlipidemia, unspecified hyperlipidemia type    4. Atherosclerosis of aorta    5. CHRIS (obstructive sleep apnea)    6. Partial epilepsy with impairment of consciousness    7. Tobacco abuse    8. Diabetes " mellitus without complication        Plan:         Edu was seen today for annual exam.    Diagnoses and all orders for this visit:    Annual physical exam    Diabetes mellitus without complication  Lab Results   Component Value Date    HGBA1C 6.1 (H) 01/30/2019   Well controlled  DM diet discussed  -     Hemoglobin A1c; Future  -     Comprehensive metabolic panel; Future  -     CBC auto differential; Future    Depression with anxiety  Continue Lexapro    Hyperlipidemia, unspecified hyperlipidemia type  Continue statin therapy    Atherosclerosis of aorta  Continue statin therapy    CHRIS (obstructive sleep apnea)  Stable on CPAP    Partial epilepsy with impairment of consciousness  Followed by neurology    Tobacco abuse  Encouraged smoking cessation  Pt declines assistance    Lydia Huynh PA-C

## 2019-07-02 ENCOUNTER — TELEPHONE (OUTPATIENT)
Dept: INTERNAL MEDICINE | Facility: CLINIC | Age: 64
End: 2019-07-02

## 2019-07-08 ENCOUNTER — ANTI-COAG VISIT (OUTPATIENT)
Dept: CARDIOLOGY | Facility: CLINIC | Age: 64
End: 2019-07-08
Payer: MEDICARE

## 2019-07-08 ENCOUNTER — LAB VISIT (OUTPATIENT)
Dept: LAB | Facility: HOSPITAL | Age: 64
End: 2019-07-08
Attending: INTERNAL MEDICINE
Payer: MEDICARE

## 2019-07-08 DIAGNOSIS — Z86.711 HISTORY OF PULMONARY EMBOLISM: ICD-10-CM

## 2019-07-08 DIAGNOSIS — Z86.718 HISTORY OF DVT (DEEP VEIN THROMBOSIS): ICD-10-CM

## 2019-07-08 DIAGNOSIS — Z79.01 CHRONIC ANTICOAGULATION: ICD-10-CM

## 2019-07-08 DIAGNOSIS — Z79.01 LONG TERM (CURRENT) USE OF ANTICOAGULANTS: ICD-10-CM

## 2019-07-08 LAB
INR PPP: 1.4 (ref 0.8–1.2)
PROTHROMBIN TIME: 13.8 SEC (ref 9–12.5)

## 2019-07-08 PROCEDURE — 85610 PROTHROMBIN TIME: CPT | Mod: HCNC

## 2019-07-08 PROCEDURE — 93793 ANTICOAG MGMT PT WARFARIN: CPT | Mod: S$GLB,,, | Performed by: PHARMACIST

## 2019-07-08 PROCEDURE — 36415 COLL VENOUS BLD VENIPUNCTURE: CPT | Mod: HCNC,PO

## 2019-07-08 PROCEDURE — 93793 PR ANTICOAGULANT MGMT FOR PT TAKING WARFARIN: ICD-10-PCS | Mod: S$GLB,,, | Performed by: PHARMACIST

## 2019-07-09 NOTE — PROGRESS NOTES
INR not at goal. Medications, chart, and patient findings reviewed. See calendar for adjustments to dose and follow up plan.

## 2019-07-15 ENCOUNTER — LAB VISIT (OUTPATIENT)
Dept: LAB | Facility: HOSPITAL | Age: 64
End: 2019-07-15
Attending: INTERNAL MEDICINE
Payer: MEDICARE

## 2019-07-15 ENCOUNTER — ANTI-COAG VISIT (OUTPATIENT)
Dept: CARDIOLOGY | Facility: CLINIC | Age: 64
End: 2019-07-15
Payer: MEDICARE

## 2019-07-15 DIAGNOSIS — Z86.711 HISTORY OF PULMONARY EMBOLISM: ICD-10-CM

## 2019-07-15 DIAGNOSIS — Z79.01 CHRONIC ANTICOAGULATION: ICD-10-CM

## 2019-07-15 DIAGNOSIS — Z79.01 LONG TERM (CURRENT) USE OF ANTICOAGULANTS: ICD-10-CM

## 2019-07-15 DIAGNOSIS — Z86.718 HISTORY OF DVT (DEEP VEIN THROMBOSIS): ICD-10-CM

## 2019-07-15 LAB
INR PPP: 1.6 (ref 0.8–1.2)
PROTHROMBIN TIME: 15.4 SEC (ref 9–12.5)

## 2019-07-15 PROCEDURE — 93793 PR ANTICOAGULANT MGMT FOR PT TAKING WARFARIN: ICD-10-PCS | Mod: S$GLB,,, | Performed by: PHARMACIST

## 2019-07-15 PROCEDURE — 36415 COLL VENOUS BLD VENIPUNCTURE: CPT | Mod: HCNC,PO

## 2019-07-15 PROCEDURE — 93793 ANTICOAG MGMT PT WARFARIN: CPT | Mod: S$GLB,,, | Performed by: PHARMACIST

## 2019-07-15 PROCEDURE — 85610 PROTHROMBIN TIME: CPT | Mod: HCNC

## 2019-07-15 NOTE — PROGRESS NOTES
Wife was given day by day dose of coumadin and redraw date, verbalized understanding appointment booked

## 2019-07-15 NOTE — PROGRESS NOTES
Verified coumadin: last Tuesday -5mg, Wednesday -2.5mg, Thursday -2.5mg, Friday -2.5mg, Saturday -5mg, Sunday -2.5mg

## 2019-07-22 ENCOUNTER — LAB VISIT (OUTPATIENT)
Dept: LAB | Facility: HOSPITAL | Age: 64
End: 2019-07-22
Attending: INTERNAL MEDICINE
Payer: MEDICARE

## 2019-07-22 DIAGNOSIS — Z86.718 HISTORY OF DVT (DEEP VEIN THROMBOSIS): ICD-10-CM

## 2019-07-22 DIAGNOSIS — Z86.711 HISTORY OF PULMONARY EMBOLISM: ICD-10-CM

## 2019-07-22 DIAGNOSIS — Z79.01 CHRONIC ANTICOAGULATION: ICD-10-CM

## 2019-07-22 DIAGNOSIS — Z79.01 LONG TERM (CURRENT) USE OF ANTICOAGULANTS: ICD-10-CM

## 2019-07-22 LAB
INR PPP: 1.9 (ref 0.8–1.2)
PROTHROMBIN TIME: 18.6 SEC (ref 9–12.5)

## 2019-07-22 PROCEDURE — 36415 COLL VENOUS BLD VENIPUNCTURE: CPT | Mod: HCNC,PO

## 2019-07-22 PROCEDURE — 85610 PROTHROMBIN TIME: CPT | Mod: HCNC

## 2019-07-23 ENCOUNTER — ANTI-COAG VISIT (OUTPATIENT)
Dept: CARDIOLOGY | Facility: CLINIC | Age: 64
End: 2019-07-23
Payer: MEDICARE

## 2019-07-23 DIAGNOSIS — Z86.718 HISTORY OF DVT (DEEP VEIN THROMBOSIS): ICD-10-CM

## 2019-07-23 DIAGNOSIS — Z79.01 CHRONIC ANTICOAGULATION: ICD-10-CM

## 2019-07-23 DIAGNOSIS — Z86.711 HISTORY OF PULMONARY EMBOLISM: ICD-10-CM

## 2019-07-23 PROCEDURE — 93793 PR ANTICOAGULANT MGMT FOR PT TAKING WARFARIN: ICD-10-PCS | Mod: S$GLB,,,

## 2019-07-23 PROCEDURE — 93793 ANTICOAG MGMT PT WARFARIN: CPT | Mod: S$GLB,,,

## 2019-07-29 ENCOUNTER — LAB VISIT (OUTPATIENT)
Dept: LAB | Facility: HOSPITAL | Age: 64
End: 2019-07-29
Attending: INTERNAL MEDICINE
Payer: MEDICARE

## 2019-07-29 DIAGNOSIS — Z86.711 HISTORY OF PULMONARY EMBOLISM: ICD-10-CM

## 2019-07-29 DIAGNOSIS — Z86.718 HISTORY OF DVT (DEEP VEIN THROMBOSIS): ICD-10-CM

## 2019-07-29 DIAGNOSIS — Z79.01 CHRONIC ANTICOAGULATION: ICD-10-CM

## 2019-07-29 DIAGNOSIS — Z79.01 LONG TERM (CURRENT) USE OF ANTICOAGULANTS: ICD-10-CM

## 2019-07-29 LAB
INR PPP: 2.9 (ref 0.8–1.2)
PROTHROMBIN TIME: 27.8 SEC (ref 9–12.5)

## 2019-07-29 PROCEDURE — 85610 PROTHROMBIN TIME: CPT | Mod: HCNC

## 2019-07-29 PROCEDURE — 36415 COLL VENOUS BLD VENIPUNCTURE: CPT | Mod: HCNC,PO

## 2019-07-30 ENCOUNTER — ANTI-COAG VISIT (OUTPATIENT)
Dept: CARDIOLOGY | Facility: CLINIC | Age: 64
End: 2019-07-30
Payer: MEDICARE

## 2019-07-30 DIAGNOSIS — Z86.711 HISTORY OF PULMONARY EMBOLISM: ICD-10-CM

## 2019-07-30 DIAGNOSIS — Z79.01 CHRONIC ANTICOAGULATION: ICD-10-CM

## 2019-07-30 DIAGNOSIS — Z86.718 HISTORY OF DVT (DEEP VEIN THROMBOSIS): ICD-10-CM

## 2019-07-30 PROCEDURE — 93793 PR ANTICOAGULANT MGMT FOR PT TAKING WARFARIN: ICD-10-PCS | Mod: S$GLB,,,

## 2019-07-30 PROCEDURE — 93793 ANTICOAG MGMT PT WARFARIN: CPT | Mod: S$GLB,,,

## 2019-08-07 ENCOUNTER — ANTI-COAG VISIT (OUTPATIENT)
Dept: CARDIOLOGY | Facility: CLINIC | Age: 64
End: 2019-08-07
Payer: MEDICARE

## 2019-08-07 ENCOUNTER — LAB VISIT (OUTPATIENT)
Dept: LAB | Facility: HOSPITAL | Age: 64
End: 2019-08-07
Attending: INTERNAL MEDICINE
Payer: MEDICARE

## 2019-08-07 DIAGNOSIS — Z86.711 HISTORY OF PULMONARY EMBOLISM: ICD-10-CM

## 2019-08-07 DIAGNOSIS — Z86.718 HISTORY OF DVT (DEEP VEIN THROMBOSIS): ICD-10-CM

## 2019-08-07 DIAGNOSIS — Z79.01 LONG TERM (CURRENT) USE OF ANTICOAGULANTS: ICD-10-CM

## 2019-08-07 DIAGNOSIS — Z79.01 CHRONIC ANTICOAGULATION: ICD-10-CM

## 2019-08-07 LAB
INR PPP: 2.5 (ref 0.8–1.2)
PROTHROMBIN TIME: 23.7 SEC (ref 9–12.5)

## 2019-08-07 PROCEDURE — 36415 COLL VENOUS BLD VENIPUNCTURE: CPT | Mod: HCNC,PO

## 2019-08-07 PROCEDURE — 93793 PR ANTICOAGULANT MGMT FOR PT TAKING WARFARIN: ICD-10-PCS | Mod: S$GLB,,,

## 2019-08-07 PROCEDURE — 93793 ANTICOAG MGMT PT WARFARIN: CPT | Mod: S$GLB,,,

## 2019-08-07 PROCEDURE — 85610 PROTHROMBIN TIME: CPT | Mod: HCNC

## 2019-08-21 ENCOUNTER — ANTI-COAG VISIT (OUTPATIENT)
Dept: CARDIOLOGY | Facility: CLINIC | Age: 64
End: 2019-08-21
Payer: MEDICARE

## 2019-08-21 ENCOUNTER — LAB VISIT (OUTPATIENT)
Dept: LAB | Facility: HOSPITAL | Age: 64
End: 2019-08-21
Attending: INTERNAL MEDICINE
Payer: MEDICARE

## 2019-08-21 DIAGNOSIS — Z86.718 HISTORY OF DVT (DEEP VEIN THROMBOSIS): ICD-10-CM

## 2019-08-21 DIAGNOSIS — Z86.711 HISTORY OF PULMONARY EMBOLISM: ICD-10-CM

## 2019-08-21 DIAGNOSIS — Z79.01 CHRONIC ANTICOAGULATION: ICD-10-CM

## 2019-08-21 DIAGNOSIS — Z79.01 LONG TERM (CURRENT) USE OF ANTICOAGULANTS: ICD-10-CM

## 2019-08-21 LAB
INR PPP: 1.1 (ref 0.8–1.2)
PROTHROMBIN TIME: 11.4 SEC (ref 9–12.5)

## 2019-08-21 PROCEDURE — 93793 ANTICOAG MGMT PT WARFARIN: CPT | Mod: S$GLB,,,

## 2019-08-21 PROCEDURE — 85610 PROTHROMBIN TIME: CPT | Mod: HCNC

## 2019-08-21 PROCEDURE — 93793 PR ANTICOAGULANT MGMT FOR PT TAKING WARFARIN: ICD-10-PCS | Mod: S$GLB,,,

## 2019-08-21 PROCEDURE — 36415 COLL VENOUS BLD VENIPUNCTURE: CPT | Mod: HCNC,PO

## 2019-08-22 NOTE — PROGRESS NOTES
INR not at goal - baseline without explanation. Will boost conservatively and f/u INR closely incase lab error. Medications, chart, and patient findings reviewed. See calendar for adjustments to dose and follow up plan.

## 2019-08-26 ENCOUNTER — ANTI-COAG VISIT (OUTPATIENT)
Dept: CARDIOLOGY | Facility: CLINIC | Age: 64
End: 2019-08-26
Payer: MEDICARE

## 2019-08-26 ENCOUNTER — LAB VISIT (OUTPATIENT)
Dept: LAB | Facility: HOSPITAL | Age: 64
End: 2019-08-26
Attending: INTERNAL MEDICINE
Payer: MEDICARE

## 2019-08-26 DIAGNOSIS — Z79.01 CHRONIC ANTICOAGULATION: ICD-10-CM

## 2019-08-26 DIAGNOSIS — Z79.01 LONG TERM (CURRENT) USE OF ANTICOAGULANTS: ICD-10-CM

## 2019-08-26 DIAGNOSIS — Z86.711 HISTORY OF PULMONARY EMBOLISM: ICD-10-CM

## 2019-08-26 DIAGNOSIS — Z86.718 HISTORY OF DVT (DEEP VEIN THROMBOSIS): ICD-10-CM

## 2019-08-26 LAB
INR PPP: 1.9 (ref 0.8–1.2)
PROTHROMBIN TIME: 18.7 SEC (ref 9–12.5)

## 2019-08-26 PROCEDURE — 85610 PROTHROMBIN TIME: CPT | Mod: HCNC

## 2019-08-26 PROCEDURE — 93793 ANTICOAG MGMT PT WARFARIN: CPT | Mod: S$GLB,,,

## 2019-08-26 PROCEDURE — 93793 PR ANTICOAGULANT MGMT FOR PT TAKING WARFARIN: ICD-10-PCS | Mod: S$GLB,,,

## 2019-08-26 PROCEDURE — 36415 COLL VENOUS BLD VENIPUNCTURE: CPT | Mod: HCNC,PO

## 2019-08-26 NOTE — PROGRESS NOTES
INR just barely below goal. Medications and chart reviewed. No changes noted to necessitate adjustment of warfarin or follow-up plan. See calendar.

## 2019-09-09 ENCOUNTER — NURSE TRIAGE (OUTPATIENT)
Dept: ADMINISTRATIVE | Facility: CLINIC | Age: 64
End: 2019-09-09

## 2019-09-09 ENCOUNTER — DOCUMENTATION ONLY (OUTPATIENT)
Dept: CARDIOLOGY | Facility: CLINIC | Age: 64
End: 2019-09-09

## 2019-09-09 ENCOUNTER — LAB VISIT (OUTPATIENT)
Dept: LAB | Facility: HOSPITAL | Age: 64
End: 2019-09-09
Attending: INTERNAL MEDICINE
Payer: MEDICARE

## 2019-09-09 ENCOUNTER — TELEPHONE (OUTPATIENT)
Dept: INTERNAL MEDICINE | Facility: CLINIC | Age: 64
End: 2019-09-09

## 2019-09-09 DIAGNOSIS — Z86.711 HISTORY OF PULMONARY EMBOLISM: ICD-10-CM

## 2019-09-09 DIAGNOSIS — Z86.718 HISTORY OF DVT (DEEP VEIN THROMBOSIS): ICD-10-CM

## 2019-09-09 DIAGNOSIS — Z79.01 CHRONIC ANTICOAGULATION: ICD-10-CM

## 2019-09-09 DIAGNOSIS — Z79.01 LONG TERM (CURRENT) USE OF ANTICOAGULANTS: ICD-10-CM

## 2019-09-09 LAB
INR PPP: 5.8 (ref 0.8–1.2)
PROTHROMBIN TIME: 58.2 SEC (ref 9–12.5)

## 2019-09-09 PROCEDURE — 85610 PROTHROMBIN TIME: CPT | Mod: HCNC

## 2019-09-09 PROCEDURE — 36415 COLL VENOUS BLD VENIPUNCTURE: CPT | Mod: HCNC,PO

## 2019-09-09 NOTE — TELEPHONE ENCOUNTER
Called  to get Cardiologist on call to call me back to give them the results of the Coumadin.  Dr. Guerra is on call and was paged. He was given the result.

## 2019-09-10 ENCOUNTER — ANTI-COAG VISIT (OUTPATIENT)
Dept: CARDIOLOGY | Facility: CLINIC | Age: 64
End: 2019-09-10
Payer: MEDICARE

## 2019-09-10 DIAGNOSIS — Z86.718 HISTORY OF DVT (DEEP VEIN THROMBOSIS): ICD-10-CM

## 2019-09-10 DIAGNOSIS — Z79.01 CHRONIC ANTICOAGULATION: ICD-10-CM

## 2019-09-10 DIAGNOSIS — Z86.711 HISTORY OF PULMONARY EMBOLISM: ICD-10-CM

## 2019-09-10 PROCEDURE — 93793 PR ANTICOAGULANT MGMT FOR PT TAKING WARFARIN: ICD-10-PCS | Mod: S$GLB,,,

## 2019-09-10 PROCEDURE — 93793 ANTICOAG MGMT PT WARFARIN: CPT | Mod: S$GLB,,,

## 2019-09-10 NOTE — PROGRESS NOTES
INR not at goal - patient took dose higher than instructed. Medications, chart, and patient findings reviewed. See calendar for adjustments to dose and follow up plan.

## 2019-09-10 NOTE — PROGRESS NOTES
Attempted to contact patient a few times regarding elevated INR.  No answer. Will make sure coumadin clinic has results tomorrow.

## 2019-09-10 NOTE — TELEPHONE ENCOUNTER
Reason for Disposition   [1] Caller requesting NON-URGENT health information AND [2] PCP's office is the best resource    Protocols used: INFORMATION ONLY CALL-A-    Pt called asking for lab results. Advised to call Provider in morning to discuss results.

## 2019-09-11 ENCOUNTER — ANTI-COAG VISIT (OUTPATIENT)
Dept: CARDIOLOGY | Facility: CLINIC | Age: 64
End: 2019-09-11
Payer: MEDICARE

## 2019-09-11 ENCOUNTER — LAB VISIT (OUTPATIENT)
Dept: LAB | Facility: HOSPITAL | Age: 64
End: 2019-09-11
Attending: INTERNAL MEDICINE
Payer: MEDICARE

## 2019-09-11 DIAGNOSIS — Z79.01 CHRONIC ANTICOAGULATION: ICD-10-CM

## 2019-09-11 DIAGNOSIS — Z86.711 HISTORY OF PULMONARY EMBOLISM: ICD-10-CM

## 2019-09-11 DIAGNOSIS — Z86.718 HISTORY OF DVT (DEEP VEIN THROMBOSIS): ICD-10-CM

## 2019-09-11 DIAGNOSIS — Z79.01 LONG TERM (CURRENT) USE OF ANTICOAGULANTS: ICD-10-CM

## 2019-09-11 LAB
INR PPP: 2.4 (ref 0.8–1.2)
PROTHROMBIN TIME: 23.1 SEC (ref 9–12.5)

## 2019-09-11 PROCEDURE — 36415 COLL VENOUS BLD VENIPUNCTURE: CPT | Mod: HCNC,PO

## 2019-09-11 PROCEDURE — 85610 PROTHROMBIN TIME: CPT | Mod: HCNC

## 2019-09-11 PROCEDURE — 93793 PR ANTICOAGULANT MGMT FOR PT TAKING WARFARIN: ICD-10-PCS | Mod: S$GLB,,,

## 2019-09-11 PROCEDURE — 93793 ANTICOAG MGMT PT WARFARIN: CPT | Mod: S$GLB,,,

## 2019-09-12 ENCOUNTER — TELEPHONE (OUTPATIENT)
Dept: INTERNAL MEDICINE | Facility: CLINIC | Age: 64
End: 2019-09-12

## 2019-09-12 NOTE — TELEPHONE ENCOUNTER
----- Message from Airam Zepeda sent at 9/12/2019 11:34 AM CDT -----  Contact: self  [phone]  Patient would like to get test results  Name of test (lab, mammo, etc.):   lab  Date of test:  9/11  Ordering provider: Pattie  Where was the test performed:  [facility]  Would the patient rather a call back or a response via [facility]?:  Call back  Comments:

## 2019-09-12 NOTE — PROGRESS NOTES
Wife advised to have patient take (Warfarin 5mg Thur/Sat/Sun, except 2.5mg Fri). Wife verbalized understanding.

## 2019-09-12 NOTE — TELEPHONE ENCOUNTER
Repeat INR is in goal range at 2.4.  He should receive a call from the Coumadin Clinic with further instructions.

## 2019-09-12 NOTE — TELEPHONE ENCOUNTER
----- Message from Marsha Howrad sent at 9/12/2019  2:31 PM CDT -----  Contact: self/[phone]  Patient is returning a phone call.  Who left a message for the patient: Rolanda with Dr Blankenship  Does patient know what this is regarding:  no  Comments:

## 2019-09-16 ENCOUNTER — LAB VISIT (OUTPATIENT)
Dept: LAB | Facility: HOSPITAL | Age: 64
End: 2019-09-16
Attending: INTERNAL MEDICINE
Payer: MEDICARE

## 2019-09-16 DIAGNOSIS — Z86.718 HISTORY OF DVT (DEEP VEIN THROMBOSIS): ICD-10-CM

## 2019-09-16 DIAGNOSIS — Z86.711 HISTORY OF PULMONARY EMBOLISM: ICD-10-CM

## 2019-09-16 DIAGNOSIS — Z79.01 LONG TERM (CURRENT) USE OF ANTICOAGULANTS: ICD-10-CM

## 2019-09-16 DIAGNOSIS — Z79.01 CHRONIC ANTICOAGULATION: ICD-10-CM

## 2019-09-16 LAB
INR PPP: 1.4 (ref 0.8–1.2)
PROTHROMBIN TIME: 14.1 SEC (ref 9–12.5)

## 2019-09-16 PROCEDURE — 36415 COLL VENOUS BLD VENIPUNCTURE: CPT | Mod: HCNC,PO

## 2019-09-16 PROCEDURE — 85610 PROTHROMBIN TIME: CPT | Mod: HCNC

## 2019-09-17 ENCOUNTER — ANTI-COAG VISIT (OUTPATIENT)
Dept: CARDIOLOGY | Facility: CLINIC | Age: 64
End: 2019-09-17
Payer: MEDICARE

## 2019-09-17 DIAGNOSIS — Z86.718 HISTORY OF DVT (DEEP VEIN THROMBOSIS): ICD-10-CM

## 2019-09-17 DIAGNOSIS — Z86.711 HISTORY OF PULMONARY EMBOLISM: ICD-10-CM

## 2019-09-17 DIAGNOSIS — Z79.01 CHRONIC ANTICOAGULATION: ICD-10-CM

## 2019-09-17 LAB — INR PPP: 1.4

## 2019-09-17 PROCEDURE — 93793 ANTICOAG MGMT PT WARFARIN: CPT | Mod: S$GLB,,,

## 2019-09-17 PROCEDURE — 93793 PR ANTICOAGULANT MGMT FOR PT TAKING WARFARIN: ICD-10-PCS | Mod: S$GLB,,,

## 2019-09-23 ENCOUNTER — LAB VISIT (OUTPATIENT)
Dept: LAB | Facility: HOSPITAL | Age: 64
End: 2019-09-23
Attending: INTERNAL MEDICINE
Payer: MEDICARE

## 2019-09-23 ENCOUNTER — ANTI-COAG VISIT (OUTPATIENT)
Dept: CARDIOLOGY | Facility: CLINIC | Age: 64
End: 2019-09-23
Payer: MEDICARE

## 2019-09-23 DIAGNOSIS — Z86.718 HISTORY OF DVT (DEEP VEIN THROMBOSIS): ICD-10-CM

## 2019-09-23 DIAGNOSIS — Z86.711 HISTORY OF PULMONARY EMBOLISM: ICD-10-CM

## 2019-09-23 DIAGNOSIS — Z79.01 CHRONIC ANTICOAGULATION: ICD-10-CM

## 2019-09-23 DIAGNOSIS — Z79.01 LONG TERM (CURRENT) USE OF ANTICOAGULANTS: ICD-10-CM

## 2019-09-23 LAB
INR PPP: 1.9 (ref 0.8–1.2)
PROTHROMBIN TIME: 18.9 SEC (ref 9–12.5)

## 2019-09-23 PROCEDURE — 85610 PROTHROMBIN TIME: CPT | Mod: HCNC

## 2019-09-23 PROCEDURE — 36415 COLL VENOUS BLD VENIPUNCTURE: CPT | Mod: HCNC,PO

## 2019-09-23 PROCEDURE — 93793 ANTICOAG MGMT PT WARFARIN: CPT | Mod: S$GLB,,,

## 2019-09-23 PROCEDURE — 93793 PR ANTICOAGULANT MGMT FOR PT TAKING WARFARIN: ICD-10-PCS | Mod: S$GLB,,,

## 2019-09-30 ENCOUNTER — LAB VISIT (OUTPATIENT)
Dept: LAB | Facility: HOSPITAL | Age: 64
End: 2019-09-30
Attending: INTERNAL MEDICINE
Payer: MEDICARE

## 2019-09-30 DIAGNOSIS — Z79.01 LONG TERM (CURRENT) USE OF ANTICOAGULANTS: ICD-10-CM

## 2019-09-30 DIAGNOSIS — Z79.01 CHRONIC ANTICOAGULATION: ICD-10-CM

## 2019-09-30 DIAGNOSIS — Z86.718 HISTORY OF DVT (DEEP VEIN THROMBOSIS): ICD-10-CM

## 2019-09-30 DIAGNOSIS — Z86.711 HISTORY OF PULMONARY EMBOLISM: ICD-10-CM

## 2019-09-30 LAB
INR PPP: 1.1
INR PPP: 1.1 (ref 0.8–1.2)
PROTHROMBIN TIME: 11.6 SEC (ref 9–12.5)

## 2019-09-30 PROCEDURE — 85610 PROTHROMBIN TIME: CPT | Mod: HCNC

## 2019-09-30 PROCEDURE — 36415 COLL VENOUS BLD VENIPUNCTURE: CPT | Mod: HCNC,PO

## 2019-10-01 ENCOUNTER — ANTI-COAG VISIT (OUTPATIENT)
Dept: CARDIOLOGY | Facility: CLINIC | Age: 64
End: 2019-10-01
Payer: MEDICARE

## 2019-10-01 DIAGNOSIS — Z86.718 HISTORY OF DVT (DEEP VEIN THROMBOSIS): ICD-10-CM

## 2019-10-01 DIAGNOSIS — Z86.711 HISTORY OF PULMONARY EMBOLISM: ICD-10-CM

## 2019-10-01 DIAGNOSIS — Z79.01 CHRONIC ANTICOAGULATION: ICD-10-CM

## 2019-10-01 PROCEDURE — 93793 PR ANTICOAGULANT MGMT FOR PT TAKING WARFARIN: ICD-10-PCS | Mod: S$GLB,,,

## 2019-10-01 PROCEDURE — 93793 ANTICOAG MGMT PT WARFARIN: CPT | Mod: S$GLB,,,

## 2019-10-01 NOTE — PROGRESS NOTES
INR not at goal - curious to be at baseline and patient denies changes. Medications, chart, and patient findings reviewed. See calendar for adjustments to dose and follow up plan.

## 2019-10-07 ENCOUNTER — LAB VISIT (OUTPATIENT)
Dept: LAB | Facility: HOSPITAL | Age: 64
End: 2019-10-07
Attending: INTERNAL MEDICINE
Payer: MEDICARE

## 2019-10-07 ENCOUNTER — ANTI-COAG VISIT (OUTPATIENT)
Dept: CARDIOLOGY | Facility: CLINIC | Age: 64
End: 2019-10-07
Payer: MEDICARE

## 2019-10-07 DIAGNOSIS — Z86.718 HISTORY OF DVT (DEEP VEIN THROMBOSIS): ICD-10-CM

## 2019-10-07 DIAGNOSIS — Z79.01 CHRONIC ANTICOAGULATION: ICD-10-CM

## 2019-10-07 DIAGNOSIS — Z79.01 LONG TERM (CURRENT) USE OF ANTICOAGULANTS: ICD-10-CM

## 2019-10-07 DIAGNOSIS — Z86.711 HISTORY OF PULMONARY EMBOLISM: ICD-10-CM

## 2019-10-07 LAB
INR PPP: 1.5 (ref 0.8–1.2)
PROTHROMBIN TIME: 15 SEC (ref 9–12.5)

## 2019-10-07 PROCEDURE — 85610 PROTHROMBIN TIME: CPT | Mod: HCNC

## 2019-10-07 PROCEDURE — 93793 PR ANTICOAGULANT MGMT FOR PT TAKING WARFARIN: ICD-10-PCS | Mod: S$GLB,,,

## 2019-10-07 PROCEDURE — 93793 ANTICOAG MGMT PT WARFARIN: CPT | Mod: S$GLB,,,

## 2019-10-07 PROCEDURE — 36415 COLL VENOUS BLD VENIPUNCTURE: CPT | Mod: HCNC,PO

## 2019-10-14 ENCOUNTER — LAB VISIT (OUTPATIENT)
Dept: LAB | Facility: HOSPITAL | Age: 64
End: 2019-10-14
Attending: INTERNAL MEDICINE
Payer: MEDICARE

## 2019-10-14 DIAGNOSIS — Z86.718 HISTORY OF DVT (DEEP VEIN THROMBOSIS): ICD-10-CM

## 2019-10-14 DIAGNOSIS — Z79.01 LONG TERM (CURRENT) USE OF ANTICOAGULANTS: ICD-10-CM

## 2019-10-14 DIAGNOSIS — Z86.711 HISTORY OF PULMONARY EMBOLISM: ICD-10-CM

## 2019-10-14 DIAGNOSIS — Z79.01 CHRONIC ANTICOAGULATION: ICD-10-CM

## 2019-10-14 LAB
INR PPP: 1.2 (ref 0.8–1.2)
PROTHROMBIN TIME: 12.2 SEC (ref 9–12.5)

## 2019-10-14 PROCEDURE — 85610 PROTHROMBIN TIME: CPT | Mod: HCNC

## 2019-10-14 PROCEDURE — 36415 COLL VENOUS BLD VENIPUNCTURE: CPT | Mod: HCNC,PO

## 2019-10-15 ENCOUNTER — ANTI-COAG VISIT (OUTPATIENT)
Dept: CARDIOLOGY | Facility: CLINIC | Age: 64
End: 2019-10-15
Payer: MEDICARE

## 2019-10-15 DIAGNOSIS — Z86.718 HISTORY OF DVT (DEEP VEIN THROMBOSIS): ICD-10-CM

## 2019-10-15 DIAGNOSIS — Z86.711 HISTORY OF PULMONARY EMBOLISM: ICD-10-CM

## 2019-10-15 DIAGNOSIS — Z79.01 CHRONIC ANTICOAGULATION: ICD-10-CM

## 2019-10-15 PROCEDURE — 93793 PR ANTICOAGULANT MGMT FOR PT TAKING WARFARIN: ICD-10-PCS | Mod: S$GLB,,,

## 2019-10-15 PROCEDURE — 93793 ANTICOAG MGMT PT WARFARIN: CPT | Mod: S$GLB,,,

## 2019-10-15 NOTE — PROGRESS NOTES
Patient was advised of coumadin instructions: 10/15 -10mg then 5mg daily and get lab drawn 10/21, verbalized understanding, appointment booked

## 2019-10-21 ENCOUNTER — ANTI-COAG VISIT (OUTPATIENT)
Dept: CARDIOLOGY | Facility: CLINIC | Age: 64
End: 2019-10-21
Payer: MEDICARE

## 2019-10-21 DIAGNOSIS — Z86.718 HISTORY OF DVT (DEEP VEIN THROMBOSIS): ICD-10-CM

## 2019-10-21 DIAGNOSIS — Z79.01 CHRONIC ANTICOAGULATION: ICD-10-CM

## 2019-10-21 DIAGNOSIS — Z86.711 HISTORY OF PULMONARY EMBOLISM: ICD-10-CM

## 2019-10-21 DIAGNOSIS — Z79.01 LONG TERM (CURRENT) USE OF ANTICOAGULANTS: Primary | ICD-10-CM

## 2019-10-21 LAB — INR PPP: 1.7 (ref 2–3)

## 2019-10-21 PROCEDURE — 93793 PR ANTICOAGULANT MGMT FOR PT TAKING WARFARIN: ICD-10-PCS | Mod: HCNC,S$GLB,,

## 2019-10-21 PROCEDURE — 85610 POCT INR: ICD-10-PCS | Mod: QW,HCNC,S$GLB, | Performed by: INTERNAL MEDICINE

## 2019-10-21 PROCEDURE — 85610 PROTHROMBIN TIME: CPT | Mod: QW,HCNC,S$GLB, | Performed by: INTERNAL MEDICINE

## 2019-10-21 PROCEDURE — 93793 ANTICOAG MGMT PT WARFARIN: CPT | Mod: HCNC,S$GLB,,

## 2019-10-21 NOTE — PROGRESS NOTES
INR not at goal. Medications, chart, and patient findings reviewed. See calendar for adjustments to dose and follow up plan.  Pt denies any changes.  He does have some trouble confirming his dose.  Will instruct pt to take 10mg 10/21 & maintain increased regimen.  Plan to re-assess in 1 week.   Patient was re-educated on situations that would require placing a call to the Coumadin Clinic, including bleeding or unusual bruising issues, changes in health, diet or medications,upcoming procedures that require warfarin interruption, and missed Coumadin dose(s). Patient expressed understanding that avoidance of consistency with these parameters could cause fluctuations in INR, leading to more frequent visits and increase risk of adverse events.

## 2019-10-28 ENCOUNTER — ANTI-COAG VISIT (OUTPATIENT)
Dept: CARDIOLOGY | Facility: CLINIC | Age: 64
End: 2019-10-28
Payer: MEDICARE

## 2019-10-28 ENCOUNTER — LAB VISIT (OUTPATIENT)
Dept: LAB | Facility: HOSPITAL | Age: 64
End: 2019-10-28
Attending: INTERNAL MEDICINE
Payer: MEDICARE

## 2019-10-28 DIAGNOSIS — Z79.01 CHRONIC ANTICOAGULATION: ICD-10-CM

## 2019-10-28 DIAGNOSIS — Z86.711 HISTORY OF PULMONARY EMBOLISM: ICD-10-CM

## 2019-10-28 DIAGNOSIS — Z79.01 LONG TERM (CURRENT) USE OF ANTICOAGULANTS: ICD-10-CM

## 2019-10-28 DIAGNOSIS — Z86.718 HISTORY OF DVT (DEEP VEIN THROMBOSIS): ICD-10-CM

## 2019-10-28 LAB
INR PPP: 1.1 (ref 0.8–1.2)
PROTHROMBIN TIME: 11.3 SEC (ref 9–12.5)

## 2019-10-28 PROCEDURE — 85610 PROTHROMBIN TIME: CPT | Mod: HCNC

## 2019-10-28 PROCEDURE — 93793 ANTICOAG MGMT PT WARFARIN: CPT | Mod: S$GLB,,, | Performed by: PHARMACIST

## 2019-10-28 PROCEDURE — 36415 COLL VENOUS BLD VENIPUNCTURE: CPT | Mod: HCNC,PO

## 2019-10-28 PROCEDURE — 93793 PR ANTICOAGULANT MGMT FOR PT TAKING WARFARIN: ICD-10-PCS | Mod: S$GLB,,, | Performed by: PHARMACIST

## 2019-10-28 NOTE — PROGRESS NOTES
Patient instructed to take new increased dose Coumadin of 1-1/5 tabs daily and INR on 10/31/19 at [facility] which her verbalized understanding and repeated back correctly.

## 2019-10-28 NOTE — PROGRESS NOTES
Patient reports he took Coumadin per calendar and denies change to health, medications, or die. INR unusually at baseline with no reported changes

## 2019-10-31 ENCOUNTER — LAB VISIT (OUTPATIENT)
Dept: LAB | Facility: HOSPITAL | Age: 64
End: 2019-10-31
Attending: INTERNAL MEDICINE
Payer: MEDICARE

## 2019-10-31 DIAGNOSIS — Z86.711 HISTORY OF PULMONARY EMBOLISM: ICD-10-CM

## 2019-10-31 DIAGNOSIS — Z86.718 HISTORY OF DVT (DEEP VEIN THROMBOSIS): ICD-10-CM

## 2019-10-31 DIAGNOSIS — Z79.01 LONG TERM (CURRENT) USE OF ANTICOAGULANTS: ICD-10-CM

## 2019-10-31 DIAGNOSIS — Z79.01 CHRONIC ANTICOAGULATION: ICD-10-CM

## 2019-10-31 LAB
INR PPP: 4.8 (ref 0.8–1.2)
PROTHROMBIN TIME: 47.5 SEC (ref 9–12.5)

## 2019-10-31 PROCEDURE — 85610 PROTHROMBIN TIME: CPT | Mod: HCNC

## 2019-10-31 PROCEDURE — 36415 COLL VENOUS BLD VENIPUNCTURE: CPT | Mod: HCNC,PO

## 2019-11-01 ENCOUNTER — ANTI-COAG VISIT (OUTPATIENT)
Dept: CARDIOLOGY | Facility: CLINIC | Age: 64
End: 2019-11-01
Payer: MEDICARE

## 2019-11-01 DIAGNOSIS — Z79.01 CHRONIC ANTICOAGULATION: ICD-10-CM

## 2019-11-01 DIAGNOSIS — Z86.718 HISTORY OF DVT (DEEP VEIN THROMBOSIS): ICD-10-CM

## 2019-11-01 DIAGNOSIS — Z86.711 HISTORY OF PULMONARY EMBOLISM: ICD-10-CM

## 2019-11-01 PROCEDURE — 93793 PR ANTICOAGULANT MGMT FOR PT TAKING WARFARIN: ICD-10-PCS | Mod: S$GLB,,,

## 2019-11-01 PROCEDURE — 93793 ANTICOAG MGMT PT WARFARIN: CPT | Mod: S$GLB,,,

## 2019-11-11 ENCOUNTER — LAB VISIT (OUTPATIENT)
Dept: LAB | Facility: HOSPITAL | Age: 64
End: 2019-11-11
Attending: INTERNAL MEDICINE
Payer: MEDICARE

## 2019-11-11 DIAGNOSIS — Z79.01 CHRONIC ANTICOAGULATION: ICD-10-CM

## 2019-11-11 DIAGNOSIS — Z86.718 HISTORY OF DVT (DEEP VEIN THROMBOSIS): ICD-10-CM

## 2019-11-11 DIAGNOSIS — Z86.711 HISTORY OF PULMONARY EMBOLISM: ICD-10-CM

## 2019-11-11 DIAGNOSIS — Z79.01 LONG TERM (CURRENT) USE OF ANTICOAGULANTS: ICD-10-CM

## 2019-11-11 LAB
INR PPP: 1 (ref 0.8–1.2)
PROTHROMBIN TIME: 10.7 SEC (ref 9–12.5)

## 2019-11-11 PROCEDURE — 85610 PROTHROMBIN TIME: CPT | Mod: HCNC

## 2019-11-11 PROCEDURE — 36415 COLL VENOUS BLD VENIPUNCTURE: CPT | Mod: HCNC,PO

## 2019-11-12 ENCOUNTER — ANTI-COAG VISIT (OUTPATIENT)
Dept: CARDIOLOGY | Facility: CLINIC | Age: 64
End: 2019-11-12
Payer: MEDICARE

## 2019-11-12 DIAGNOSIS — Z86.718 HISTORY OF DVT (DEEP VEIN THROMBOSIS): ICD-10-CM

## 2019-11-12 DIAGNOSIS — Z86.711 HISTORY OF PULMONARY EMBOLISM: ICD-10-CM

## 2019-11-12 DIAGNOSIS — Z79.01 CHRONIC ANTICOAGULATION: ICD-10-CM

## 2019-11-12 PROCEDURE — 93793 ANTICOAG MGMT PT WARFARIN: CPT | Mod: S$GLB,,,

## 2019-11-12 PROCEDURE — 93793 PR ANTICOAGULANT MGMT FOR PT TAKING WARFARIN: ICD-10-PCS | Mod: S$GLB,,,

## 2019-11-12 NOTE — PROGRESS NOTES
INR not at goal - INRs continue to fluctuate without apparent reason, suspect there is some element of change not being reported. Medications, chart, and patient findings reviewed. See calendar for adjustments to dose and follow up plan.

## 2019-11-18 ENCOUNTER — PATIENT OUTREACH (OUTPATIENT)
Dept: ADMINISTRATIVE | Facility: HOSPITAL | Age: 64
End: 2019-11-18

## 2019-11-18 ENCOUNTER — LAB VISIT (OUTPATIENT)
Dept: LAB | Facility: HOSPITAL | Age: 64
End: 2019-11-18
Attending: INTERNAL MEDICINE
Payer: MEDICARE

## 2019-11-18 ENCOUNTER — ANTI-COAG VISIT (OUTPATIENT)
Dept: CARDIOLOGY | Facility: CLINIC | Age: 64
End: 2019-11-18
Payer: MEDICARE

## 2019-11-18 ENCOUNTER — PATIENT MESSAGE (OUTPATIENT)
Dept: ADMINISTRATIVE | Facility: HOSPITAL | Age: 64
End: 2019-11-18

## 2019-11-18 DIAGNOSIS — Z86.718 HISTORY OF DVT (DEEP VEIN THROMBOSIS): ICD-10-CM

## 2019-11-18 DIAGNOSIS — Z79.01 CHRONIC ANTICOAGULATION: ICD-10-CM

## 2019-11-18 DIAGNOSIS — Z86.711 HISTORY OF PULMONARY EMBOLISM: ICD-10-CM

## 2019-11-18 DIAGNOSIS — Z79.01 LONG TERM (CURRENT) USE OF ANTICOAGULANTS: ICD-10-CM

## 2019-11-18 LAB
INR PPP: 1.6 (ref 0.8–1.2)
PROTHROMBIN TIME: 15.4 SEC (ref 9–12.5)

## 2019-11-18 PROCEDURE — 36415 COLL VENOUS BLD VENIPUNCTURE: CPT | Mod: HCNC,PO

## 2019-11-18 PROCEDURE — 93793 ANTICOAG MGMT PT WARFARIN: CPT | Mod: S$GLB,,,

## 2019-11-18 PROCEDURE — 85610 PROTHROMBIN TIME: CPT | Mod: HCNC

## 2019-11-18 PROCEDURE — 93793 PR ANTICOAGULANT MGMT FOR PT TAKING WARFARIN: ICD-10-PCS | Mod: S$GLB,,,

## 2019-11-22 ENCOUNTER — PATIENT OUTREACH (OUTPATIENT)
Dept: ADMINISTRATIVE | Facility: OTHER | Age: 64
End: 2019-11-22

## 2019-11-22 DIAGNOSIS — E11.9 DIABETES MELLITUS WITHOUT COMPLICATION: Primary | ICD-10-CM

## 2019-11-25 ENCOUNTER — OFFICE VISIT (OUTPATIENT)
Dept: NEUROLOGY | Facility: CLINIC | Age: 64
End: 2019-11-25
Payer: MEDICARE

## 2019-11-25 ENCOUNTER — LAB VISIT (OUTPATIENT)
Dept: LAB | Facility: HOSPITAL | Age: 64
End: 2019-11-25
Attending: INTERNAL MEDICINE
Payer: MEDICARE

## 2019-11-25 ENCOUNTER — LAB VISIT (OUTPATIENT)
Dept: LAB | Facility: HOSPITAL | Age: 64
End: 2019-11-25
Attending: PSYCHIATRY & NEUROLOGY
Payer: MEDICARE

## 2019-11-25 VITALS — DIASTOLIC BLOOD PRESSURE: 60 MMHG | SYSTOLIC BLOOD PRESSURE: 95 MMHG | HEART RATE: 83 BPM

## 2019-11-25 DIAGNOSIS — G40.909 SEIZURE DISORDER: ICD-10-CM

## 2019-11-25 DIAGNOSIS — Z79.01 LONG TERM (CURRENT) USE OF ANTICOAGULANTS: ICD-10-CM

## 2019-11-25 DIAGNOSIS — Z86.718 HISTORY OF DVT (DEEP VEIN THROMBOSIS): ICD-10-CM

## 2019-11-25 DIAGNOSIS — Z79.01 CHRONIC ANTICOAGULATION: ICD-10-CM

## 2019-11-25 DIAGNOSIS — Z86.711 HISTORY OF PULMONARY EMBOLISM: ICD-10-CM

## 2019-11-25 LAB
ALBUMIN SERPL BCP-MCNC: 3.9 G/DL (ref 3.5–5.2)
ALP SERPL-CCNC: 81 U/L (ref 55–135)
ALT SERPL W/O P-5'-P-CCNC: 18 U/L (ref 10–44)
ANION GAP SERPL CALC-SCNC: 11 MMOL/L (ref 8–16)
AST SERPL-CCNC: 26 U/L (ref 10–40)
BILIRUB SERPL-MCNC: 0.4 MG/DL (ref 0.1–1)
BUN SERPL-MCNC: 14 MG/DL (ref 8–23)
CALCIUM SERPL-MCNC: 9.4 MG/DL (ref 8.7–10.5)
CHLORIDE SERPL-SCNC: 107 MMOL/L (ref 95–110)
CO2 SERPL-SCNC: 25 MMOL/L (ref 23–29)
CREAT SERPL-MCNC: 1.1 MG/DL (ref 0.5–1.4)
EST. GFR  (AFRICAN AMERICAN): >60 ML/MIN/1.73 M^2
EST. GFR  (NON AFRICAN AMERICAN): >60 ML/MIN/1.73 M^2
GLUCOSE SERPL-MCNC: 94 MG/DL (ref 70–110)
INR PPP: 1.6 (ref 0.8–1.2)
POTASSIUM SERPL-SCNC: 4.4 MMOL/L (ref 3.5–5.1)
PROT SERPL-MCNC: 7.3 G/DL (ref 6–8.4)
PROTHROMBIN TIME: 15.4 SEC (ref 9–12.5)
SODIUM SERPL-SCNC: 143 MMOL/L (ref 136–145)

## 2019-11-25 PROCEDURE — 1126F AMNT PAIN NOTED NONE PRSNT: CPT | Mod: HCNC,S$GLB,, | Performed by: PSYCHIATRY & NEUROLOGY

## 2019-11-25 PROCEDURE — 1101F PR PT FALLS ASSESS DOC 0-1 FALLS W/OUT INJ PAST YR: ICD-10-PCS | Mod: HCNC,CPTII,S$GLB, | Performed by: PSYCHIATRY & NEUROLOGY

## 2019-11-25 PROCEDURE — 36415 COLL VENOUS BLD VENIPUNCTURE: CPT | Mod: HCNC

## 2019-11-25 PROCEDURE — 1159F MED LIST DOCD IN RCRD: CPT | Mod: HCNC,S$GLB,, | Performed by: PSYCHIATRY & NEUROLOGY

## 2019-11-25 PROCEDURE — 80053 COMPREHEN METABOLIC PANEL: CPT | Mod: HCNC

## 2019-11-25 PROCEDURE — 80177 DRUG SCRN QUAN LEVETIRACETAM: CPT | Mod: HCNC

## 2019-11-25 PROCEDURE — 1159F PR MEDICATION LIST DOCUMENTED IN MEDICAL RECORD: ICD-10-PCS | Mod: HCNC,S$GLB,, | Performed by: PSYCHIATRY & NEUROLOGY

## 2019-11-25 PROCEDURE — 1101F PT FALLS ASSESS-DOCD LE1/YR: CPT | Mod: HCNC,CPTII,S$GLB, | Performed by: PSYCHIATRY & NEUROLOGY

## 2019-11-25 PROCEDURE — 1126F PR PAIN SEVERITY QUANTIFIED, NO PAIN PRESENT: ICD-10-PCS | Mod: HCNC,S$GLB,, | Performed by: PSYCHIATRY & NEUROLOGY

## 2019-11-25 PROCEDURE — 99214 OFFICE O/P EST MOD 30 MIN: CPT | Mod: HCNC,S$GLB,, | Performed by: PSYCHIATRY & NEUROLOGY

## 2019-11-25 PROCEDURE — 99999 PR PBB SHADOW E&M-EST. PATIENT-LVL III: ICD-10-PCS | Mod: PBBFAC,HCNC,, | Performed by: PSYCHIATRY & NEUROLOGY

## 2019-11-25 PROCEDURE — 99999 PR PBB SHADOW E&M-EST. PATIENT-LVL III: CPT | Mod: PBBFAC,HCNC,, | Performed by: PSYCHIATRY & NEUROLOGY

## 2019-11-25 PROCEDURE — 99214 PR OFFICE/OUTPT VISIT, EST, LEVL IV, 30-39 MIN: ICD-10-PCS | Mod: HCNC,S$GLB,, | Performed by: PSYCHIATRY & NEUROLOGY

## 2019-11-25 PROCEDURE — 85610 PROTHROMBIN TIME: CPT | Mod: HCNC

## 2019-11-25 PROCEDURE — 36415 COLL VENOUS BLD VENIPUNCTURE: CPT | Mod: HCNC,PO

## 2019-11-25 RX ORDER — LEVETIRACETAM 750 MG/1
1500 TABLET ORAL 2 TIMES DAILY
Qty: 360 TABLET | Refills: 1 | Status: ON HOLD | OUTPATIENT
Start: 2019-11-25 | End: 2020-02-28 | Stop reason: HOSPADM

## 2019-11-25 NOTE — PATIENT INSTRUCTIONS
Restrictions for uncontrolled seizures/epilepsy  Individual is to not engage in the following activities:  Driving  Climbing  Operating dangerous equipment  Cooking  Swimming alone  Scuba Diving  Tod Diving  Hang Gliding  handling firearms   Handling knives or other sharp objects  Other activities in which a lapse of awareness could lead to injury   to the individual or others.

## 2019-11-25 NOTE — PROGRESS NOTES
"Neurology Clinic Visit  Primary Care Provider: Gladis Blankenship MD   Referring Provider: Self, Aaareferral   Date of Visit: 11/25/2019       chief complaint:   Chief Complaint   Patient presents with    Seizures       History of Present Illness  Edu Choi is a 64 y.o. male I have been asked to consult for evaluation Gerald MENON.  He is here to establish care is he is a patient of the late Dr. Maureen Arora.  He is accompanied by his wife who assists with the history.  According to the patient he started to have seizures when he was about 4 years old and he believes it was related to high fever.  He reports since then he has continued to have seizures.  He is currently on Keppra 1000 mg twice a day.  According to his wife his last seizure was about 4 months ago.    Seizure description:  Type 1:  Staring and unresponsiveness lasting for 1-2 minutes followed by confusion.  These occur every 4-6 months.  Type 2:  Convulsions - generalized stiffening and shaking lasting for a few minutes followed by confusion.  These occur every 4-6 months.    Current medication.  Keppra 1000 mg twice a day    Previous medication:  Phenobarbital and Dilantin    Risk factors:  Patient has a history of seizures as a child; he denies family history of seizures.  He denies history of stroke.  He reports normal growth and development.      Per dr. Ventura's note on 6/28/2019  "HPI   This is a 64 year-old male who is being followed by me with a history of seizures since age 4 related to complications secondary to measles during childhood. Seizures are described as transient loss of consciousness during which time he may display repetitive behaviors and he has no recollection of the episode. He has had occasional generalized convulsions that are infrequent.  He had a seizure in May 2017, noted on awakening in the morning.  He did report that he had been having sleep difficulty and is not sure if he missed his medicine the night before.  " "Subsequently was seen at the [facility] emergency room after having had a seizure in November 2017.   He subsequently had another brief seizure in February 2018 and was seen at the emergency room and had CT scan of brain that was essentially unchanged.   He continues to be on Coumadin for his bleeding disorder. His spouse was present today.   She did report that his seizure in February 2018 may have been related to stress as she had been hospitalized and he was alone at home and may been stressed out.  However, since then he has had 2 more seizures last being couple months ago.  No obvious precipitating factors were noted though the seizures very brief and he recovered almost immediately.  He has had no further seizures since his last visit 6 months ago.     An EEG done in 2017 was abnormal.   The findings were consistent with left hemispheric cerebral dysfunction that is maximal and epileptogenic in the left temporal head region. In addition, there were scattered sharp waves in the right frontopolar head region, which if significant, would indicate that technically, the patient had secondarily generalized epilepsy.  "        Patient Active Problem List    Diagnosis Date Noted    Hypotension 12/22/2017    CHRIS (obstructive sleep apnea)     Excessive daytime sleepiness     Calculus of gallbladder without cholecystitis without obstruction 10/12/2017    History of CVA (cerebrovascular accident) 07/13/2017    Atherosclerosis of aorta 06/22/2017    Atherosclerosis of native coronary artery of native heart without angina pectoris 06/22/2017    Special screening for malignant neoplasms, colon 10/14/2016    Hyperlipidemia     Chronic anticoagulation 05/30/2016    S/P splenectomy 05/30/2016    Tobacco abuse 04/30/2015    History of DVT (deep vein thrombosis) 04/30/2015    History of pulmonary embolism 04/30/2015    Depression with anxiety 06/20/2014    Partial epilepsy with impairment of consciousness 07/06/2012 "     Past Medical History:   Diagnosis Date    Atherosclerosis of aorta 6/22/2017    Atherosclerosis of native coronary artery of native heart without angina pectoris 6/22/2017    Blood clotting tendency     Depression     Diabetes mellitus type 2 in nonobese     DVT (deep venous thrombosis)     Hyperlipidemia     Measles complicated by meningitis     during childhood, with subsequent seizures    Newly diagnosed diabetes 4/18/2017    PE (pulmonary embolism)     Seizures     Splenomegaly     Thrombophlebitis leg     bilateral legs     Past Surgical History:   Procedure Laterality Date    ANAL FISTULOTOMY      COLONOSCOPY N/A 10/14/2016    Procedure: COLONOSCOPY;  Surgeon: Edu Stratton MD;  Location: [facility] ([address] [place] [address]);  Service: Endoscopy;  Laterality: N/A;  Patient may hold Coumadin x 3 days prior to procedure with out Lovenox bridge as per C.C./svn/coumadin lab 1st    SPLENECTOMY, TOTAL       Family History   Problem Relation Age of Onset    Thrombophlebitis Father     Heart failure Father     Heart attack Father     Hypertension Father     Melanoma Neg Hx     Colon cancer Neg Hx     Esophageal cancer Neg Hx     Rectal cancer Neg Hx     Stomach cancer Neg Hx          Current Outpatient Medications   Medication Sig    atorvastatin (LIPITOR) 40 MG tablet TAKE 1 TABLET EVERY EVENING    escitalopram oxalate (LEXAPRO) 20 MG tablet TAKE 1 TABLET EVERY DAY    levETIRAcetam (KEPPRA) 750 MG Tab Take 2 tablets (1,500 mg total) by mouth 2 (two) times daily.    warfarin (COUMADIN) 5 MG tablet TAKE 1 TABLET EVERY DAY    warfarin (COUMADIN) 5 MG tablet 2.5mg PO daily except 5mg PO Tues/Sat - OR as directed by Coumadin Clinic    lancets 30 gauge Misc 1 lancet by Misc.(Non-Drug; Combo Route) route once daily.    TRUE METRIX GLUCOSE TEST STRIP Strp 1 strip by Misc.(Non-Drug; Combo Route) route once daily. (Patient not taking: Reported on 11/25/2019)     No current facility-administered  medications for this visit.        Review of patient's allergies indicates:   Allergen Reactions    No known drug allergies      Social History     Socioeconomic History    Marital status:      Spouse name: Not on file    Number of children: Not on file    Years of education: Not on file    Highest education level: Not on file   Occupational History    Not on file   Social Needs    Financial resource strain: Not on file    Food insecurity:     Worry: Not on file     Inability: Not on file    Transportation needs:     Medical: Not on file     Non-medical: Not on file   Tobacco Use    Smoking status: Current Every Day Smoker     Packs/day: 0.25     Years: 33.00     Pack years: 8.25    Smokeless tobacco: Never Used   Substance and Sexual Activity    Alcohol use: No    Drug use: No    Sexual activity: Never   Lifestyle    Physical activity:     Days per week: Not on file     Minutes per session: Not on file    Stress: Not on file   Relationships    Social connections:     Talks on phone: Not on file     Gets together: Not on file     Attends Sikh service: Not on file     Active member of club or organization: Not on file     Attends meetings of clubs or organizations: Not on file     Relationship status: Not on file   Other Topics Concern    Not on file   Social History Narrative    Not on file       Review of Systems      Constitutional: negative  Eyes: negative  Ears, nose, mouth, throat, and face: negative  Respiratory: negative  Cardiovascular: negative  Gastrointestinal: negative  Genitourinary:negative  Integument/breast: negative  Hematologic/lymphatic: negative  Musculoskeletal:negative  Neurological: negative  Behavioral/Psych: negative  Endocrine: negative  Allergic/Immunologic: negative    Objective:  Vital signs in last 24 hours:    Vitals:    11/25/19 1113   BP: 95/60   Pulse: 83       There is no height or weight on file to calculate BMI.     General: no acute distress, well  nourished, well-groomed  CVS: RRR, no murmur, gallops or rubs  Respiratory: Clear to ausculation  Extremities: no edema    Neurological Examination:    HIGHER INTEGRATIVE FUNCTIONS:  -Normal attention span and concentration; immediately responds to questions and commands  -Oriented to time, place and person  -Recent and remote memory intact  -Language normal (no aphasia or dysarthria)  -Normal fund of knowledge    CN:  -PERRLA, visual fields full, unable to visualize optic discs due to small pupils on fundus exam   -EOMI with normal saccades and smooth pursuit  -Facial sensation intact bilaterally  -Facial strength/movement intact bilaterally  -Hearing intact to voice  -Palate elevates symmetrically  -Normal shoulder shrug and head turn  -Tongue protrudes midline    MOTOR: (left/right graded 1-5)  -UE: 5/5 deltoids; 5/5 biceps, triceps; 5/5 wrist flexors, extensors; 5/5 interosseous; 5/5   -LEs: 5/5 hip flexion, extension; 5/5 knee flexion, extension; 5/5 ankle flexion, extension  -Tone: normal  -No pronator drift, no orbiting    SENSORY:  -Light touch, temperature sensation intact bilaterally    REFLEXES:  -2+ upper and lower bilaterally  -Flexor plantar reflex bilaterally  -No clonus    COORDINATION:  -FNF, HKS, FEI intact bilaterally    GAIT:  -Normal casual gait        Assessment/Plan:    1. Seizure disorder:  Likely focal epilepsy would complex partial and secondary generalized seizures    Plan:  Increase Keppra to 1500 mg twice a day.  Will checks Keppra level.  Referral to Neurology has been placed to transition care.    Side effects discussed with patient. He understood.  I discussed assessment and plan with patient and answered the questions that he had.     Part of patient note might have been created using Dragon Dictation.  Any errors in syntax or even information may not have been identified and edited on initial review prior to signing this note.

## 2019-11-26 ENCOUNTER — ANTI-COAG VISIT (OUTPATIENT)
Dept: CARDIOLOGY | Facility: CLINIC | Age: 64
End: 2019-11-26
Payer: MEDICARE

## 2019-11-26 DIAGNOSIS — Z79.01 CHRONIC ANTICOAGULATION: ICD-10-CM

## 2019-11-26 DIAGNOSIS — Z86.718 HISTORY OF DVT (DEEP VEIN THROMBOSIS): ICD-10-CM

## 2019-11-26 DIAGNOSIS — Z86.711 HISTORY OF PULMONARY EMBOLISM: ICD-10-CM

## 2019-11-26 PROCEDURE — 93793 ANTICOAG MGMT PT WARFARIN: CPT | Mod: S$GLB,,,

## 2019-11-26 PROCEDURE — 93793 PR ANTICOAGULANT MGMT FOR PT TAKING WARFARIN: ICD-10-PCS | Mod: S$GLB,,,

## 2019-11-26 NOTE — PROGRESS NOTES
Patient advised to take (WArfarin 10mg today, except 7.5mg daily also 5mg Sun/thurs). Patient verbalized understanding.

## 2019-11-27 LAB — LEVETIRACETAM SERPL-MCNC: 48.8 UG/ML (ref 3–60)

## 2019-12-02 ENCOUNTER — LAB VISIT (OUTPATIENT)
Dept: LAB | Facility: HOSPITAL | Age: 64
End: 2019-12-02
Payer: MEDICARE

## 2019-12-02 DIAGNOSIS — Z86.711 HISTORY OF PULMONARY EMBOLISM: ICD-10-CM

## 2019-12-02 DIAGNOSIS — Z86.718 HISTORY OF DVT (DEEP VEIN THROMBOSIS): ICD-10-CM

## 2019-12-02 DIAGNOSIS — Z79.01 LONG TERM (CURRENT) USE OF ANTICOAGULANTS: ICD-10-CM

## 2019-12-02 DIAGNOSIS — Z79.01 CHRONIC ANTICOAGULATION: ICD-10-CM

## 2019-12-02 LAB
INR PPP: 5.3 (ref 0.8–1.2)
PROTHROMBIN TIME: 52.5 SEC (ref 9–12.5)

## 2019-12-02 PROCEDURE — 85610 PROTHROMBIN TIME: CPT | Mod: HCNC

## 2019-12-02 PROCEDURE — 36415 COLL VENOUS BLD VENIPUNCTURE: CPT | Mod: HCNC,PO

## 2019-12-03 ENCOUNTER — ANTI-COAG VISIT (OUTPATIENT)
Dept: CARDIOLOGY | Facility: CLINIC | Age: 64
End: 2019-12-03
Payer: MEDICARE

## 2019-12-03 DIAGNOSIS — Z86.718 HISTORY OF DVT (DEEP VEIN THROMBOSIS): ICD-10-CM

## 2019-12-03 DIAGNOSIS — Z86.711 HISTORY OF PULMONARY EMBOLISM: ICD-10-CM

## 2019-12-03 DIAGNOSIS — Z79.01 CHRONIC ANTICOAGULATION: ICD-10-CM

## 2019-12-03 PROCEDURE — 93793 PR ANTICOAGULANT MGMT FOR PT TAKING WARFARIN: ICD-10-PCS | Mod: S$GLB,,,

## 2019-12-03 PROCEDURE — 93793 ANTICOAG MGMT PT WARFARIN: CPT | Mod: S$GLB,,,

## 2019-12-03 NOTE — PROGRESS NOTES
INR not at goal. Medications, chart, and patient findings reviewed. See calendar for adjustments to dose and follow up plan.  Pt has been taking a higher dose than recommended & denies any other changes.  Will instruct pt to hold coumadin 12/3-12/4; resume; plan to re-assess 12/6.

## 2019-12-05 ENCOUNTER — LAB VISIT (OUTPATIENT)
Dept: LAB | Facility: HOSPITAL | Age: 64
End: 2019-12-05
Attending: INTERNAL MEDICINE
Payer: MEDICARE

## 2019-12-05 DIAGNOSIS — Z86.718 HISTORY OF DVT (DEEP VEIN THROMBOSIS): ICD-10-CM

## 2019-12-05 DIAGNOSIS — Z79.01 LONG TERM (CURRENT) USE OF ANTICOAGULANTS: ICD-10-CM

## 2019-12-05 DIAGNOSIS — Z79.01 CHRONIC ANTICOAGULATION: ICD-10-CM

## 2019-12-05 DIAGNOSIS — Z86.711 HISTORY OF PULMONARY EMBOLISM: ICD-10-CM

## 2019-12-05 LAB
INR PPP: 2.7
INR PPP: 2.7 (ref 0.8–1.2)
PROTHROMBIN TIME: 25.8 SEC (ref 9–12.5)

## 2019-12-05 PROCEDURE — 36415 COLL VENOUS BLD VENIPUNCTURE: CPT | Mod: HCNC,PO

## 2019-12-05 PROCEDURE — 85610 PROTHROMBIN TIME: CPT | Mod: HCNC

## 2019-12-06 ENCOUNTER — ANTI-COAG VISIT (OUTPATIENT)
Dept: CARDIOLOGY | Facility: CLINIC | Age: 64
End: 2019-12-06
Payer: MEDICARE

## 2019-12-06 DIAGNOSIS — Z86.718 HISTORY OF DVT (DEEP VEIN THROMBOSIS): ICD-10-CM

## 2019-12-06 DIAGNOSIS — Z79.01 CHRONIC ANTICOAGULATION: ICD-10-CM

## 2019-12-06 DIAGNOSIS — Z86.711 HISTORY OF PULMONARY EMBOLISM: ICD-10-CM

## 2019-12-06 PROCEDURE — 93793 PR ANTICOAGULANT MGMT FOR PT TAKING WARFARIN: ICD-10-PCS | Mod: S$GLB,,,

## 2019-12-06 PROCEDURE — 93793 ANTICOAG MGMT PT WARFARIN: CPT | Mod: S$GLB,,,

## 2019-12-11 ENCOUNTER — LAB VISIT (OUTPATIENT)
Dept: LAB | Facility: HOSPITAL | Age: 64
End: 2019-12-11
Attending: INTERNAL MEDICINE
Payer: MEDICARE

## 2019-12-11 DIAGNOSIS — Z86.711 HISTORY OF PULMONARY EMBOLISM: ICD-10-CM

## 2019-12-11 DIAGNOSIS — Z86.718 HISTORY OF DVT (DEEP VEIN THROMBOSIS): ICD-10-CM

## 2019-12-11 DIAGNOSIS — Z79.01 LONG TERM (CURRENT) USE OF ANTICOAGULANTS: ICD-10-CM

## 2019-12-11 DIAGNOSIS — Z79.01 CHRONIC ANTICOAGULATION: ICD-10-CM

## 2019-12-11 LAB
INR PPP: 1.3 (ref 0.8–1.2)
PROTHROMBIN TIME: 13.4 SEC (ref 9–12.5)

## 2019-12-11 PROCEDURE — 36415 COLL VENOUS BLD VENIPUNCTURE: CPT | Mod: HCNC,PO

## 2019-12-11 PROCEDURE — 85610 PROTHROMBIN TIME: CPT | Mod: HCNC

## 2019-12-12 ENCOUNTER — ANTI-COAG VISIT (OUTPATIENT)
Dept: CARDIOLOGY | Facility: CLINIC | Age: 64
End: 2019-12-12
Payer: MEDICARE

## 2019-12-12 DIAGNOSIS — Z86.711 HISTORY OF PULMONARY EMBOLISM: ICD-10-CM

## 2019-12-12 DIAGNOSIS — Z86.718 HISTORY OF DVT (DEEP VEIN THROMBOSIS): ICD-10-CM

## 2019-12-12 DIAGNOSIS — Z79.01 CHRONIC ANTICOAGULATION: ICD-10-CM

## 2019-12-12 PROCEDURE — 93793 ANTICOAG MGMT PT WARFARIN: CPT | Mod: S$GLB,,,

## 2019-12-12 PROCEDURE — 93793 PR ANTICOAGULANT MGMT FOR PT TAKING WARFARIN: ICD-10-PCS | Mod: S$GLB,,,

## 2019-12-12 NOTE — PROGRESS NOTES
INR not at goal. Medications, chart, and patient findings reviewed. See calendar for adjustments to dose and follow up plan.  Pt denies any changes & is now subtherapeutic on maintenance dose.  Instruct pt to take 10mg 12/12 & resume maintenance dose.

## 2019-12-14 DIAGNOSIS — E78.5 HYPERLIPIDEMIA, UNSPECIFIED HYPERLIPIDEMIA TYPE: ICD-10-CM

## 2019-12-15 RX ORDER — ATORVASTATIN CALCIUM 40 MG/1
TABLET, FILM COATED ORAL
Qty: 90 TABLET | Refills: 3 | Status: ON HOLD | OUTPATIENT
Start: 2019-12-15 | End: 2020-02-28 | Stop reason: HOSPADM

## 2019-12-16 ENCOUNTER — LAB VISIT (OUTPATIENT)
Dept: LAB | Facility: HOSPITAL | Age: 64
End: 2019-12-16
Attending: INTERNAL MEDICINE
Payer: MEDICARE

## 2019-12-16 DIAGNOSIS — Z79.01 CHRONIC ANTICOAGULATION: ICD-10-CM

## 2019-12-16 DIAGNOSIS — Z79.01 LONG TERM (CURRENT) USE OF ANTICOAGULANTS: ICD-10-CM

## 2019-12-16 DIAGNOSIS — Z86.718 HISTORY OF DVT (DEEP VEIN THROMBOSIS): ICD-10-CM

## 2019-12-16 DIAGNOSIS — Z86.711 HISTORY OF PULMONARY EMBOLISM: ICD-10-CM

## 2019-12-16 LAB
INR PPP: 4.3 (ref 0.8–1.2)
PROTHROMBIN TIME: 42.2 SEC (ref 9–12.5)

## 2019-12-16 PROCEDURE — 36415 COLL VENOUS BLD VENIPUNCTURE: CPT | Mod: HCNC,PO

## 2019-12-16 PROCEDURE — 85610 PROTHROMBIN TIME: CPT | Mod: HCNC

## 2019-12-17 ENCOUNTER — ANTI-COAG VISIT (OUTPATIENT)
Dept: CARDIOLOGY | Facility: CLINIC | Age: 64
End: 2019-12-17
Payer: MEDICARE

## 2019-12-17 DIAGNOSIS — Z79.01 CHRONIC ANTICOAGULATION: ICD-10-CM

## 2019-12-17 DIAGNOSIS — Z86.711 HISTORY OF PULMONARY EMBOLISM: ICD-10-CM

## 2019-12-17 DIAGNOSIS — Z86.718 HISTORY OF DVT (DEEP VEIN THROMBOSIS): ICD-10-CM

## 2019-12-17 PROCEDURE — 93793 PR ANTICOAGULANT MGMT FOR PT TAKING WARFARIN: ICD-10-PCS | Mod: S$GLB,,, | Performed by: PHARMACIST

## 2019-12-17 PROCEDURE — 93793 ANTICOAG MGMT PT WARFARIN: CPT | Mod: S$GLB,,, | Performed by: PHARMACIST

## 2019-12-19 ENCOUNTER — LAB VISIT (OUTPATIENT)
Dept: LAB | Facility: HOSPITAL | Age: 64
End: 2019-12-19
Attending: INTERNAL MEDICINE
Payer: MEDICARE

## 2019-12-19 DIAGNOSIS — Z79.01 CHRONIC ANTICOAGULATION: ICD-10-CM

## 2019-12-19 DIAGNOSIS — Z86.711 HISTORY OF PULMONARY EMBOLISM: ICD-10-CM

## 2019-12-19 DIAGNOSIS — Z79.01 LONG TERM (CURRENT) USE OF ANTICOAGULANTS: ICD-10-CM

## 2019-12-19 DIAGNOSIS — Z86.718 HISTORY OF DVT (DEEP VEIN THROMBOSIS): ICD-10-CM

## 2019-12-19 LAB
INR PPP: 3 (ref 0.8–1.2)
PROTHROMBIN TIME: 28.3 SEC (ref 9–12.5)

## 2019-12-19 PROCEDURE — 85610 PROTHROMBIN TIME: CPT | Mod: HCNC

## 2019-12-19 PROCEDURE — 36415 COLL VENOUS BLD VENIPUNCTURE: CPT | Mod: HCNC,PO

## 2019-12-20 ENCOUNTER — ANTI-COAG VISIT (OUTPATIENT)
Dept: CARDIOLOGY | Facility: CLINIC | Age: 64
End: 2019-12-20
Payer: MEDICARE

## 2019-12-20 DIAGNOSIS — Z86.718 HISTORY OF DVT (DEEP VEIN THROMBOSIS): ICD-10-CM

## 2019-12-20 DIAGNOSIS — Z79.01 CHRONIC ANTICOAGULATION: ICD-10-CM

## 2019-12-20 DIAGNOSIS — Z86.711 HISTORY OF PULMONARY EMBOLISM: ICD-10-CM

## 2019-12-20 PROCEDURE — 93793 ANTICOAG MGMT PT WARFARIN: CPT | Mod: S$GLB,,,

## 2019-12-20 PROCEDURE — 93793 PR ANTICOAGULANT MGMT FOR PT TAKING WARFARIN: ICD-10-PCS | Mod: S$GLB,,,

## 2019-12-20 NOTE — PROGRESS NOTES
Patient advised to take (WArfarin Wed/Fri/5mg, except 7.5mg AOD) . Greens once a week. No ETOH. Patient verbalized understanding.

## 2019-12-23 DIAGNOSIS — F41.8 DEPRESSION WITH ANXIETY: ICD-10-CM

## 2019-12-24 RX ORDER — ESCITALOPRAM OXALATE 20 MG/1
TABLET ORAL
Qty: 90 TABLET | Refills: 3 | Status: ON HOLD | OUTPATIENT
Start: 2019-12-24 | End: 2020-03-29 | Stop reason: HOSPADM

## 2019-12-26 ENCOUNTER — LAB VISIT (OUTPATIENT)
Dept: LAB | Facility: HOSPITAL | Age: 64
End: 2019-12-26
Attending: INTERNAL MEDICINE
Payer: MEDICARE

## 2019-12-26 ENCOUNTER — ANTI-COAG VISIT (OUTPATIENT)
Dept: CARDIOLOGY | Facility: CLINIC | Age: 64
End: 2019-12-26
Payer: MEDICARE

## 2019-12-26 DIAGNOSIS — Z79.01 LONG TERM (CURRENT) USE OF ANTICOAGULANTS: ICD-10-CM

## 2019-12-26 DIAGNOSIS — Z79.01 CHRONIC ANTICOAGULATION: ICD-10-CM

## 2019-12-26 DIAGNOSIS — Z86.718 HISTORY OF DVT (DEEP VEIN THROMBOSIS): ICD-10-CM

## 2019-12-26 DIAGNOSIS — Z86.711 HISTORY OF PULMONARY EMBOLISM: ICD-10-CM

## 2019-12-26 LAB
INR PPP: 1.1 (ref 0.8–1.2)
PROTHROMBIN TIME: 11.2 SEC (ref 9–12.5)

## 2019-12-26 PROCEDURE — 93793 ANTICOAG MGMT PT WARFARIN: CPT | Mod: S$GLB,,,

## 2019-12-26 PROCEDURE — 85610 PROTHROMBIN TIME: CPT | Mod: HCNC

## 2019-12-26 PROCEDURE — 93793 PR ANTICOAGULANT MGMT FOR PT TAKING WARFARIN: ICD-10-PCS | Mod: S$GLB,,,

## 2019-12-26 PROCEDURE — 36415 COLL VENOUS BLD VENIPUNCTURE: CPT | Mod: HCNC,PO

## 2019-12-30 ENCOUNTER — TELEPHONE (OUTPATIENT)
Dept: INTERNAL MEDICINE | Facility: CLINIC | Age: 64
End: 2019-12-30

## 2019-12-30 ENCOUNTER — ANTI-COAG VISIT (OUTPATIENT)
Dept: CARDIOLOGY | Facility: CLINIC | Age: 64
End: 2019-12-30

## 2019-12-30 ENCOUNTER — OFFICE VISIT (OUTPATIENT)
Dept: INTERNAL MEDICINE | Facility: CLINIC | Age: 64
End: 2019-12-30
Payer: MEDICARE

## 2019-12-30 ENCOUNTER — LAB VISIT (OUTPATIENT)
Dept: LAB | Facility: HOSPITAL | Age: 64
End: 2019-12-30
Attending: INTERNAL MEDICINE
Payer: MEDICARE

## 2019-12-30 VITALS
OXYGEN SATURATION: 97 % | DIASTOLIC BLOOD PRESSURE: 60 MMHG | BODY MASS INDEX: 24.64 KG/M2 | HEART RATE: 92 BPM | SYSTOLIC BLOOD PRESSURE: 118 MMHG | WEIGHT: 143.5 LBS

## 2019-12-30 DIAGNOSIS — Z79.01 CHRONIC ANTICOAGULATION: Primary | ICD-10-CM

## 2019-12-30 DIAGNOSIS — Z79.01 CHRONIC ANTICOAGULATION: ICD-10-CM

## 2019-12-30 DIAGNOSIS — Z90.81 HISTORY OF SPLENECTOMY: ICD-10-CM

## 2019-12-30 DIAGNOSIS — Z12.83 SKIN CANCER SCREENING: ICD-10-CM

## 2019-12-30 DIAGNOSIS — Z00.00 ANNUAL PHYSICAL EXAM: Primary | ICD-10-CM

## 2019-12-30 DIAGNOSIS — F33.42 RECURRENT MAJOR DEPRESSIVE DISORDER, IN FULL REMISSION: ICD-10-CM

## 2019-12-30 DIAGNOSIS — Z12.5 PROSTATE CANCER SCREENING: ICD-10-CM

## 2019-12-30 DIAGNOSIS — E78.5 HYPERLIPIDEMIA ASSOCIATED WITH TYPE 2 DIABETES MELLITUS: ICD-10-CM

## 2019-12-30 DIAGNOSIS — Z86.718 HISTORY OF DVT (DEEP VEIN THROMBOSIS): ICD-10-CM

## 2019-12-30 DIAGNOSIS — I70.0 AORTIC ATHEROSCLEROSIS: ICD-10-CM

## 2019-12-30 DIAGNOSIS — E11.42 CONTROLLED TYPE 2 DIABETES MELLITUS WITH DIABETIC POLYNEUROPATHY, WITHOUT LONG-TERM CURRENT USE OF INSULIN: ICD-10-CM

## 2019-12-30 DIAGNOSIS — G40.909 SEIZURE DISORDER: ICD-10-CM

## 2019-12-30 DIAGNOSIS — E11.42 DIABETIC PERIPHERAL NEUROPATHY: ICD-10-CM

## 2019-12-30 DIAGNOSIS — Z86.711 HISTORY OF PULMONARY EMBOLISM: ICD-10-CM

## 2019-12-30 DIAGNOSIS — E11.69 HYPERLIPIDEMIA ASSOCIATED WITH TYPE 2 DIABETES MELLITUS: ICD-10-CM

## 2019-12-30 DIAGNOSIS — G47.33 OSA ON CPAP: ICD-10-CM

## 2019-12-30 DIAGNOSIS — Z79.01 LONG TERM (CURRENT) USE OF ANTICOAGULANTS: ICD-10-CM

## 2019-12-30 LAB
INR PPP: 2.6 (ref 0.8–1.2)
PROTHROMBIN TIME: 24.9 SEC (ref 9–12.5)

## 2019-12-30 PROCEDURE — 99499 RISK ADDL DX/OHS AUDIT: ICD-10-PCS | Mod: HCNC,S$GLB,, | Performed by: INTERNAL MEDICINE

## 2019-12-30 PROCEDURE — 99397 PER PM REEVAL EST PAT 65+ YR: CPT | Mod: HCNC,S$GLB,, | Performed by: INTERNAL MEDICINE

## 2019-12-30 PROCEDURE — 99999 PR PBB SHADOW E&M-EST. PATIENT-LVL IV: CPT | Mod: PBBFAC,HCNC,, | Performed by: INTERNAL MEDICINE

## 2019-12-30 PROCEDURE — 3044F PR MOST RECENT HEMOGLOBIN A1C LEVEL <7.0%: ICD-10-PCS | Mod: HCNC,CPTII,S$GLB, | Performed by: INTERNAL MEDICINE

## 2019-12-30 PROCEDURE — 3044F HG A1C LEVEL LT 7.0%: CPT | Mod: HCNC,CPTII,S$GLB, | Performed by: INTERNAL MEDICINE

## 2019-12-30 PROCEDURE — 99999 PR PBB SHADOW E&M-EST. PATIENT-LVL IV: ICD-10-PCS | Mod: PBBFAC,HCNC,, | Performed by: INTERNAL MEDICINE

## 2019-12-30 PROCEDURE — 36415 COLL VENOUS BLD VENIPUNCTURE: CPT | Mod: HCNC

## 2019-12-30 PROCEDURE — 99499 UNLISTED E&M SERVICE: CPT | Mod: HCNC,S$GLB,, | Performed by: INTERNAL MEDICINE

## 2019-12-30 PROCEDURE — 99397 PR PREVENTIVE VISIT,EST,65 & OVER: ICD-10-PCS | Mod: HCNC,S$GLB,, | Performed by: INTERNAL MEDICINE

## 2019-12-30 PROCEDURE — 85610 PROTHROMBIN TIME: CPT | Mod: HCNC

## 2019-12-30 NOTE — PROGRESS NOTES
INR at goal today but will transition to DOAC since difficulty keeping in range. Will hold warfarin tonight and patient may start apixaban tomorrow. Will d/c from our care now.

## 2019-12-30 NOTE — PATIENT INSTRUCTIONS
Ask the pharmacy about Shingrix, new shingles vaccine, coverage.    Only get your INR (coumadin level) checked today.      your prescription from [facility] for Eliquis (new blood thinner). If coumadin level is ok today, then we will let you know to start taking the Eliquis. If the coumadin level is too high, then we would recommend to hold off for a few days and recheck levels.     Follow up in 6 weeks with fasting labs a few days before.     People to follow up with:  1) dermatology (skin doctor)  2) optometry (Dr. Wilks - eye doctor)  3) podiatry (foot doctor)

## 2019-12-30 NOTE — TELEPHONE ENCOUNTER
Spoke with wife diego. Informed wife of pt's results and for pt to skip coumadin dose on tomorrow and Wednesday. Lab scheduled for January 2nd at the [place] location. Wife verbally understood instructions.

## 2019-12-30 NOTE — PROGRESS NOTES
INTERNAL MEDICINE ANNUAL VISIT NOTE    CHIEF COMPLAINT     Chief Complaint   Patient presents with    Annual Exam     HPI     Edu Choi is a 64 y.o. C male who presents for annual exam. Last saw pt 1/31/18.    DM2 - diet controlled.  a1c - 6.1 7/1/19  Eye - last seen Dr. Wilks 8/13/18.  Foot - due  MAC - 1/31/18  Atorvastatin 40mg daily. LDL - 5.2 1/30/19    H/o DVT and PE. On coumadin and follows w/ coumadin clinic. Received a message from PharmD requesting pt to be placed on NOAC as his INR has been very difficult to control for years. Often out of therapeutic range w/o any changes to diet per pt.   Pt is amenable to that.     CHRIS 12/1/17. Had CPAP for a few mo. FFM was uncomfortable.     Seizure D/O - Keppra 1000mg BID.   Saw Dr. Zaragoza 11/25/19. Went up on keppra to 1500mg BID. Didn't start the new dose yet but then pt had a seizure black Friday. But since that one hasn't had another one.   Pt is taking seizure precautions. Not driving.   Will f/u in 4 mo.     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     MDD - Lexapro 20mg daily. Reports no issues w/ it.     CT chest - aorta atherosclerosis. On atorvastatin 40mg dialy.    Had flu vaccine around Thanksgiving.     Past Medical History:  Past Medical History:   Diagnosis Date    Atherosclerosis of aorta 6/22/2017    Atherosclerosis of native coronary artery of native heart without angina pectoris 6/22/2017    Blood clotting tendency     Depression     Diabetes mellitus type 2 in nonobese     DVT (deep venous thrombosis)     Hyperlipidemia     Measles complicated by meningitis     during childhood, with subsequent seizures    Newly diagnosed diabetes 4/18/2017    PE (pulmonary embolism)     Seizures     Splenomegaly     Thrombophlebitis leg     bilateral legs       Past Surgical History:  Past Surgical History:   Procedure Laterality Date    ANAL FISTULOTOMY      COLONOSCOPY N/A 10/14/2016    Procedure: COLONOSCOPY;  Surgeon:  Edu Stratton MD;  Location: [facility] ([address] [place] [address]);  Service: Endoscopy;  Laterality: N/A;  Patient may hold Coumadin x 3 days prior to procedure with out Lovenox bridge as per C.C./svn/coumadin lab 1st    SPLENECTOMY, TOTAL         Allergies:  Review of patient's allergies indicates:   Allergen Reactions    No known drug allergies        Home Medications:  Prior to Admission medications    Medication Sig Start Date End Date Taking? Authorizing Provider   atorvastatin (LIPITOR) 40 MG tablet TAKE 1 TABLET EVERY EVENING 12/15/19  Yes Gladis Blankenship MD   escitalopram oxalate (LEXAPRO) 20 MG tablet TAKE 1 TABLET EVERY DAY 12/24/19  Yes PEDRO Snyder   levETIRAcetam (KEPPRA) 750 MG Tab Take 2 tablets (1,500 mg total) by mouth 2 (two) times daily. 11/25/19  Yes Romie Zaragoza MD   TRUE METRIX GLUCOSE TEST STRIP Strp 1 strip by Misc.(Non-Drug; Combo Route) route once daily. 6/15/17  Yes Gladis Blankenship MD   warfarin (COUMADIN) 5 MG tablet TAKE 1 TABLET EVERY DAY 11/27/18  Yes Gladis Blankenship MD   warfarin (COUMADIN) 5 MG tablet 2.5mg PO daily except 5mg PO Tues/Sat - OR as directed by Coumadin Clinic 6/25/19  Yes Gladis Blankenship MD   lancets 30 gauge Misc 1 lancet by Misc.(Non-Drug; Combo Route) route once daily. 6/15/17 6/15/18  Gladis Blankenship MD       Family History:  Family History   Problem Relation Age of Onset    Thrombophlebitis Father     Heart failure Father     Heart attack Father     Hypertension Father     Melanoma Neg Hx     Colon cancer Neg Hx     Esophageal cancer Neg Hx     Rectal cancer Neg Hx     Stomach cancer Neg Hx        Social History:  Social History     Tobacco Use    Smoking status: Current Every Day Smoker     Packs/day: 0.25     Years: 33.00     Pack years: 8.25    Smokeless tobacco: Never Used   Substance Use Topics    Alcohol use: No    Drug use: No       Review of Systems:  Review of Systems Comprehensive review of systems otherwise negative. See history/subjective section for more  details.    Health Maintainence:    reviewed.     PHYSICAL EXAM     /60   Pulse 92   Wt 65.1 kg (143 lb 8.3 oz)   SpO2 97%   BMI 24.64 kg/m²     GEN - A+OX4, NAD   HEENT - PERRL, EOMI, OP clear. MMM. TM normal.   Neck - No thyromegaly or cervical LAD. No thyroid masses felt.  CV - RRR, no m/r   Chest - CTAB, no wheezing or rhonchi  Abd - S/NT/ND/+BS.   Ext - 1+BDP and radial pulses. No LE edema.   Foot - decreased sensation to monofilament. Calluses at the base of big toes. No open lesions. Very long toenails.  Neuro - PERRL, EOMI, no nystagmus, eyebrow raise, facial sensation, hearing, m of mastication, smile, palatal raise, shoulder shrug, tongue protrusion symmetric and intact. 5/5 BUE and BLE strength. Sensation to light touch intact throughout. 2+ DTRs. Normal gait.   MSK - No spinal tenderness to palpation. Normal gait.   Skin - No rash. Scaly spot on the L ear.    LABS     Previous labs reviewed.    ASSESSMENT/PLAN     Edu Choi is a 64 y.o. male with  Edu was seen today for annual exam.    Diagnoses and all orders for this visit:    Annual physical exam  -     Hemoglobin A1c; Future; Expected date: 12/30/2019  -     CBC auto differential; Future; Expected date: 12/30/2019  -     Comprehensive metabolic panel; Future; Expected date: 12/30/2019  -     Lipid panel; Future; Expected date: 12/30/2019    Controlled type 2 diabetes mellitus with diabetic polyneuropathy, without long-term current use of insulin  -     Ambulatory Referral to Optometry  -     Hemoglobin A1c; Future; Expected date: 12/30/2019  -     Microalbumin/creatinine urine ratio; Future; Expected date: 12/30/2019    Hyperlipidemia associated with type 2 diabetes mellitus - cont atorva 40mg daily.  -     Comprehensive metabolic panel; Future; Expected date: 12/30/2019  -     Lipid panel; Future; Expected date: 12/30/2019    Seizure disorder - cont keppra 1500mg BID. F/u w/ neuro.  -     Comprehensive metabolic panel; Future;  Expected date: 12/30/2019    CHRIS on CPAP - given rx for nasal mask. Pt to bring to DME company and try for a different mask to see if better tolerated.   -     CPAP/BIPAP SUPPLIES    History of DVT (deep vein thrombosis)/History of pulmonary embolism - change coumadin to eliquis.  Only get your INR (coumadin level) checked today.      your prescription from [facility] pharmacy for Eliquis (new blood thinner). If coumadin level is ok today, then we will let you know to start taking the Eliquis. If the coumadin level is too high, then we would recommend to hold off for a few days and recheck levels.     Chronic anticoagulation  -     CBC auto differential; Future; Expected date: 12/30/2019  -     apixaban (ELIQUIS) 5 mg Tab; Take 1 tablet (5 mg total) by mouth 2 (two) times daily.    Prostate cancer screening  -     PSA, Screening; Future; Expected date: 12/30/2019    Recurrent major depressive disorder, in full remission - pt reports doing well on lexapro.    Diabetic peripheral neuropathy  -     Ambulatory Referral to Podiatry    Skin cancer screening  -     Ambulatory Referral to Dermatology    History of splenectomy  Needs pneumovax next yr in 2020. Needs repeat menactra 3/14/2022. Had flu vaccine this yr.     Aortic atherosclerosis - atorva 40mg daily.    Other orders  -     FLUZONE HIGH-DOSE 2019-20, PF, 180 mcg/0.5 mL Syrg    Pt will ask about shingrix at local pharmacy.     RTC in 2 months, sooner if needed and depending on labs.    Gladis Blankenship MD  Department of Internal Medicine - [facility]  8:13 AM

## 2019-12-30 NOTE — Clinical Note
Guerline,Great idea! I talked to them today and they definitely want to go the NOAC route. Putting them on eliquis. He's getting his INR today and if ok, he'll start eliquis. Thanks for the input!Gladis

## 2019-12-30 NOTE — TELEPHONE ENCOUNTER
Please call and let pt know that his INR is in the therapeutic range, which means his blood is thinned. Therefore skip coumadin tomorrow and January 1 and recheck INR on Thursday, Jan 2. If back in normal range, can start taking eliquis but we'll call him after his results on Thursday.

## 2019-12-31 NOTE — TELEPHONE ENCOUNTER
----- Message from Beth Lu sent at 12/30/2019  4:30 PM CST -----  Contact: Pt self Mobile [phone]  Patient is returning a phone call.  Who left a message for the patient: Gladis Blankenship   Does patient know what this is regarding:  A message from Gladis Blankenship  Comments:

## 2020-01-02 ENCOUNTER — LAB VISIT (OUTPATIENT)
Dept: LAB | Facility: HOSPITAL | Age: 65
End: 2020-01-02
Attending: INTERNAL MEDICINE
Payer: MEDICARE

## 2020-01-02 DIAGNOSIS — Z79.01 CHRONIC ANTICOAGULATION: ICD-10-CM

## 2020-01-02 LAB
INR PPP: 1.1 (ref 0.8–1.2)
PROTHROMBIN TIME: 11.4 SEC (ref 9–12.5)

## 2020-01-02 PROCEDURE — 36415 COLL VENOUS BLD VENIPUNCTURE: CPT | Mod: HCNC,PO

## 2020-01-02 PROCEDURE — 85610 PROTHROMBIN TIME: CPT | Mod: HCNC

## 2020-01-03 ENCOUNTER — TELEPHONE (OUTPATIENT)
Dept: INTERNAL MEDICINE | Facility: CLINIC | Age: 65
End: 2020-01-03

## 2020-01-03 NOTE — TELEPHONE ENCOUNTER
Spoke with wife diego. Informed her pt can stop the coumadin and start the eliquis. Wife verbally understood.

## 2020-02-02 ENCOUNTER — HOSPITAL ENCOUNTER (EMERGENCY)
Facility: HOSPITAL | Age: 65
Discharge: ANOTHER HOSPITAL | End: 2020-02-02
Attending: EMERGENCY MEDICINE | Admitting: PSYCHIATRY & NEUROLOGY
Payer: MEDICARE

## 2020-02-02 ENCOUNTER — HOSPITAL ENCOUNTER (INPATIENT)
Facility: HOSPITAL | Age: 65
LOS: 26 days | Discharge: NURSING, REHAB OR LONG-TERM CARE | DRG: 870 | End: 2020-02-28
Attending: PSYCHIATRY & NEUROLOGY | Admitting: PSYCHIATRY & NEUROLOGY
Payer: MEDICARE

## 2020-02-02 VITALS
SYSTOLIC BLOOD PRESSURE: 113 MMHG | HEART RATE: 101 BPM | WEIGHT: 143.5 LBS | TEMPERATURE: 102 F | DIASTOLIC BLOOD PRESSURE: 57 MMHG | BODY MASS INDEX: 24.64 KG/M2 | RESPIRATION RATE: 24 BRPM | OXYGEN SATURATION: 97 %

## 2020-02-02 DIAGNOSIS — J96.00 ACUTE RESPIRATORY FAILURE, UNSPECIFIED WHETHER WITH HYPOXIA OR HYPERCAPNIA: ICD-10-CM

## 2020-02-02 DIAGNOSIS — R56.9 SEIZURE: ICD-10-CM

## 2020-02-02 DIAGNOSIS — J18.9 PNEUMONIA OF LEFT LUNG DUE TO INFECTIOUS ORGANISM, UNSPECIFIED PART OF LUNG: ICD-10-CM

## 2020-02-02 DIAGNOSIS — I25.10 ATHEROSCLEROSIS OF NATIVE CORONARY ARTERY OF NATIVE HEART, ANGINA PRESENCE UNSPECIFIED: ICD-10-CM

## 2020-02-02 DIAGNOSIS — J96.01 ACUTE RESPIRATORY FAILURE WITH HYPOXIA: Primary | ICD-10-CM

## 2020-02-02 DIAGNOSIS — I25.10 CAD (CORONARY ARTERY DISEASE): ICD-10-CM

## 2020-02-02 DIAGNOSIS — R41.82 ALTERED MENTAL STATUS: ICD-10-CM

## 2020-02-02 DIAGNOSIS — R41.82 ALTERED MENTAL STATUS, UNSPECIFIED ALTERED MENTAL STATUS TYPE: ICD-10-CM

## 2020-02-02 DIAGNOSIS — R13.12 OROPHARYNGEAL DYSPHAGIA: ICD-10-CM

## 2020-02-02 DIAGNOSIS — G40.901 STATUS EPILEPTICUS: Primary | ICD-10-CM

## 2020-02-02 PROBLEM — I95.0 IDIOPATHIC HYPOTENSION: Status: ACTIVE | Noted: 2017-12-22

## 2020-02-02 PROBLEM — K55.9 LARGE BOWEL ISCHEMIA: Status: ACTIVE | Noted: 2020-02-02

## 2020-02-02 PROBLEM — S02.92XA MULTIPLE CLOSED FRACTURES OF FACIAL BONE: Status: ACTIVE | Noted: 2020-02-02

## 2020-02-02 PROBLEM — S02.92XA CLOSED EXTENSIVE FACIAL FRACTURES: Status: ACTIVE | Noted: 2020-02-02

## 2020-02-02 PROBLEM — J69.0 ASPIRATION PNEUMONITIS: Status: ACTIVE | Noted: 2020-02-02

## 2020-02-02 LAB
ABO + RH BLD: NORMAL
ALBUMIN SERPL BCP-MCNC: 3.8 G/DL (ref 3.5–5.2)
ALLENS TEST: ABNORMAL
ALLENS TEST: ABNORMAL
ALP SERPL-CCNC: 83 U/L (ref 55–135)
ALT SERPL W/O P-5'-P-CCNC: 15 U/L (ref 10–44)
AMPHET+METHAMPHET UR QL: NEGATIVE
AMPHET+METHAMPHET UR QL: NEGATIVE
AMYLASE SERPL-CCNC: 89 U/L (ref 20–110)
ANION GAP SERPL CALC-SCNC: 7 MMOL/L (ref 8–16)
AST SERPL-CCNC: 20 U/L (ref 10–40)
BARBITURATES UR QL SCN>200 NG/ML: NEGATIVE
BARBITURATES UR QL SCN>200 NG/ML: NEGATIVE
BASOPHILS # BLD AUTO: 0.03 K/UL (ref 0–0.2)
BASOPHILS NFR BLD: 0.2 % (ref 0–1.9)
BENZODIAZ UR QL SCN>200 NG/ML: NORMAL
BENZODIAZ UR QL SCN>200 NG/ML: NORMAL
BILIRUB SERPL-MCNC: 0.3 MG/DL (ref 0.1–1)
BILIRUB UR QL STRIP: NEGATIVE
BILIRUB UR QL STRIP: NEGATIVE
BLD GP AB SCN CELLS X3 SERPL QL: NORMAL
BUN SERPL-MCNC: 14 MG/DL (ref 8–23)
BZE UR QL SCN: NEGATIVE
BZE UR QL SCN: NEGATIVE
CALCIUM SERPL-MCNC: 8.7 MG/DL (ref 8.7–10.5)
CANNABINOIDS UR QL SCN: NEGATIVE
CANNABINOIDS UR QL SCN: NEGATIVE
CHLORIDE SERPL-SCNC: 106 MMOL/L (ref 95–110)
CHOLEST SERPL-MCNC: 114 MG/DL (ref 120–199)
CHOLEST/HDLC SERPL: 2.5 {RATIO} (ref 2–5)
CK SERPL-CCNC: 122 U/L (ref 20–200)
CK SERPL-CCNC: 277 U/L (ref 20–200)
CLARITY UR REFRACT.AUTO: ABNORMAL
CLARITY UR: CLEAR
CO2 SERPL-SCNC: 26 MMOL/L (ref 23–29)
COLOR UR AUTO: YELLOW
COLOR UR: YELLOW
CREAT SERPL-MCNC: 1.1 MG/DL (ref 0.5–1.4)
CREAT UR-MCNC: 127 MG/DL (ref 23–375)
CREAT UR-MCNC: 34.6 MG/DL (ref 23–375)
DELSYS: ABNORMAL
DELSYS: ABNORMAL
DIFFERENTIAL METHOD: ABNORMAL
EOSINOPHIL # BLD AUTO: 0 K/UL (ref 0–0.5)
EOSINOPHIL NFR BLD: 0.2 % (ref 0–8)
ERYTHROCYTE [DISTWIDTH] IN BLOOD BY AUTOMATED COUNT: 14 % (ref 11.5–14.5)
ERYTHROCYTE [SEDIMENTATION RATE] IN BLOOD BY WESTERGREN METHOD: 16 MM/H
ERYTHROCYTE [SEDIMENTATION RATE] IN BLOOD BY WESTERGREN METHOD: 18 MM/H
EST. GFR  (AFRICAN AMERICAN): >60 ML/MIN/1.73 M^2
EST. GFR  (NON AFRICAN AMERICAN): >60 ML/MIN/1.73 M^2
ESTIMATED AVG GLUCOSE: 128 MG/DL (ref 68–131)
ETHANOL UR-MCNC: <10 MG/DL
FIO2: 100
GLUCOSE SERPL-MCNC: 111 MG/DL (ref 70–110)
GLUCOSE UR QL STRIP: NEGATIVE
GLUCOSE UR QL STRIP: NEGATIVE
HBA1C MFR BLD HPLC: 6.1 % (ref 4–5.6)
HCO3 UR-SCNC: 22.4 MMOL/L (ref 24–28)
HCO3 UR-SCNC: 25.8 MMOL/L (ref 24–28)
HCT VFR BLD AUTO: 42.7 % (ref 40–54)
HDLC SERPL-MCNC: 45 MG/DL (ref 40–75)
HDLC SERPL: 39.5 % (ref 20–50)
HGB BLD-MCNC: 13.8 G/DL (ref 14–18)
HGB UR QL STRIP: ABNORMAL
HGB UR QL STRIP: ABNORMAL
INFLUENZA A, MOLECULAR: NEGATIVE
INFLUENZA B, MOLECULAR: NEGATIVE
INR PPP: 1 (ref 0.8–1.2)
KETONES UR QL STRIP: NEGATIVE
KETONES UR QL STRIP: NEGATIVE
LACTATE SERPL-SCNC: 0.9 MMOL/L (ref 0.5–2.2)
LACTATE SERPL-SCNC: 1.3 MMOL/L (ref 0.5–2.2)
LDLC SERPL CALC-MCNC: 56.4 MG/DL (ref 63–159)
LEUKOCYTE ESTERASE UR QL STRIP: ABNORMAL
LEUKOCYTE ESTERASE UR QL STRIP: NEGATIVE
LIPASE SERPL-CCNC: 30 U/L (ref 4–60)
LYMPHOCYTES # BLD AUTO: 1.9 K/UL (ref 1–4.8)
LYMPHOCYTES NFR BLD: 14.7 % (ref 18–48)
MAGNESIUM SERPL-MCNC: 2 MG/DL (ref 1.6–2.6)
MCH RBC QN AUTO: 31.3 PG (ref 27–31)
MCHC RBC AUTO-ENTMCNC: 32.3 G/DL (ref 32–36)
MCV RBC AUTO: 97 FL (ref 82–98)
METHADONE UR QL SCN>300 NG/ML: NEGATIVE
METHADONE UR QL SCN>300 NG/ML: NEGATIVE
MICROSCOPIC COMMENT: ABNORMAL
MODE: ABNORMAL
MODE: ABNORMAL
MONOCYTES # BLD AUTO: 1 K/UL (ref 0.3–1)
MONOCYTES NFR BLD: 7.7 % (ref 4–15)
NEUTROPHILS # BLD AUTO: 10 K/UL (ref 1.8–7.7)
NEUTROPHILS NFR BLD: 77.2 % (ref 38–73)
NITRITE UR QL STRIP: NEGATIVE
NITRITE UR QL STRIP: NEGATIVE
NONHDLC SERPL-MCNC: 69 MG/DL
OPIATES UR QL SCN: NEGATIVE
OPIATES UR QL SCN: NEGATIVE
PCO2 BLDA: 33.4 MMHG (ref 35–45)
PCO2 BLDA: 50.8 MMHG (ref 35–45)
PCP UR QL SCN>25 NG/ML: NEGATIVE
PCP UR QL SCN>25 NG/ML: NEGATIVE
PEEP: 5
PEEP: 5
PH SMN: 7.32 [PH] (ref 7.35–7.45)
PH SMN: 7.43 [PH] (ref 7.35–7.45)
PH UR STRIP: 5 [PH] (ref 5–8)
PH UR STRIP: 8 [PH] (ref 5–8)
PLATELET # BLD AUTO: 334 K/UL (ref 150–350)
PMV BLD AUTO: 10.5 FL (ref 9.2–12.9)
PO2 BLDA: 189 MMHG (ref 80–100)
PO2 BLDA: 195 MMHG (ref 80–100)
POC BE: -2 MMOL/L
POC BE: 0 MMOL/L
POC SATURATED O2: 100 % (ref 95–100)
POC SATURATED O2: 100 % (ref 95–100)
POC TCO2: 23 MMOL/L (ref 23–27)
POC TCO2: 27 MMOL/L (ref 23–27)
POCT GLUCOSE: 122 MG/DL (ref 70–110)
POTASSIUM SERPL-SCNC: 4.6 MMOL/L (ref 3.5–5.1)
PROCALCITONIN SERPL IA-MCNC: 0.03 NG/ML
PROCALCITONIN SERPL IA-MCNC: 0.06 NG/ML
PROT SERPL-MCNC: 7.3 G/DL (ref 6–8.4)
PROT UR QL STRIP: ABNORMAL
PROT UR QL STRIP: NEGATIVE
PROTHROMBIN TIME: 10.5 SEC (ref 9–12.5)
RBC # BLD AUTO: 4.41 M/UL (ref 4.6–6.2)
RBC #/AREA URNS AUTO: 36 /HPF (ref 0–4)
SAMPLE: ABNORMAL
SAMPLE: ABNORMAL
SITE: ABNORMAL
SITE: ABNORMAL
SODIUM SERPL-SCNC: 139 MMOL/L (ref 136–145)
SP GR UR STRIP: 1.02 (ref 1–1.03)
SP GR UR STRIP: 1.02 (ref 1–1.03)
SP02: 97
SPECIMEN SOURCE: NORMAL
SQUAMOUS #/AREA URNS AUTO: 1 /HPF
TOXICOLOGY INFORMATION: NORMAL
TOXICOLOGY INFORMATION: NORMAL
TRIGL SERPL-MCNC: 63 MG/DL (ref 30–150)
TROPONIN I SERPL DL<=0.01 NG/ML-MCNC: 0.01 NG/ML (ref 0–0.03)
TSH SERPL DL<=0.005 MIU/L-ACNC: 0.52 UIU/ML (ref 0.4–4)
URN SPEC COLLECT METH UR: ABNORMAL
URN SPEC COLLECT METH UR: ABNORMAL
UROBILINOGEN UR STRIP-ACNC: NEGATIVE EU/DL
VT: 450
VT: 450
WBC # BLD AUTO: 12.96 K/UL (ref 3.9–12.7)
WBC #/AREA URNS AUTO: 9 /HPF (ref 0–5)

## 2020-02-02 PROCEDURE — 31500 INSERT EMERGENCY AIRWAY: CPT | Mod: HCNC

## 2020-02-02 PROCEDURE — 12000002 HC ACUTE/MED SURGE SEMI-PRIVATE ROOM: Mod: HCNC

## 2020-02-02 PROCEDURE — 87070 CULTURE OTHR SPECIMN AEROBIC: CPT | Mod: HCNC

## 2020-02-02 PROCEDURE — 94002 VENT MGMT INPAT INIT DAY: CPT | Mod: HCNC

## 2020-02-02 PROCEDURE — 96366 THER/PROPH/DIAG IV INF ADDON: CPT | Mod: HCNC

## 2020-02-02 PROCEDURE — 84443 ASSAY THYROID STIM HORMONE: CPT | Mod: HCNC

## 2020-02-02 PROCEDURE — 99900035 HC TECH TIME PER 15 MIN (STAT): Mod: HCNC

## 2020-02-02 PROCEDURE — 82550 ASSAY OF CK (CPK): CPT | Mod: 91,HCNC

## 2020-02-02 PROCEDURE — 96375 TX/PRO/DX INJ NEW DRUG ADDON: CPT | Mod: HCNC

## 2020-02-02 PROCEDURE — 99291 CRITICAL CARE FIRST HOUR: CPT | Mod: 25,HCNC

## 2020-02-02 PROCEDURE — 93005 ELECTROCARDIOGRAM TRACING: CPT | Mod: HCNC

## 2020-02-02 PROCEDURE — 87205 SMEAR GRAM STAIN: CPT | Mod: HCNC

## 2020-02-02 PROCEDURE — 36415 COLL VENOUS BLD VENIPUNCTURE: CPT | Mod: HCNC

## 2020-02-02 PROCEDURE — 96361 HYDRATE IV INFUSION ADD-ON: CPT | Mod: HCNC

## 2020-02-02 PROCEDURE — 63600175 PHARM REV CODE 636 W HCPCS: Mod: HCNC | Performed by: PHYSICIAN ASSISTANT

## 2020-02-02 PROCEDURE — 86901 BLOOD TYPING SEROLOGIC RH(D): CPT | Mod: HCNC

## 2020-02-02 PROCEDURE — 85025 COMPLETE CBC W/AUTO DIFF WBC: CPT | Mod: HCNC

## 2020-02-02 PROCEDURE — 36600 WITHDRAWAL OF ARTERIAL BLOOD: CPT | Mod: HCNC

## 2020-02-02 PROCEDURE — 82962 GLUCOSE BLOOD TEST: CPT | Mod: HCNC

## 2020-02-02 PROCEDURE — 96367 TX/PROPH/DG ADDL SEQ IV INF: CPT | Mod: HCNC,59

## 2020-02-02 PROCEDURE — 36620 ARTERIAL LINE: ICD-10-PCS | Mod: HCNC,,, | Performed by: NURSE PRACTITIONER

## 2020-02-02 PROCEDURE — 36556 INSERT NON-TUNNEL CV CATH: CPT | Mod: HCNC

## 2020-02-02 PROCEDURE — 93010 EKG 12-LEAD: ICD-10-PCS | Mod: HCNC,,, | Performed by: INTERNAL MEDICINE

## 2020-02-02 PROCEDURE — 80061 LIPID PANEL: CPT | Mod: HCNC

## 2020-02-02 PROCEDURE — 82550 ASSAY OF CK (CPK): CPT | Mod: HCNC

## 2020-02-02 PROCEDURE — 87040 BLOOD CULTURE FOR BACTERIA: CPT | Mod: 59,HCNC

## 2020-02-02 PROCEDURE — 82803 BLOOD GASES ANY COMBINATION: CPT | Mod: HCNC

## 2020-02-02 PROCEDURE — 93010 ELECTROCARDIOGRAM REPORT: CPT | Mod: HCNC,,, | Performed by: INTERNAL MEDICINE

## 2020-02-02 PROCEDURE — 87040 BLOOD CULTURE FOR BACTERIA: CPT | Mod: HCNC

## 2020-02-02 PROCEDURE — 99291 CRITICAL CARE FIRST HOUR: CPT | Mod: HCNC,,, | Performed by: NURSE PRACTITIONER

## 2020-02-02 PROCEDURE — 25000003 PHARM REV CODE 250: Mod: HCNC | Performed by: NURSE PRACTITIONER

## 2020-02-02 PROCEDURE — 82150 ASSAY OF AMYLASE: CPT | Mod: HCNC

## 2020-02-02 PROCEDURE — 63600175 PHARM REV CODE 636 W HCPCS: Mod: HCNC

## 2020-02-02 PROCEDURE — 25000003 PHARM REV CODE 250: Mod: HCNC

## 2020-02-02 PROCEDURE — 81001 URINALYSIS AUTO W/SCOPE: CPT | Mod: HCNC

## 2020-02-02 PROCEDURE — 99900026 HC AIRWAY MAINTENANCE (STAT): Mod: HCNC

## 2020-02-02 PROCEDURE — 84145 PROCALCITONIN (PCT): CPT | Mod: 91,HCNC

## 2020-02-02 PROCEDURE — 80053 COMPREHEN METABOLIC PANEL: CPT | Mod: HCNC

## 2020-02-02 PROCEDURE — 84145 PROCALCITONIN (PCT): CPT | Mod: HCNC

## 2020-02-02 PROCEDURE — 25000003 PHARM REV CODE 250: Mod: HCNC | Performed by: PHYSICIAN ASSISTANT

## 2020-02-02 PROCEDURE — 85610 PROTHROMBIN TIME: CPT | Mod: HCNC

## 2020-02-02 PROCEDURE — 83605 ASSAY OF LACTIC ACID: CPT | Mod: HCNC

## 2020-02-02 PROCEDURE — 81003 URINALYSIS AUTO W/O SCOPE: CPT | Mod: HCNC,59

## 2020-02-02 PROCEDURE — 20000000 HC ICU ROOM: Mod: HCNC

## 2020-02-02 PROCEDURE — 63600175 PHARM REV CODE 636 W HCPCS: Mod: HCNC | Performed by: NURSE PRACTITIONER

## 2020-02-02 PROCEDURE — S0028 INJECTION, FAMOTIDINE, 20 MG: HCPCS | Mod: HCNC | Performed by: PHYSICIAN ASSISTANT

## 2020-02-02 PROCEDURE — 84484 ASSAY OF TROPONIN QUANT: CPT | Mod: HCNC

## 2020-02-02 PROCEDURE — 83690 ASSAY OF LIPASE: CPT | Mod: HCNC

## 2020-02-02 PROCEDURE — 96365 THER/PROPH/DIAG IV INF INIT: CPT | Mod: HCNC,59

## 2020-02-02 PROCEDURE — 84484 ASSAY OF TROPONIN QUANT: CPT | Mod: 91,HCNC

## 2020-02-02 PROCEDURE — 63600175 PHARM REV CODE 636 W HCPCS: Mod: HCNC | Performed by: EMERGENCY MEDICINE

## 2020-02-02 PROCEDURE — 80307 DRUG TEST PRSMV CHEM ANLYZR: CPT | Mod: HCNC

## 2020-02-02 PROCEDURE — 25000003 PHARM REV CODE 250: Mod: HCNC | Performed by: EMERGENCY MEDICINE

## 2020-02-02 PROCEDURE — 99291 PR CRITICAL CARE, E/M 30-74 MINUTES: ICD-10-PCS | Mod: HCNC,,, | Performed by: NURSE PRACTITIONER

## 2020-02-02 PROCEDURE — 87502 INFLUENZA DNA AMP PROBE: CPT | Mod: HCNC

## 2020-02-02 PROCEDURE — 83735 ASSAY OF MAGNESIUM: CPT | Mod: HCNC

## 2020-02-02 PROCEDURE — 96376 TX/PRO/DX INJ SAME DRUG ADON: CPT | Mod: HCNC,59

## 2020-02-02 PROCEDURE — 36620 INSERTION CATHETER ARTERY: CPT | Mod: HCNC,,, | Performed by: NURSE PRACTITIONER

## 2020-02-02 PROCEDURE — 83605 ASSAY OF LACTIC ACID: CPT | Mod: 91,HCNC

## 2020-02-02 PROCEDURE — 83036 HEMOGLOBIN GLYCOSYLATED A1C: CPT | Mod: HCNC

## 2020-02-02 RX ORDER — ROCURONIUM BROMIDE 10 MG/ML
70 INJECTION, SOLUTION INTRAVENOUS ONCE
Status: DISCONTINUED | OUTPATIENT
Start: 2020-02-02 | End: 2020-02-02

## 2020-02-02 RX ORDER — ACETAMINOPHEN 650 MG/1
650 SUPPOSITORY RECTAL
Status: COMPLETED | OUTPATIENT
Start: 2020-02-02 | End: 2020-02-02

## 2020-02-02 RX ORDER — SODIUM CHLORIDE 0.9 % (FLUSH) 0.9 %
10 SYRINGE (ML) INJECTION
Status: DISCONTINUED | OUTPATIENT
Start: 2020-02-02 | End: 2020-02-28 | Stop reason: HOSPADM

## 2020-02-02 RX ORDER — CHLORHEXIDINE GLUCONATE ORAL RINSE 1.2 MG/ML
15 SOLUTION DENTAL 4 TIMES DAILY
Status: DISCONTINUED | OUTPATIENT
Start: 2020-02-02 | End: 2020-02-07

## 2020-02-02 RX ORDER — FENTANYL CITRATE 50 UG/ML
50 INJECTION, SOLUTION INTRAMUSCULAR; INTRAVENOUS
Status: DISCONTINUED | OUTPATIENT
Start: 2020-02-05 | End: 2020-02-02

## 2020-02-02 RX ORDER — LEVETIRACETAM 15 MG/ML
1500 INJECTION INTRAVASCULAR EVERY 12 HOURS
Status: DISCONTINUED | OUTPATIENT
Start: 2020-02-02 | End: 2020-02-04

## 2020-02-02 RX ORDER — FENTANYL CITRATE 50 UG/ML
50 INJECTION, SOLUTION INTRAMUSCULAR; INTRAVENOUS
Status: DISCONTINUED | OUTPATIENT
Start: 2020-02-02 | End: 2020-02-02

## 2020-02-02 RX ORDER — ATORVASTATIN CALCIUM 20 MG/1
40 TABLET, FILM COATED ORAL NIGHTLY
Status: DISCONTINUED | OUTPATIENT
Start: 2020-02-02 | End: 2020-02-06

## 2020-02-02 RX ORDER — SODIUM CHLORIDE 9 MG/ML
INJECTION, SOLUTION INTRAVENOUS CONTINUOUS
Status: DISCONTINUED | OUTPATIENT
Start: 2020-02-02 | End: 2020-02-08

## 2020-02-02 RX ORDER — ETOMIDATE 2 MG/ML
20 INJECTION INTRAVENOUS
Status: COMPLETED | OUTPATIENT
Start: 2020-02-02 | End: 2020-02-02

## 2020-02-02 RX ORDER — LABETALOL HCL 20 MG/4 ML
10 SYRINGE (ML) INTRAVENOUS EVERY 4 HOURS PRN
Status: DISCONTINUED | OUTPATIENT
Start: 2020-02-02 | End: 2020-02-28 | Stop reason: HOSPADM

## 2020-02-02 RX ORDER — LORAZEPAM 2 MG/ML
2 INJECTION INTRAMUSCULAR
Status: DISCONTINUED | OUTPATIENT
Start: 2020-02-02 | End: 2020-02-17

## 2020-02-02 RX ORDER — FENTANYL CITRATE 50 UG/ML
50 INJECTION, SOLUTION INTRAMUSCULAR; INTRAVENOUS
Status: DISCONTINUED | OUTPATIENT
Start: 2020-02-03 | End: 2020-02-03

## 2020-02-02 RX ORDER — FAMOTIDINE 10 MG/ML
20 INJECTION INTRAVENOUS 2 TIMES DAILY
Status: DISCONTINUED | OUTPATIENT
Start: 2020-02-02 | End: 2020-02-23

## 2020-02-02 RX ORDER — PHENYLEPHRINE HCL IN 0.9% NACL 1 MG/10 ML
SYRINGE (ML) INTRAVENOUS
Status: DISPENSED
Start: 2020-02-02 | End: 2020-02-03

## 2020-02-02 RX ORDER — METRONIDAZOLE 500 MG/100ML
500 INJECTION, SOLUTION INTRAVENOUS
Status: DISCONTINUED | OUTPATIENT
Start: 2020-02-02 | End: 2020-02-02

## 2020-02-02 RX ORDER — ONDANSETRON 2 MG/ML
4 INJECTION INTRAMUSCULAR; INTRAVENOUS EVERY 8 HOURS PRN
Status: DISCONTINUED | OUTPATIENT
Start: 2020-02-02 | End: 2020-02-28 | Stop reason: HOSPADM

## 2020-02-02 RX ORDER — PROPOFOL 10 MG/ML
INJECTION, EMULSION INTRAVENOUS
Status: DISCONTINUED
Start: 2020-02-02 | End: 2020-02-02 | Stop reason: HOSPADM

## 2020-02-02 RX ORDER — LEVETIRACETAM 10 MG/ML
1000 INJECTION INTRAVASCULAR
Status: DISCONTINUED | OUTPATIENT
Start: 2020-02-02 | End: 2020-02-02

## 2020-02-02 RX ORDER — INSULIN ASPART 100 [IU]/ML
1-10 INJECTION, SOLUTION INTRAVENOUS; SUBCUTANEOUS EVERY 6 HOURS PRN
Status: DISCONTINUED | OUTPATIENT
Start: 2020-02-02 | End: 2020-02-28 | Stop reason: HOSPADM

## 2020-02-02 RX ORDER — PROPOFOL 10 MG/ML
5 INJECTION, EMULSION INTRAVENOUS CONTINUOUS
Status: DISCONTINUED | OUTPATIENT
Start: 2020-02-02 | End: 2020-02-02 | Stop reason: HOSPADM

## 2020-02-02 RX ORDER — ROCURONIUM BROMIDE 10 MG/ML
INJECTION, SOLUTION INTRAVENOUS CODE/TRAUMA/SEDATION MEDICATION
Status: COMPLETED | OUTPATIENT
Start: 2020-02-02 | End: 2020-02-02

## 2020-02-02 RX ORDER — LORAZEPAM 2 MG/ML
INJECTION INTRAMUSCULAR
Status: COMPLETED
Start: 2020-02-02 | End: 2020-02-02

## 2020-02-02 RX ORDER — ROCURONIUM BROMIDE 10 MG/ML
INJECTION, SOLUTION INTRAVENOUS
Status: DISCONTINUED
Start: 2020-02-02 | End: 2020-02-02 | Stop reason: HOSPADM

## 2020-02-02 RX ORDER — FENTANYL CITRATE 50 UG/ML
INJECTION, SOLUTION INTRAMUSCULAR; INTRAVENOUS
Status: COMPLETED
Start: 2020-02-02 | End: 2020-02-02

## 2020-02-02 RX ORDER — FENTANYL CITRATE/PF 250MCG/5ML
50 SYRINGE (ML) INTRAVENOUS
Status: DISCONTINUED | OUTPATIENT
Start: 2020-02-03 | End: 2020-02-02

## 2020-02-02 RX ORDER — AMOXICILLIN 250 MG
1 CAPSULE ORAL 2 TIMES DAILY
Status: DISCONTINUED | OUTPATIENT
Start: 2020-02-02 | End: 2020-02-06

## 2020-02-02 RX ORDER — ACETAMINOPHEN 325 MG/1
650 TABLET ORAL EVERY 6 HOURS PRN
Status: DISCONTINUED | OUTPATIENT
Start: 2020-02-02 | End: 2020-02-06

## 2020-02-02 RX ORDER — ETOMIDATE 2 MG/ML
INJECTION INTRAVENOUS
Status: COMPLETED
Start: 2020-02-02 | End: 2020-02-02

## 2020-02-02 RX ORDER — GLUCAGON 1 MG
1 KIT INJECTION
Status: DISCONTINUED | OUTPATIENT
Start: 2020-02-02 | End: 2020-02-28 | Stop reason: HOSPADM

## 2020-02-02 RX ORDER — ESCITALOPRAM OXALATE 10 MG/1
20 TABLET ORAL DAILY
Status: DISCONTINUED | OUTPATIENT
Start: 2020-02-03 | End: 2020-02-06

## 2020-02-02 RX ADMIN — LORAZEPAM 2 MG: 2 INJECTION INTRAMUSCULAR; INTRAVENOUS at 11:02

## 2020-02-02 RX ADMIN — FENTANYL CITRATE 50 MCG: 50 INJECTION, SOLUTION INTRAMUSCULAR; INTRAVENOUS at 10:02

## 2020-02-02 RX ADMIN — LEVETIRACETAM INJECTION 1500 MG: 15 INJECTION INTRAVENOUS at 10:02

## 2020-02-02 RX ADMIN — FENTANYL CITRATE 50 MCG: 50 INJECTION INTRAMUSCULAR; INTRAVENOUS at 11:02

## 2020-02-02 RX ADMIN — NOREPINEPHRINE BITARTRATE 0.1 MCG/KG/MIN: 1 INJECTION, SOLUTION, CONCENTRATE INTRAVENOUS at 10:02

## 2020-02-02 RX ADMIN — VANCOMYCIN HYDROCHLORIDE 2000 MG: 1 INJECTION, POWDER, LYOPHILIZED, FOR SOLUTION INTRAVENOUS at 01:02

## 2020-02-02 RX ADMIN — ACETAMINOPHEN 325 MG: 325 SUPPOSITORY RECTAL at 06:02

## 2020-02-02 RX ADMIN — PIPERACILLIN SODIUM AND TAZOBACTAM SODIUM 4.5 G: 4; .5 INJECTION, POWDER, LYOPHILIZED, FOR SOLUTION INTRAVENOUS at 12:02

## 2020-02-02 RX ADMIN — ROCURONIUM BROMIDE 70 MG: 10 INJECTION, SOLUTION INTRAVENOUS at 09:02

## 2020-02-02 RX ADMIN — PIPERACILLIN SODIUM AND TAZOBACTAM SODIUM 4.5 G: 4; .5 INJECTION, POWDER, LYOPHILIZED, FOR SOLUTION INTRAVENOUS at 10:02

## 2020-02-02 RX ADMIN — LORAZEPAM 2 MG: 2 INJECTION INTRAMUSCULAR; INTRAVENOUS at 01:02

## 2020-02-02 RX ADMIN — SODIUM CHLORIDE: 0.9 INJECTION, SOLUTION INTRAVENOUS at 08:02

## 2020-02-02 RX ADMIN — ACETAMINOPHEN 650 MG: 650 SUPPOSITORY RECTAL at 02:02

## 2020-02-02 RX ADMIN — PROPOFOL 5 MCG/KG/MIN: 10 INJECTION, EMULSION INTRAVENOUS at 10:02

## 2020-02-02 RX ADMIN — FAMOTIDINE 20 MG: 10 INJECTION, SOLUTION INTRAVENOUS at 10:02

## 2020-02-02 RX ADMIN — SODIUM CHLORIDE 1000 ML: 0.9 INJECTION, SOLUTION INTRAVENOUS at 07:02

## 2020-02-02 RX ADMIN — CEFTRIAXONE 2 G: 2 INJECTION, SOLUTION INTRAVENOUS at 03:02

## 2020-02-02 RX ADMIN — FENTANYL CITRATE 50 MCG: 50 INJECTION INTRAMUSCULAR; INTRAVENOUS at 10:02

## 2020-02-02 RX ADMIN — ETOMIDATE 20 MG: 2 INJECTION INTRAVENOUS at 09:02

## 2020-02-02 RX ADMIN — CHLORHEXIDINE GLUCONATE 0.12% ORAL RINSE 15 ML: 1.2 LIQUID ORAL at 10:02

## 2020-02-02 RX ADMIN — SODIUM CHLORIDE 1953 ML: 0.9 INJECTION, SOLUTION INTRAVENOUS at 11:02

## 2020-02-02 RX ADMIN — SODIUM CHLORIDE 1000 ML: 0.9 INJECTION, SOLUTION INTRAVENOUS at 09:02

## 2020-02-02 RX ADMIN — ETOMIDATE 20 MG: 2 INJECTION, SOLUTION INTRAVENOUS at 09:02

## 2020-02-02 NOTE — ED NOTES
Clear blood tinged fluid draining from both nares Dr. Flores called to bedside. Noted tremors throughout body more prominent to lower extremities. increase propofol per order per Dr. Flores.

## 2020-02-02 NOTE — ED PROVIDER NOTES
Encounter Date: 2/2/2020    SCRIBE #1 NOTE: I, Michelle Ahumada, am scribing for, and in the presence of,  Dr. Flores. I have scribed the entire note.       History     Chief Complaint   Patient presents with    Seizures     Was found outside having a seizure.  Patient was given 5 mg of versed IM at home and once in the ambulance had another aseizure and was given 5mg IV. Patient recently was increased on keppra and changed from coumadin to eliquis.     Time seen by provider: 9:24 AM    This is a 63 y/o male with a history of DVT, PE, HLD, DM, seizures on keppra, and atherosclerosis who presents to the ED via EMS with complaint of a seizure that occurred this morning. He has had seizures since childhood per wife.  EMS reported patient was found by wife outside on the cement.  He was found with blood coming from his nose and trembling. Wife notes the seizure activity is consistent with the patient's previous seizures. She states that he has been compliant with 1500 mg keppra BID.  Last seizure was around Thanksgiving and patient had his keppra dose increased on Black Friday.  She notes that he has been coughing lately but denies any other symptoms. States that he was recently switched from Coumadin to Eliquis. Patient has a history of smoking. EMS reports that patient continues with seizure activity prior to arrival so they administered a total 10 MG of versed IM.  Upon arrival patient is unresponsive.  History provided by wife and EMS.     The history is provided by the EMS personnel.     Review of patient's allergies indicates:   Allergen Reactions    No known drug allergies      Past Medical History:   Diagnosis Date    Atherosclerosis of aorta 6/22/2017    Atherosclerosis of native coronary artery of native heart without angina pectoris 6/22/2017    Blood clotting tendency     Depression     Diabetes mellitus type 2 in nonobese     DVT (deep venous thrombosis)     Hyperlipidemia     Measles complicated  by meningitis     during childhood, with subsequent seizures    Newly diagnosed diabetes 4/18/2017    PE (pulmonary embolism)     Seizures     Splenomegaly     Thrombophlebitis leg     bilateral legs     Past Surgical History:   Procedure Laterality Date    ANAL FISTULOTOMY      COLONOSCOPY N/A 10/14/2016    Procedure: COLONOSCOPY;  Surgeon: Edu Stratton MD;  Location: [facility] ([address] [place] [address]);  Service: Endoscopy;  Laterality: N/A;  Patient may hold Coumadin x 3 days prior to procedure with out Lovenox bridge as per C.C./svn/coumadin lab 1st    SPLENECTOMY, TOTAL       Family History   Problem Relation Age of Onset    Thrombophlebitis Father     Heart failure Father     Heart attack Father     Hypertension Father     Melanoma Neg Hx     Colon cancer Neg Hx     Esophageal cancer Neg Hx     Rectal cancer Neg Hx     Stomach cancer Neg Hx      Social History     Tobacco Use    Smoking status: Current Every Day Smoker     Packs/day: 0.25     Years: 33.00     Pack years: 8.25    Smokeless tobacco: Never Used   Substance Use Topics    Alcohol use: No    Drug use: No     Review of Systems   Unable to perform ROS: Patient unresponsive       Physical Exam     Initial Vitals   BP Pulse Resp Temp SpO2   02/02/20 0910 02/02/20 0910 02/02/20 0910 02/02/20 1003 02/02/20 0910   123/66 95 16 98.7 °F (37.1 °C) 100 %      MAP       --                Physical Exam    Nursing note and vitals reviewed.  Constitutional: He appears well-developed and well-nourished. He appears distressed.   Tremulous   HENT:   Head: Normocephalic and atraumatic.   Right Ear: External ear normal.   Left Ear: External ear normal.   Mouth/Throat: Oropharynx is clear and moist.   Dry blood to face and bilateral nares   Eyes: EOM are normal.   Pupils are equally round, sluggish   Neck: No stridor present.   Placed in cervical collar in ED   Cardiovascular: Normal rate, regular rhythm and normal heart sounds.   No murmur  heard.  Pulmonary/Chest: No respiratory distress.   Coarse breath sounds bilaterally   Abdominal: Soft. Bowel sounds are normal. There is no tenderness. There is no rebound and no guarding.   Musculoskeletal: Normal range of motion. He exhibits no edema or tenderness.   Neurological: He is unresponsive. He displays tremor. No sensory deficit. He displays seizure activity. GCS eye subscore is 1. GCS verbal subscore is 2. GCS motor subscore is 4.   Skin: Skin is warm and dry. Capillary refill takes less than 2 seconds.   2 cm skin tear to dorsal aspect of right hand   Psychiatric: He has a normal mood and affect. His behavior is normal.         ED Course   External Jugular IV  Date/Time: 2/2/2020 9:54 AM  Performed by: Margot Flores MD  Authorized by: Margot Flores MD   Location (Ext Jugular): Right.  Area Prepped With: Chlorohexidine.  Catheter Size: 20 ga.  Number of attempts: 1  Fixation/Dressing: Taped in place.  Patient tolerance: Patient tolerated the procedure well with no immediate complications    Intubation  Date/Time: 2/2/2020 9:55 AM  Performed by: Margot Flores MD  Authorized by: Margot Flores MD   Consent Done: Emergent Situation  Indications: airway protection  Intubation method: video-assisted  Patient status: paralyzed (RSI)  Preoxygenation: BVM  Sedatives: etomidate  Paralytic: rocuronium  Laryngoscope size: Mac 4  Tube size: 7.5 mm  Tube type: cuffed  Number of attempts: 1  Ventilation between attempts: BVM  Cords visualized: yes  Post-procedure assessment: CO2 detector and chest rise  Breath sounds: equal  Cuff inflated: yes  Tube secured with: ETT guo  Chest x-ray interpreted by me and radiologist.  Chest x-ray findings: endotracheal tube in appropriate position  Patient tolerance: Patient tolerated the procedure well with no immediate complications    Critical Care  Date/Time: 2/2/2020 10:05 AM  Performed by: Margot Flores MD  Authorized by: Margot Flores MD    Direct patient critical care time: 37 minutes  Total critical care time (exclusive of procedural time) : 37 minutes  Critical care was necessary to treat or prevent imminent or life-threatening deterioration of the following conditions: CNS failure or compromise, respiratory failure and sepsis.  Critical care was time spent personally by me on the following activities: blood draw for specimens, interpretation of cardiac output measurements, evaluation of patient's response to treatment, examination of patient, obtaining history from patient or surrogate, ordering and performing treatments and interventions, ordering and review of laboratory studies, ordering and review of radiographic studies, pulse oximetry and re-evaluation of patient's condition.        Labs Reviewed   CBC W/ AUTO DIFFERENTIAL - Abnormal; Notable for the following components:       Result Value    WBC 12.96 (*)     RBC 4.41 (*)     Hemoglobin 13.8 (*)     Mean Corpuscular Hemoglobin 31.3 (*)     Gran # (ANC) 10.0 (*)     Gran% 77.2 (*)     Lymph% 14.7 (*)     All other components within normal limits   COMPREHENSIVE METABOLIC PANEL - Abnormal; Notable for the following components:    Glucose 111 (*)     Anion Gap 7 (*)     All other components within normal limits   URINALYSIS, REFLEX TO URINE CULTURE - Abnormal; Notable for the following components:    Protein, UA Trace (*)     Occult Blood UA Trace (*)     All other components within normal limits    Narrative:     Preferred Collection Type->Urine, Clean Catch   POCT GLUCOSE - Abnormal; Notable for the following components:    POCT Glucose 122 (*)     All other components within normal limits   ISTAT PROCEDURE - Abnormal; Notable for the following components:    POC PH 7.315 (*)     POC PCO2 50.8 (*)     POC PO2 189 (*)     All other components within normal limits   INFLUENZA A & B BY MOLECULAR   CULTURE, BLOOD   CULTURE, BLOOD   MAGNESIUM   PROTIME-INR   LACTIC ACID, PLASMA   PROCALCITONIN    TROPONIN I   DRUG SCREEN PANEL, URINE EMERGENCY   CK   LEVETIRACETAM  (KEPPRA) LEVEL   POCT GLUCOSE MONITORING CONTINUOUS          X-Rays:   Independently Interpreted Readings:   Other Readings:  Reviewed by myself, read by radiology.    Imaging Results           CT Chest Without Contrast (Final result)  Result time 02/02/20 11:45:33    Final result by Jonathon Harper MD (02/02/20 11:45:33)                 Impression:      Complete collapse of the left lung with ipsilateral shift of the mediastinal structures.  Additional area of atelectasis involving the medial right lower lobe.  Suggestion of hyperdense colonic bowel wall.  The possibility of shock bowel can not be excluded.  Further evaluation with CT abdomen pelvis can be performed as indicated.  Nondisplaced right-sided rib fractures and other findings as above.      Electronically signed by: Jonathon Harper MD  Date:    02/02/2020  Time:    11:45             Narrative:    EXAMINATION:  CT CHEST WITHOUT CONTRAST    CLINICAL HISTORY:  Dyspnea, cardiac origin suspected;    TECHNIQUE:  Low dose axial images, sagittal and coronal reformations were obtained from the thoracic inlet to the lung bases. Contrast was not administered.    COMPARISON:  The chest CT from January last year..    FINDINGS:  Limited imaging through the upper abdomen demonstrates cholelithiasis.  Suggestion of hyper dense colonic bowel wall.  Esophagogastric tube is present within the stomach.  There is some nodular thickening of the adrenal glands which can be seen in the setting of hyperplasia.  Review of bone windows demonstrates degenerative changes of the spine.  Essentially nondisplaced fractures involving the right 4th and 5th ribs.    There is complete collapse/atelectasis of the left lung containing air bronchograms with ipsilateral shift of the mediastinum.  Atelectasis also seen in the medial right lower lung.  Note is made of azygos fissure.  No pneumothorax or pleural effusion is  seen.  Heart is shifted to the left.  No pericardial effusion.  Trachea is patent and contains an endo tracheal tube.  No bulky adenopathy is seen.  Coronary artery and thoracic aortic atherosclerosis is present    This report was flagged in Epic as abnormal.                               CT Cervical Spine Without Contrast (Final result)  Result time 02/02/20 11:28:49    Final result by Jonathon Harper MD (02/02/20 11:28:49)                 Impression:      Degenerative changes as above.  No acute osseous abnormality is evident.      Electronically signed by: Jonathon Harper MD  Date:    02/02/2020  Time:    11:28             Narrative:    EXAMINATION:  CT CERVICAL SPINE WITHOUT CONTRAST    CLINICAL HISTORY:  Neck pain, first study;    TECHNIQUE:  Low dose axial images, sagittal and coronal reformations were performed though the cervical spine.  Contrast was not administered.    COMPARISON:  02/07/2018    FINDINGS:  Limited imaging through the lung apices demonstrates azygos fissure on the right.  There is scarring with some opacification of the left lung apex.  Endotracheal and esophagogastric tubes are partially imaged.  There is edema with locules of air noted in the nasopharynx and oropharynx.  Small scattered bilateral cervical lymph nodes are visualized.    Bones are demineralized with multilevel degenerative changes noted.  No acute fracture is seen.                               CT Maxillofacial Without Contrast (Final result)  Result time 02/02/20 11:24:09    Final result by Jonathon Harper MD (02/02/20 11:24:09)                 Impression:      Multiple fractures as discussed above      Electronically signed by: Jonathon Harper MD  Date:    02/02/2020  Time:    11:24             Narrative:    EXAMINATION:  CT MAXILLOFACIAL WITHOUT CONTRAST    CLINICAL HISTORY:  Nasal fracture, suspected;    TECHNIQUE:  Low dose axial images, sagittal and coronal reformations were obtained through the face.  Contrast was not  administered.    COMPARISON:  None    FINDINGS:  Mildly comminuted nondisplaced bilateral nasal bone fracture.  Fracture plane is seen extending into the right frontal processes of the maxilla on the right.  There is a nondisplaced minimally depressed fracture of the left zygomatic process near the junction with the zygoma as well as a fracture of the zygoma more anteriorly.  Nondisplaced fracture involving the pterygoid process of the left sphenoid with fracture plane seen extending into the adjacent left maxilla.  Additional slightly depressed fracture involving the upper posterolateral inferior left maxillary sinus wall noted.  Right zygomatic arch is intact.  There is also a nondisplaced fracture involving the inferomedial left orbital wall.  The temporomandibular joints are within normal limits.  Mandible also appears intact.  Patient is intubated.  There is prominent opacification of the ethmoid and maxillary sinuses.  Hyper attenuation/blood products seen within the left maxillary sinus.  Mucoperiosteal thickening of the right sphenoid sinus is noted.                               CT Head Without Contrast (Final result)  Result time 02/02/20 10:56:53    Final result by Jonathon Harper MD (02/02/20 10:56:53)                 Impression:      No acute abnormality or interval change since 02/07/2018.      Electronically signed by: Jonathon Harper MD  Date:    02/02/2020  Time:    10:56             Narrative:    EXAMINATION:  CT HEAD WITHOUT CONTRAST    CLINICAL HISTORY:  Seizures new or progressive;    TECHNIQUE:  Low dose axial images were obtained through the head.  Coronal and sagittal reformations were also performed. Contrast was not administered.    COMPARISON:  Head CT from 02/07/2018    FINDINGS:  Atrophy of the brain parenchyma with chronic microvascular ischemic changes.  Findings are essentially stable in appearance since 02/07/2018.  There is no mass effect.  No shift of the midline structures.  The  ventricles and the basilar cisterns remain patent.  Midline structures are intact normal gray-white matter differentiation is maintained without CT evidence to suggest an acute cerebrovascular accident.  Remote right temporal infarct.  No acute intracranial hemorrhage.  No extra-axial collection    Orbits are symmetric in appearance as visualized.  Pansinusitis changes are seen.  Mastoid air cells are normally pneumatized.  The skull is intact.                                X-Ray Chest AP Portable (Final result)  Result time 02/02/20 10:17:42    Final result by Jonathon Harper MD (02/02/20 10:17:42)                 Impression:      As above      Electronically signed by: Jonathon Harper MD  Date:    02/02/2020  Time:    10:17             Narrative:    EXAMINATION:  XR CHEST AP PORTABLE    CLINICAL HISTORY:  intubation;    TECHNIQUE:  Single frontal view of the chest was performed.    COMPARISON:  07/07/2015 chest x-ray and the chest CT from January 31st 2019.    FINDINGS:  Esophagogastric tube courses off the field of view.  Endotracheal tube terminates approximately 4.6 cm above the christoph.  There is essentially complete opacification of the left hemithorax with a few mild residual aerated portions of the left lung noted.  No significant shift of mediastinal structures is appreciated.  Differential considerations include diffuse consolidation, atelectasis, postsurgical changes, among other considerations.  If indicated, further evaluation with CT could be performed.  Right lung is clear.  Bones appear intact.    This report was flagged in Epic as abnormal.                              Medical Decision Making:   Initial Assessment:   This is a 65 y/o male with a history of DVT, PE, HLD, DM, seizures, and atherosclerosis who presents to the ED via EMS with complaint of a seizure that occurred this morning.  Differential Diagnosis:   Seizure, status epilepticus, SDH, SAH, sepsis  Independently Interpreted Test(s):   I  have ordered and independently interpreted EKG Reading(s) - see prior notes  Clinical Tests:   Lab Tests: Ordered and Reviewed  Radiological Study: Ordered and Reviewed  Medical Tests: Ordered and Reviewed  ED Management:  Patient intubated for airway protection.  Started on propofol.    - CT head without acute abnormality  - CT max/face with multiple facial fractures.  - CXR with large left-sided consolidation  - CT with left sided collapsed lung, Atelectasis?  Discussed with pulmonary who evaluated patient in ED, see note for recs.    - septic workup performed when patient became febrile  - unable to perform LP given patient's anticoagulation but covered with IV rocephin for meningitis.  Suspect source is pulmonary due to patient's recent h/o productive cough and possible consolidation on CXR.   Patient to be transferred to [facility] for continuous EEG.                    ED Course as of Feb 02 1713   Sun Feb 02, 2020   0923 BP: 123/66 [LD]   0923 Temp(!): 100.6 °F (38.1 °C) [LD]   0923 Pulse: 95 [LD]   0923 Resp: 16 [LD]   0923 SpO2: 100 % [LD]   1004 EKG with sinus tachycardia, rate of 108 bpm.  Normal axis, normal intervals, normal conduction, TWIs in anterior leads. Normal ST segments.  No STEMI    [LD]   1027 POCT Glucose(!): 122 [LD]   1101 Patient continues to have seizure like activity  Will give ativan     [LD]   1105 Discussed with transfer center.  Dr Yee will return call.    [LD]   1126 Patient accepted at [facility]    [LD]   1225 Discussed with Dr Stockton on call for pulmonology who recommended suctioning.      [LD]   1239 Respiratory called for suctioning.     [LD]      ED Course User Index  [LD] Margot Flores MD       Patient Condition: Benefit outweighs risk  Reason for Transfer: Qualified clinical personnel or service unavailable, Hospital resources not available  Accepting Physician: Dr. Yee   Sending MD: Dr Flores        Clinical Impression:     1. Status epilepticus    2.  Seizure    3. Pneumonia of left lung due to infectious organism, unspecified part of lung    4. Altered mental status, unspecified altered mental status type    5. Acute respiratory failure, unspecified whether with hypoxia or hypercapnia        Disposition:   Disposition: Transferred  Condition: Stable    I, Margot Flores,  personally performed the services described in this documentation. All medical record entries made by the scribe were at my direction and in my presence.  I have reviewed the chart and agree that the record reflects my personal performance and is accurate and complete. Margot Flores M.D. 5:13 PM02/02/2020             Margot Flores MD  02/02/20 1713

## 2020-02-02 NOTE — ED NOTES
Pt transferred to CT via stretcher, cardiac monitor, and portable vent with RN, ED tech, and RT without incident

## 2020-02-02 NOTE — ED NOTES
Pt lying in stretcher. ETT in place.  No noted tremors or movement. Will continue to closely monitor.

## 2020-02-02 NOTE — ED NOTES
Pt displaying intermittent seizure like activiy Dr. Flores called to bedside. Order for ativan to be placed.

## 2020-02-02 NOTE — ED NOTES
Family at bedside, pt remains intubated. Sedation and antibiotics administered per order. Pt in no acute distress at this time. Will continue to closely monitor.

## 2020-02-02 NOTE — ED NOTES
Spoke with transfer center awaiting available room in neuro ICU, family update and verbalizes understanding plan.

## 2020-02-03 LAB
ALBUMIN SERPL BCP-MCNC: 2.8 G/DL (ref 3.5–5.2)
ALLENS TEST: ABNORMAL
ALP SERPL-CCNC: 57 U/L (ref 55–135)
ALT SERPL W/O P-5'-P-CCNC: 12 U/L (ref 10–44)
ANION GAP SERPL CALC-SCNC: 8 MMOL/L (ref 8–16)
APTT BLDCRRT: 26.9 SEC (ref 21–32)
ASCENDING AORTA: 3.16 CM
AST SERPL-CCNC: 21 U/L (ref 10–40)
AV INDEX (PROSTH): 1.15
AV MEAN GRADIENT: 2 MMHG
AV PEAK GRADIENT: 4 MMHG
AV VALVE AREA: 3.61 CM2
AV VELOCITY RATIO: 1.09
BASOPHILS # BLD AUTO: 0.04 K/UL (ref 0–0.2)
BASOPHILS NFR BLD: 0.2 % (ref 0–1.9)
BILIRUB SERPL-MCNC: 0.6 MG/DL (ref 0.1–1)
BSA FOR ECHO PROCEDURE: 1.76 M2
BUN SERPL-MCNC: 12 MG/DL (ref 8–23)
CALCIUM SERPL-MCNC: 6.7 MG/DL (ref 8.7–10.5)
CHLORIDE SERPL-SCNC: 112 MMOL/L (ref 95–110)
CK MB SERPL-MCNC: 3.3 NG/ML (ref 0.1–6.5)
CK MB SERPL-RTO: 0.8 % (ref 0–5)
CK SERPL-CCNC: 328 U/L (ref 20–200)
CK SERPL-CCNC: 410 U/L (ref 20–200)
CO2 SERPL-SCNC: 20 MMOL/L (ref 23–29)
CREAT SERPL-MCNC: 0.8 MG/DL (ref 0.5–1.4)
CV ECHO LV RWT: 0.36 CM
DELSYS: ABNORMAL
DIFFERENTIAL METHOD: ABNORMAL
DOP CALC AO PEAK VEL: 0.94 M/S
DOP CALC AO VTI: 19.8 CM
DOP CALC LVOT AREA: 3.1 CM2
DOP CALC LVOT DIAMETER: 2 CM
DOP CALC LVOT PEAK VEL: 1.02 M/S
DOP CALC LVOT STROKE VOLUME: 71.44 CM3
DOP CALCLVOT PEAK VEL VTI: 22.75 CM
E WAVE DECELERATION TIME: 190.85 MSEC
E/A RATIO: 1.2
E/E' RATIO: 9.82 M/S
ECHO LV POSTERIOR WALL: 0.69 CM (ref 0.6–1.1)
EOSINOPHIL # BLD AUTO: 0.1 K/UL (ref 0–0.5)
EOSINOPHIL NFR BLD: 0.3 % (ref 0–8)
ERYTHROCYTE [DISTWIDTH] IN BLOOD BY AUTOMATED COUNT: 14.2 % (ref 11.5–14.5)
ERYTHROCYTE [SEDIMENTATION RATE] IN BLOOD BY WESTERGREN METHOD: 16 MM/H
EST. GFR  (AFRICAN AMERICAN): >60 ML/MIN/1.73 M^2
EST. GFR  (NON AFRICAN AMERICAN): >60 ML/MIN/1.73 M^2
FIO2: 40
FRACTIONAL SHORTENING: 38 % (ref 28–44)
GLUCOSE SERPL-MCNC: 115 MG/DL (ref 70–110)
HCO3 UR-SCNC: 20.7 MMOL/L (ref 24–28)
HCT VFR BLD AUTO: 34 % (ref 40–54)
HGB BLD-MCNC: 10.8 G/DL (ref 14–18)
IMM GRANULOCYTES # BLD AUTO: 0.09 K/UL (ref 0–0.04)
IMM GRANULOCYTES NFR BLD AUTO: 0.5 % (ref 0–0.5)
INR PPP: 1.1 (ref 0.8–1.2)
INTERVENTRICULAR SEPTUM: 0.89 CM (ref 0.6–1.1)
LA MAJOR: 4.91 CM
LA MINOR: 4.56 CM
LA WIDTH: 3.28 CM
LACTATE SERPL-SCNC: 0.6 MMOL/L (ref 0.5–2.2)
LACTATE SERPL-SCNC: 0.7 MMOL/L (ref 0.5–2.2)
LACTATE SERPL-SCNC: 0.7 MMOL/L (ref 0.5–2.2)
LACTATE SERPL-SCNC: 2.3 MMOL/L (ref 0.5–2.2)
LEFT ATRIUM SIZE: 3.36 CM
LEFT ATRIUM VOLUME INDEX: 25.5 ML/M2
LEFT ATRIUM VOLUME: 44.3 CM3
LEFT INTERNAL DIMENSION IN SYSTOLE: 2.39 CM (ref 2.1–4)
LEFT VENTRICLE DIASTOLIC VOLUME INDEX: 37.5 ML/M2
LEFT VENTRICLE DIASTOLIC VOLUME: 65.09 ML
LEFT VENTRICLE MASS INDEX: 50 G/M2
LEFT VENTRICLE SYSTOLIC VOLUME INDEX: 11.5 ML/M2
LEFT VENTRICLE SYSTOLIC VOLUME: 19.88 ML
LEFT VENTRICULAR INTERNAL DIMENSION IN DIASTOLE: 3.88 CM (ref 3.5–6)
LEFT VENTRICULAR MASS: 87.43 G
LV LATERAL E/E' RATIO: 9 M/S
LV SEPTAL E/E' RATIO: 10.8 M/S
LYMPHOCYTES # BLD AUTO: 2.9 K/UL (ref 1–4.8)
LYMPHOCYTES NFR BLD: 16.6 % (ref 18–48)
MAGNESIUM SERPL-MCNC: 1.5 MG/DL (ref 1.6–2.6)
MCH RBC QN AUTO: 31.3 PG (ref 27–31)
MCHC RBC AUTO-ENTMCNC: 31.8 G/DL (ref 32–36)
MCV RBC AUTO: 99 FL (ref 82–98)
MODE: ABNORMAL
MONOCYTES # BLD AUTO: 2 K/UL (ref 0.3–1)
MONOCYTES NFR BLD: 11.1 % (ref 4–15)
MV PEAK A VEL: 0.9 M/S
MV PEAK E VEL: 1.08 M/S
NEUTROPHILS # BLD AUTO: 12.5 K/UL (ref 1.8–7.7)
NEUTROPHILS NFR BLD: 71.3 % (ref 38–73)
NRBC BLD-RTO: 0 /100 WBC
PCO2 BLDA: 38.8 MMHG (ref 35–45)
PEEP: 5
PH SMN: 7.33 [PH] (ref 7.35–7.45)
PHOSPHATE SERPL-MCNC: 2.7 MG/DL (ref 2.7–4.5)
PISA TR MAX VEL: 2.56 M/S
PLATELET # BLD AUTO: 256 K/UL (ref 150–350)
PMV BLD AUTO: 10.4 FL (ref 9.2–12.9)
PO2 BLDA: 95 MMHG (ref 80–100)
POC BE: -5 MMOL/L
POC SATURATED O2: 97 % (ref 95–100)
POC TCO2: 22 MMOL/L (ref 23–27)
POCT GLUCOSE: 108 MG/DL (ref 70–110)
POCT GLUCOSE: 116 MG/DL (ref 70–110)
POTASSIUM SERPL-SCNC: 3.6 MMOL/L (ref 3.5–5.1)
PROT SERPL-MCNC: 5.4 G/DL (ref 6–8.4)
PROTHROMBIN TIME: 11.5 SEC (ref 9–12.5)
RA MAJOR: 5.26 CM
RA WIDTH: 3.15 CM
RBC # BLD AUTO: 3.45 M/UL (ref 4.6–6.2)
RIGHT VENTRICULAR END-DIASTOLIC DIMENSION: 3.6 CM
RV TISSUE DOPPLER FREE WALL SYSTOLIC VELOCITY 1 (APICAL 4 CHAMBER VIEW): 13 CM/S
SAMPLE: ABNORMAL
SINUS: 3.39 CM
SITE: ABNORMAL
SODIUM SERPL-SCNC: 140 MMOL/L (ref 136–145)
STJ: 2.77 CM
TDI LATERAL: 0.12 M/S
TDI SEPTAL: 0.1 M/S
TDI: 0.11 M/S
TR MAX PG: 26 MMHG
TRICUSPID ANNULAR PLANE SYSTOLIC EXCURSION: 2.33 CM
TROPONIN I SERPL DL<=0.01 NG/ML-MCNC: 0.02 NG/ML (ref 0–0.03)
TROPONIN I SERPL DL<=0.01 NG/ML-MCNC: <0.006 NG/ML (ref 0–0.03)
VT: 450
WBC # BLD AUTO: 17.61 K/UL (ref 3.9–12.7)

## 2020-02-03 PROCEDURE — 25000003 PHARM REV CODE 250: Mod: HCNC | Performed by: PSYCHIATRY & NEUROLOGY

## 2020-02-03 PROCEDURE — 99900035 HC TECH TIME PER 15 MIN (STAT): Mod: HCNC

## 2020-02-03 PROCEDURE — 25000003 PHARM REV CODE 250: Mod: HCNC | Performed by: PHYSICIAN ASSISTANT

## 2020-02-03 PROCEDURE — 27200188 HC TRANSDUCER, ART ADULT/PEDS: Mod: HCNC

## 2020-02-03 PROCEDURE — 94761 N-INVAS EAR/PLS OXIMETRY MLT: CPT | Mod: HCNC

## 2020-02-03 PROCEDURE — 27200966 HC CLOSED SUCTION SYSTEM: Mod: HCNC

## 2020-02-03 PROCEDURE — 36620 INSERTION CATHETER ARTERY: CPT | Mod: HCNC

## 2020-02-03 PROCEDURE — 99291 CRITICAL CARE FIRST HOUR: CPT | Mod: HCNC,GC,, | Performed by: PSYCHIATRY & NEUROLOGY

## 2020-02-03 PROCEDURE — 82553 CREATINE MB FRACTION: CPT | Mod: HCNC

## 2020-02-03 PROCEDURE — 83735 ASSAY OF MAGNESIUM: CPT | Mod: HCNC

## 2020-02-03 PROCEDURE — 63600175 PHARM REV CODE 636 W HCPCS: Mod: HCNC | Performed by: NURSE PRACTITIONER

## 2020-02-03 PROCEDURE — 84484 ASSAY OF TROPONIN QUANT: CPT | Mod: HCNC

## 2020-02-03 PROCEDURE — 99291 PR CRITICAL CARE, E/M 30-74 MINUTES: ICD-10-PCS | Mod: HCNC,GC,, | Performed by: PSYCHIATRY & NEUROLOGY

## 2020-02-03 PROCEDURE — 95700 EEG CONT REC W/VID EEG TECH: CPT | Mod: HCNC

## 2020-02-03 PROCEDURE — 99222 PR INITIAL HOSPITAL CARE,LEVL II: ICD-10-PCS | Mod: HCNC,,, | Performed by: SURGERY

## 2020-02-03 PROCEDURE — 94003 VENT MGMT INPAT SUBQ DAY: CPT | Mod: HCNC

## 2020-02-03 PROCEDURE — 83605 ASSAY OF LACTIC ACID: CPT | Mod: HCNC

## 2020-02-03 PROCEDURE — 99222 1ST HOSP IP/OBS MODERATE 55: CPT | Mod: HCNC,GC,, | Performed by: OTOLARYNGOLOGY

## 2020-02-03 PROCEDURE — 25000003 PHARM REV CODE 250: Mod: HCNC | Performed by: NURSE PRACTITIONER

## 2020-02-03 PROCEDURE — S0028 INJECTION, FAMOTIDINE, 20 MG: HCPCS | Mod: HCNC | Performed by: PHYSICIAN ASSISTANT

## 2020-02-03 PROCEDURE — 82550 ASSAY OF CK (CPK): CPT | Mod: HCNC

## 2020-02-03 PROCEDURE — 95720 EEG PHY/QHP EA INCR W/VEEG: CPT | Mod: HCNC,,, | Performed by: PSYCHIATRY & NEUROLOGY

## 2020-02-03 PROCEDURE — 82803 BLOOD GASES ANY COMBINATION: CPT | Mod: HCNC

## 2020-02-03 PROCEDURE — 80053 COMPREHEN METABOLIC PANEL: CPT | Mod: HCNC

## 2020-02-03 PROCEDURE — 84100 ASSAY OF PHOSPHORUS: CPT | Mod: HCNC

## 2020-02-03 PROCEDURE — 37799 UNLISTED PX VASCULAR SURGERY: CPT | Mod: HCNC

## 2020-02-03 PROCEDURE — 95714 VEEG EA 12-26 HR UNMNTR: CPT | Mod: HCNC

## 2020-02-03 PROCEDURE — 85730 THROMBOPLASTIN TIME PARTIAL: CPT | Mod: HCNC

## 2020-02-03 PROCEDURE — 63600175 PHARM REV CODE 636 W HCPCS: Mod: HCNC | Performed by: PSYCHIATRY & NEUROLOGY

## 2020-02-03 PROCEDURE — 95718 EEG PHYS/QHP 2-12 HR W/VEEG: CPT | Mod: HCNC,,, | Performed by: PSYCHIATRY & NEUROLOGY

## 2020-02-03 PROCEDURE — 99222 PR INITIAL HOSPITAL CARE,LEVL II: ICD-10-PCS | Mod: HCNC,GC,, | Performed by: OTOLARYNGOLOGY

## 2020-02-03 PROCEDURE — 99222 1ST HOSP IP/OBS MODERATE 55: CPT | Mod: HCNC,,, | Performed by: SURGERY

## 2020-02-03 PROCEDURE — 20000000 HC ICU ROOM: Mod: HCNC

## 2020-02-03 PROCEDURE — 63600175 PHARM REV CODE 636 W HCPCS: Mod: HCNC | Performed by: PHYSICIAN ASSISTANT

## 2020-02-03 PROCEDURE — 85610 PROTHROMBIN TIME: CPT | Mod: HCNC

## 2020-02-03 PROCEDURE — 27000221 HC OXYGEN, UP TO 24 HOURS: Mod: HCNC

## 2020-02-03 PROCEDURE — 95720 PR EEG, W/VIDEO, CONT RECORD, I&R, >12<26 HRS: ICD-10-PCS | Mod: HCNC,,, | Performed by: PSYCHIATRY & NEUROLOGY

## 2020-02-03 PROCEDURE — 99900026 HC AIRWAY MAINTENANCE (STAT): Mod: HCNC

## 2020-02-03 PROCEDURE — 95718 PR EEG, W/VIDEO, CONT RECORD, I&R, 2-12 HRS: ICD-10-PCS | Mod: HCNC,,, | Performed by: PSYCHIATRY & NEUROLOGY

## 2020-02-03 PROCEDURE — 85025 COMPLETE CBC W/AUTO DIFF WBC: CPT | Mod: HCNC

## 2020-02-03 PROCEDURE — 83605 ASSAY OF LACTIC ACID: CPT | Mod: 91,HCNC

## 2020-02-03 RX ORDER — LANOLIN ALCOHOL/MO/W.PET/CERES
800 CREAM (GRAM) TOPICAL
Status: DISCONTINUED | OUTPATIENT
Start: 2020-02-03 | End: 2020-02-06

## 2020-02-03 RX ORDER — POTASSIUM CHLORIDE 20 MEQ/15ML
40 SOLUTION ORAL
Status: DISCONTINUED | OUTPATIENT
Start: 2020-02-03 | End: 2020-02-27

## 2020-02-03 RX ORDER — SODIUM,POTASSIUM PHOSPHATES 280-250MG
2 POWDER IN PACKET (EA) ORAL
Status: DISCONTINUED | OUTPATIENT
Start: 2020-02-03 | End: 2020-02-06

## 2020-02-03 RX ORDER — MIDAZOLAM HYDROCHLORIDE 1 MG/ML
1 INJECTION INTRAMUSCULAR; INTRAVENOUS ONCE
Status: COMPLETED | OUTPATIENT
Start: 2020-02-03 | End: 2020-02-03

## 2020-02-03 RX ORDER — DEXMEDETOMIDINE HYDROCHLORIDE 4 UG/ML
0.2 INJECTION, SOLUTION INTRAVENOUS CONTINUOUS
Status: DISCONTINUED | OUTPATIENT
Start: 2020-02-03 | End: 2020-02-05

## 2020-02-03 RX ORDER — FENTANYL CITRATE 50 UG/ML
25 INJECTION, SOLUTION INTRAMUSCULAR; INTRAVENOUS
Status: DISCONTINUED | OUTPATIENT
Start: 2020-02-03 | End: 2020-02-17

## 2020-02-03 RX ORDER — MIDAZOLAM HYDROCHLORIDE 1 MG/ML
0.5 INJECTION INTRAMUSCULAR; INTRAVENOUS ONCE
Status: COMPLETED | OUTPATIENT
Start: 2020-02-03 | End: 2020-02-03

## 2020-02-03 RX ORDER — POTASSIUM CHLORIDE 20 MEQ/15ML
60 SOLUTION ORAL
Status: DISCONTINUED | OUTPATIENT
Start: 2020-02-03 | End: 2020-02-27

## 2020-02-03 RX ORDER — NOREPINEPHRINE BITARTRATE/D5W 4MG/250ML
0.02 PLASTIC BAG, INJECTION (ML) INTRAVENOUS CONTINUOUS
Status: DISCONTINUED | OUTPATIENT
Start: 2020-02-03 | End: 2020-02-10

## 2020-02-03 RX ADMIN — FENTANYL CITRATE 25 MCG: 50 INJECTION INTRAMUSCULAR; INTRAVENOUS at 11:02

## 2020-02-03 RX ADMIN — CHLORHEXIDINE GLUCONATE 0.12% ORAL RINSE 15 ML: 1.2 LIQUID ORAL at 09:02

## 2020-02-03 RX ADMIN — FENTANYL CITRATE 50 MCG: 50 INJECTION INTRAMUSCULAR; INTRAVENOUS at 02:02

## 2020-02-03 RX ADMIN — ATORVASTATIN CALCIUM 40 MG: 20 TABLET, FILM COATED ORAL at 08:02

## 2020-02-03 RX ADMIN — MIDAZOLAM HYDROCHLORIDE 0.5 MG: 1 INJECTION, SOLUTION INTRAMUSCULAR; INTRAVENOUS at 10:02

## 2020-02-03 RX ADMIN — DOCUSATE SODIUM - SENNOSIDES 1 TABLET: 50; 8.6 TABLET, FILM COATED ORAL at 09:02

## 2020-02-03 RX ADMIN — SODIUM CHLORIDE 2000 ML: 0.9 INJECTION, SOLUTION INTRAVENOUS at 12:02

## 2020-02-03 RX ADMIN — LEVETIRACETAM INJECTION 1500 MG: 15 INJECTION INTRAVENOUS at 08:02

## 2020-02-03 RX ADMIN — VANCOMYCIN HYDROCHLORIDE 1250 MG: 1.25 INJECTION, POWDER, LYOPHILIZED, FOR SOLUTION INTRAVENOUS at 01:02

## 2020-02-03 RX ADMIN — PIPERACILLIN SODIUM AND TAZOBACTAM SODIUM 4.5 G: 4; .5 INJECTION, POWDER, LYOPHILIZED, FOR SOLUTION INTRAVENOUS at 12:02

## 2020-02-03 RX ADMIN — FENTANYL CITRATE 50 MCG: 50 INJECTION INTRAMUSCULAR; INTRAVENOUS at 08:02

## 2020-02-03 RX ADMIN — Medication 0.08 MCG/KG/MIN: at 11:02

## 2020-02-03 RX ADMIN — FENTANYL CITRATE 25 MCG: 50 INJECTION INTRAMUSCULAR; INTRAVENOUS at 12:02

## 2020-02-03 RX ADMIN — ESCITALOPRAM OXALATE 20 MG: 10 TABLET ORAL at 08:02

## 2020-02-03 RX ADMIN — DOCUSATE SODIUM - SENNOSIDES 1 TABLET: 50; 8.6 TABLET, FILM COATED ORAL at 08:02

## 2020-02-03 RX ADMIN — MIDAZOLAM HYDROCHLORIDE 1 MG: 1 INJECTION, SOLUTION INTRAMUSCULAR; INTRAVENOUS at 01:02

## 2020-02-03 RX ADMIN — ACETAMINOPHEN 650 MG: 325 TABLET ORAL at 08:02

## 2020-02-03 RX ADMIN — DEXMEDETOMIDINE HYDROCHLORIDE 0.2 MCG/KG/HR: 4 INJECTION, SOLUTION INTRAVENOUS at 11:02

## 2020-02-03 RX ADMIN — CHLORHEXIDINE GLUCONATE 0.12% ORAL RINSE 15 ML: 1.2 LIQUID ORAL at 05:02

## 2020-02-03 RX ADMIN — FAMOTIDINE 20 MG: 10 INJECTION, SOLUTION INTRAVENOUS at 08:02

## 2020-02-03 RX ADMIN — FENTANYL CITRATE 25 MCG: 50 INJECTION INTRAMUSCULAR; INTRAVENOUS at 07:02

## 2020-02-03 RX ADMIN — FENTANYL CITRATE 25 MCG: 50 INJECTION INTRAMUSCULAR; INTRAVENOUS at 04:02

## 2020-02-03 RX ADMIN — CHLORHEXIDINE GLUCONATE 0.12% ORAL RINSE 15 ML: 1.2 LIQUID ORAL at 01:02

## 2020-02-03 RX ADMIN — SODIUM CHLORIDE 1000 ML: 0.9 INJECTION, SOLUTION INTRAVENOUS at 10:02

## 2020-02-03 RX ADMIN — PIPERACILLIN SODIUM AND TAZOBACTAM SODIUM 4.5 G: 4; .5 INJECTION, POWDER, LYOPHILIZED, FOR SOLUTION INTRAVENOUS at 05:02

## 2020-02-03 RX ADMIN — CHLORHEXIDINE GLUCONATE 0.12% ORAL RINSE 15 ML: 1.2 LIQUID ORAL at 08:02

## 2020-02-03 RX ADMIN — PIPERACILLIN SODIUM AND TAZOBACTAM SODIUM 4.5 G: 4; .5 INJECTION, POWDER, LYOPHILIZED, FOR SOLUTION INTRAVENOUS at 09:02

## 2020-02-03 NOTE — ASSESSMENT & PLAN NOTE
2/2Arrived intubated,   Vent Mode: A/C  Oxygen Concentration (%):  [40-60] 40  Resp Rate Total:  [16 br/min-31 br/min] 16 br/min  Vt Set:  [450 mL] 450 mL  PEEP/CPAP:  [5 cmH20] 5 cmH20  Mean Airway Pressure:  [8.7 xdY73-86 cmH20] 9.7 cmH20  Daily CXR / abg  Assess vent and wean as able  2/2 CT chest with LEft lobe collapse, but on CXR later today left lobe appears open. Will follow clinically  2/3 CXR pending

## 2020-02-03 NOTE — PLAN OF CARE
Nutrition assessment completed. Please see RD note for details.      Recommendations  1. If able to initiate enteral nutrition, recommend starting TF.  Impact Peptide @ goal rate 50mL/hr.   - Initiate @ 10mL/hr and increase by 10mL q4hrs, or as tolerated, until goal rate is reached.   - Provides 1800kcals, 113g protein and 924mL free water.     2. If unable to initiate enteral nutrition and parenteral nutrition warranted, recommend Custom TPN 250g Dextrose, 115g AA with 20% lipids in 250mL to provide 1810kcals, 115g protein.     RD to monitor.     Goals: Pt to receive nutrition by RD follow up  Nutrition Goal Status: new  Communication of RD Recs: reviewed with RN  Problem: Oral Intake Inadequate  Goal: Improved Oral Intake  Outcome: Ongoing, Progressing

## 2020-02-03 NOTE — HPI
Pt is a 64 y.o. Male with PMHx of of DVT, PE, DM, and seizures (on 1500 keppra at home) who presents to the ED via EMS with complaint of a seizure that occurred this morning. Pt has long standing history of seizures and was found down by wife on cement around 0800, LKN 0700. Patient compliant with keppra per wife and last seizure was in November. At that time his keppra dose was increased. Patient has had cough recently but no other symptoms. Patient is on AC for DVT/PE and was recently switched from Coumadin to Eliquis. He was given 10 mg Versed during EMS ride to outside facility for continued seizure activity. He was unresponsive upon arrival to ED and subsequently intubated. CTH without any acute changes, CT max/face with multiple facial fractures, and CXR with consolidation and atelectasis/collapsed lung. Patient transferred to [facility] to higher level neurologic monitoring and continuous EEG.

## 2020-02-03 NOTE — PROCEDURES
EXTENDED  ELECTROENCEPHALOGRAM  REPORT    Edu Choi  MRN: [id number]  CSN: [id number]  : 1955  EEG #: -1, -2    DATE OF SERVICE: 2020    DATE OF ADMISSION: 2020  7:24 PM    ADMITTING/REQUESTING PROVIDER:  Ricardo Yee MD    METHODOLOGY   Electroencephalographic (EEG) recording is with electrodes placed according to the International 10-20 placement system.  Thirty two (32) channels of digital signal (sampling rate of 512/sec) including T1 and T2 was simultaneously recorded from the scalp and may include  EKG, EMG, and/or eye monitors.  Recording band pass was 0.1 to 512 hz.  Digital video recording of the patient is simultaneously recorded with the EEG.  The patient is instructed report clinical symptoms which may occur during the recording session.  EEG and video recording is stored and archived in digital format.  Activation procedures which include photic stimulation, hyperventilation and instructing patients to perform simple task are done in selected patients.   The EEG is displayed on a monitor screen and can be reviewed using different montages.  Computer assisted analysis is employed to detect spike and electrographic seizure activity.   The entire record is submitted for computer analysis.  The entire recording is visually reviewed and the times identified by computer analysis as being spikes or seizures are reviewed again.  Compresses spectral analysis (CSA) is also performed on the activity recorded from each individual channel.  This is displayed as a power display of frequencies from 0 to 30 Hz over time.   The CSA is reviewed looking for asymmetries in power between homologous areas of the scalp and then compared with the original EEG recording.     Yeex[employer] software was also utilized in the review of this study.  This software suite analyzes the EEG recording in multiple domains.  Coherence and rhythmicity is computed to identify EEG sections which may contain  organized seizures.  Each channel undergoes analysis to detect presence of spike and sharp waves which have special and morphological characteristic of epileptic activity.  The routine EEG recording is converted from spacial into frequency domain.  This is then displayed comparing homologous areas to identify areas of significant asymmetry.  Algorithm to identify non-cortically generated artifact is used to separate eye movement, EMG and other artifact from the EEG.      RECORDING TIMES  Start on 2/3/2020 at 02:50   Stop on 2/3/2020 at 08:35  Total time: 5 hours and 45 minutes    EEG FINDINGS  Background activity:   This is an electrode cap study of fair quality. Vertex and R parasagittal leads were limited by electrode artifact for entire study.     The background rhythm was continuous, symmetric and characterized by admixture of generalized 5-7hz theta with overly alpha/beta. A poorly sustained 8 Hz posterior dominant alpha rhythm was seen with eye closure. Reactive and variable.     Sleep:  No normal sleep transients seen.     Activation procedures: not performed    Abnormal activity:   No epileptiform discharges, periodic discharges, lateralized rhythmic delta activity or electrographic seizures were seen.    EKG:  EKG was normal sinus rhythm.     IMPRESSION:   This is an abnormal EEG recording due to generalized theta/delta slowing.     The patient is a 64 year-old male who is being evaluated for acute encephalopathy and tbi with a history of epilepsy.  The patient is currently maintained on keppra. There is evidence for generalized cortical dysfunction consistent with a mild diffuse encephalopathy. No seizures or epileptiform discharges were recorded during this study.    Leticia Resendiz MD  Neurology-Epilepsy Fellow.  [facility].    This EEG was reviewed with the Fellow and the final impression was made by me, with appropriate corrections.    Rebecca Porter MD, RENEE(), FACNS,  DAVON.  Neurology-Epilepsy.  [facility].

## 2020-02-03 NOTE — HPI
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 He was transferred to [facility] for higher level of care. He was given 10 mg Versed during EMS ride to outside facility for continued seizure activity. He was unresponsive upon arrival to ED and subsequently intubated. At outside ED, he was noted to have left lung collapse and maxillofacial fractures. Upon arrival here, repeat CT C/A/P was order, demonstrating right colonic wall thickening and portal venous gas. He arrived intubated and sedated.  We were initially were consulted for right colonic wall thickening seen on CT, but signed off a few days later because exam was not concerning, white count normalized, and pt was having bowel function.  We are reconsulted for Trach/PEG.  Pt has not been able to wean off vent.  Has been on spontaneous for about five days, but have been unable to extubate due to neuro status.  Per pt's nurse, his neuro status changes frequently and sometimes he will follow commands, but minutes later will be almost unarousable.  He is off all sedation.  Currently he is on PS of 8 and a PEEP of 8 on Spontaneous mode.    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  No prior neck surgeries.

## 2020-02-03 NOTE — ED NOTES
Dr. Flores informed of pt's temperature. Verbal order for tylenol 325 mg [place] ordered. Verbal read back verification.

## 2020-02-03 NOTE — CONSULTS
"  [facility]  Adult Nutrition  Consult Note    SUMMARY     Recommendations  1. If able to initiate enteral nutrition, recommend starting TF.  Impact Peptide @ goal rate 50mL/hr.   - Initiate @ 10mL/hr and increase by 10mL q4hrs, or as tolerated, until goal rate is reached.   - Provides 1800kcals, 113g protein and 924mL free water.     2. If unable to initiate enteral nutrition and parenteral nutrition warranted, recommend Custom TPN 250g Dextrose, 115g AA with 20% lipids in 250mL to provide 1810kcals, 115g protein.     RD to monitor.     Goals: Pt to receive nutrition by RD follow up  Nutrition Goal Status: new  Communication of RD Recs: reviewed with RN    Reason for Assessment    Reason For Assessment: consult  Diagnosis: seizures  Relevant Medical History: HLD, DM, seizures, DVT/PE  Interdisciplinary Rounds: attended  General Information Comments: Pt intubated. No TF ordered at this time 2/2 possible bowel ischemia. Family at bedside providing nutrition hx. Per family, pt with adequate po intake/appetite PTA. No weight loss per family, weight stable approx 145-150lb > 7 years. Pt overweight and nourished without indications of malnutrition at this time.  Nutrition Discharge Planning: unable to determine at this time    Nutrition Risk Screen    Nutrition Risk Screen: no indicators present    Nutrition/Diet History    Spiritual, Cultural Beliefs, Caodaism Practices, Values that Affect Care: no    Anthropometrics    Temp: 100.2 °F (37.9 °C)  Height: 5' 4" (162.6 cm)  Height (inches): 64 in  Weight Method: Bed Scale  Weight: 68.5 kg (151 lb)  Weight (lb): 151 lb  Ideal Body Weight (IBW), Male: 130 lb  % Ideal Body Weight, Male (lb): 116.15 %  BMI (Calculated): 25.9  BMI Grade: 25 - 29.9 - overweight       Lab/Procedures/Meds    Pertinent Labs Reviewed: reviewed  Pertinent Labs Comments: HgbA1c 6.1, POCT Glu   Pertinent Medications Reviewed: reviewed  Pertinent Medications Comments: IVF, " versed    Estimated/Assessed Needs    Weight Used For Calorie Calculations: 68.5 kg (151 lb 0.2 oz)  Energy Calorie Requirements (kcal): 1817  Energy Need Method: [place]  Protein Requirements: 82-103g(1.2-1.5g/kg)  Weight Used For Protein Calculations: 68.5 kg (151 lb 0.2 oz)  Fluid Requirements (mL): 1mL/kcal or per MD     RDA Method (mL): 1817  CHO Requirement: 227g CHO daily      Nutrition Prescription Ordered    Current Diet Order: NPO    Evaluation of Received Nutrient/Fluid Intake    IV Fluid (mL): 2400  % Intake of Estimated Energy Needs: 0 - 25 %  % Meal Intake: NPO    Nutrition Risk    Level of Risk/Frequency of Follow-up: high(f/u 2x/week)     Assessment and Plan     Nutrition Problem  Inadequate energy intake    Related to (etiology):   NPO    Signs and Symptoms (as evidenced by):   Pt receiving < 75% EEN and EPN     Interventions(treatment strategy):  Collaboration of care with providers    Nutrition Diagnosis Status:   New    Monitor and Evaluation    Food and Nutrient Intake: energy intake, food and beverage intake, enteral nutrition intake  Food and Nutrient Adminstration: diet order, enteral and parenteral nutrition administration  Anthropometric Measurements: weight, weight change, body mass index  Biochemical Data, Medical Tests and Procedures: electrolyte and renal panel, inflammatory profile, gastrointestinal profile, glucose/endocrine profile, lipid profile  Nutrition-Focused Physical Findings: overall appearance     Nutrition Follow-Up    RD Follow-up?: Yes

## 2020-02-03 NOTE — PLAN OF CARE
CM met with patient and wife in room for [place] Planning Assessment.  Patient unable to answer questions (Vent).  Per wife, the patient lives with wife in a single story home with 4 step(s) to enter.   Per wife, the patient was independent with ADLS and used no dme for ambulation.  Patient will have assistance from wife upon discharge.   Discharge Planning Booklet given to patient/family and discussed.  All questions addressed.  CM will follow for needs.       02/03/20 6496   Discharge Assessment   Assessment Type Discharge Planning Assessment   Confirmed/corrected address and phone number on facesheet? Yes   Assessment information obtained from? Caregiver  (wife, Nicole)   Expected Length of Stay (days) 5   Communicated expected length of stay with patient/caregiver yes   Prior to hospitilization cognitive status: Alert/Oriented   Prior to hospitalization functional status: Independent   Current cognitive status: Unable to Assess   Current Functional Status: Completely Dependent  (vent)   Lives With spouse   Able to Return to Prior Arrangements yes   Is patient able to care for self after discharge? Unable to determine at this time (comments)   Who are your caregiver(s) and their phone number(s)? Nicole Choi (wife) [phone]   Patient's perception of discharge disposition other (comments)  (hawk)   Readmission Within the Last 30 Days no previous admission in last 30 days   Patient currently being followed by outpatient case management? No   Patient currently receives any other outside agency services? No   Equipment Currently Used at Home none   Part D Coverage [employer]   Do you have any problems affording any of your prescribed medications? No   Is the patient taking medications as prescribed? yes   Does the patient have transportation home? Yes   Transportation Anticipated family or friend will provide   Does the patient receive services at the Coumadin Clinic? No   Discharge Plan A Long-term acute care  facility (LTAC)   Discharge Plan B Rehab;Home Health   DME Needed Upon Discharge  other (see comments)  (tbd)   Patient/Family in Agreement with Plan yes                PCP:  Gladis Blankenship MD        Pharmacy:    [id number] [place] - [address]  [address] [address]  Phone: [phone] Fax: [phone]    [facility] - [facility] [address] [facility]  [address] [facility] [address]  Phone: [phone] Fax: [phone]        Emergency Contacts:  Extended Emergency Contact Information  Primary Emergency Contact: Nicole Choi  Address: [address] [place] [address] .           [place] [address] [facility]  Home Phone: [phone]  Mobile Phone: [phone]  Relation: Spouse  Secondary Emergency Contact: Edu Choi   [facility]  Home Phone: [phone]  Relation: Son      Insurance:    Payor: [employer] MANAGED MEDICARE / Plan: HU[employer] TOTAL CARE ADVANTAGE / Product Type: Medicare Advantage /       Stefanie Ponce RN, CCRN-K, [facility]  Neuro-Critical Care   X [id number]    02/03/2020  3:45 PM

## 2020-02-03 NOTE — PT/OT/SLP PROGRESS
Speech Language Pathology  Discharge    Edu Choi  MRN: [id number]    Patient not seen today secondary to pt intubated at this time. Please re-consult when medically appropriate. No further ST warranted at this time.       Eugenia Ruiz CCC-SLP  2/3/2020

## 2020-02-03 NOTE — PROGRESS NOTES
[facility]  Neurocritical Care  Progress Note    Admit Date: 2/2/2020  Service Date: 02/03/2020  Length of Stay: 1    Subjective:     Chief Complaint: Seizure    History of Present Illness: Pt is a 64 y.o. Male with PMHx of of DVT, PE, DM, and seizures (on 1500 keppra at home) who presents to the ED via EMS with complaint of a seizure that occurred this morning. Pt has long standing history of seizures and was found down by wife on cement around 0800, LKN 0700. Patient compliant with keppra per wife and last seizure was in November. At that time his keppra dose was increased. Patient has had cough recently but no other symptoms. Patient is on AC for DVT/PE and was recently switched from Coumadin to Eliquis. He was given 10 mg Versed during EMS ride to outside facility for continued seizure activity. He was unresponsive upon arrival to ED and subsequently intubated. CTH without any acute changes, CT max/face with multiple facial fractures, and CXR with consolidation and atelectasis/collapsed lung. Patient transferred to [facility] to higher level neurologic monitoring and continuous EEG.     Hospital Course: 2/2/2020: Admit to [facility]. GS and ENT consulted. MX facial fractures and concerns of bowel ischemia on CT  2/3 will rehook to EEG for 24h. Remains NPO. US LE pending. Monitor lactic [id number] 2L  IV. Wean levophed as tolerated after bolus    Interval History: will rehook to EEG for 24h. Remains NPO. US LE pending. Monitor lactic [id number] 2L  IV. Wean levophed as tolerated after bolus      Review of Systems:  Unable to obtain a complete ROS due to level of consciousness. Pt intubated    Vitals:   Temp: 98.8 °F (37.1 °C)  Pulse: 69  Rhythm: normal sinus rhythm  BP: (!) 104/54  MAP (mmHg): 78  Resp: 16  SpO2: 98 %  Oxygen Concentration (%): 40  O2 Device (Oxygen Therapy): ventilator  Vent Mode: A/C  Set Rate: 16 BPM  Vt Set: 450 mL  PEEP/CPAP: 5 cmH20  Peak Airway Pressure: 28 cmH2O  Mean Airway Pressure:  9.7 cmH20  Plateau Pressure: 0 cmH20    Temp  Min: 98.2 °F (36.8 °C)  Max: 101.7 °F (38.7 °C)  Pulse  Min: 69  Max: 115  BP  Min: 94/46  Max: 147/65  MAP (mmHg)  Min: 72  Max: 84  Resp  Min: 15  Max: 28  SpO2  Min: 74 %  Max: 100 %  Oxygen Concentration (%)  Min: 40  Max: 60    02/02 0701 - 02/03 0700  In: 1266.8 [I.V.:966.8]  Out: 250 [Urine:250]         Examination:   Constitutional: Well-nourished and -developed. No apparent distress.   Eyes: Conjunctiva clear, anicteric. Lids no lesions.  Head/Ears/Nose/Mouth/Throat/Neck: Head with abrasion, contusion and laceration. Fult. Facial fractiures. Moist mucous membranes. External ears, nose atraumatic.   Cardiovascular: Regular rhythm. No murmurs. No leg edema.  Respiratory: Mech. Ventilation. Bibasilar coarse crackles  Gastrointestinal: No hernia. Soft, nondistended, nontender. + bowel sounds.    Neurologic:  -GCS E1V1M5  Intubated. No sedation. Not opeining eyes to stimulation. PERRL 3+ + cough gag, corneals. Does not follow commands.  -Motor ZAMUDIO spon but not to command  -Sensation intact  Unable to test orientation, language, memory, judgment, insight, fund of knowledge, hearing, shoulder shrug, tongue protrusion, coordination, gait due to level of consciousness.    Medications:   Continuous  sodium chloride 0.9% Last Rate: 100 mL/hr at 02/03/20 1105   norepinephrine bitartrate-D5W Last Rate: 0.1 mcg/kg/min (02/03/20 1305)   Scheduled  atorvastatin 40 mg QHS   chlorhexidine 15 mL QID   escitalopram oxalate 20 mg Daily   famotidine (PF) 20 mg BID   levetiracetam IVPB 1,500 mg Q12H   piperacillin-tazobactam (ZOSYN) IVPB 4.5 g Q8H   senna-docusate 8.6-50 mg 1 tablet BID   vancomycin (VANCOCIN) IVPB 1,250 mg Q24H   PRN  acetaminophen 650 mg Q6H PRN   Dextrose 10% Bolus 12.5 g PRN   fentaNYL 25 mcg Q2H PRN   glucagon (human recombinant) 1 mg PRN   insulin aspart U-100 1-10 Units Q6H PRN   labetalol 10 mg Q4H PRN   lorazepam 2 mg Q1H PRN   magnesium oxide 800 mg PRN    magnesium oxide 800 mg PRN   ondansetron 4 mg Q8H PRN   potassium chloride 10% 40 mEq PRN   potassium chloride 10% 40 mEq PRN   potassium chloride 10% 60 mEq PRN   potassium, sodium phosphates 2 packet PRN   potassium, sodium phosphates 2 packet PRN   potassium, sodium phosphates 2 packet PRN   sodium chloride 0.9% 10 mL PRN   vancomycin - pharmacy to dose  pharmacy to manage frequency      Today I independently reviewed pertinent medications, lines/drains/airways, imaging, laboratory results, microbiology results, notably:     ISTAT: Recent Labs   Lab 02/03/20  0733   PH 7.335*   PCO2 38.8   PO2 95   POCSATURATED 97   HCO3 20.7*   BE -5   POCTCO2 22*   SAMPLE ARTERIAL      Chem: Recent Labs   Lab 02/03/20  0132      K 3.6   *   CO2 20*   *   BUN 12   CREATININE 0.8   ESTGFRAFRICA >60.0   EGFRNONAA >60.0   CALCIUM 6.7*   MG 1.5*   PHOS 2.7   ANIONGAP 8   PROT 5.4*   ALBUMIN 2.8*   BILITOT 0.6   ALKPHOS 57   AST 21   ALT 12   TROPONINI <0.006     Heme: Recent Labs   Lab 02/02/20  1927 02/03/20  0132   WBC  --  17.61*   HGB  --  10.8*   HCT  --  34.0*   PLT  --  256   PROCAL 0.06  --    INR  --  1.1     Endo:   Recent Labs   Lab 02/03/20  0016 02/03/20  0623   POCTGLUCOSE 116* 108      Assessment/Plan:     Neuro  * Seizure  2/2 Admit to [facility]  -Q 1 hour neuro checks/ vital signs   Epilepsy following  EEG cap over night reconnect to 24h EEG  -continue home keppra 1500 mg   IVF /h      Psychiatric  Depression with anxiety  Continue escitalopram 20mg daily    Pulmonary  Aspiration pneumonitis  Found down, concerns for aspiration pneumonia/pneumonitis.   CT C/A/P showed diffuse right colonic wall thickening concern for colitis, no obstruction noted.  Cultures pending  Vancomycin 1250mg q24      Acute respiratory failure with hypoxia  2/2Arrived intubated,   Vent Mode: A/C  Oxygen Concentration (%):  [40-60] 40  Resp Rate Total:  [16 br/min-31 br/min] 16 br/min  Vt Set:  [450 mL] 450 mL  PEEP/CPAP:   [5 cmH20] 5 cmH20  Mean Airway Pressure:  [8.7 vxL81-81 cmH20] 9.7 cmH20  Daily CXR / abg  Assess vent and wean as able  2/2 CT chest with LEft lobe collapse, but on CXR later today left lobe appears open. Will follow clinically  2/3 CXR pending    Cardiac/Vascular  Idiopathic hypotension  IVF /h   bolus 2L  And wan levophed  - A-line placed  Levo gtt for MAp > 65    Hematology  Chronic anticoagulation  Monitor Coags   Elloquis held until eval bowl ischemia and CT head    History of DVT (deep vein thrombosis)  2/3 Upper and lower extremity US negative for DVT    GI  Large bowel ischemia  Wall thickening and diffuse portal vein air noted on CT abdomen  Lactic 0.8, will trend  BS x 4, soft and nondistended.  NG to LIWS, brown drainage  General Surgery consulted.     Orthopedic  Multiple closed fractures of facial bone  ENT consulted,  No CSF noted  Will get beta-2 transferrin if clear drainage          The patient is being Prophylaxed for:  Venous Thromboembolism with: Mechanical  Stress Ulcer with: H2B  Ventilator Pneumonia with: chlorhexidine oral care    Activity Orders          Diet NPO: NPO starting at 02/02 1925    Commode at bedside starting at 02/02 1925        Full Code     I have spent 35 min with this patient, with over 50% of this time spent coordinating care and speaking with the family    Tammie Bob NP  Neurocritical Care  [facility]

## 2020-02-03 NOTE — SUBJECTIVE & OBJECTIVE
Past Medical History:   Diagnosis Date    Atherosclerosis of aorta 6/22/2017    Atherosclerosis of native coronary artery of native heart without angina pectoris 6/22/2017    Blood clotting tendency     Depression     Diabetes mellitus type 2 in nonobese     DVT (deep venous thrombosis)     Hyperlipidemia     Measles complicated by meningitis     during childhood, with subsequent seizures    Newly diagnosed diabetes 4/18/2017    PE (pulmonary embolism)     Seizures     Splenomegaly     Thrombophlebitis leg     bilateral legs     Past Surgical History:   Procedure Laterality Date    ANAL FISTULOTOMY      COLONOSCOPY N/A 10/14/2016    Procedure: COLONOSCOPY;  Surgeon: Edu Stratton MD;  Location: [facility] ([address] [place] [address]);  Service: Endoscopy;  Laterality: N/A;  Patient may hold Coumadin x 3 days prior to procedure with out Lovenox bridge as per C.C./svn/coumadin lab 1st    SPLENECTOMY, TOTAL        Current Facility-Administered Medications on File Prior to Encounter   Medication Dose Route Frequency Provider Last Rate Last Dose    [COMPLETED] acetaminophen suppository 325 mg  325 mg Rectal ED 1 Time Margot Flores MD   325 mg at 02/02/20 1826    [COMPLETED] acetaminophen suppository 650 mg  650 mg Rectal ED 1 Time Margot Flores MD   650 mg at 02/02/20 1413    [COMPLETED] cefTRIAXone (ROCEPHIN) 2 g in dextrose 5 % 50 mL IVPB  2 g Intravenous ED 1 Time Margot Flores MD   Stopped at 02/02/20 1817    [COMPLETED] etomidate injection 20 mg  20 mg Intravenous ED 1 Time Margot Flores MD   20 mg at 02/02/20 0944    [COMPLETED] etomidate injection   Intravenous Code/trauma/sedation Med Margot Flores MD   20 mg at 02/02/20 0944    [COMPLETED] lorazepam (ATIVAN) 2 mg/mL injection        2 mg at 02/02/20 1152    [COMPLETED] lorazepam (ATIVAN) injection 2 mg  2 mg Intravenous ED 1 Time Margot Flores MD   2 mg at 02/02/20 1120    [COMPLETED] lorazepam (ATIVAN) injection 2 mg  2 mg  Intravenous ED 1 Time Margot Flores MD   2 mg at 02/02/20 1320    [COMPLETED] piperacillin-tazobactam 4.5 g in dextrose 5 % 100 mL IVPB (ready to mix system)  4.5 g Intravenous ED 1 Time Margot Flores MD   Stopped at 02/02/20 1306    [COMPLETED] rocuronium injection   Intravenous Code/trauma/sedation Med Margot Flores MD   70 mg at 02/02/20 0945    [COMPLETED] sodium chloride 0.9% bolus 1,000 mL  1,000 mL Intravenous ED 1 Time Margot Flores MD   Stopped at 02/02/20 1120    [COMPLETED] sodium chloride 0.9% bolus 1,953 mL  30 mL/kg Intravenous ED 1 Time Margot Flores MD   Stopped at 02/02/20 1420    [COMPLETED] vancomycin (VANCOCIN) 2,000 mg in dextrose 5 % 500 mL IVPB  2,000 mg Intravenous ED 1 Time Margot Flores MD   Stopped at 02/02/20 1511    [DISCONTINUED] levETIRAcetam in NaCl (iso-os) IVPB 1,000 mg  1,000 mg Intravenous ED 1 Time Margot Flores MD        [DISCONTINUED] lorazepam (ATIVAN) injection 2 mg  2 mg Intravenous ED 1 Time Margot Flores MD        [DISCONTINUED] propofol (DIPRIVAN) 10 mg/mL infusion  5 mcg/kg/min Intravenous Continuous Margot Flores MD 5.9 mL/hr at 02/02/20 1140 15 mcg/kg/min at 02/02/20 1140    [DISCONTINUED] propofol (DIPRIVAN) 10 mg/mL infusion             [DISCONTINUED] rocuronium injection 70 mg  70 mg Intravenous Once Margot Flores MD         Current Outpatient Medications on File Prior to Encounter   Medication Sig Dispense Refill    apixaban (ELIQUIS) 5 mg Tab Take 1 tablet (5 mg total) by mouth 2 (two) times daily. 60 tablet 0    atorvastatin (LIPITOR) 40 MG tablet TAKE 1 TABLET EVERY EVENING 90 tablet 3    escitalopram oxalate (LEXAPRO) 20 MG tablet TAKE 1 TABLET EVERY DAY 90 tablet 3    FLUZONE HIGH-DOSE 2019-20, PF, 180 mcg/0.5 mL Syrg       lancets 30 gauge Misc 1 lancet by Misc.(Non-Drug; Combo Route) route once daily. 100 each 11    levETIRAcetam (KEPPRA) 750 MG Tab Take 2 tablets (1,500 mg total) by mouth 2  (two) times daily. 360 tablet 1    TRUE METRIX GLUCOSE TEST STRIP Strp 1 strip by Misc.(Non-Drug; Combo Route) route once daily. 100 strip 11      Allergies: No known drug allergies    Family History   Problem Relation Age of Onset    Thrombophlebitis Father     Heart failure Father     Heart attack Father     Hypertension Father     Melanoma Neg Hx     Colon cancer Neg Hx     Esophageal cancer Neg Hx     Rectal cancer Neg Hx     Stomach cancer Neg Hx      Social History     Tobacco Use    Smoking status: Current Every Day Smoker     Packs/day: 0.25     Years: 33.00     Pack years: 8.25    Smokeless tobacco: Never Used   Substance Use Topics    Alcohol use: No    Drug use: No     Review of Systems   Unable to perform ROS: Intubated   Constitutional: Negative for unexpected weight change.     Objective:     Vitals:    Temp: (!) 101.3 °F (38.5 °C)  Pulse: 91  Resp: 19  SpO2: 98 %  Oxygen Concentration (%): 60  O2 Device (Oxygen Therapy): ventilator  Vent Mode: A/C  Set Rate: 16 BPM  Vt Set: 450 mL  PEEP/CPAP: 5 cmH20  Peak Airway Pressure: 30 cmH2O  Mean Airway Pressure: 12 cmH20  Plateau Pressure: 0 cmH20    Temp  Min: 98.7 °F (37.1 °C)  Max: 102.2 °F (39 °C)  Pulse  Min: 91  Max: 134  BP  Min: 93/50  Max: 188/59  MAP (mmHg)  Min: 66  Max: 130  Resp  Min: 16  Max: 37  SpO2  Min: 74 %  Max: 100 %  Oxygen Concentration (%)  Min: 40  Max: 100    No intake/output data recorded.           Physical Exam   Constitutional: He appears well-developed and well-nourished.   HENT:   Head: Head is with abrasion, with contusion and with laceration.   Multiple facial fractures   Eyes: Pupils are equal, round, and reactive to light. EOM are normal.   Cardiovascular: Normal rate and regular rhythm.   Neurological:   E1 V1t M4  PERRLA, sluggish bilaterally  Cough/gag intact  Withdraws to Pain all extremities, not on sedation     Skin: Skin is warm and dry.   Vitals reviewed.    .    Today I personally reviewed pertinent  medications, lines/drains/airways, imaging, cardiology results, laboratory results, microbiology results,

## 2020-02-03 NOTE — SUBJECTIVE & OBJECTIVE
Current Facility-Administered Medications on File Prior to Encounter   Medication    [COMPLETED] acetaminophen suppository 325 mg    [COMPLETED] acetaminophen suppository 650 mg    [COMPLETED] cefTRIAXone (ROCEPHIN) 2 g in dextrose 5 % 50 mL IVPB    [COMPLETED] etomidate injection 20 mg    [COMPLETED] etomidate injection    [COMPLETED] lorazepam (ATIVAN) 2 mg/mL injection    [COMPLETED] lorazepam (ATIVAN) injection 2 mg    [COMPLETED] lorazepam (ATIVAN) injection 2 mg    [COMPLETED] piperacillin-tazobactam 4.5 g in dextrose 5 % 100 mL IVPB (ready to mix system)    [COMPLETED] rocuronium injection    [COMPLETED] sodium chloride 0.9% bolus 1,000 mL    [COMPLETED] sodium chloride 0.9% bolus 1,953 mL    [COMPLETED] vancomycin (VANCOCIN) 2,000 mg in dextrose 5 % 500 mL IVPB    [DISCONTINUED] levETIRAcetam in NaCl (iso-os) IVPB 1,000 mg    [DISCONTINUED] lorazepam (ATIVAN) injection 2 mg    [DISCONTINUED] propofol (DIPRIVAN) 10 mg/mL infusion    [DISCONTINUED] propofol (DIPRIVAN) 10 mg/mL infusion    [DISCONTINUED] rocuronium injection 70 mg     Current Outpatient Medications on File Prior to Encounter   Medication Sig    apixaban (ELIQUIS) 5 mg Tab Take 1 tablet (5 mg total) by mouth 2 (two) times daily.    atorvastatin (LIPITOR) 40 MG tablet TAKE 1 TABLET EVERY EVENING    escitalopram oxalate (LEXAPRO) 20 MG tablet TAKE 1 TABLET EVERY DAY    FLUZONE HIGH-DOSE 2019-20, PF, 180 mcg/0.5 mL Syrg     lancets 30 gauge Misc 1 lancet by Misc.(Non-Drug; Combo Route) route once daily.    levETIRAcetam (KEPPRA) 750 MG Tab Take 2 tablets (1,500 mg total) by mouth 2 (two) times daily.    TRUE METRIX GLUCOSE TEST STRIP Strp 1 strip by Misc.(Non-Drug; Combo Route) route once daily.       Review of patient's allergies indicates:   Allergen Reactions    No known drug allergies        Past Medical History:   Diagnosis Date    Atherosclerosis of aorta 6/22/2017    Atherosclerosis of native coronary artery of  native heart without angina pectoris 6/22/2017    Blood clotting tendency     Depression     Diabetes mellitus type 2 in nonobese     DVT (deep venous thrombosis)     Hyperlipidemia     Measles complicated by meningitis     during childhood, with subsequent seizures    Newly diagnosed diabetes 4/18/2017    PE (pulmonary embolism)     Seizures     Splenomegaly     Thrombophlebitis leg     bilateral legs     Past Surgical History:   Procedure Laterality Date    ANAL FISTULOTOMY      COLONOSCOPY N/A 10/14/2016    Procedure: COLONOSCOPY;  Surgeon: Edu Stratton MD;  Location: [address]);  Service: Endoscopy;  Laterality: N/A;  Patient may hold Coumadin x 3 days prior to procedure with out Lovenox bridge as per C.C./svn/coumadin lab 1st    SPLENECTOMY, TOTAL       Family History     Problem Relation (Age of Onset)    Heart attack Father    Heart failure Father    Hypertension Father    Thrombophlebitis Father        Tobacco Use    Smoking status: Current Every Day Smoker     Packs/day: 0.25     Years: 33.00     Pack years: 8.25    Smokeless tobacco: Never Used   Substance and Sexual Activity    Alcohol use: No    Drug use: No    Sexual activity: Never     Review of Systems   Unable to perform ROS: Intubated     Objective:     Vital Signs (Most Recent):  Temp: (!) 101.3 °F (38.5 °C) (02/02/20 1904)  Pulse: 91 (02/02/20 2106)  Resp: (!) 21 (02/02/20 2106)  BP: (!) 104/55 (02/02/20 2106)  SpO2: 98 % (02/02/20 2106) Vital Signs (24h Range):  Temp:  [98.7 °F (37.1 °C)-102.2 °F (39 °C)] 101.3 °F (38.5 °C)  Pulse:  [] 91  Resp:  [16-37] 21  SpO2:  [74 %-100 %] 98 %  BP: ()/(46-94) 104/55     Weight: 68.5 kg (151 lb 0.2 oz)  Body mass index is 25.92 kg/m².    Physical Exam   Constitutional: He appears well-developed and well-nourished.   Intubated   HENT:   Head: Normocephalic.   Dried blood surrounding nares   Eyes: Conjunctivae are normal. Right eye exhibits no discharge. Left eye  exhibits no discharge.   Neck:   In C collar   Cardiovascular: Normal rate and regular rhythm.   On Levo 0.06   Pulmonary/Chest:   Intubated  Vent Mode: A/C  Oxygen Concentration (%):  () 40  Resp Rate Total:  (18 br/min-30 br/min) 24 br/min  Vt Set:  (450 mL) 450 mL  PEEP/CPAP:  (5 cmH20) 5 cmH20  Mean Airway Pressure:  (10 bmO36-86 cmH20) 10 cmH20   Abdominal: Soft. He exhibits no distension. There is tenderness.   Minimal tenderness to RLQ with deep palpation   Musculoskeletal: He exhibits no deformity.   Neurological:   Withdraws to sternal rub   Skin: Skin is warm and dry.       Significant Labs:  CBC:   Recent Labs   Lab 02/02/20  0957   WBC 12.96*   RBC 4.41*   HGB 13.8*   HCT 42.7      MCV 97   MCH 31.3*   MCHC 32.3     CMP:   Recent Labs   Lab 02/02/20  0957   *   CALCIUM 8.7   ALBUMIN 3.8   PROT 7.3      K 4.6   CO2 26      BUN 14   CREATININE 1.1   ALKPHOS 83   ALT 15   AST 20   BILITOT 0.3     Coagulation:   Recent Labs   Lab 02/02/20  0957   LABPROT 10.5   INR 1.0     Cardiac markers:   Recent Labs   Lab 02/02/20  1118   TROPONINI 0.015     ABGs:   Recent Labs   Lab 02/02/20  1942   PH 7.435   PCO2 33.4*   PO2 195*   HCO3 22.4*   POCSATURATED 100   BE -2       Significant Diagnostics:  I have reviewed all pertinent imaging results/findings within the past 24 hours.     CT C/A/P 2/2/20:  Impression       Right colonic wall thickening suggesting nonspecific colitis.  Consider infectious inflammatory and ischemic etiologies.  In addition, there are multiple foci of portal venous gas throughout both hepatic lobes as described above.  These findings are concerning for bowel ischemia, and surgical consultation is advised.    Ill-defined patchy subsegmental opacities most prominent in the lingula and left hilar region, as well as the right lung base with considerations including infection, non infectious inflammation, as well as aspiration.  Interval improvement in complete  atelectasis of the left lung when compared to CT chest 02/02/2020.    Cholelithiasis without CT evidence of cholecystitis.    Nonspecific nodular thickening of the bilateral adrenal glands.    Splenectomy with residual splenule present.

## 2020-02-03 NOTE — PLAN OF CARE
02/03/20 1547   Post-Acute Status   Post-Acute Authorization Placement   Post-Acute Placement Status Awaiting Internal Medical Clearance   Discharge Delays None known at this time       Stefanie Ponce RN, CCRN-K, [facility] [place] [facility]  Neuro-Critical Care   [phone]

## 2020-02-03 NOTE — PLAN OF CARE
POC reviewed with pt at 0500. Pt unable to verbalize understanding. Questions and concerns addressed. No change in neuro exam overnight. Pt taken to CT of head and abdomen without issue. Levo gtt titrated off. Gen surgery stopping by every 4 hours for abdomen exams. EEG placed. Pt making good urine with condom cath. C collar kept in place. No acute events overnight. Pt progressing toward goals. Will continue to monitor. See flowsheets for full assessment and VS info

## 2020-02-03 NOTE — ASSESSMENT & PLAN NOTE
Admit to NCC  -Q 1 hour neuro checks   -Q 1 hour vital signs   -continue home keppra 1500 mg   -cEEG pending

## 2020-02-03 NOTE — ASSESSMENT & PLAN NOTE
65 yo male with PMHx of of DVT/PE (on chronic anticoagulation, Eliquis currently), HLD, DM, seizures on keppra, and aortic atherosclerosis who was transferred to [facility] s/p seizure. Found to have R colon wall thickening and portal venous gas, concern for ischemia.     - Discussed with Acute Hiral Surgery staff  - With no peritoneal signs on exam and down trending lactate, will perform serial abdominal exams q 2 hours  - Currently with decreasing pressor requirements; please notify if this changes  - Monitor AM labs, including repeat lactate  - Remainder of care per primary  - Please call with questions or concerns

## 2020-02-03 NOTE — PT/OT/SLP PROGRESS
Physical Therapy  Consult    Patient Name:  Edu Choi   MRN:  [id number]  Admitting Diagnosis:  Seizure   Recent Surgery: * No surgery found *    Admit Date: 2/2/2020  Length of Stay: 1 days    Physical Therapy orders received and acknowledged. Patient is intubated & sedated. PT to attempt when Edu Choi is medically appropriate for progressive mobility.    Nelly Jara PT, DPT  2/3/2020   Pager: [phone]

## 2020-02-03 NOTE — NURSING
Patient arrived to [facility] < [facility]     Type of stroke/diagnosis:  seizures    TPA start and end time n/a    Thrombectomy start and end time n/a    Current symptoms: sedated and intubated    Skin assessment done: Yes  Wounds noted: bruising around eyes, R hand skin tear, R knee abrasion, blanching redness on sacrum, L elbow abrasion  SALO Armband Applied: yes    NCC notified: IRAIDA Wilks

## 2020-02-03 NOTE — PROCEDURES
"Edu Choi is a 64 y.o. male patient.    Temp: 98.6 °F (37 °C) (20)  Pulse: 79 (20)  Resp: 15 (20)  BP: (!) 117/59 (20)  SpO2: 98 % (20)  Weight: 68.5 kg (151 lb 0.2 oz) (20)  Height: 5' 4.96" (165 cm) (20)       Arterial Line  Date/Time: 2020 10:30 PM  Performed by: Kane Mar NP  Authorized by: Kane Mar NP   Consent Done: Yes  Consent: Written consent obtained.  Consent given by: spouse  Required items: required blood products, implants, devices, and special equipment available  Patient identity confirmed:  and MRN  Time out: Immediately prior to procedure a "time out" was called to verify the correct patient, procedure, equipment, support staff and site/side marked as required.  Preparation: Patient was prepped and draped in the usual sterile fashion.  Indications: multiple ABGs, respiratory failure and hemodynamic monitoring  Location: left radial  Wade's test normal: yes  Needle gauge: 20  Number of attempts: 3  Complications: No  Specimens: No  Implants: No  Post-procedure: dressing applied  Post-procedure CMS: normal and unchanged  Patient tolerance: Patient tolerated the procedure well with no immediate complications        Time spent in addition to critical care time charged.   Kane Mar  2/3/2020  "

## 2020-02-03 NOTE — HOSPITAL COURSE
2/2/2020: Admit to NCC. GS and ENT consulted. MX facial fractures and concerns of bowel ischemia on CT  2/3 will rehook to EEG for 24h. Remains NPO. US LE pending. Monitor lactic [id number] 2L  IV. Wean levophed as tolerated after bolus  2/4: remains on small amount of pressors, cultures remain negative, cont abx, cont current AEDs, EEG overnight negative for seizure activity, repeat ct abdomen today  2/5: minimal dose pressors, advance TFs. Tolerating SBT, possible trial of extubation in am  2/6: need for pressors will likely cease when able to come off sedation, currently still requires mechanical ventilation with pO2 value of 60 on 40% fiO2 and ps 10  02/08/2020 exam still poor, getting MRI brain   02/09/2020 Not tolerating CPAP, placed on AC   02/10/2020 NAEO  02/11/2020 NAEO  02/12/2020 CPAP trial this am   02/13/2020 NAEON. On heparin drip with stable exam.   02/14/2020 NAEON, f/u EEG (on Keppra). Will give modafinil trial today.   02/15/2020: Stable overnight. Little improvement with modafinil. Heparin gtt stopped for LP procedure. Meningitic coverage started.  02/16/2020: LP performed overnight. Heparin gtt restarted. Antibiotics stopped. Continue acyclovir. Patient more awake today. Increased free water flushes. Started amantadine.  02/17/2020 NAEO. Planning a family meeting to discuss trach/peg   02/18/2020 family still discussing trach/peg. Required increased PEEP overnight   02/19/2020 vent requirements stable, awaiting trach/PEG  02/20/2020  Trach/PEG pending  ENT consulted due to vent requirements  Weaning vent as tolerated, now down to PEEP of 8 and 45%  Neuro exam waxing and waning but stable  2/21/2020 No significant events over night. Tolerating spon. Breathing trial today. More alert today. Attempted breathing parameters.pt not following well poor attempts at NIF and VC. Will reassess tomorrow.   2/22 no significant events over night. Tolerating SBT. Pt more sleepy today. precedex turned off.   Keppra decreased to 1g q12. Not as responsive this afternoon. Not following commands, not attentive, not tracking. Sent for [facility], no new changes noted. Placed on EEG cap will monitor over night  2/23 No significant events over night. EEG cap showed no seizures . EEG dcd. Pt more alert today. Tracking responding to wife. toelrating SBT. TF off NGT to suction. + cuff leak good breathing parameters. Extubated to NC 3L  No s/s stridor or difficulty breathing.  02/24/2020: switched TF to Glucerna per nutation reccs, decrease keppra to 750mg  BID, cardiac chair today, schedule Tylenol 1 gm q6h for 24 h, d/c 3% nebs  02/25/2020: brief desaturation-NT suction and placed on nonbriether, CXR, 40 mg Lasix  02/26/2020 Had to be intubated this am due to increased hypoxia and lethargy  \  02/27/2020 Trach/PEG today   02/28/2020 Stable for discharge to LTAC.

## 2020-02-03 NOTE — H&P
[facility]  Neurocritical Care  History & Physical    Admit Date: 2/2/2020  Service Date: 02/02/2020  Length of Stay: 0    Subjective:     Chief Complaint: Seizure    History of Present Illness: Pt is a 64 y.o. Male with PMHx of of DVT, PE, DM, and seizures (on 1500 keppra at home) who presents to the ED via EMS with complaint of a seizure that occurred this morning. Pt has long standing history of seizures and was found down by wife on cement. Patient compliant with keppra per wife and last seizure was in November. At that time his keppra dose was increased. Patient has had cough recently but no other symptoms. Patient is on AC for DVT/PE and was recently switched from Coumadin to Eliquis. He was given 10 mg Versed during EMS ride to outside facility for continued seizure activity. He was unresponsive upon arrival to ED and subsequently intubated. CTH without any acute changes, CT max/face with multiple facial fractures, and CXR with consolidation and atelectasis/collapsed lung. Patient transferred to [facility] to higher level neurologic monitoring and continuous EEG.     Past Medical History:   Diagnosis Date    Atherosclerosis of aorta 6/22/2017    Atherosclerosis of native coronary artery of native heart without angina pectoris 6/22/2017    Blood clotting tendency     Depression     Diabetes mellitus type 2 in nonobese     DVT (deep venous thrombosis)     Hyperlipidemia     Measles complicated by meningitis     during childhood, with subsequent seizures    Newly diagnosed diabetes 4/18/2017    PE (pulmonary embolism)     Seizures     Splenomegaly     Thrombophlebitis leg     bilateral legs     Past Surgical History:   Procedure Laterality Date    ANAL FISTULOTOMY      COLONOSCOPY N/A 10/14/2016    Procedure: COLONOSCOPY;  Surgeon: Edu Stratton MD;  Location: [facility] ([address] [place] [address]);  Service: Endoscopy;  Laterality: N/A;  Patient may hold Coumadin x 3 days prior to procedure with  out Lovenox bridge as per C.C./svn/coumadin lab 1st    SPLENECTOMY, TOTAL        Current Facility-Administered Medications on File Prior to Encounter   Medication Dose Route Frequency Provider Last Rate Last Dose    [COMPLETED] acetaminophen suppository 325 mg  325 mg Rectal ED 1 Time Margot Flores MD   325 mg at 02/02/20 1826    [COMPLETED] acetaminophen suppository 650 mg  650 mg Rectal ED 1 Time Margot Flores MD   650 mg at 02/02/20 1413    [COMPLETED] cefTRIAXone (ROCEPHIN) 2 g in dextrose 5 % 50 mL IVPB  2 g Intravenous ED 1 Time Margot Flores MD   Stopped at 02/02/20 1817    [COMPLETED] etomidate injection 20 mg  20 mg Intravenous ED 1 Time Margot Flores MD   20 mg at 02/02/20 0944    [COMPLETED] etomidate injection   Intravenous Code/trauma/sedation Med Margot Flores MD   20 mg at 02/02/20 0944    [COMPLETED] lorazepam (ATIVAN) 2 mg/mL injection        2 mg at 02/02/20 1152    [COMPLETED] lorazepam (ATIVAN) injection 2 mg  2 mg Intravenous ED 1 Time Margot Flores MD   2 mg at 02/02/20 1120    [COMPLETED] lorazepam (ATIVAN) injection 2 mg  2 mg Intravenous ED 1 Time Margot Flores MD   2 mg at 02/02/20 1320    [COMPLETED] piperacillin-tazobactam 4.5 g in dextrose 5 % 100 mL IVPB (ready to mix system)  4.5 g Intravenous ED 1 Time Margot Flores MD   Stopped at 02/02/20 1306    [COMPLETED] rocuronium injection   Intravenous Code/trauma/sedation Med Margot Flores MD   70 mg at 02/02/20 0945    [COMPLETED] sodium chloride 0.9% bolus 1,000 mL  1,000 mL Intravenous ED 1 Time Margot Flores MD   Stopped at 02/02/20 1120    [COMPLETED] sodium chloride 0.9% bolus 1,953 mL  30 mL/kg Intravenous ED 1 Time Margot Flores MD   Stopped at 02/02/20 1420    [COMPLETED] vancomycin (VANCOCIN) 2,000 mg in dextrose 5 % 500 mL IVPB  2,000 mg Intravenous ED 1 Time Margot Flores MD   Stopped at 02/02/20 1511    [DISCONTINUED] levETIRAcetam in NaCl (iso-os) IVPB  1,000 mg  1,000 mg Intravenous ED 1 Time Margot Flores MD        [DISCONTINUED] lorazepam (ATIVAN) injection 2 mg  2 mg Intravenous ED 1 Time Margot Flores MD        [DISCONTINUED] propofol (DIPRIVAN) 10 mg/mL infusion  5 mcg/kg/min Intravenous Continuous Margot Flores MD 5.9 mL/hr at 02/02/20 1140 15 mcg/kg/min at 02/02/20 1140    [DISCONTINUED] propofol (DIPRIVAN) 10 mg/mL infusion             [DISCONTINUED] rocuronium injection 70 mg  70 mg Intravenous Once Margot Flores MD         Current Outpatient Medications on File Prior to Encounter   Medication Sig Dispense Refill    apixaban (ELIQUIS) 5 mg Tab Take 1 tablet (5 mg total) by mouth 2 (two) times daily. 60 tablet 0    atorvastatin (LIPITOR) 40 MG tablet TAKE 1 TABLET EVERY EVENING 90 tablet 3    escitalopram oxalate (LEXAPRO) 20 MG tablet TAKE 1 TABLET EVERY DAY 90 tablet 3    FLUZONE HIGH-DOSE 2019-20, PF, 180 mcg/0.5 mL Syrg       lancets 30 gauge Misc 1 lancet by Misc.(Non-Drug; Combo Route) route once daily. 100 each 11    levETIRAcetam (KEPPRA) 750 MG Tab Take 2 tablets (1,500 mg total) by mouth 2 (two) times daily. 360 tablet 1    TRUE METRIX GLUCOSE TEST STRIP Strp 1 strip by Misc.(Non-Drug; Combo Route) route once daily. 100 strip 11      Allergies: No known drug allergies    Family History   Problem Relation Age of Onset    Thrombophlebitis Father     Heart failure Father     Heart attack Father     Hypertension Father     Melanoma Neg Hx     Colon cancer Neg Hx     Esophageal cancer Neg Hx     Rectal cancer Neg Hx     Stomach cancer Neg Hx      Social History     Tobacco Use    Smoking status: Current Every Day Smoker     Packs/day: 0.25     Years: 33.00     Pack years: 8.25    Smokeless tobacco: Never Used   Substance Use Topics    Alcohol use: No    Drug use: No     Review of Systems   Unable to perform ROS: Intubated   Constitutional: Negative for unexpected weight change.     Objective:      Vitals:    Temp: (!) 101.3 °F (38.5 °C)  Pulse: 91  Resp: 19  SpO2: 98 %  Oxygen Concentration (%): 60  O2 Device (Oxygen Therapy): ventilator  Vent Mode: A/C  Set Rate: 16 BPM  Vt Set: 450 mL  PEEP/CPAP: 5 cmH20  Peak Airway Pressure: 30 cmH2O  Mean Airway Pressure: 12 cmH20  Plateau Pressure: 0 cmH20    Temp  Min: 98.7 °F (37.1 °C)  Max: 102.2 °F (39 °C)  Pulse  Min: 91  Max: 134  BP  Min: 93/50  Max: 188/59  MAP (mmHg)  Min: 66  Max: 130  Resp  Min: 16  Max: 37  SpO2  Min: 74 %  Max: 100 %  Oxygen Concentration (%)  Min: 40  Max: 100    No intake/output data recorded.           Physical Exam   Constitutional: He appears well-developed and well-nourished.   HENT:   Head: Head is with abrasion, with contusion and with laceration.   Multiple facial fractures   Eyes: Pupils are equal, round, and reactive to light. EOM are normal.   Cardiovascular: Normal rate and regular rhythm.   Neurological:   E1 V1t M4  PERRLA, sluggish bilaterally  Cough/gag intact  Withdraws to Pain all extremities, not on sedation     Skin: Skin is warm and dry.   Vitals reviewed.    .    Today I personally reviewed pertinent medications, lines/drains/airways, imaging, cardiology results, laboratory results, microbiology results,     Assessment/Plan:     Neuro  Seizure  Admit to [facility]  -Q 1 hour neuro checks   -Q 1 hour vital signs   -continue home keppra 1500 mg   -cEEG pending   Family updated bedside    Pulmonary  Aspiration pneumonitis  Found down, concerns for aspiration pneumonia/pneumonitis.   Pan Cx and ABX started.       Acute respiratory failure with hypoxia  Arrived intubated,   Daily CXR   ABG q 12 hour  Assess vent and wean as able    CT chest with LEft lobe collapse, but on CXR later today left lobe appears open. Will follow clinically    Cardiac/Vascular  Idiopathic hypotension  IVF  -multiple bolus   -d/c propofol gtt   - A-line placed  Levo gtt for MAp > 65    Hematology  Chronic anticoagulation  Monitor Thom Mcallister  held until eval bowl ischemia and CT head    History of DVT (deep vein thrombosis)  Upper and lower extremity US pending to eval for DVT    GI  Large bowel ischemia  Wall thickening and diffuse portal vein air noted on CT abdomen  Lactic 0.8, will trend  BS x 4, soft and nondistended.  NG to LIWS, brown drainage  General Surgery consulted.     Orthopedic  Multiple closed fractures of facial bone  ENT consulted,  No CSF noted  Will get beta-2 transferrin if clear drainage          The patient is being Prophylaxed for:  Venous Thromboembolism with: Mechanical  Stress Ulcer with: H2B  Ventilator Pneumonia with: chlorhexidine oral care    Activity Orders          Diet NPO: NPO starting at 02/02 1925    Commode at bedside starting at 02/02 1925        Full Code     Critical Care: 60 min    Kane Mar, IRAIDA  Neurocritical Care  [facility]

## 2020-02-03 NOTE — ASSESSMENT & PLAN NOTE
Arrived intubated,   Daily CXR   ABG q 12 hour  Assess vent and wean as able    CT chest with LEft lobe collapse, but on CXR later today left lobe appears open. Will follow clinically

## 2020-02-03 NOTE — PROGRESS NOTES
[facility]  General Surgery  Progress Note    Subjective:     History of Present Illness:  63 yo male with PMHx of of DVT/PE (on chronic anticoagulation, Eliquis currently), HLD, DM, seizures on keppra, and aortic atherosclerosis who presented to OSH after found down for about an hour this morning. He was transferred to [facility] for higher level of care. He was given 10 mg Versed during EMS ride to outside facility for continued seizure activity. He was unresponsive upon arrival to ED and subsequently intubated. At outside ED, he was noted to have left lung collapse and maxillofacial fractures. Upon arrival here, repeat CT C/A/P was order, demonstrating right colonic wall thickening and portal venous gas. He arrived intubated and sedated with propofol, currently weaned off the propofol.     Post-Op Info:  * No surgery found *         Interval History: Able to wean pressors overnight.     Medications:  Continuous Infusions:   sodium chloride 0.9% 100 mL/hr at 02/03/20 0600    norepinephrine bitartrate-D5W Stopped (02/03/20 0300)     Scheduled Meds:   atorvastatin  40 mg Oral QHS    chlorhexidine  15 mL Mouth/Throat QID    escitalopram oxalate  20 mg Oral Daily    famotidine (PF)  20 mg Intravenous BID    levetiracetam IVPB  1,500 mg Intravenous Q12H    phenylephrine HCl in 0.9% NaCl        piperacillin-tazobactam (ZOSYN) IVPB  4.5 g Intravenous Q8H    senna-docusate 8.6-50 mg  1 tablet Per OG tube BID    vancomycin (VANCOCIN) IVPB  1,250 mg Intravenous Q24H     PRN Meds:acetaminophen, Dextrose 10% Bolus, fentaNYL, glucagon (human recombinant), insulin aspart U-100, labetalol, lorazepam, ondansetron, sodium chloride 0.9%, Pharmacy to dose Vancomycin consult **AND** vancomycin - pharmacy to dose     Review of patient's allergies indicates:   Allergen Reactions    No known drug allergies      Objective:     Vital Signs (Most Recent):  Temp: 98.2 °F (36.8 °C) (02/03/20 0301)  Pulse: 84  (02/03/20 0600)  Resp: 16 (02/03/20 0600)  BP: (!) 117/56 (02/03/20 0600)  SpO2: 99 % (02/03/20 0600) Vital Signs (24h Range):  Temp:  [98.2 °F (36.8 °C)-102.2 °F (39 °C)] 98.2 °F (36.8 °C)  Pulse:  [] 84  Resp:  [15-37] 16  SpO2:  [74 %-100 %] 99 %  BP: ()/(46-94) 117/56  Arterial Line BP: (112-143)/(45-69) 126/46     Weight: 68.5 kg (151 lb 0.2 oz)  Body mass index is 25.16 kg/m².    Intake/Output - Last 3 Shifts       02/01 0700 - 02/02 0659 02/02 0700 - 02/03 0659 02/03 0700 - 02/04 0659    I.V. (mL/kg)  966.8 (14.1)     IV Piggyback  300     Total Intake(mL/kg)  1266.8 (18.5)     Urine (mL/kg/hr)  250     Total Output  250     Net  +1016.8                  Physical Exam   Constitutional: He appears well-developed and well-nourished.   Intubated   Cardiovascular: Normal rate and regular rhythm.   Pulmonary/Chest:   Vent Mode: A/C  Oxygen Concentration (%):  () 40  Resp Rate Total:  (16 br/min-31 br/min) 20 br/min  Vt Set:  (450 mL) 450 mL  PEEP/CPAP:  (5 cmH20) 5 cmH20  Mean Airway Pressure:  (8.7 pbM63-79 cmH20) 11 cmH20     Abdominal: Soft.   Not rigid   Skin: Skin is warm and dry.       Significant Labs:  CBC:   Recent Labs   Lab 02/03/20  0132   WBC 17.61*   RBC 3.45*   HGB 10.8*   HCT 34.0*      MCV 99*   MCH 31.3*   MCHC 31.8*     CMP:   Recent Labs   Lab 02/03/20 0132   *   CALCIUM 6.7*   ALBUMIN 2.8*   PROT 5.4*      K 3.6   CO2 20*   *   BUN 12   CREATININE 0.8   ALKPHOS 57   ALT 12   AST 21   BILITOT 0.6       Significant Diagnostics:      Assessment/Plan:     Large bowel ischemia  63 yo male with PMHx of of DVT/PE (on chronic anticoagulation, Eliquis currently), HLD, DM, seizures on keppra, and aortic atherosclerosis who was transferred to [facility] s/p seizure. Found to have R colon wall thickening and portal venous gas, concern for ischemia.     - Lactate normal and off pressors  - Continue resuscitation  - Continue to hold tube feeds for now  - Will want to  repeat CT scan prior to restarting feeds  - Please call for any change in exam or hemodynamic status  - Thank you for the consult. Will follow        Jill Joseph MD  General Surgery  [facility]

## 2020-02-03 NOTE — PT/OT/SLP PROGRESS
Occupational Therapy      Patient Name:  Edu Choi   MRN:  [id number]    Patient not seen today secondary to pt intubated and sedated at this time. Will follow-up when patient is medically stable.    Lydia Hernandez OT  2/3/2020

## 2020-02-03 NOTE — ASSESSMENT & PLAN NOTE
Wall thickening and diffuse portal vein air noted on CT abdomen  Lactic 0.8, will trend  BS x 4, soft and nondistended.  NG to LIWS, brown drainage  General Surgery consulted.

## 2020-02-03 NOTE — ASSESSMENT & PLAN NOTE
2/2 Admit to [facility]  -Q 1 hour neuro checks/ vital signs   Epilepsy following  EEG cap over night reconnect to 24h EEG  -continue home keppra 1500 mg   IVF /h

## 2020-02-03 NOTE — ASSESSMENT & PLAN NOTE
65 yo male with PMHx of of DVT/PE (on chronic anticoagulation, Eliquis currently), HLD, DM, seizures on keppra, and aortic atherosclerosis who was transferred to [facility] s/p seizure. Found to have R colon wall thickening and portal venous gas, concern for ischemia.     - Lactate normal and off pressors  - Continue resuscitation  - Continue to hold tube feeds for now  - Will want to repeat CT scan prior to restarting feeds  - Please call for any change in exam or hemodynamic status  - Thank you for the consult. Will follow

## 2020-02-03 NOTE — CONSULTS
[facility]  General Surgery  Consult Note    Patient Name: Edu Choi  MRN: [id number]  Code Status: Full Code  Admission Date: 2/2/2020  Hospital Length of Stay: 1 days  Attending Physician: Ricardo Yee MD  Primary Care Provider: Gladis Blankenship MD    Patient information was obtained from past medical records and ER records.     Inpatient consult to General Surgery  Consult performed by: Max Cardona MD  Consult ordered by: Kane Mar NP        Subjective:     Principal Problem: Seizure    History of Present Illness: 63 yo male with PMHx of of DVT/PE (on chronic anticoagulation, Eliquis currently), HLD, DM, seizures on keppra, and aortic atherosclerosis who presented to OSH after found down for about an hour this morning. He was transferred to [facility] for higher level of care. He was given 10 mg Versed during EMS ride to outside facility for continued seizure activity. He was unresponsive upon arrival to ED and subsequently intubated. At outside ED, he was noted to have left lung collapse and maxillofacial fractures. Upon arrival here, repeat CT C/A/P was order, demonstrating right colonic wall thickening and portal venous gas. He arrived intubated and sedated with propofol, currently weaned off the propofol.     Current Facility-Administered Medications on File Prior to Encounter   Medication    [COMPLETED] acetaminophen suppository 325 mg    [COMPLETED] acetaminophen suppository 650 mg    [COMPLETED] cefTRIAXone (ROCEPHIN) 2 g in dextrose 5 % 50 mL IVPB    [COMPLETED] etomidate injection 20 mg    [COMPLETED] etomidate injection    [COMPLETED] lorazepam (ATIVAN) 2 mg/mL injection    [COMPLETED] lorazepam (ATIVAN) injection 2 mg    [COMPLETED] lorazepam (ATIVAN) injection 2 mg    [COMPLETED] piperacillin-tazobactam 4.5 g in dextrose 5 % 100 mL IVPB (ready to mix system)    [COMPLETED] rocuronium injection    [COMPLETED] sodium chloride 0.9% bolus 1,000 mL    [COMPLETED]  sodium chloride 0.9% bolus 1,953 mL    [COMPLETED] vancomycin (VANCOCIN) 2,000 mg in dextrose 5 % 500 mL IVPB    [DISCONTINUED] levETIRAcetam in NaCl (iso-os) IVPB 1,000 mg    [DISCONTINUED] lorazepam (ATIVAN) injection 2 mg    [DISCONTINUED] propofol (DIPRIVAN) 10 mg/mL infusion    [DISCONTINUED] propofol (DIPRIVAN) 10 mg/mL infusion    [DISCONTINUED] rocuronium injection 70 mg     Current Outpatient Medications on File Prior to Encounter   Medication Sig    apixaban (ELIQUIS) 5 mg Tab Take 1 tablet (5 mg total) by mouth 2 (two) times daily.    atorvastatin (LIPITOR) 40 MG tablet TAKE 1 TABLET EVERY EVENING    escitalopram oxalate (LEXAPRO) 20 MG tablet TAKE 1 TABLET EVERY DAY    FLUZONE HIGH-DOSE 2019-20, PF, 180 mcg/0.5 mL Syrg     lancets 30 gauge Misc 1 lancet by Misc.(Non-Drug; Combo Route) route once daily.    levETIRAcetam (KEPPRA) 750 MG Tab Take 2 tablets (1,500 mg total) by mouth 2 (two) times daily.    TRUE METRIX GLUCOSE TEST STRIP Strp 1 strip by Misc.(Non-Drug; Combo Route) route once daily.       Review of patient's allergies indicates:   Allergen Reactions    No known drug allergies        Past Medical History:   Diagnosis Date    Atherosclerosis of aorta 6/22/2017    Atherosclerosis of native coronary artery of native heart without angina pectoris 6/22/2017    Blood clotting tendency     Depression     Diabetes mellitus type 2 in nonobese     DVT (deep venous thrombosis)     Hyperlipidemia     Measles complicated by meningitis     during childhood, with subsequent seizures    Newly diagnosed diabetes 4/18/2017    PE (pulmonary embolism)     Seizures     Splenomegaly     Thrombophlebitis leg     bilateral legs     Past Surgical History:   Procedure Laterality Date    ANAL FISTULOTOMY      COLONOSCOPY N/A 10/14/2016    Procedure: COLONOSCOPY;  Surgeon: Edu Stratton MD;  Location: [facility] ([address] [place] [address]);  Service: Endoscopy;  Laterality: N/A;  Patient may hold Coumadin  x 3 days prior to procedure with out Lovenox bridge as per C.C./svn/coumadin lab 1st    SPLENECTOMY, TOTAL       Family History     Problem Relation (Age of Onset)    Heart attack Father    Heart failure Father    Hypertension Father    Thrombophlebitis Father        Tobacco Use    Smoking status: Current Every Day Smoker     Packs/day: 0.25     Years: 33.00     Pack years: 8.25    Smokeless tobacco: Never Used   Substance and Sexual Activity    Alcohol use: No    Drug use: No    Sexual activity: Never     Review of Systems   Unable to perform ROS: Intubated     Objective:     Vital Signs (Most Recent):  Temp: (!) 101.3 °F (38.5 °C) (02/02/20 1904)  Pulse: 91 (02/02/20 2106)  Resp: (!) 21 (02/02/20 2106)  BP: (!) 104/55 (02/02/20 2106)  SpO2: 98 % (02/02/20 2106) Vital Signs (24h Range):  Temp:  [98.7 °F (37.1 °C)-102.2 °F (39 °C)] 101.3 °F (38.5 °C)  Pulse:  [] 91  Resp:  [16-37] 21  SpO2:  [74 %-100 %] 98 %  BP: ()/(46-94) 104/55     Weight: 68.5 kg (151 lb 0.2 oz)  Body mass index is 25.92 kg/m².    Physical Exam   Constitutional: He appears well-developed and well-nourished.   Intubated   HENT:   Head: Normocephalic.   Dried blood surrounding nares   Eyes: Conjunctivae are normal. Right eye exhibits no discharge. Left eye exhibits no discharge.   Neck:   In C collar   Cardiovascular: Normal rate and regular rhythm.   On Levo 0.06   Pulmonary/Chest:   Intubated  Vent Mode: A/C  Oxygen Concentration (%):  () 40  Resp Rate Total:  (18 br/min-30 br/min) 24 br/min  Vt Set:  (450 mL) 450 mL  PEEP/CPAP:  (5 cmH20) 5 cmH20  Mean Airway Pressure:  (10 siT04-28 cmH20) 10 cmH20   Abdominal: Soft. He exhibits no distension. There is tenderness.   Minimal tenderness to RLQ with deep palpation   Musculoskeletal: He exhibits no deformity.   Neurological:   Withdraws to sternal rub   Skin: Skin is warm and dry.       Significant Labs:  CBC:   Recent Labs   Lab 02/02/20  0957   WBC 12.96*   RBC 4.41*    HGB 13.8*   HCT 42.7      MCV 97   MCH 31.3*   MCHC 32.3     CMP:   Recent Labs   Lab 02/02/20  0957   *   CALCIUM 8.7   ALBUMIN 3.8   PROT 7.3      K 4.6   CO2 26      BUN 14   CREATININE 1.1   ALKPHOS 83   ALT 15   AST 20   BILITOT 0.3     Coagulation:   Recent Labs   Lab 02/02/20  0957   LABPROT 10.5   INR 1.0     Cardiac markers:   Recent Labs   Lab 02/02/20  1118   TROPONINI 0.015     ABGs:   Recent Labs   Lab 02/02/20  1942   PH 7.435   PCO2 33.4*   PO2 195*   HCO3 22.4*   POCSATURATED 100   BE -2       Significant Diagnostics:  I have reviewed all pertinent imaging results/findings within the past 24 hours.     CT C/A/P 2/2/20:  Impression       Right colonic wall thickening suggesting nonspecific colitis.  Consider infectious inflammatory and ischemic etiologies.  In addition, there are multiple foci of portal venous gas throughout both hepatic lobes as described above.  These findings are concerning for bowel ischemia, and surgical consultation is advised.    Ill-defined patchy subsegmental opacities most prominent in the lingula and left hilar region, as well as the right lung base with considerations including infection, non infectious inflammation, as well as aspiration.  Interval improvement in complete atelectasis of the left lung when compared to CT chest 02/02/2020.    Cholelithiasis without CT evidence of cholecystitis.    Nonspecific nodular thickening of the bilateral adrenal glands.    Splenectomy with residual splenule present.         Assessment/Plan:     Large bowel ischemia  63 yo male with PMHx of of DVT/PE (on chronic anticoagulation, Eliquis currently), HLD, DM, seizures on keppra, and aortic atherosclerosis who was transferred to [facility] [place]/p seizure. Found to have R colon wall thickening and portal venous gas, concern for ischemia.     - Discussed with Acute Hiral Surgery staff  - With no peritoneal signs on exam and down trending lactate, will perform serial  abdominal exams q 2 hours  - Currently with decreasing pressor requirements; please notify if this changes  - Monitor AM labs, including repeat lactate  - Remainder of care per primary  - Please call with questions or concerns      VTE Risk Mitigation (From admission, onward)         Ordered     IP VTE LOW RISK PATIENT  Once      02/02/20 1925     Place sequential compression device  Until discontinued      02/02/20 1925                Thank you for your consult. I will follow-up with patient. Please contact us if you have any additional questions.    Max Cardona MD  General Surgery  [facility]

## 2020-02-03 NOTE — CONSULTS
Inpatient consult to Physical Medicine Rehab  Consult performed by: PAMELA Pollack  Consult ordered by: Sera Rice PA-C  Reason for consult: assess rehab needs      Consult received.  Reviewed patient history and current admission.    Patient is intubated and too acute at this time; rehab potential cannot be appropriately assessed.  Recommend early mobility, OOB in chair, and PT/OT/SLP when appropriate.  Will follow for additional rehab needs and post-acute recommendation when patient is medically stable and able to participate with therapy.    Thank you for consult.    ERICK Valencia, EDITAP-C  Physical Medicine & Rehabilitation   02/03/2020

## 2020-02-03 NOTE — PROGRESS NOTES
Pharmacokinetic Initial Assessment: IV Vancomycin    Assessment/Plan:    · A dose of Vancomycin 2000 mg was given in ED at 1300 on 2/2/20  · Initiate intravenous vancomycin with dose of 1250 mg every 24 hours, start at 1300 on 2/3/20  · Desired empiric serum trough concentration is 15 to 20 mcg/mL  · Draw vancomycin trough level 30 min prior to third dose on 2/4/20 at approximately 1230     Pharmacy will continue to follow and monitor vancomycin.      Please contact pharmacy at extension [phone] with any questions regarding this assessment.     Thank you for the consult,   Linda Davidson       Patient brief summary:  Edu Choi is a 64 y.o. male initiated on antimicrobial therapy with IV Vancomycin for treatment of suspected bacteremia    Drug Allergies:   Review of patient's allergies indicates:   Allergen Reactions    No known drug allergies        Actual Body Weight:   68.5 kg    Renal Function:   Estimated Creatinine Clearance: 55.3 mL/min (based on SCr of 1.1 mg/dL).,     Dialysis Method (if applicable):  N/A    CBC (last 72 hours):  Recent Labs   Lab Result Units 02/02/20  0957   WBC K/uL 12.96*   Hemoglobin g/dL 13.8*   Hematocrit % 42.7   Platelets K/uL 334   Gran% % 77.2*   Lymph% % 14.7*   Mono% % 7.7   Eosinophil% % 0.2   Basophil% % 0.2   Differential Method  Automated       Metabolic Panel (last 72 hours):  Recent Labs   Lab Result Units 02/02/20  0957 02/02/20  1116 02/02/20  1119   Sodium mmol/L 139  --   --    Potassium mmol/L 4.6  --   --    Chloride mmol/L 106  --   --    CO2 mmol/L 26  --   --    Glucose mg/dL 111*  --   --    Glucose, UA   --  Negative  --    BUN, Bld mg/dL 14  --   --    Creatinine mg/dL 1.1  --   --    Creatinine, Random Ur mg/dL  --   --  34.6   Albumin g/dL 3.8  --   --    Total Bilirubin mg/dL 0.3  --   --    Alkaline Phosphatase U/L 83  --   --    AST U/L 20  --   --    ALT U/L 15  --   --    Magnesium mg/dL 2.0  --   --        Drug levels (last 3 results):  No results for  input(s): VANCOMYCINRA, VANCOMYCINPE, VANCOMYCINTR in the last 72 hours.    Microbiologic Results:  Microbiology Results (last 7 days)     Procedure Component Value Units Date/Time    Culture, Respiratory with Gram Stain [[id number]] Collected:  02/02/20 1940    Order Status:  Sent Specimen:  Respiratory from Endotracheal Aspirate Updated:  02/02/20 1941    Blood culture [[id number]] Collected:  02/02/20 1920    Order Status:  Sent Specimen:  Blood from Peripheral, Ankle, Right Updated:  02/02/20 1929    Blood culture [[id number]] Collected:  02/02/20 1910    Order Status:  Sent Specimen:  Blood from Peripheral, Ankle, Left Updated:  02/02/20 1928

## 2020-02-03 NOTE — CONSULTS
"Wound care consult received from nursing for wounds to "elbow, knee, hand".  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 He was transferred to [facility] for higher level of care.  He was unresponsive upon arrival to ED and subsequently intubated. At outside ED, he was noted to have left lung collapse and maxillofacial fractures. ENT consulted for evaluation of facial fractures.  Upon assessment of reported wounds with nurse noted skin tear to R hand dorsal hand.  Bruising to R/L elbow and R knee bruising. Sacrum with blanchable redness over intact scar tissue. See photos below.  Recommend nursing to continue with pressure injury prevention interventions already in place and to monitor sacrum redness, keeping silicone foam dressing in place. Standing orders in place for skin tear to R hand.  Wound care team to sign off.  Please reconsult for any further needs.     Sacrum-    R elbow    L elbow    R dorsal hand                    "

## 2020-02-03 NOTE — SUBJECTIVE & OBJECTIVE
Interval History: Able to wean pressors overnight.     Medications:  Continuous Infusions:   sodium chloride 0.9% 100 mL/hr at 02/03/20 0600    norepinephrine bitartrate-D5W Stopped (02/03/20 0300)     Scheduled Meds:   atorvastatin  40 mg Oral QHS    chlorhexidine  15 mL Mouth/Throat QID    escitalopram oxalate  20 mg Oral Daily    famotidine (PF)  20 mg Intravenous BID    levetiracetam IVPB  1,500 mg Intravenous Q12H    phenylephrine HCl in 0.9% NaCl        piperacillin-tazobactam (ZOSYN) IVPB  4.5 g Intravenous Q8H    senna-docusate 8.6-50 mg  1 tablet Per OG tube BID    vancomycin (VANCOCIN) IVPB  1,250 mg Intravenous Q24H     PRN Meds:acetaminophen, Dextrose 10% Bolus, fentaNYL, glucagon (human recombinant), insulin aspart U-100, labetalol, lorazepam, ondansetron, sodium chloride 0.9%, Pharmacy to dose Vancomycin consult **AND** vancomycin - pharmacy to dose     Review of patient's allergies indicates:   Allergen Reactions    No known drug allergies      Objective:     Vital Signs (Most Recent):  Temp: 98.2 °F (36.8 °C) (02/03/20 0301)  Pulse: 84 (02/03/20 0600)  Resp: 16 (02/03/20 0600)  BP: (!) 117/56 (02/03/20 0600)  SpO2: 99 % (02/03/20 0600) Vital Signs (24h Range):  Temp:  [98.2 °F (36.8 °C)-102.2 °F (39 °C)] 98.2 °F (36.8 °C)  Pulse:  [] 84  Resp:  [15-37] 16  SpO2:  [74 %-100 %] 99 %  BP: ()/(46-94) 117/56  Arterial Line BP: (112-143)/(45-69) 126/46     Weight: 68.5 kg (151 lb 0.2 oz)  Body mass index is 25.16 kg/m².    Intake/Output - Last 3 Shifts       02/01 0700 - 02/02 0659 02/02 0700 - 02/03 0659 02/03 0700 - 02/04 0659    I.V. (mL/kg)  966.8 (14.1)     IV Piggyback  300     Total Intake(mL/kg)  1266.8 (18.5)     Urine (mL/kg/hr)  250     Total Output  250     Net  +1016.8                  Physical Exam   Constitutional: He appears well-developed and well-nourished.   Intubated   Cardiovascular: Normal rate and regular rhythm.   Pulmonary/Chest:   Vent Mode: A/C  Oxygen  Concentration (%):  () 40  Resp Rate Total:  (16 br/min-31 br/min) 20 br/min  Vt Set:  (450 mL) 450 mL  PEEP/CPAP:  (5 cmH20) 5 cmH20  Mean Airway Pressure:  (8.7 xnC85-18 cmH20) 11 cmH20     Abdominal: Soft.   Not rigid   Skin: Skin is warm and dry.       Significant Labs:  CBC:   Recent Labs   Lab 02/03/20  0132   WBC 17.61*   RBC 3.45*   HGB 10.8*   HCT 34.0*      MCV 99*   MCH 31.3*   MCHC 31.8*     CMP:   Recent Labs   Lab 02/03/20 0132   *   CALCIUM 6.7*   ALBUMIN 2.8*   PROT 5.4*      K 3.6   CO2 20*   *   BUN 12   CREATININE 0.8   ALKPHOS 57   ALT 12   AST 21   BILITOT 0.6       Significant Diagnostics:

## 2020-02-03 NOTE — CONSULTS
Otolaryngology - Head and Neck Surgery  Consultation Report      Consultation from: neuro ICU  Reason for Consultation:  Facial fractures      Subjective:      CC: No chief complaint on file.      HPI       64M c h/o DVT/PE (on chronic anticoagulation, Eliquis currently), HLD, DM, seizures on keppra, and aortic atherosclerosis who presented to OSH after found down for about an hour yesterday morning. He was transferred to [facility] for higher level of care.  He was unresponsive upon arrival to ED and subsequently intubated. At outside ED, he was noted to have left lung collapse and maxillofacial fractures. ENT consulted for evaluation of facial fractures.      ROS: unable to complete - pt is intubated    Past Medical History:   Diagnosis Date    Atherosclerosis of aorta 6/22/2017    Atherosclerosis of native coronary artery of native heart without angina pectoris 6/22/2017    Blood clotting tendency     Depression     Diabetes mellitus type 2 in nonobese     DVT (deep venous thrombosis)     Hyperlipidemia     Measles complicated by meningitis     during childhood, with subsequent seizures    Newly diagnosed diabetes 4/18/2017    PE (pulmonary embolism)     Seizures     Splenomegaly     Thrombophlebitis leg     bilateral legs     Past Surgical History:   Procedure Laterality Date    ANAL FISTULOTOMY      COLONOSCOPY N/A 10/14/2016    Procedure: COLONOSCOPY;  Surgeon: Edu Stratton MD;  Location: [facility] ([address] [place] [address]);  Service: Endoscopy;  Laterality: N/A;  Patient may hold Coumadin x 3 days prior to procedure with out Lovenox bridge as per C.C./svn/coumadin lab 1st    SPLENECTOMY, TOTAL       Social History     Tobacco Use    Smoking status: Current Every Day Smoker     Packs/day: 0.25     Years: 33.00     Pack years: 8.25    Smokeless tobacco: Never Used   Substance Use Topics    Alcohol use: No     Family History   Problem Relation Age of Onset    Thrombophlebitis Father     Heart failure Father      Heart attack Father     Hypertension Father     Melanoma Neg Hx     Colon cancer Neg Hx     Esophageal cancer Neg Hx     Rectal cancer Neg Hx     Stomach cancer Neg Hx        Current Facility-Administered Medications:     0.9%  NaCl infusion, , Intravenous, Continuous, Sera Rice PA-C, Last Rate: 100 mL/hr at 02/03/20 0600    acetaminophen tablet 650 mg, 650 mg, Per OG tube, Q6H PRN, Sera Rice PA-C    atorvastatin tablet 40 mg, 40 mg, Oral, QHS, Sera Rice PA-C, Stopped at 02/02/20 2100    chlorhexidine 0.12 % solution 15 mL, 15 mL, Mouth/Throat, QID, Kane Mar, NP, 15 mL at 02/02/20 2240    dextrose 10% (D10W) Bolus, 12.5 g, Intravenous, PRN, Ricardo Yee MD    escitalopram oxalate tablet 20 mg, 20 mg, Oral, Daily, Sera Rice PA-C    famotidine (PF) injection 20 mg, 20 mg, Intravenous, BID, Sera Rice PA-C, 20 mg at 02/02/20 2238    fentaNYL injection 50 mcg, 50 mcg, Intravenous, Q2H PRN, Ricardo Yee MD, 50 mcg at 02/03/20 0237    glucagon (human recombinant) injection 1 mg, 1 mg, Intramuscular, PRN, Sera Rice PA-C    insulin aspart U-100 pen 1-10 Units, 1-10 Units, Subcutaneous, Q6H PRN, Sera Rice PA-C    labetalol 20 mg/4 mL (5 mg/mL) IV syring, 10 mg, Intravenous, Q4H PRN, Sera Rice PA-C    levETIRAcetam in NaCl (iso-os) IVPB 1,500 mg, 1,500 mg, Intravenous, Q12H, Sera Rice PA-C, 1,500 mg at 02/02/20 2239    lorazepam injection 2 mg, 2 mg, Intravenous, Q1H PRN, Sera Rice PA-C    norepinephrine 16 mg in dextrose 5 % 250 mL infusion, 0.02 mcg/kg/min, Intravenous, Continuous, Kane Mar, IRAIDA, Stopped at 02/03/20 0300    ondansetron injection 4 mg, 4 mg, Intravenous, Q8H PRN, Sera Rice PA-C    phenylephrine HCl in 0.9% NaCl 1 mg/10 mL (100 mcg/mL) syringe, , , ,     piperacillin-tazobactam 4.5 g in sodium chloride 0.9% 100 mL IVPB (ready to mix system), 4.5 g,  Intravenous, Q8H, Sera Rice PA-C, Last Rate: 25 mL/hr at 02/03/20 0507, 4.5 g at 02/03/20 0507    senna-docusate 8.6-50 mg per tablet 1 tablet, 1 tablet, Per OG tube, BID, Sera Rice PA-C, Stopped at 02/02/20 2100    sodium chloride 0.9% flush 10 mL, 10 mL, Intravenous, PRN, Sera Rice PA-C    Pharmacy to dose Vancomycin consult, , , Once **AND** vancomycin - pharmacy to dose, , Intravenous, pharmacy to manage frequency, Sera Rice PA-C    vancomycin 1.25 g in dextrose 5% 250 mL IVPB (ready to mix), 1,250 mg, Intravenous, Q24H, Ricardo Yee MD  No known drug allergies      Objective:     Temp:  [98.2 °F (36.8 °C)-102.2 °F (39 °C)] 98.2 °F (36.8 °C)  Pulse:  [] 84  Resp:  [15-37] 16  SpO2:  [74 %-100 %] 99 %  BP: ()/(46-94) 117/56  Arterial Line BP: (112-143)/(45-69) 126/46    General Appearance:   Obtunded, intubated    Neuro:   E1VtM4    Head and Face:   cEEG in place  ett guo in place  Diffuse edema of face    Ears:  Pinna normal bilaterally, no skin lesions    Nose:   External nose is asymmetric, step off palpable on right nasal bone    OC/OP:  ett in place    Neck:  Neck is symmetric, non-edematous, non-erythematous  Trachea is midline and easily palpable,    Respiratory:  Intubated on mechanical ventilation          Recent Labs   Lab 02/02/20  0957 02/03/20  0132   WBC 12.96* 17.61*   HGB 13.8* 10.8*   HCT 42.7 34.0*    256    140   K 4.6 3.6   BUN 14 12   CREATININE 1.1 0.8   CALCIUM 8.7 6.7*   PHOS  --  2.7   MG 2.0 1.5*     CT max/face    Mildly comminuted nondisplaced bilateral nasal bone fracture.  Fracture plane is seen extending into the right frontal processes of the maxilla on the right.      nondisplaced minimally depressed fracture of the left zygomatic process near the junction with the zygoma as well as a fracture of the zygoma more anteriorly.     Nondisplaced fracture involving the pterygoid process of the left sphenoid  with fracture plane seen extending into the adjacent left maxilla.      Slightly depressed fracture involving the upper posterolateral inferior left maxillary sinus wall noted.      Nondisplaced fracture involving the inferomedial left orbital wall.       Assessment/Plan:        Multiple non-displaced facial fractures. Clinical exam limited by facial edema, ett and eeg monitor. No surgical intervention necessary at this time. Follow up with Dr. Flower after discharge.         Yahir Landry,   Otorhinolaryngology-Head & Neck Surgery  [facility]  02/03/2020

## 2020-02-03 NOTE — ASSESSMENT & PLAN NOTE
Found down, concerns for aspiration pneumonia/pneumonitis.   CT C/A/P showed diffuse right colonic wall thickening concern for colitis, no obstruction noted.  Cultures pending  Vancomycin 1250mg q24

## 2020-02-04 LAB
ALBUMIN SERPL BCP-MCNC: 2.5 G/DL (ref 3.5–5.2)
ALLENS TEST: ABNORMAL
ALP SERPL-CCNC: 51 U/L (ref 55–135)
ALT SERPL W/O P-5'-P-CCNC: 12 U/L (ref 10–44)
ANION GAP SERPL CALC-SCNC: 9 MMOL/L (ref 8–16)
AST SERPL-CCNC: 32 U/L (ref 10–40)
BACTERIA #/AREA URNS AUTO: ABNORMAL /HPF
BASOPHILS # BLD AUTO: 0.08 K/UL (ref 0–0.2)
BASOPHILS NFR BLD: 0.5 % (ref 0–1.9)
BILIRUB SERPL-MCNC: 0.6 MG/DL (ref 0.1–1)
BILIRUB UR QL STRIP: NEGATIVE
BUN SERPL-MCNC: 8 MG/DL (ref 8–23)
CA-I BLDV-SCNC: 1.02 MMOL/L (ref 1.06–1.42)
CALCIUM SERPL-MCNC: 6.9 MG/DL (ref 8.7–10.5)
CHLORIDE SERPL-SCNC: 112 MMOL/L (ref 95–110)
CLARITY UR REFRACT.AUTO: CLEAR
CO2 SERPL-SCNC: 17 MMOL/L (ref 23–29)
COLOR UR AUTO: ABNORMAL
CREAT SERPL-MCNC: 0.8 MG/DL (ref 0.5–1.4)
DELSYS: ABNORMAL
DIFFERENTIAL METHOD: ABNORMAL
EOSINOPHIL # BLD AUTO: 0.2 K/UL (ref 0–0.5)
EOSINOPHIL NFR BLD: 1.1 % (ref 0–8)
ERYTHROCYTE [DISTWIDTH] IN BLOOD BY AUTOMATED COUNT: 13.9 % (ref 11.5–14.5)
ERYTHROCYTE [SEDIMENTATION RATE] IN BLOOD BY WESTERGREN METHOD: 16 MM/H
EST. GFR  (AFRICAN AMERICAN): >60 ML/MIN/1.73 M^2
EST. GFR  (NON AFRICAN AMERICAN): >60 ML/MIN/1.73 M^2
FIO2: 40
GLUCOSE SERPL-MCNC: 103 MG/DL (ref 70–110)
GLUCOSE UR QL STRIP: NEGATIVE
HCO3 UR-SCNC: 19.9 MMOL/L (ref 24–28)
HCT VFR BLD AUTO: 31.1 % (ref 40–54)
HGB BLD-MCNC: 9.8 G/DL (ref 14–18)
HGB UR QL STRIP: ABNORMAL
IMM GRANULOCYTES # BLD AUTO: 0.08 K/UL (ref 0–0.04)
IMM GRANULOCYTES NFR BLD AUTO: 0.5 % (ref 0–0.5)
KETONES UR QL STRIP: ABNORMAL
LACTATE SERPL-SCNC: 0.7 MMOL/L (ref 0.5–2.2)
LEUKOCYTE ESTERASE UR QL STRIP: ABNORMAL
LYMPHOCYTES # BLD AUTO: 2 K/UL (ref 1–4.8)
LYMPHOCYTES NFR BLD: 13.1 % (ref 18–48)
MAGNESIUM SERPL-MCNC: 1.6 MG/DL (ref 1.6–2.6)
MCH RBC QN AUTO: 31.4 PG (ref 27–31)
MCHC RBC AUTO-ENTMCNC: 31.5 G/DL (ref 32–36)
MCV RBC AUTO: 100 FL (ref 82–98)
MICROSCOPIC COMMENT: ABNORMAL
MIN VOL: 7.59
MODE: ABNORMAL
MONOCYTES # BLD AUTO: 1.6 K/UL (ref 0.3–1)
MONOCYTES NFR BLD: 10.7 % (ref 4–15)
NEUTROPHILS # BLD AUTO: 11 K/UL (ref 1.8–7.7)
NEUTROPHILS NFR BLD: 74.1 % (ref 38–73)
NITRITE UR QL STRIP: NEGATIVE
NRBC BLD-RTO: 0 /100 WBC
PCO2 BLDA: 36 MMHG (ref 35–45)
PEEP: 5
PH SMN: 7.35 [PH] (ref 7.35–7.45)
PH UR STRIP: 5 [PH] (ref 5–8)
PHOSPHATE SERPL-MCNC: 2 MG/DL (ref 2.7–4.5)
PIP: 27
PLATELET # BLD AUTO: 222 K/UL (ref 150–350)
PMV BLD AUTO: 10.9 FL (ref 9.2–12.9)
PO2 BLDA: 88 MMHG (ref 80–100)
POC BE: -6 MMOL/L
POC SATURATED O2: 96 % (ref 95–100)
POC TCO2: 21 MMOL/L (ref 23–27)
POCT GLUCOSE: 113 MG/DL (ref 70–110)
POCT GLUCOSE: 96 MG/DL (ref 70–110)
POCT GLUCOSE: 98 MG/DL (ref 70–110)
POTASSIUM SERPL-SCNC: 3.5 MMOL/L (ref 3.5–5.1)
PROT SERPL-MCNC: 5.2 G/DL (ref 6–8.4)
PROT UR QL STRIP: NEGATIVE
RBC # BLD AUTO: 3.12 M/UL (ref 4.6–6.2)
RBC #/AREA URNS AUTO: 17 /HPF (ref 0–4)
SAMPLE: ABNORMAL
SITE: ABNORMAL
SODIUM SERPL-SCNC: 138 MMOL/L (ref 136–145)
SODIUM SERPL-SCNC: 140 MMOL/L (ref 136–145)
SODIUM UR-SCNC: 32 MMOL/L (ref 20–250)
SP GR UR STRIP: 1.01 (ref 1–1.03)
SP02: 95
URN SPEC COLLECT METH UR: ABNORMAL
VANCOMYCIN TROUGH SERPL-MCNC: 6.7 UG/ML (ref 10–22)
VT: 450
WBC # BLD AUTO: 14.89 K/UL (ref 3.9–12.7)
WBC #/AREA URNS AUTO: 13 /HPF (ref 0–5)

## 2020-02-04 PROCEDURE — 99900035 HC TECH TIME PER 15 MIN (STAT): Mod: HCNC

## 2020-02-04 PROCEDURE — 25000003 PHARM REV CODE 250: Mod: HCNC | Performed by: NURSE PRACTITIONER

## 2020-02-04 PROCEDURE — 99232 SBSQ HOSP IP/OBS MODERATE 35: CPT | Mod: HCNC,,, | Performed by: SURGERY

## 2020-02-04 PROCEDURE — 37799 UNLISTED PX VASCULAR SURGERY: CPT | Mod: HCNC

## 2020-02-04 PROCEDURE — 84100 ASSAY OF PHOSPHORUS: CPT | Mod: HCNC

## 2020-02-04 PROCEDURE — 85025 COMPLETE CBC W/AUTO DIFF WBC: CPT | Mod: HCNC

## 2020-02-04 PROCEDURE — 82800 BLOOD PH: CPT | Mod: HCNC

## 2020-02-04 PROCEDURE — 94761 N-INVAS EAR/PLS OXIMETRY MLT: CPT | Mod: HCNC

## 2020-02-04 PROCEDURE — 25000003 PHARM REV CODE 250: Mod: HCNC | Performed by: PHYSICIAN ASSISTANT

## 2020-02-04 PROCEDURE — 82330 ASSAY OF CALCIUM: CPT | Mod: HCNC

## 2020-02-04 PROCEDURE — 63600175 PHARM REV CODE 636 W HCPCS: Mod: HCNC | Performed by: PSYCHIATRY & NEUROLOGY

## 2020-02-04 PROCEDURE — 27200966 HC CLOSED SUCTION SYSTEM: Mod: HCNC

## 2020-02-04 PROCEDURE — 83605 ASSAY OF LACTIC ACID: CPT | Mod: 91,HCNC

## 2020-02-04 PROCEDURE — 83735 ASSAY OF MAGNESIUM: CPT | Mod: HCNC

## 2020-02-04 PROCEDURE — 99291 CRITICAL CARE FIRST HOUR: CPT | Mod: HCNC,GC,, | Performed by: PSYCHIATRY & NEUROLOGY

## 2020-02-04 PROCEDURE — 63600175 PHARM REV CODE 636 W HCPCS: Mod: HCNC | Performed by: PHYSICIAN ASSISTANT

## 2020-02-04 PROCEDURE — 63600175 PHARM REV CODE 636 W HCPCS: Mod: HCNC | Performed by: NURSE PRACTITIONER

## 2020-02-04 PROCEDURE — 27000221 HC OXYGEN, UP TO 24 HOURS: Mod: HCNC

## 2020-02-04 PROCEDURE — 25500020 PHARM REV CODE 255: Mod: HCNC | Performed by: PSYCHIATRY & NEUROLOGY

## 2020-02-04 PROCEDURE — 84300 ASSAY OF URINE SODIUM: CPT | Mod: HCNC

## 2020-02-04 PROCEDURE — 80053 COMPREHEN METABOLIC PANEL: CPT | Mod: HCNC

## 2020-02-04 PROCEDURE — 51701 INSERT BLADDER CATHETER: CPT | Mod: HCNC

## 2020-02-04 PROCEDURE — 83605 ASSAY OF LACTIC ACID: CPT | Mod: HCNC

## 2020-02-04 PROCEDURE — S0028 INJECTION, FAMOTIDINE, 20 MG: HCPCS | Mod: HCNC | Performed by: PHYSICIAN ASSISTANT

## 2020-02-04 PROCEDURE — 20000000 HC ICU ROOM: Mod: HCNC

## 2020-02-04 PROCEDURE — 99900026 HC AIRWAY MAINTENANCE (STAT): Mod: HCNC

## 2020-02-04 PROCEDURE — 81001 URINALYSIS AUTO W/SCOPE: CPT | Mod: HCNC

## 2020-02-04 PROCEDURE — 80202 ASSAY OF VANCOMYCIN: CPT | Mod: HCNC

## 2020-02-04 PROCEDURE — 94003 VENT MGMT INPAT SUBQ DAY: CPT | Mod: HCNC

## 2020-02-04 PROCEDURE — 99291 PR CRITICAL CARE, E/M 30-74 MINUTES: ICD-10-PCS | Mod: HCNC,GC,, | Performed by: PSYCHIATRY & NEUROLOGY

## 2020-02-04 PROCEDURE — 82803 BLOOD GASES ANY COMBINATION: CPT | Mod: HCNC

## 2020-02-04 PROCEDURE — 25000003 PHARM REV CODE 250: Mod: HCNC | Performed by: PSYCHIATRY & NEUROLOGY

## 2020-02-04 PROCEDURE — 84295 ASSAY OF SERUM SODIUM: CPT | Mod: HCNC

## 2020-02-04 PROCEDURE — 99232 PR SUBSEQUENT HOSPITAL CARE,LEVL II: ICD-10-PCS | Mod: HCNC,,, | Performed by: SURGERY

## 2020-02-04 PROCEDURE — 25500020 PHARM REV CODE 255: Mod: HCNC | Performed by: STUDENT IN AN ORGANIZED HEALTH CARE EDUCATION/TRAINING PROGRAM

## 2020-02-04 PROCEDURE — 51702 INSERT TEMP BLADDER CATH: CPT | Mod: HCNC

## 2020-02-04 RX ORDER — ENOXAPARIN SODIUM 100 MG/ML
40 INJECTION SUBCUTANEOUS EVERY 24 HOURS
Status: DISCONTINUED | OUTPATIENT
Start: 2020-02-04 | End: 2020-02-08

## 2020-02-04 RX ORDER — VANCOMYCIN HCL IN 5 % DEXTROSE 1G/250ML
1000 PLASTIC BAG, INJECTION (ML) INTRAVENOUS
Status: DISCONTINUED | OUTPATIENT
Start: 2020-02-05 | End: 2020-02-05

## 2020-02-04 RX ORDER — ENOXAPARIN SODIUM 100 MG/ML
40 INJECTION SUBCUTANEOUS EVERY 24 HOURS
Status: DISCONTINUED | OUTPATIENT
Start: 2020-02-04 | End: 2020-02-04

## 2020-02-04 RX ORDER — LEVETIRACETAM 15 MG/ML
1500 INJECTION INTRAVASCULAR EVERY 12 HOURS
Status: DISCONTINUED | OUTPATIENT
Start: 2020-02-04 | End: 2020-02-07

## 2020-02-04 RX ORDER — LEVETIRACETAM 10 MG/ML
1000 INJECTION INTRAVASCULAR EVERY 12 HOURS
Status: DISCONTINUED | OUTPATIENT
Start: 2020-02-04 | End: 2020-02-04

## 2020-02-04 RX ADMIN — POTASSIUM CHLORIDE 40 MEQ: 20 SOLUTION ORAL at 05:02

## 2020-02-04 RX ADMIN — FAMOTIDINE 20 MG: 10 INJECTION, SOLUTION INTRAVENOUS at 08:02

## 2020-02-04 RX ADMIN — IOHEXOL 100 ML: 350 INJECTION, SOLUTION INTRAVENOUS at 12:02

## 2020-02-04 RX ADMIN — CHLORHEXIDINE GLUCONATE 0.12% ORAL RINSE 15 ML: 1.2 LIQUID ORAL at 05:02

## 2020-02-04 RX ADMIN — Medication 800 MG: at 08:02

## 2020-02-04 RX ADMIN — CHLORHEXIDINE GLUCONATE 0.12% ORAL RINSE 15 ML: 1.2 LIQUID ORAL at 09:02

## 2020-02-04 RX ADMIN — PIPERACILLIN SODIUM AND TAZOBACTAM SODIUM 4.5 G: 4; .5 INJECTION, POWDER, LYOPHILIZED, FOR SOLUTION INTRAVENOUS at 09:02

## 2020-02-04 RX ADMIN — POTASSIUM & SODIUM PHOSPHATES POWDER PACK 280-160-250 MG 2 PACKET: 280-160-250 PACK at 04:02

## 2020-02-04 RX ADMIN — PIPERACILLIN SODIUM AND TAZOBACTAM SODIUM 4.5 G: 4; .5 INJECTION, POWDER, LYOPHILIZED, FOR SOLUTION INTRAVENOUS at 05:02

## 2020-02-04 RX ADMIN — ENOXAPARIN SODIUM 40 MG: 100 INJECTION SUBCUTANEOUS at 02:02

## 2020-02-04 RX ADMIN — IOHEXOL 15 ML: 350 INJECTION, SOLUTION INTRAVENOUS at 09:02

## 2020-02-04 RX ADMIN — ATORVASTATIN CALCIUM 40 MG: 20 TABLET, FILM COATED ORAL at 09:02

## 2020-02-04 RX ADMIN — POTASSIUM & SODIUM PHOSPHATES POWDER PACK 280-160-250 MG 2 PACKET: 280-160-250 PACK at 08:02

## 2020-02-04 RX ADMIN — FENTANYL CITRATE 25 MCG: 50 INJECTION INTRAMUSCULAR; INTRAVENOUS at 05:02

## 2020-02-04 RX ADMIN — LEVETIRACETAM INJECTION 1500 MG: 15 INJECTION INTRAVENOUS at 08:02

## 2020-02-04 RX ADMIN — SODIUM CHLORIDE 1000 ML: 0.9 INJECTION, SOLUTION INTRAVENOUS at 06:02

## 2020-02-04 RX ADMIN — POTASSIUM & SODIUM PHOSPHATES POWDER PACK 280-160-250 MG 2 PACKET: 280-160-250 PACK at 05:02

## 2020-02-04 RX ADMIN — SODIUM CHLORIDE: 0.9 INJECTION, SOLUTION INTRAVENOUS at 09:02

## 2020-02-04 RX ADMIN — VANCOMYCIN HYDROCHLORIDE 1250 MG: 1.25 INJECTION, POWDER, LYOPHILIZED, FOR SOLUTION INTRAVENOUS at 01:02

## 2020-02-04 RX ADMIN — PIPERACILLIN SODIUM AND TAZOBACTAM SODIUM 4.5 G: 4; .5 INJECTION, POWDER, LYOPHILIZED, FOR SOLUTION INTRAVENOUS at 01:02

## 2020-02-04 RX ADMIN — DOCUSATE SODIUM - SENNOSIDES 1 TABLET: 50; 8.6 TABLET, FILM COATED ORAL at 09:02

## 2020-02-04 RX ADMIN — Medication 800 MG: at 05:02

## 2020-02-04 RX ADMIN — IOHEXOL 15 ML: 350 INJECTION, SOLUTION INTRAVENOUS at 10:02

## 2020-02-04 RX ADMIN — CHLORHEXIDINE GLUCONATE 0.12% ORAL RINSE 15 ML: 1.2 LIQUID ORAL at 08:02

## 2020-02-04 RX ADMIN — Medication 0.04 MCG/KG/MIN: at 10:02

## 2020-02-04 RX ADMIN — DEXMEDETOMIDINE HYDROCHLORIDE 0.4 MCG/KG/HR: 4 INJECTION, SOLUTION INTRAVENOUS at 05:02

## 2020-02-04 RX ADMIN — ESCITALOPRAM OXALATE 20 MG: 10 TABLET ORAL at 08:02

## 2020-02-04 RX ADMIN — LEVETIRACETAM INJECTION 1500 MG: 15 INJECTION INTRAVENOUS at 09:02

## 2020-02-04 RX ADMIN — FAMOTIDINE 20 MG: 10 INJECTION, SOLUTION INTRAVENOUS at 09:02

## 2020-02-04 RX ADMIN — DOCUSATE SODIUM - SENNOSIDES 1 TABLET: 50; 8.6 TABLET, FILM COATED ORAL at 08:02

## 2020-02-04 RX ADMIN — CALCIUM GLUCONATE 1 G: 98 INJECTION, SOLUTION INTRAVENOUS at 05:02

## 2020-02-04 RX ADMIN — CHLORHEXIDINE GLUCONATE 0.12% ORAL RINSE 15 ML: 1.2 LIQUID ORAL at 01:02

## 2020-02-04 NOTE — ASSESSMENT & PLAN NOTE
2/2Arrived intubated,   Vent Mode: A/C  Oxygen Concentration (%):  [40] 40  Resp Rate Total:  [16 br/min-26 br/min] 16 br/min  Vt Set:  [450 mL] 450 mL  PEEP/CPAP:  [5 cmH20] 5 cmH20  Mean Airway Pressure:  [9.2 axY55-45 cmH20] 11 cmH20  Daily CXR / abg  Assess vent and wean as able  2/2 CT chest with LEft lobe collapse, but on CXR later today left lobe appears open. Will follow clinically  2/3 CXR pending  2/4: ETT repositioned yesterday, likely has pneumonia, POA, cont A/C support

## 2020-02-04 NOTE — PROCEDURES
EXTENDED  ELECTROENCEPHALOGRAM  REPORT    Edu Choi  MRN: [id number]  CSN: [id number]  : 1955  EEG #: -1    DATE OF SERVICE: 2/3-2020    DATE OF ADMISSION: 2020  7:24 PM    ADMITTING/REQUESTING PROVIDER:  Ricardo Yee MD    METHODOLOGY   Electroencephalographic (EEG) recording is with electrodes placed according to the International 10-20 placement system.  Thirty two (32) channels of digital signal (sampling rate of 512/sec) including T1 and T2 was simultaneously recorded from the scalp and may include  EKG, EMG, and/or eye monitors.  Recording band pass was 0.1 to 512 hz.  Digital video recording of the patient is simultaneously recorded with the EEG.  The patient is instructed report clinical symptoms which may occur during the recording session.  EEG and video recording is stored and archived in digital format.  Activation procedures which include photic stimulation, hyperventilation and instructing patients to perform simple task are done in selected patients.   The EEG is displayed on a monitor screen and can be reviewed using different montages.  Computer assisted analysis is employed to detect spike and electrographic seizure activity.   The entire record is submitted for computer analysis.  The entire recording is visually reviewed and the times identified by computer analysis as being spikes or seizures are reviewed again.  Compresses spectral analysis (CSA) is also performed on the activity recorded from each individual channel.  This is displayed as a power display of frequencies from 0 to 30 Hz over time.   The CSA is reviewed looking for asymmetries in power between homologous areas of the scalp and then compared with the original EEG recording.     [employer] software was also utilized in the review of this study.  This software suite analyzes the EEG recording in multiple domains.  Coherence and rhythmicity is computed to identify EEG sections which may contain organized  seizures.  Each channel undergoes analysis to detect presence of spike and sharp waves which have special and morphological characteristic of epileptic activity.  The routine EEG recording is converted from spacial into frequency domain.  This is then displayed comparing homologous areas to identify areas of significant asymmetry.  Algorithm to identify non-cortically generated artifact is used to separate eye movement, EMG and other artifact from the EEG.      RECORDING TIMES  Start on 2/3/2020,14:27  End on 2/4/2020, 09:29  Total time: 19 hours and 2 minutes    EEG FINDINGS  Background activity:   The background rhythm was continuous, symmetric and characterized by admixture of medium voltage generalized 5-7hz theta with overlying alpha/beta, becoming higher voltage and somewhat sharply contoured with stimulation. A poorly sustained 8 Hz posterior dominant alpha rhythm was seen.Reactive and variable.     Sleep:  Poorly formed symmetric sleep spindles seen.     Activation procedures: not performed    Abnormal activity:   No epileptiform discharges, periodic discharges, lateralized rhythmic delta activity or electrographic seizures were seen.    EKG:  EKG was normal sinus rhythm.     IMPRESSION:   This is an abnormal EEG recording due to generalized theta/delta slowing.     The patient is a 64 year-old male who is being evaluated for acute encephalopathy and tbi with a history of epilepsy.  The patient is currently maintained on keppra. There is evidence for generalized cortical dysfunction consistent with a mild diffuse encephalopathy. No seizures or epileptiform discharges were recorded during this study. There is no significant changes from yesterday's study.     Leticia Resendiz MD  Neurology-Epilepsy Fellow.  [facility].    This EEG was reviewed with the Fellow and the final impression was made by me, with appropriate corrections.    Rebecca Porter MD, RENEE(), FACNS,  DAVON.  Neurology-Epilepsy.  [facility].

## 2020-02-04 NOTE — PT/OT/SLP PROGRESS
Occupational Therapy      Patient Name:  Edu Choi   MRN:  [id number]    Patient not seen today secondary to pt remains intubated/sedated and not medically appropriate for OT evaluation. Will follow-up as scheduled and appropriate.    DANN Zayas  2/4/2020

## 2020-02-04 NOTE — ASSESSMENT & PLAN NOTE
2/2 Admit to NCC  -Q 1 hour neuro checks/ vital signs   Epilepsy following  EEG cap over night reconnect to 24h EEG  -continue home keppra 1500 mg   IVF /h  02/04: EEG last 24 hrs -no seizures, cont keppra at home dose iv for now

## 2020-02-04 NOTE — PLAN OF CARE
POC reviewed with pt and family at 1400. Pts wife verbalized understanding. Questions and concerns addressed. No acute events today. Pt progressing toward goals. 2L NS bolus given. NS @100. PRN fentanyl x3 administered for comfort. C collar in place. EEG in place. Levo gtt titrated off.  Will continue to monitor. See flowsheets for full assessment and VS info.

## 2020-02-04 NOTE — ASSESSMENT & PLAN NOTE
Monitor Thom Mcallister held until eval bowl ischemia and CT head  02/04: no current need to restart therapeutic anticoagulation

## 2020-02-04 NOTE — ASSESSMENT & PLAN NOTE
Found down, concerns for aspiration pneumonia/pneumonitis.   CT C/A/P showed diffuse right colonic wall thickening concern for colitis, no obstruction noted.  Cultures pending  Vancomycin 1250mg q24  Zosyn

## 2020-02-04 NOTE — PROGRESS NOTES
Pharmacokinetic Assessment Follow Up: IV Vancomycin    Assessment and Plan:    - Vancomycin trough before third dose resulted as 6.7 mcg/mL  - Subtherapeutic, goal 15-20 mcg/mL  - Increase current regimen to 1000 mg Q12H  - Draw trough before fourth dose on 2/6 at 13:15    Drug levels (last 3 results):  Recent Labs   Lab Result Units 02/04/20  1254   Vancomycin-Trough ug/mL 6.7*       Pharmacy will continue to follow and monitor vancomycin. Please call for questions regarding this assessment.    Thank you for the consult,   Melissa Scherer, PharmD, [facility]  Neurocritical Care Pharmacist  [phone]                     Patient brief summary:  Edu Choi is a 64 y.o. male initiated on antimicrobial therapy with IV Vancomycin for treatment of bacteremia    The patient's current regimen is 1250 mg Q24H    Drug Allergies:   Review of patient's allergies indicates:   Allergen Reactions    No known drug allergies        Actual Body Weight:   68.5 kg    Renal Function:   Estimated Creatinine Clearance: 76.1 mL/min (based on SCr of 0.8 mg/dL).,     Dialysis Method (if applicable):  N/A    CBC (last 72 hours):  Recent Labs   Lab Result Units 02/02/20  0957 02/02/20  1927 02/03/20  0132 02/04/20  0214   WBC K/uL 12.96*  --  17.61* 14.89*   Hemoglobin g/dL 13.8*  --  10.8* 9.8*   Hemoglobin A1C %  --  6.1*  --   --    Hematocrit % 42.7  --  34.0* 31.1*   Platelets K/uL 334  --  256 222   Gran% % 77.2*  --  71.3 74.1*   Lymph% % 14.7*  --  16.6* 13.1*   Mono% % 7.7  --  11.1 10.7   Eosinophil% % 0.2  --  0.3 1.1   Basophil% % 0.2  --  0.2 0.5   Differential Method  Automated  --  Automated Automated       Metabolic Panel (last 72 hours):  Recent Labs   Lab Result Units 02/02/20  0957 02/02/20  1116 02/02/20  1119 02/02/20  2230 02/03/20  0132 02/04/20  0214   Sodium mmol/L 139  --   --   --  140 138   Potassium mmol/L 4.6  --   --   --  3.6 3.5   Chloride mmol/L 106  --   --   --  112* 112*   CO2 mmol/L 26  --   --   --  20* 17*    Glucose mg/dL 111*  --   --   --  115* 103   Glucose, UA   --  Negative  --  Negative  --   --    BUN, Bld mg/dL 14  --   --   --  12 8   Creatinine mg/dL 1.1  --   --   --  0.8 0.8   Creatinine, Random Ur mg/dL  --   --  34.6 127.0  --   --    Albumin g/dL 3.8  --   --   --  2.8* 2.5*   Total Bilirubin mg/dL 0.3  --   --   --  0.6 0.6   Alkaline Phosphatase U/L 83  --   --   --  57 51*   AST U/L 20  --   --   --  21 32   ALT U/L 15  --   --   --  12 12   Magnesium mg/dL 2.0  --   --   --  1.5* 1.6   Phosphorus mg/dL  --   --   --   --  2.7 2.0*       Vancomycin Administrations:  vancomycin given in the last 96 hours                   vancomycin 1.25 g in dextrose 5% 250 mL IVPB (ready to mix) (mg) 1,250 mg New Bag 02/04/20 1334     1,250 mg New Bag 02/03/20 1330    vancomycin (VANCOCIN) 2,000 mg in dextrose 5 % 500 mL IVPB (mg) 2,000 mg New Bag 02/02/20 1303                Microbiologic Results:  Microbiology Results (last 7 days)     Procedure Component Value Units Date/Time    Culture, Respiratory with Gram Stain [[id number]] Collected:  02/02/20 1940    Order Status:  Completed Specimen:  Respiratory from Endotracheal Aspirate Updated:  02/04/20 1055     Respiratory Culture Normal respiratory tessa     Gram Stain (Respiratory) <10 epithelial cells per low power field.     Gram Stain (Respiratory) Rare WBC's     Gram Stain (Respiratory) Rare Gram positive cocci     Gram Stain (Respiratory) Rare Gram negative rods    Blood culture [[id number]] Collected:  02/02/20 1910    Order Status:  Completed Specimen:  Blood from Peripheral, Ankle, Left Updated:  02/04/20 0613     Blood Culture, Routine No Growth to date      No Growth to date    Blood culture [[id number]] Collected:  02/02/20 1920    Order Status:  Completed Specimen:  Blood from Peripheral, Ankle, Right Updated:  02/04/20 0613     Blood Culture, Routine No Growth to date      No Growth to date

## 2020-02-04 NOTE — ASSESSMENT & PLAN NOTE
Wall thickening and diffuse portal vein air noted on CT abdomen  Lactic 0.8, will trend  BS x 4, soft and nondistended.  NG to LIWS, brown drainage  General Surgery consulted.   02/04: wall thickening and portal vein air absent on todays study, gen surg following

## 2020-02-04 NOTE — PROGRESS NOTES
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 [place] [facility]. He was given 10 mg Versed during EMS ride to outside facility for continued seizure activity. He was unresponsive upon arrival to ED and subsequently intubated. At outside ED, he was noted to have left lung collapse and maxillofacial fractures. Upon arrival here, repeat CT C/A/P was order, demonstrating right colonic wall thickening and portal venous gas. He arrived intubated and sedated with propofol, currently weaned off the propofol.     Post-Op Info:  * No surgery found *         Interval History: No acute events overnight  Afebrile  On levo 0.04  Lactate normalized  Abdominal exam stable    Medications:  Continuous Infusions:   sodium chloride 0.9% 100 mL/hr at 02/04/20 0705    dexmedetomidine (PRECEDEX) infusion 0.2 mcg/kg/hr (02/04/20 0705)    norepinephrine bitartrate-D5W 0.04 mcg/kg/min (02/04/20 0705)     Scheduled Meds:   atorvastatin  40 mg Oral QHS    chlorhexidine  15 mL Mouth/Throat QID    escitalopram oxalate  20 mg Oral Daily    famotidine (PF)  20 mg Intravenous BID    levetiracetam IVPB  1,500 mg Intravenous Q12H    piperacillin-tazobactam (ZOSYN) IVPB  4.5 g Intravenous Q8H    senna-docusate 8.6-50 mg  1 tablet Per OG tube BID    vancomycin (VANCOCIN) IVPB  1,250 mg Intravenous Q24H     PRN Meds:acetaminophen, Dextrose 10% Bolus, fentaNYL, glucagon (human recombinant), insulin aspart U-100, labetalol, lorazepam, magnesium oxide, magnesium oxide, ondansetron, potassium chloride 10%, potassium chloride 10%, potassium chloride 10%, potassium, sodium phosphates, potassium, sodium phosphates, potassium, sodium phosphates,  sodium chloride 0.9%, Pharmacy to dose Vancomycin consult **AND** vancomycin - pharmacy to dose     Review of patient's allergies indicates:   Allergen Reactions    No known drug allergies      Objective:     Vital Signs (Most Recent):  Temp: 98.2 °F (36.8 °C) (02/04/20 0705)  Pulse: 64 (02/04/20 0737)  Resp: 16 (02/04/20 0737)  BP: (!) 104/56 (02/04/20 0737)  SpO2: 97 % (02/04/20 0737) Vital Signs (24h Range):  Temp:  [98 °F (36.7 °C)-99.4 °F (37.4 °C)] 98.2 °F (36.8 °C)  Pulse:  [] 64  Resp:  [16-23] 16  SpO2:  [93 %-99 %] 97 %  BP: ()/(51-66) 104/56  Arterial Line BP: (106-134)/(41-54) 110/47     Weight: 68.5 kg (151 lb)  Body mass index is 25.92 kg/m².    Intake/Output - Last 3 Shifts       02/02 0700 - 02/03 0659 02/03 0700 - 02/04 0659 02/04 0700 - 02/05 0659    I.V. (mL/kg) 966.8 (14.1) 2691 (39.3) 118 (1.7)    NG/GT  200     IV Piggyback 300 4750     Total Intake(mL/kg) 1266.8 (18.5) 7641 (111.5) 118 (1.7)    Urine (mL/kg/hr) 250 2535 (1.5) 100 (0.9)    Stool  0     Total Output 250 2535 100    Net +1016.8 +5106 +18           Urine Occurrence  2 x     Stool Occurrence  0 x           Physical Exam   Constitutional: He is oriented to person, place, and time. He appears well-developed and well-nourished.   Intubated   Cardiovascular: Normal rate and regular rhythm.   Pulmonary/Chest:   Vent Mode: A/C  Oxygen Concentration (%):  () 40  Resp Rate Total:  (16 br/min-31 br/min) 20 br/min  Vt Set:  (450 mL) 450 mL  PEEP/CPAP:  (5 cmH20) 5 cmH20  Mean Airway Pressure:  (8.7 rpR27-00 cmH20) 11 cmH20     Abdominal: Soft.   Not rigid   Neurological: He is alert and oriented to person, place, and time.   Skin: Skin is warm and dry.       Significant Labs:  CBC:   Recent Labs   Lab 02/04/20 0214   WBC 14.89*   RBC 3.12*   HGB 9.8*   HCT 31.1*      *   MCH 31.4*   MCHC 31.5*     BMP:   Recent Labs   Lab 02/04/20 0214         K 3.5   *   CO2 17*   BUN 8   CREATININE 0.8    CALCIUM 6.9*   MG 1.6     CMP:   Recent Labs   Lab 02/04/20  0214      CALCIUM 6.9*   ALBUMIN 2.5*   PROT 5.2*      K 3.5   CO2 17*   *   BUN 8   CREATININE 0.8   ALKPHOS 51*   ALT 12   AST 32   BILITOT 0.6       Significant Diagnostics:  I have reviewed all pertinent imaging results/findings within the past 24 hours.    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 Found to have R colon wall thickening and portal venous gas, concern for ischemia.     - Lactate normal, leukocytosis resolving. Abdominal exam unchanged  - Continue to hold tube feeds for now  - Will want to repeat CT scan prior to restarting feeds  - Please call for any change in exam or hemodynamic status        Johnathon Davidson MD  General Surgery  [facility]

## 2020-02-04 NOTE — SUBJECTIVE & OBJECTIVE
Interval History: No acute events overnight  Afebrile  On levo 0.04  Lactate normalized  Abdominal exam stable    Medications:  Continuous Infusions:   sodium chloride 0.9% 100 mL/hr at 02/04/20 0705    dexmedetomidine (PRECEDEX) infusion 0.2 mcg/kg/hr (02/04/20 0705)    norepinephrine bitartrate-D5W 0.04 mcg/kg/min (02/04/20 0705)     Scheduled Meds:   atorvastatin  40 mg Oral QHS    chlorhexidine  15 mL Mouth/Throat QID    escitalopram oxalate  20 mg Oral Daily    famotidine (PF)  20 mg Intravenous BID    levetiracetam IVPB  1,500 mg Intravenous Q12H    piperacillin-tazobactam (ZOSYN) IVPB  4.5 g Intravenous Q8H    senna-docusate 8.6-50 mg  1 tablet Per OG tube BID    vancomycin (VANCOCIN) IVPB  1,250 mg Intravenous Q24H     PRN Meds:acetaminophen, Dextrose 10% Bolus, fentaNYL, glucagon (human recombinant), insulin aspart U-100, labetalol, lorazepam, magnesium oxide, magnesium oxide, ondansetron, potassium chloride 10%, potassium chloride 10%, potassium chloride 10%, potassium, sodium phosphates, potassium, sodium phosphates, potassium, sodium phosphates, sodium chloride 0.9%, Pharmacy to dose Vancomycin consult **AND** vancomycin - pharmacy to dose     Review of patient's allergies indicates:   Allergen Reactions    No known drug allergies      Objective:     Vital Signs (Most Recent):  Temp: 98.2 °F (36.8 °C) (02/04/20 0705)  Pulse: 64 (02/04/20 0737)  Resp: 16 (02/04/20 0737)  BP: (!) 104/56 (02/04/20 0737)  SpO2: 97 % (02/04/20 0737) Vital Signs (24h Range):  Temp:  [98 °F (36.7 °C)-99.4 °F (37.4 °C)] 98.2 °F (36.8 °C)  Pulse:  [] 64  Resp:  [16-23] 16  SpO2:  [93 %-99 %] 97 %  BP: ()/(51-66) 104/56  Arterial Line BP: (106-134)/(41-54) 110/47     Weight: 68.5 kg (151 lb)  Body mass index is 25.92 kg/m².    Intake/Output - Last 3 Shifts       02/02 0700 - 02/03 0659 02/03 0700 - 02/04 0659 02/04 0700 - 02/05 0659    I.V. (mL/kg) 966.8 (14.1) 2691 (39.3) 118 (1.7)    NG/GT  200     IV  Piggyback 300 4750     Total Intake(mL/kg) 1266.8 (18.5) 7641 (111.5) 118 (1.7)    Urine (mL/kg/hr) 250 2535 (1.5) 100 (0.9)    Stool  0     Total Output 250 2535 100    Net +1016.8 +5106 +18           Urine Occurrence  2 x     Stool Occurrence  0 x           Physical Exam   Constitutional: He is oriented to person, place, and time. He appears well-developed and well-nourished.   Intubated   Cardiovascular: Normal rate and regular rhythm.   Pulmonary/Chest:   Vent Mode: A/C  Oxygen Concentration (%):  () 40  Resp Rate Total:  (16 br/min-31 br/min) 20 br/min  Vt Set:  (450 mL) 450 mL  PEEP/CPAP:  (5 cmH20) 5 cmH20  Mean Airway Pressure:  (8.7 wrF66-56 cmH20) 11 cmH20     Abdominal: Soft.   Not rigid   Neurological: He is alert and oriented to person, place, and time.   Skin: Skin is warm and dry.       Significant Labs:  CBC:   Recent Labs   Lab 02/04/20 0214   WBC 14.89*   RBC 3.12*   HGB 9.8*   HCT 31.1*      *   MCH 31.4*   MCHC 31.5*     BMP:   Recent Labs   Lab 02/04/20 0214         K 3.5   *   CO2 17*   BUN 8   CREATININE 0.8   CALCIUM 6.9*   MG 1.6     CMP:   Recent Labs   Lab 02/04/20 0214      CALCIUM 6.9*   ALBUMIN 2.5*   PROT 5.2*      K 3.5   CO2 17*   *   BUN 8   CREATININE 0.8   ALKPHOS 51*   ALT 12   AST 32   BILITOT 0.6       Significant Diagnostics:  I have reviewed all pertinent imaging results/findings within the past 24 hours.

## 2020-02-04 NOTE — PLAN OF CARE
POC reviewed with pt at 0500. No evidence of learning. Questions and concerns addressed. No acute events overnight. Pt agitated overnight; precedex infusion started. 1L NS bolus administered x2. Levo and NS drips still infusing. No BM. Lytes being replaced. Pt progressing toward goals. Will continue to monitor. See flowsheets for full assessment and VS info

## 2020-02-04 NOTE — SUBJECTIVE & OBJECTIVE
Review of Systems   Unable to perform ROS: Intubated       Objective:     Vitals:  Temp: 98 °F (36.7 °C)  Pulse: 63  Rhythm: normal sinus rhythm  BP: (!) 116/59  MAP (mmHg): 85  Resp: 16  SpO2: 95 %  Oxygen Concentration (%): 40  O2 Device (Oxygen Therapy): ventilator  Vent Mode: A/C  Set Rate: 16 BPM  Vt Set: 450 mL  PEEP/CPAP: 5 cmH20  Peak Airway Pressure: 35 cmH2O  Mean Airway Pressure: 11 cmH20  Plateau Pressure: 0 cmH20    Temp  Min: 98 °F (36.7 °C)  Max: 99.4 °F (37.4 °C)  Pulse  Min: 60  Max: 107  BP  Min: 99/51  Max: 147/65  MAP (mmHg)  Min: 72  Max: 94  Resp  Min: 16  Max: 23  SpO2  Min: 93 %  Max: 99 %  Oxygen Concentration (%)  Min: 40  Max: 40    02/03 0701 - 02/04 0700  In: 7641 [I.V.:2691]  Out: 2535 [Urine:2535]   Unmeasured Output  Urine Occurrence: 1  Stool Occurrence: 0       Physical Exam   Constitutional: No distress.   HENT:   Head: Normocephalic.   Eyes: Pupils are equal, round, and reactive to light.   Neck:   Cervical hard collar in place   Cardiovascular: Regular rhythm.   Pulmonary/Chest: Breath sounds normal.   Abdominal: Soft. Bowel sounds are normal.   Musculoskeletal: He exhibits edema.   Neurological:   Sedated  Nl CNs  Symmetric withdrawal in extremities   Skin: Skin is warm. Capillary refill takes less than 2 seconds.         Medications:  Continuous  sodium chloride 0.9% Last Rate: 100 mL/hr at 02/04/20 1105   dexmedetomidine (PRECEDEX) infusion Last Rate: 0.3 mcg/kg/hr (02/04/20 1105)   norepinephrine bitartrate-D5W Last Rate: 0.04 mcg/kg/min (02/04/20 1105)   Scheduled  atorvastatin 40 mg QHS   chlorhexidine 15 mL QID   enoxaparin 40 mg Daily   escitalopram oxalate 20 mg Daily   famotidine (PF) 20 mg BID   levetiracetam IVPB 1,500 mg Q12H   piperacillin-tazobactam (ZOSYN) IVPB 4.5 g Q8H   senna-docusate 8.6-50 mg 1 tablet BID   vancomycin (VANCOCIN) IVPB 1,250 mg Q24H   PRN  acetaminophen 650 mg Q6H PRN   Dextrose 10% Bolus 12.5 g PRN   fentaNYL 25 mcg Q2H PRN   glucagon  (human recombinant) 1 mg PRN   insulin aspart U-100 1-10 Units Q6H PRN   labetalol 10 mg Q4H PRN   lorazepam 2 mg Q1H PRN   magnesium oxide 800 mg PRN   magnesium oxide 800 mg PRN   ondansetron 4 mg Q8H PRN   potassium chloride 10% 40 mEq PRN   potassium chloride 10% 40 mEq PRN   potassium chloride 10% 60 mEq PRN   potassium, sodium phosphates 2 packet PRN   potassium, sodium phosphates 2 packet PRN   potassium, sodium phosphates 2 packet PRN   sodium chloride 0.9% 10 mL PRN   vancomycin - pharmacy to dose  pharmacy to manage frequency     Today I personally reviewed pertinent medications, lines/drains/airways, imaging, laboratory results, microbiology results, notably:    Diet  Diet NPO  Diet NPO

## 2020-02-04 NOTE — PLAN OF CARE
POC reviewed with pt at 1630. Pt intubated/sedated and unable to verbalize understanding. Levo gtt titrated throughout shift to keep MAP>65. Precedex gtt titrated throughout shift to keep pt comfortable. Wheeler in place. Pt travelled to CT at 1145 with no problems. Tolerated scan well. Will continue to monitor. See flowsheets for full assessment and VS info.       Problem: Device-Related Complication Risk (Mechanical Ventilation, Invasive)  Goal: Optimal Device Function  Intervention: Optimize Device Care and Function  Flowsheets (Taken 2/4/2020 1643)  Aspiration Precautions: respiratory status monitored; upright posture maintained  Airway Safety Measures: mask at bedside; suction at bedside

## 2020-02-05 PROBLEM — K55.9 LARGE BOWEL ISCHEMIA: Status: RESOLVED | Noted: 2020-02-02 | Resolved: 2020-02-05

## 2020-02-05 LAB
ALBUMIN SERPL BCP-MCNC: 2.4 G/DL (ref 3.5–5.2)
ALLENS TEST: ABNORMAL
ALLENS TEST: ABNORMAL
ALP SERPL-CCNC: 49 U/L (ref 55–135)
ALT SERPL W/O P-5'-P-CCNC: 11 U/L (ref 10–44)
ANION GAP SERPL CALC-SCNC: 9 MMOL/L (ref 8–16)
AST SERPL-CCNC: 28 U/L (ref 10–40)
BACTERIA SPEC AEROBE CULT: NORMAL
BACTERIA SPEC AEROBE CULT: NORMAL
BASOPHILS # BLD AUTO: 0.08 K/UL (ref 0–0.2)
BASOPHILS NFR BLD: 0.7 % (ref 0–1.9)
BILIRUB SERPL-MCNC: 0.6 MG/DL (ref 0.1–1)
BUN SERPL-MCNC: 6 MG/DL (ref 8–23)
CALCIUM SERPL-MCNC: 7.4 MG/DL (ref 8.7–10.5)
CHLORIDE SERPL-SCNC: 111 MMOL/L (ref 95–110)
CO2 SERPL-SCNC: 21 MMOL/L (ref 23–29)
CREAT SERPL-MCNC: 0.7 MG/DL (ref 0.5–1.4)
DELSYS: ABNORMAL
DELSYS: ABNORMAL
DIFFERENTIAL METHOD: ABNORMAL
EOSINOPHIL # BLD AUTO: 0.3 K/UL (ref 0–0.5)
EOSINOPHIL NFR BLD: 2.3 % (ref 0–8)
ERYTHROCYTE [DISTWIDTH] IN BLOOD BY AUTOMATED COUNT: 13.7 % (ref 11.5–14.5)
ERYTHROCYTE [SEDIMENTATION RATE] IN BLOOD BY WESTERGREN METHOD: 16 MM/H
EST. GFR  (AFRICAN AMERICAN): >60 ML/MIN/1.73 M^2
EST. GFR  (NON AFRICAN AMERICAN): >60 ML/MIN/1.73 M^2
FIO2: 40
FIO2: 40
GLUCOSE SERPL-MCNC: 107 MG/DL (ref 70–110)
GRAM STN SPEC: NORMAL
HCO3 UR-SCNC: 20.9 MMOL/L (ref 24–28)
HCO3 UR-SCNC: 21.9 MMOL/L (ref 24–28)
HCT VFR BLD AUTO: 32 % (ref 40–54)
HGB BLD-MCNC: 10.4 G/DL (ref 14–18)
IMM GRANULOCYTES # BLD AUTO: 0.04 K/UL (ref 0–0.04)
IMM GRANULOCYTES NFR BLD AUTO: 0.4 % (ref 0–0.5)
LYMPHOCYTES # BLD AUTO: 2 K/UL (ref 1–4.8)
LYMPHOCYTES NFR BLD: 18.3 % (ref 18–48)
MAGNESIUM SERPL-MCNC: 1.7 MG/DL (ref 1.6–2.6)
MCH RBC QN AUTO: 31.4 PG (ref 27–31)
MCHC RBC AUTO-ENTMCNC: 32.5 G/DL (ref 32–36)
MCV RBC AUTO: 97 FL (ref 82–98)
MIN VOL: 10.6
MIN VOL: 7
MODE: ABNORMAL
MODE: ABNORMAL
MONOCYTES # BLD AUTO: 1.5 K/UL (ref 0.3–1)
MONOCYTES NFR BLD: 13.1 % (ref 4–15)
NEUTROPHILS # BLD AUTO: 7.2 K/UL (ref 1.8–7.7)
NEUTROPHILS NFR BLD: 65.2 % (ref 38–73)
NRBC BLD-RTO: 0 /100 WBC
PCO2 BLDA: 35.8 MMHG (ref 35–45)
PCO2 BLDA: 38.1 MMHG (ref 35–45)
PEEP: 5
PEEP: 7
PH SMN: 7.37 [PH] (ref 7.35–7.45)
PH SMN: 7.37 [PH] (ref 7.35–7.45)
PHOSPHATE SERPL-MCNC: 1.7 MG/DL (ref 2.7–4.5)
PIP: 33
PLATELET # BLD AUTO: 243 K/UL (ref 150–350)
PMV BLD AUTO: 11.1 FL (ref 9.2–12.9)
PO2 BLDA: 107 MMHG (ref 80–100)
PO2 BLDA: 92 MMHG (ref 80–100)
POC BE: -3 MMOL/L
POC BE: -4 MMOL/L
POC SATURATED O2: 97 % (ref 95–100)
POC SATURATED O2: 98 % (ref 95–100)
POC TCO2: 22 MMOL/L (ref 23–27)
POC TCO2: 23 MMOL/L (ref 23–27)
POCT GLUCOSE: 107 MG/DL (ref 70–110)
POCT GLUCOSE: 121 MG/DL (ref 70–110)
POCT GLUCOSE: 97 MG/DL (ref 70–110)
POCT GLUCOSE: 99 MG/DL (ref 70–110)
POTASSIUM SERPL-SCNC: 3.5 MMOL/L (ref 3.5–5.1)
POTASSIUM SERPL-SCNC: 3.9 MMOL/L (ref 3.5–5.1)
PROT SERPL-MCNC: 5.3 G/DL (ref 6–8.4)
PS: 12
RBC # BLD AUTO: 3.31 M/UL (ref 4.6–6.2)
SAMPLE: ABNORMAL
SAMPLE: ABNORMAL
SITE: ABNORMAL
SITE: ABNORMAL
SODIUM SERPL-SCNC: 141 MMOL/L (ref 136–145)
SP02: 97
SP02: 99
SPONT RATE: 23
VT: 450
WBC # BLD AUTO: 11.06 K/UL (ref 3.9–12.7)

## 2020-02-05 PROCEDURE — S0028 INJECTION, FAMOTIDINE, 20 MG: HCPCS | Mod: HCNC | Performed by: PHYSICIAN ASSISTANT

## 2020-02-05 PROCEDURE — 37799 UNLISTED PX VASCULAR SURGERY: CPT | Mod: HCNC

## 2020-02-05 PROCEDURE — 99900026 HC AIRWAY MAINTENANCE (STAT): Mod: HCNC

## 2020-02-05 PROCEDURE — 99233 SBSQ HOSP IP/OBS HIGH 50: CPT | Mod: HCNC,GC,, | Performed by: PSYCHIATRY & NEUROLOGY

## 2020-02-05 PROCEDURE — 99233 PR SUBSEQUENT HOSPITAL CARE,LEVL III: ICD-10-PCS | Mod: HCNC,GC,, | Performed by: PSYCHIATRY & NEUROLOGY

## 2020-02-05 PROCEDURE — 84100 ASSAY OF PHOSPHORUS: CPT | Mod: HCNC

## 2020-02-05 PROCEDURE — 99900035 HC TECH TIME PER 15 MIN (STAT): Mod: HCNC

## 2020-02-05 PROCEDURE — 63600175 PHARM REV CODE 636 W HCPCS: Mod: HCNC | Performed by: NURSE PRACTITIONER

## 2020-02-05 PROCEDURE — 63600175 PHARM REV CODE 636 W HCPCS: Mod: HCNC | Performed by: PHYSICIAN ASSISTANT

## 2020-02-05 PROCEDURE — 27200966 HC CLOSED SUCTION SYSTEM: Mod: HCNC

## 2020-02-05 PROCEDURE — 94003 VENT MGMT INPAT SUBQ DAY: CPT | Mod: HCNC

## 2020-02-05 PROCEDURE — 63600175 PHARM REV CODE 636 W HCPCS: Mod: HCNC | Performed by: PSYCHIATRY & NEUROLOGY

## 2020-02-05 PROCEDURE — 94761 N-INVAS EAR/PLS OXIMETRY MLT: CPT | Mod: HCNC

## 2020-02-05 PROCEDURE — 25000003 PHARM REV CODE 250: Mod: HCNC | Performed by: NURSE PRACTITIONER

## 2020-02-05 PROCEDURE — 27000221 HC OXYGEN, UP TO 24 HOURS: Mod: HCNC

## 2020-02-05 PROCEDURE — 84132 ASSAY OF SERUM POTASSIUM: CPT | Mod: HCNC

## 2020-02-05 PROCEDURE — 82803 BLOOD GASES ANY COMBINATION: CPT | Mod: HCNC

## 2020-02-05 PROCEDURE — 85025 COMPLETE CBC W/AUTO DIFF WBC: CPT | Mod: HCNC

## 2020-02-05 PROCEDURE — 25000003 PHARM REV CODE 250: Mod: HCNC | Performed by: PSYCHIATRY & NEUROLOGY

## 2020-02-05 PROCEDURE — 25000003 PHARM REV CODE 250: Mod: HCNC | Performed by: PHYSICIAN ASSISTANT

## 2020-02-05 PROCEDURE — 20000000 HC ICU ROOM: Mod: HCNC

## 2020-02-05 PROCEDURE — 80053 COMPREHEN METABOLIC PANEL: CPT | Mod: HCNC

## 2020-02-05 PROCEDURE — 83735 ASSAY OF MAGNESIUM: CPT | Mod: HCNC

## 2020-02-05 RX ORDER — VANCOMYCIN HCL IN 5 % DEXTROSE 1G/250ML
1000 PLASTIC BAG, INJECTION (ML) INTRAVENOUS
Status: DISCONTINUED | OUTPATIENT
Start: 2020-02-05 | End: 2020-02-08

## 2020-02-05 RX ORDER — DEXMEDETOMIDINE HYDROCHLORIDE 4 UG/ML
0.2 INJECTION, SOLUTION INTRAVENOUS CONTINUOUS
Status: DISCONTINUED | OUTPATIENT
Start: 2020-02-05 | End: 2020-02-07

## 2020-02-05 RX ADMIN — POTASSIUM CHLORIDE 40 MEQ: 20 SOLUTION ORAL at 05:02

## 2020-02-05 RX ADMIN — FENTANYL CITRATE 25 MCG: 50 INJECTION INTRAMUSCULAR; INTRAVENOUS at 06:02

## 2020-02-05 RX ADMIN — ATORVASTATIN CALCIUM 40 MG: 20 TABLET, FILM COATED ORAL at 08:02

## 2020-02-05 RX ADMIN — PIPERACILLIN SODIUM AND TAZOBACTAM SODIUM 4.5 G: 4; .5 INJECTION, POWDER, LYOPHILIZED, FOR SOLUTION INTRAVENOUS at 12:02

## 2020-02-05 RX ADMIN — SODIUM CHLORIDE: 0.9 INJECTION, SOLUTION INTRAVENOUS at 08:02

## 2020-02-05 RX ADMIN — POTASSIUM & SODIUM PHOSPHATES POWDER PACK 280-160-250 MG 2 PACKET: 280-160-250 PACK at 08:02

## 2020-02-05 RX ADMIN — POTASSIUM & SODIUM PHOSPHATES POWDER PACK 280-160-250 MG 2 PACKET: 280-160-250 PACK at 05:02

## 2020-02-05 RX ADMIN — CHLORHEXIDINE GLUCONATE 0.12% ORAL RINSE 15 ML: 1.2 LIQUID ORAL at 08:02

## 2020-02-05 RX ADMIN — Medication 800 MG: at 08:02

## 2020-02-05 RX ADMIN — DEXMEDETOMIDINE HYDROCHLORIDE 1 MCG/KG/HR: 100 INJECTION, SOLUTION, CONCENTRATE INTRAVENOUS at 09:02

## 2020-02-05 RX ADMIN — LEVETIRACETAM INJECTION 1500 MG: 15 INJECTION INTRAVENOUS at 08:02

## 2020-02-05 RX ADMIN — Medication 800 MG: at 05:02

## 2020-02-05 RX ADMIN — ESCITALOPRAM OXALATE 20 MG: 10 TABLET ORAL at 08:02

## 2020-02-05 RX ADMIN — PIPERACILLIN SODIUM AND TAZOBACTAM SODIUM 4.5 G: 4; .5 INJECTION, POWDER, LYOPHILIZED, FOR SOLUTION INTRAVENOUS at 04:02

## 2020-02-05 RX ADMIN — VANCOMYCIN HYDROCHLORIDE 1000 MG: 1 INJECTION, POWDER, LYOPHILIZED, FOR SOLUTION INTRAVENOUS at 12:02

## 2020-02-05 RX ADMIN — LEVETIRACETAM INJECTION 1500 MG: 15 INJECTION INTRAVENOUS at 09:02

## 2020-02-05 RX ADMIN — VANCOMYCIN HYDROCHLORIDE 1000 MG: 1 INJECTION, POWDER, LYOPHILIZED, FOR SOLUTION INTRAVENOUS at 02:02

## 2020-02-05 RX ADMIN — FENTANYL CITRATE 25 MCG: 50 INJECTION INTRAMUSCULAR; INTRAVENOUS at 07:02

## 2020-02-05 RX ADMIN — PIPERACILLIN SODIUM AND TAZOBACTAM SODIUM 4.5 G: 4; .5 INJECTION, POWDER, LYOPHILIZED, FOR SOLUTION INTRAVENOUS at 08:02

## 2020-02-05 RX ADMIN — DEXMEDETOMIDINE HYDROCHLORIDE 0.8 MCG/KG/HR: 100 INJECTION, SOLUTION, CONCENTRATE INTRAVENOUS at 12:02

## 2020-02-05 RX ADMIN — DOCUSATE SODIUM - SENNOSIDES 1 TABLET: 50; 8.6 TABLET, FILM COATED ORAL at 08:02

## 2020-02-05 RX ADMIN — FAMOTIDINE 20 MG: 10 INJECTION, SOLUTION INTRAVENOUS at 08:02

## 2020-02-05 RX ADMIN — SODIUM CHLORIDE 1000 ML: 0.9 INJECTION, SOLUTION INTRAVENOUS at 08:02

## 2020-02-05 RX ADMIN — CHLORHEXIDINE GLUCONATE 0.12% ORAL RINSE 15 ML: 1.2 LIQUID ORAL at 12:02

## 2020-02-05 RX ADMIN — CHLORHEXIDINE GLUCONATE 0.12% ORAL RINSE 15 ML: 1.2 LIQUID ORAL at 04:02

## 2020-02-05 RX ADMIN — ENOXAPARIN SODIUM 40 MG: 100 INJECTION SUBCUTANEOUS at 04:02

## 2020-02-05 RX ADMIN — FENTANYL CITRATE 25 MCG: 50 INJECTION INTRAMUSCULAR; INTRAVENOUS at 09:02

## 2020-02-05 NOTE — ASSESSMENT & PLAN NOTE
2/2Arrived intubated,   Vent Mode: Spont  Oxygen Concentration (%):  [40] 40  Resp Rate Total:  [12 br/min-38 br/min] 19 br/min  Vt Set:  [450 mL] 450 mL  PEEP/CPAP:  [5 cmH20-7 cmH20] 7 cmH20  Pressure Support:  [12 cmH20] 12 cmH20  Mean Airway Pressure:  [9.7 feN32-45 cmH20] 10 cmH20  Daily CXR / abg  Assess vent and wean as able  2/2 CT chest with LEft lobe collapse, but on CXR later today left lobe appears open. Will follow clinically  2/3 CXR pending  2/4: ETT repositioned yesterday, likely has pneumonia, POA, cont A/C support  2/5: tolerating spontaneous mode

## 2020-02-05 NOTE — ASSESSMENT & PLAN NOTE
Monitor Thom Mcallister held until eval bowl ischemia and CT head  02/04: no current need to restart therapeutic anticoagulation  Especially with recent contusions noted right temporal lobe

## 2020-02-05 NOTE — SUBJECTIVE & OBJECTIVE
Review of Systems   Unable to perform ROS: Intubated       Objective:     Vitals:  Temp: 98.3 °F (36.8 °C)  Pulse: (!) 53  Rhythm: normal sinus rhythm  BP: (!) 123/57  MAP (mmHg): 82  Resp: 16  SpO2: 97 %  Oxygen Concentration (%): 40  O2 Device (Oxygen Therapy): ventilator  Vent Mode: Spont  Set Rate: 16 BPM  Vt Set: 450 mL  Pressure Support: 12 cmH20  PEEP/CPAP: 7 cmH20  Peak Airway Pressure: 19 cmH2O  Mean Airway Pressure: 10 cmH20  Plateau Pressure: 0 cmH20    Temp  Min: 97.8 °F (36.6 °C)  Max: 98.3 °F (36.8 °C)  Pulse  Min: 50  Max: 86  BP  Min: 102/52  Max: 147/63  MAP (mmHg)  Min: 73  Max: 92  Resp  Min: 13  Max: 26  SpO2  Min: 92 %  Max: 100 %  Oxygen Concentration (%)  Min: 40  Max: 40    02/04 0701 - 02/05 0700  In: 3944.7 [I.V.:2844.7]  Out: 3950 [Urine:3950]   Unmeasured Output  Urine Occurrence: 1  Stool Occurrence: 1       Physical Exam   Constitutional: No distress.   HENT:   Head: Normocephalic.   Eyes: Pupils are equal, round, and reactive to light.   Neck:   Cervical hard collar in place   Cardiovascular: Regular rhythm.   Pulmonary/Chest: Breath sounds normal.   Abdominal: Soft. Bowel sounds are normal.   Musculoskeletal: He exhibits edema.   Neurological:   Sedated  Nl CNs  Symmetric withdrawal in extremities   Skin: Skin is warm. Capillary refill takes less than 2 seconds.         Medications:  Continuous    sodium chloride 0.9% Last Rate: 100 mL/hr at 02/05/20 0805   dexmedetomidine (PRECEDEX) infusion Last Rate: 0.8 mcg/kg/hr (02/05/20 1605)   norepinephrine bitartrate-D5W Last Rate: 0.02 mcg/kg/min (02/05/20 1605)   Scheduled    atorvastatin 40 mg QHS   chlorhexidine 15 mL QID   enoxaparin 40 mg Daily   escitalopram oxalate 20 mg Daily   famotidine (PF) 20 mg BID   levetiracetam IVPB 1,500 mg Q12H   piperacillin-tazobactam (ZOSYN) IVPB 4.5 g Q8H   senna-docusate 8.6-50 mg 1 tablet BID   vancomycin (VANCOCIN) IVPB 1,000 mg Q12H   PRN    acetaminophen 650 mg Q6H PRN   Dextrose 10% Bolus  12.5 g PRN   fentaNYL 25 mcg Q2H PRN   glucagon (human recombinant) 1 mg PRN   insulin aspart U-100 1-10 Units Q6H PRN   labetalol 10 mg Q4H PRN   lorazepam 2 mg Q1H PRN   magnesium oxide 800 mg PRN   magnesium oxide 800 mg PRN   ondansetron 4 mg Q8H PRN   potassium chloride 10% 40 mEq PRN   potassium chloride 10% 40 mEq PRN   potassium chloride 10% 60 mEq PRN   potassium, sodium phosphates 2 packet PRN   potassium, sodium phosphates 2 packet PRN   potassium, sodium phosphates 2 packet PRN   sodium chloride 0.9% 10 mL PRN   vancomycin - pharmacy to dose  pharmacy to manage frequency     Today I personally reviewed pertinent medications, lines/drains/airways, imaging, laboratory results, microbiology results, notably:    Diet  Diet NPO  Diet NPO

## 2020-02-05 NOTE — PLAN OF CARE
POC reviewed with pt at 0500. Pt unable to verbalize understanding at this time due to being intubated. Levo gtt continued for MAP goal >65. Precedex titrated overnight as needed for comfort and sedation while intubated. NS @ 100 continued. NPO overnight per orders. Pt turned per protocol. Oral care provided per protocol. Wheeler care provided. RN Will continue to monitor. See flowsheets for full assessment and VS info

## 2020-02-05 NOTE — PROGRESS NOTES
GENERAL SURGERY PROGRESS NOTE    Patient seen and examined at bedside. No acute events overnight. Afebrile. Low dose Levo 0.01. Repeat CT scan yesterday showed resolution of colon wall thickening. May advance diet/enteral feeds as tolerated per primary team as vasopressor requirement remains low or is weaned off. Please call with any questions or concerns.      Darnell Alvarado MD  Surgery Resident, PGY-V  Pager: [phone]  2/5/2020 9:08 AM

## 2020-02-05 NOTE — PT/OT/SLP PROGRESS
Occupational Therapy      Patient Name:  Edu Choi   MRN:  [id number]    Patient not seen today secondary to Other (Comment)(Pt only appropraite for PROM and RN stated that PROM has already been performed by nursing). Writing therapist attempted pt in AM, but RN stated that pt is only appropriate for ROM at this time and nursing has already performed PROM. Will follow-up as scheduled.    Johnathon Beverly, OT  2/5/2020

## 2020-02-05 NOTE — PLAN OF CARE
POC reviewed with pt at 1400. Pt intubated/seated and unable to verbalize understanding. Levo gtt titrated throughout shift to keep MAPs>65. Precedex gtt titrated throughout shift to keep pt comfortable. Fentanyl given prn x2 for agitation this morning. Wheeler in place. Tube feeds at 10ml/hr. Will continue to monitor. See flowsheets for full assessment and VS info.       Problem: Device-Related Complication Risk (Mechanical Ventilation, Invasive)  Goal: Optimal Device Function  Intervention: Optimize Device Care and Function  Flowsheets (Taken 2/5/2020 1623)  Aspiration Precautions: respiratory status monitored; tube feeding placement verified  Airway Safety Measures: mask at bedside     Problem: Restraint, Nonbehavioral (Nonviolent)  Goal: Discontinuation Criteria Achieved  Intervention: Implement Least-restrictive Safety Strategies  Flowsheets (Taken 2/5/2020 3477)  Medical Device Protection: IV pole/bag removed from visual field  Less Restrictive Alternatives: 1:1 observation maintained  Diversional Activities: television

## 2020-02-05 NOTE — PROGRESS NOTES
[facility]  Neurocritical Care  Progress Note    Admit Date: 2/2/2020  Service Date: 02/05/2020  Length of Stay: 3    Subjective:     Chief Complaint: Seizure    History of Present Illness: Pt is a 64 y.o. Male with PMHx of of DVT, PE, DM, and seizures (on 1500 keppra at home) who presents to the ED via EMS with complaint of a seizure that occurred this morning. Pt has long standing history of seizures and was found down by wife on cement around 0800, LKN 0700. Patient compliant with keppra per wife and last seizure was in November. At that time his keppra dose was increased. Patient has had cough recently but no other symptoms. Patient is on AC for DVT/PE and was recently switched from Coumadin to Eliquis. He was given 10 mg Versed during EMS ride to outside facility for continued seizure activity. He was unresponsive upon arrival to ED and subsequently intubated. CTH without any acute changes, CT max/face with multiple facial fractures, and CXR with consolidation and atelectasis/collapsed lung. Patient transferred to [facility] to higher level neurologic monitoring and continuous EEG.     Hospital Course: 2/2/2020: Admit to [facility]. GS and ENT consulted. MX facial fractures and concerns of bowel ischemia on CT  2/3 will rehook to EEG for 24h. Remains NPO. US LE pending. Monitor lactic [id number] 2L  IV. Wean levophed as tolerated after bolus  2/4: remains on small amount of pressors, cultures remain negative, cont abx, cont current AEDs, EEG overnight negative for seizure activity, repeat ct abdomen today  2/5: minimal dose pressors, advance TFs. Tolerating SBT, possible trial of extubation in am        Review of Systems   Unable to perform ROS: Intubated       Objective:     Vitals:  Temp: 98.3 °F (36.8 °C)  Pulse: (!) 53  Rhythm: normal sinus rhythm  BP: (!) 123/57  MAP (mmHg): 82  Resp: 16  SpO2: 97 %  Oxygen Concentration (%): 40  O2 Device (Oxygen Therapy): ventilator  Vent Mode: Spont  Set Rate: 16  BPM  Vt Set: 450 mL  Pressure Support: 12 cmH20  PEEP/CPAP: 7 cmH20  Peak Airway Pressure: 19 cmH2O  Mean Airway Pressure: 10 cmH20  Plateau Pressure: 0 cmH20    Temp  Min: 97.8 °F (36.6 °C)  Max: 98.3 °F (36.8 °C)  Pulse  Min: 50  Max: 86  BP  Min: 102/52  Max: 147/63  MAP (mmHg)  Min: 73  Max: 92  Resp  Min: 13  Max: 26  SpO2  Min: 92 %  Max: 100 %  Oxygen Concentration (%)  Min: 40  Max: 40    02/04 0701 - 02/05 0700  In: 3944.7 [I.V.:2844.7]  Out: 3950 [Urine:3950]   Unmeasured Output  Urine Occurrence: 1  Stool Occurrence: 1       Physical Exam   Constitutional: No distress.   HENT:   Head: Normocephalic.   Eyes: Pupils are equal, round, and reactive to light.   Neck:   Cervical hard collar in place   Cardiovascular: Regular rhythm.   Pulmonary/Chest: Breath sounds normal.   Abdominal: Soft. Bowel sounds are normal.   Musculoskeletal: He exhibits edema.   Neurological:   Sedated  Nl CNs  Symmetric withdrawal in extremities   Skin: Skin is warm. Capillary refill takes less than 2 seconds.         Medications:  Continuous    sodium chloride 0.9% Last Rate: 100 mL/hr at 02/05/20 0805   dexmedetomidine (PRECEDEX) infusion Last Rate: 0.8 mcg/kg/hr (02/05/20 1605)   norepinephrine bitartrate-D5W Last Rate: 0.02 mcg/kg/min (02/05/20 1605)   Scheduled    atorvastatin 40 mg QHS   chlorhexidine 15 mL QID   enoxaparin 40 mg Daily   escitalopram oxalate 20 mg Daily   famotidine (PF) 20 mg BID   levetiracetam IVPB 1,500 mg Q12H   piperacillin-tazobactam (ZOSYN) IVPB 4.5 g Q8H   senna-docusate 8.6-50 mg 1 tablet BID   vancomycin (VANCOCIN) IVPB 1,000 mg Q12H   PRN    acetaminophen 650 mg Q6H PRN   Dextrose 10% Bolus 12.5 g PRN   fentaNYL 25 mcg Q2H PRN   glucagon (human recombinant) 1 mg PRN   insulin aspart U-100 1-10 Units Q6H PRN   labetalol 10 mg Q4H PRN   lorazepam 2 mg Q1H PRN   magnesium oxide 800 mg PRN   magnesium oxide 800 mg PRN   ondansetron 4 mg Q8H PRN   potassium chloride 10% 40 mEq PRN   potassium chloride  10% 40 mEq PRN   potassium chloride 10% 60 mEq PRN   potassium, sodium phosphates 2 packet PRN   potassium, sodium phosphates 2 packet PRN   potassium, sodium phosphates 2 packet PRN   sodium chloride 0.9% 10 mL PRN   vancomycin - pharmacy to dose  pharmacy to manage frequency     Today I personally reviewed pertinent medications, lines/drains/airways, imaging, laboratory results, microbiology results, notably:    Diet  Diet NPO  Diet NPO        Assessment/Plan:     Neuro  * Seizure  2/2 Admit to [facility]  -Q 1 hour neuro checks/ vital signs   Epilepsy following  EEG cap over night reconnect to 24h EEG  -continue home keppra 1500 mg   IVF /h  02/04: EEG last 24 hrs -no seizures, cont keppra at home dose iv for now    Pulmonary  Aspiration pneumonitis  Found down, concerns for aspiration pneumonia/pneumonitis.   CT C/A/P showed diffuse right colonic wall thickening concern for colitis, no obstruction noted.  Cultures pending  Vancomycin 1250mg q24  Zosyn  02/05: 7 day course of abx, afebrile and white count normalizing      Acute respiratory failure with hypoxia  2/2Arrived intubated,   Vent Mode: Spont  Oxygen Concentration (%):  [40] 40  Resp Rate Total:  [12 br/min-38 br/min] 19 br/min  Vt Set:  [450 mL] 450 mL  PEEP/CPAP:  [5 cmH20-7 cmH20] 7 cmH20  Pressure Support:  [12 cmH20] 12 cmH20  Mean Airway Pressure:  [9.7 yxH80-28 cmH20] 10 cmH20  Daily CXR / abg  Assess vent and wean as able  2/2 CT chest with LEft lobe collapse, but on CXR later today left lobe appears open. Will follow clinically  2/3 CXR pending  2/4: ETT repositioned yesterday, likely has pneumonia, POA, cont A/C support  2/5: tolerating spontaneous mode     Hematology  Chronic anticoagulation  Monitor Coaruth Mcallister held until eval bowl ischemia and CT head  02/04: no current need to restart therapeutic anticoagulation  Especially with recent contusions noted right temporal lobe    History of DVT (deep vein thrombosis)  2/3 Upper and lower  extremity US negative for DVT  Start DVT prophylaxis, 02/04          The patient is being Prophylaxed for:  Venous Thromboembolism with: Chemical  Stress Ulcer with: H2B  Ventilator Pneumonia with: chlorhexidine oral care    Activity Orders          Diet NPO: NPO starting at 02/02 1925    Commode at bedside starting at 02/02 1925        Full Code    Brennon Estrella MD  Neurocritical Care  [facility]

## 2020-02-05 NOTE — ASSESSMENT & PLAN NOTE
Found down, concerns for aspiration pneumonia/pneumonitis.   CT C/A/P showed diffuse right colonic wall thickening concern for colitis, no obstruction noted.  Cultures pending  Vancomycin 1250mg q24  Zosyn  02/05: 7 day course of abx, afebrile and white count normalizing

## 2020-02-06 LAB
ALBUMIN SERPL BCP-MCNC: 2.3 G/DL (ref 3.5–5.2)
ALLENS TEST: ABNORMAL
ALLENS TEST: ABNORMAL
ALP SERPL-CCNC: 51 U/L (ref 55–135)
ALT SERPL W/O P-5'-P-CCNC: 12 U/L (ref 10–44)
ANION GAP SERPL CALC-SCNC: 10 MMOL/L (ref 8–16)
AST SERPL-CCNC: 26 U/L (ref 10–40)
BASOPHILS # BLD AUTO: 0.06 K/UL (ref 0–0.2)
BASOPHILS NFR BLD: 0.6 % (ref 0–1.9)
BILIRUB SERPL-MCNC: 0.5 MG/DL (ref 0.1–1)
BUN SERPL-MCNC: 5 MG/DL (ref 8–23)
CALCIUM SERPL-MCNC: 7.5 MG/DL (ref 8.7–10.5)
CHLORIDE SERPL-SCNC: 108 MMOL/L (ref 95–110)
CO2 SERPL-SCNC: 22 MMOL/L (ref 23–29)
CREAT SERPL-MCNC: 0.8 MG/DL (ref 0.5–1.4)
DELSYS: ABNORMAL
DELSYS: ABNORMAL
DIFFERENTIAL METHOD: ABNORMAL
EOSINOPHIL # BLD AUTO: 0.2 K/UL (ref 0–0.5)
EOSINOPHIL NFR BLD: 1.6 % (ref 0–8)
ERYTHROCYTE [DISTWIDTH] IN BLOOD BY AUTOMATED COUNT: 13.7 % (ref 11.5–14.5)
EST. GFR  (AFRICAN AMERICAN): >60 ML/MIN/1.73 M^2
EST. GFR  (NON AFRICAN AMERICAN): >60 ML/MIN/1.73 M^2
FIO2: 40
FIO2: 50
GLUCOSE SERPL-MCNC: 113 MG/DL (ref 70–110)
HCO3 UR-SCNC: 22 MMOL/L (ref 24–28)
HCO3 UR-SCNC: 25.8 MMOL/L (ref 24–28)
HCT VFR BLD AUTO: 32.2 % (ref 40–54)
HGB BLD-MCNC: 10.5 G/DL (ref 14–18)
IMM GRANULOCYTES # BLD AUTO: 0.05 K/UL (ref 0–0.04)
IMM GRANULOCYTES NFR BLD AUTO: 0.5 % (ref 0–0.5)
LYMPHOCYTES # BLD AUTO: 1.7 K/UL (ref 1–4.8)
LYMPHOCYTES NFR BLD: 15.8 % (ref 18–48)
MAGNESIUM SERPL-MCNC: 1.7 MG/DL (ref 1.6–2.6)
MCH RBC QN AUTO: 31.5 PG (ref 27–31)
MCHC RBC AUTO-ENTMCNC: 32.6 G/DL (ref 32–36)
MCV RBC AUTO: 97 FL (ref 82–98)
MIN VOL: 12
MODE: ABNORMAL
MODE: ABNORMAL
MONOCYTES # BLD AUTO: 1.1 K/UL (ref 0.3–1)
MONOCYTES NFR BLD: 10.3 % (ref 4–15)
NEUTROPHILS # BLD AUTO: 7.6 K/UL (ref 1.8–7.7)
NEUTROPHILS NFR BLD: 71.2 % (ref 38–73)
NRBC BLD-RTO: 0 /100 WBC
PCO2 BLDA: 34.1 MMHG (ref 35–45)
PCO2 BLDA: 40.4 MMHG (ref 35–45)
PEEP: 5
PEEP: 5
PH SMN: 7.41 [PH] (ref 7.35–7.45)
PH SMN: 7.42 [PH] (ref 7.35–7.45)
PHOSPHATE SERPL-MCNC: 2.4 MG/DL (ref 2.7–4.5)
PLATELET # BLD AUTO: 253 K/UL (ref 150–350)
PMV BLD AUTO: 11.4 FL (ref 9.2–12.9)
PO2 BLDA: 113 MMHG (ref 80–100)
PO2 BLDA: 60 MMHG (ref 80–100)
POC BE: -2 MMOL/L
POC BE: 1 MMOL/L
POC SATURATED O2: 91 % (ref 95–100)
POC SATURATED O2: 98 % (ref 95–100)
POC TCO2: 23 MMOL/L (ref 23–27)
POC TCO2: 27 MMOL/L (ref 23–27)
POCT GLUCOSE: 101 MG/DL (ref 70–110)
POCT GLUCOSE: 138 MG/DL (ref 70–110)
POTASSIUM SERPL-SCNC: 3.5 MMOL/L (ref 3.5–5.1)
POTASSIUM SERPL-SCNC: 3.9 MMOL/L (ref 3.5–5.1)
PROT SERPL-MCNC: 5.6 G/DL (ref 6–8.4)
PS: 10
PS: 12
RBC # BLD AUTO: 3.33 M/UL (ref 4.6–6.2)
SAMPLE: ABNORMAL
SAMPLE: ABNORMAL
SITE: ABNORMAL
SITE: ABNORMAL
SODIUM SERPL-SCNC: 140 MMOL/L (ref 136–145)
SP02: 91
SPONT RATE: 21
VANCOMYCIN TROUGH SERPL-MCNC: 15.3 UG/ML (ref 10–22)
WBC # BLD AUTO: 10.73 K/UL (ref 3.9–12.7)

## 2020-02-06 PROCEDURE — 94761 N-INVAS EAR/PLS OXIMETRY MLT: CPT | Mod: HCNC

## 2020-02-06 PROCEDURE — 63600175 PHARM REV CODE 636 W HCPCS: Mod: HCNC | Performed by: PSYCHIATRY & NEUROLOGY

## 2020-02-06 PROCEDURE — 84100 ASSAY OF PHOSPHORUS: CPT | Mod: HCNC

## 2020-02-06 PROCEDURE — 27000221 HC OXYGEN, UP TO 24 HOURS: Mod: HCNC

## 2020-02-06 PROCEDURE — 25000003 PHARM REV CODE 250: Mod: HCNC | Performed by: NURSE PRACTITIONER

## 2020-02-06 PROCEDURE — 25000003 PHARM REV CODE 250: Mod: HCNC | Performed by: PSYCHIATRY & NEUROLOGY

## 2020-02-06 PROCEDURE — 63600175 PHARM REV CODE 636 W HCPCS: Mod: HCNC | Performed by: NURSE PRACTITIONER

## 2020-02-06 PROCEDURE — 25000003 PHARM REV CODE 250: Mod: HCNC | Performed by: PHYSICIAN ASSISTANT

## 2020-02-06 PROCEDURE — 37799 UNLISTED PX VASCULAR SURGERY: CPT | Mod: HCNC

## 2020-02-06 PROCEDURE — 99233 PR SUBSEQUENT HOSPITAL CARE,LEVL III: ICD-10-PCS | Mod: HCNC,GC,, | Performed by: PSYCHIATRY & NEUROLOGY

## 2020-02-06 PROCEDURE — S0028 INJECTION, FAMOTIDINE, 20 MG: HCPCS | Mod: HCNC | Performed by: PHYSICIAN ASSISTANT

## 2020-02-06 PROCEDURE — 99900026 HC AIRWAY MAINTENANCE (STAT): Mod: HCNC

## 2020-02-06 PROCEDURE — 99900035 HC TECH TIME PER 15 MIN (STAT): Mod: HCNC

## 2020-02-06 PROCEDURE — 80053 COMPREHEN METABOLIC PANEL: CPT | Mod: HCNC

## 2020-02-06 PROCEDURE — 80202 ASSAY OF VANCOMYCIN: CPT | Mod: HCNC

## 2020-02-06 PROCEDURE — 99233 SBSQ HOSP IP/OBS HIGH 50: CPT | Mod: HCNC,GC,, | Performed by: PSYCHIATRY & NEUROLOGY

## 2020-02-06 PROCEDURE — 85025 COMPLETE CBC W/AUTO DIFF WBC: CPT | Mod: HCNC

## 2020-02-06 PROCEDURE — 20000000 HC ICU ROOM: Mod: HCNC

## 2020-02-06 PROCEDURE — 83735 ASSAY OF MAGNESIUM: CPT | Mod: HCNC

## 2020-02-06 PROCEDURE — 84132 ASSAY OF SERUM POTASSIUM: CPT | Mod: HCNC

## 2020-02-06 PROCEDURE — 82803 BLOOD GASES ANY COMBINATION: CPT | Mod: HCNC

## 2020-02-06 PROCEDURE — 27200966 HC CLOSED SUCTION SYSTEM: Mod: HCNC

## 2020-02-06 PROCEDURE — 94003 VENT MGMT INPAT SUBQ DAY: CPT | Mod: HCNC

## 2020-02-06 RX ORDER — FUROSEMIDE 10 MG/ML
20 INJECTION INTRAMUSCULAR; INTRAVENOUS ONCE
Status: COMPLETED | OUTPATIENT
Start: 2020-02-06 | End: 2020-02-06

## 2020-02-06 RX ORDER — ATORVASTATIN CALCIUM 20 MG/1
40 TABLET, FILM COATED ORAL NIGHTLY
Status: DISCONTINUED | OUTPATIENT
Start: 2020-02-07 | End: 2020-02-27

## 2020-02-06 RX ORDER — SODIUM,POTASSIUM PHOSPHATES 280-250MG
2 POWDER IN PACKET (EA) ORAL
Status: DISCONTINUED | OUTPATIENT
Start: 2020-02-06 | End: 2020-02-27

## 2020-02-06 RX ORDER — LANOLIN ALCOHOL/MO/W.PET/CERES
800 CREAM (GRAM) TOPICAL
Status: DISCONTINUED | OUTPATIENT
Start: 2020-02-06 | End: 2020-02-27

## 2020-02-06 RX ORDER — ESCITALOPRAM OXALATE 10 MG/1
20 TABLET ORAL DAILY
Status: DISCONTINUED | OUTPATIENT
Start: 2020-02-07 | End: 2020-02-27

## 2020-02-06 RX ORDER — ACETAMINOPHEN 325 MG/1
650 TABLET ORAL EVERY 6 HOURS PRN
Status: DISCONTINUED | OUTPATIENT
Start: 2020-02-06 | End: 2020-02-27

## 2020-02-06 RX ORDER — AMOXICILLIN 250 MG
1 CAPSULE ORAL 2 TIMES DAILY
Status: DISCONTINUED | OUTPATIENT
Start: 2020-02-07 | End: 2020-02-12

## 2020-02-06 RX ADMIN — FAMOTIDINE 20 MG: 10 INJECTION, SOLUTION INTRAVENOUS at 09:02

## 2020-02-06 RX ADMIN — CHLORHEXIDINE GLUCONATE 0.12% ORAL RINSE 15 ML: 1.2 LIQUID ORAL at 01:02

## 2020-02-06 RX ADMIN — PIPERACILLIN SODIUM AND TAZOBACTAM SODIUM 4.5 G: 4; .5 INJECTION, POWDER, LYOPHILIZED, FOR SOLUTION INTRAVENOUS at 11:02

## 2020-02-06 RX ADMIN — PIPERACILLIN SODIUM AND TAZOBACTAM SODIUM 4.5 G: 4; .5 INJECTION, POWDER, LYOPHILIZED, FOR SOLUTION INTRAVENOUS at 07:02

## 2020-02-06 RX ADMIN — POTASSIUM & SODIUM PHOSPHATES POWDER PACK 280-160-250 MG 2 PACKET: 280-160-250 PACK at 03:02

## 2020-02-06 RX ADMIN — ESCITALOPRAM OXALATE 20 MG: 10 TABLET ORAL at 08:02

## 2020-02-06 RX ADMIN — DEXMEDETOMIDINE HYDROCHLORIDE 0.8 MCG/KG/HR: 100 INJECTION, SOLUTION, CONCENTRATE INTRAVENOUS at 06:02

## 2020-02-06 RX ADMIN — DOCUSATE SODIUM - SENNOSIDES 1 TABLET: 50; 8.6 TABLET, FILM COATED ORAL at 08:02

## 2020-02-06 RX ADMIN — DEXMEDETOMIDINE HYDROCHLORIDE 1 MCG/KG/HR: 100 INJECTION, SOLUTION, CONCENTRATE INTRAVENOUS at 03:02

## 2020-02-06 RX ADMIN — LEVETIRACETAM INJECTION 1500 MG: 15 INJECTION INTRAVENOUS at 09:02

## 2020-02-06 RX ADMIN — LEVETIRACETAM INJECTION 1500 MG: 15 INJECTION INTRAVENOUS at 08:02

## 2020-02-06 RX ADMIN — CHLORHEXIDINE GLUCONATE 0.12% ORAL RINSE 15 ML: 1.2 LIQUID ORAL at 09:02

## 2020-02-06 RX ADMIN — ENOXAPARIN SODIUM 40 MG: 100 INJECTION SUBCUTANEOUS at 05:02

## 2020-02-06 RX ADMIN — CHLORHEXIDINE GLUCONATE 0.12% ORAL RINSE 15 ML: 1.2 LIQUID ORAL at 08:02

## 2020-02-06 RX ADMIN — VANCOMYCIN HYDROCHLORIDE 1000 MG: 1 INJECTION, POWDER, LYOPHILIZED, FOR SOLUTION INTRAVENOUS at 02:02

## 2020-02-06 RX ADMIN — FUROSEMIDE 20 MG: 10 INJECTION, SOLUTION INTRAMUSCULAR; INTRAVENOUS at 08:02

## 2020-02-06 RX ADMIN — PIPERACILLIN SODIUM AND TAZOBACTAM SODIUM 4.5 G: 4; .5 INJECTION, POWDER, LYOPHILIZED, FOR SOLUTION INTRAVENOUS at 03:02

## 2020-02-06 RX ADMIN — Medication 800 MG: at 08:02

## 2020-02-06 RX ADMIN — POTASSIUM & SODIUM PHOSPHATES POWDER PACK 280-160-250 MG 2 PACKET: 280-160-250 PACK at 08:02

## 2020-02-06 RX ADMIN — FAMOTIDINE 20 MG: 10 INJECTION, SOLUTION INTRAVENOUS at 08:02

## 2020-02-06 RX ADMIN — POTASSIUM CHLORIDE 40 MEQ: 20 SOLUTION ORAL at 03:02

## 2020-02-06 RX ADMIN — CHLORHEXIDINE GLUCONATE 0.12% ORAL RINSE 15 ML: 1.2 LIQUID ORAL at 05:02

## 2020-02-06 RX ADMIN — Medication 800 MG: at 03:02

## 2020-02-06 RX ADMIN — VANCOMYCIN HYDROCHLORIDE 1000 MG: 1 INJECTION, POWDER, LYOPHILIZED, FOR SOLUTION INTRAVENOUS at 01:02

## 2020-02-06 RX ADMIN — ATORVASTATIN CALCIUM 40 MG: 20 TABLET, FILM COATED ORAL at 09:02

## 2020-02-06 NOTE — SUBJECTIVE & OBJECTIVE
Review of Systems   Unable to perform ROS: Intubated       Objective:     Vitals:  Temp: 98.9 °F (37.2 °C)  Pulse: 65  Rhythm: normal sinus rhythm  BP: (!) 124/59  MAP (mmHg): 85  Resp: (!) 21  SpO2: (!) 94 %  Oxygen Concentration (%): 40  O2 Device (Oxygen Therapy): ventilator  Vent Mode: Spont  Pressure Support: 10 cmH20  PEEP/CPAP: 5 cmH20  Peak Airway Pressure: 15 cmH2O  Mean Airway Pressure: 9.4 cmH20  Plateau Pressure: 0 cmH20    Temp  Min: 98.2 °F (36.8 °C)  Max: 99 °F (37.2 °C)  Pulse  Min: 48  Max: 93  BP  Min: 109/55  Max: 153/67  MAP (mmHg)  Min: 77  Max: 100  Resp  Min: 14  Max: 29  SpO2  Min: 91 %  Max: 99 %  Oxygen Concentration (%)  Min: 40  Max: 50    02/05 0701 - 02/06 0700  In: 5040.2 [I.V.:2960.2]  Out: 3400 [Urine:3400]   Unmeasured Output  Urine Occurrence: 1  Stool Occurrence: 1       Physical Exam   Constitutional: No distress.   HENT:   Head: Normocephalic.   Eyes: Pupils are equal, round, and reactive to light.   Neck:   Cervical hard collar in place   Cardiovascular: Regular rhythm.   Pulmonary/Chest: Breath sounds normal.   Abdominal: Soft. Bowel sounds are normal.   Musculoskeletal: He exhibits edema.   Neurological:   Sedated  Nl CNs  Symmetric withdrawal in extremities   Skin: Skin is warm. Capillary refill takes less than 2 seconds.         Medications:  Continuous    sodium chloride 0.9% Last Rate: 100 mL/hr at 02/06/20 1405   dexmedetomidine (PRECEDEX) infusion Last Rate: 1 mcg/kg/hr (02/06/20 1405)   norepinephrine bitartrate-D5W Last Rate: 0.01 mcg/kg/min (02/06/20 1405)   Scheduled    atorvastatin 40 mg QHS   chlorhexidine 15 mL QID   enoxaparin 40 mg Daily   escitalopram oxalate 20 mg Daily   famotidine (PF) 20 mg BID   levetiracetam IVPB 1,500 mg Q12H   piperacillin-tazobactam (ZOSYN) IVPB 4.5 g Q8H   senna-docusate 8.6-50 mg 1 tablet BID   vancomycin (VANCOCIN) IVPB 1,000 mg Q12H   PRN    acetaminophen 650 mg Q6H PRN   Dextrose 10% Bolus 12.5 g PRN   fentaNYL 25 mcg Q2H PRN    glucagon (human recombinant) 1 mg PRN   insulin aspart U-100 1-10 Units Q6H PRN   labetalol 10 mg Q4H PRN   lorazepam 2 mg Q1H PRN   magnesium oxide 800 mg PRN   magnesium oxide 800 mg PRN   ondansetron 4 mg Q8H PRN   potassium chloride 10% 40 mEq PRN   potassium chloride 10% 40 mEq PRN   potassium chloride 10% 60 mEq PRN   potassium, sodium phosphates 2 packet PRN   potassium, sodium phosphates 2 packet PRN   potassium, sodium phosphates 2 packet PRN   sodium chloride 0.9% 10 mL PRN     Today I personally reviewed pertinent medications, lines/drains/airways, imaging, laboratory results, microbiology results, notably:    Diet  Diet NPO  Diet NPO

## 2020-02-06 NOTE — PROGRESS NOTES
[facility]  Neurocritical Care  Progress Note    Admit Date: 2/2/2020  Service Date: 02/06/2020  Length of Stay: 4    Subjective:     Chief Complaint: Seizure    History of Present Illness: Pt is a 64 y.o. Male with PMHx of of DVT, PE, DM, and seizures (on 1500 keppra at home) who presents to the ED via EMS with complaint of a seizure that occurred this morning. Pt has long standing history of seizures and was found down by wife on cement around 0800, LKN 0700. Patient compliant with keppra per wife and last seizure was in November. At that time his keppra dose was increased. Patient has had cough recently but no other symptoms. Patient is on AC for DVT/PE and was recently switched from Coumadin to Eliquis. He was given 10 mg Versed during EMS ride to outside facility for continued seizure activity. He was unresponsive upon arrival to ED and subsequently intubated. CTH without any acute changes, CT max/face with multiple facial fractures, and CXR with consolidation and atelectasis/collapsed lung. Patient transferred to [facility] to higher level neurologic monitoring and continuous EEG.     Hospital Course: 2/2/2020: Admit to [facility]. GS and ENT consulted. MX facial fractures and concerns of bowel ischemia on CT  2/3 will rehook to EEG for 24h. Remains NPO. US LE pending. Monitor lactic [id number] 2L  IV. Wean levophed as tolerated after bolus  2/4: remains on small amount of pressors, cultures remain negative, cont abx, cont current AEDs, EEG overnight negative for seizure activity, repeat ct abdomen today  2/5: minimal dose pressors, advance TFs. Tolerating SBT, possible trial of extubation in am  2/6: need for pressors will likely cease when able to come off sedation, currently still requires mechanical ventilation with pO2 value of 60 on 40% fiO2 and ps 10        Review of Systems   Unable to perform ROS: Intubated       Objective:     Vitals:  Temp: 98.9 °F (37.2 °C)  Pulse: 65  Rhythm: normal sinus  rhythm  BP: (!) 124/59  MAP (mmHg): 85  Resp: (!) 21  SpO2: (!) 94 %  Oxygen Concentration (%): 40  O2 Device (Oxygen Therapy): ventilator  Vent Mode: Spont  Pressure Support: 10 cmH20  PEEP/CPAP: 5 cmH20  Peak Airway Pressure: 15 cmH2O  Mean Airway Pressure: 9.4 cmH20  Plateau Pressure: 0 cmH20    Temp  Min: 98.2 °F (36.8 °C)  Max: 99 °F (37.2 °C)  Pulse  Min: 48  Max: 93  BP  Min: 109/55  Max: 153/67  MAP (mmHg)  Min: 77  Max: 100  Resp  Min: 14  Max: 29  SpO2  Min: 91 %  Max: 99 %  Oxygen Concentration (%)  Min: 40  Max: 50    02/05 0701 - 02/06 0700  In: 5040.2 [I.V.:2960.2]  Out: 3400 [Urine:3400]   Unmeasured Output  Urine Occurrence: 1  Stool Occurrence: 1       Physical Exam   Constitutional: No distress.   HENT:   Head: Normocephalic.   Eyes: Pupils are equal, round, and reactive to light.   Neck:   Cervical hard collar in place   Cardiovascular: Regular rhythm.   Pulmonary/Chest: Breath sounds normal.   Abdominal: Soft. Bowel sounds are normal.   Musculoskeletal: He exhibits edema.   Neurological:   Sedated  Nl CNs  Symmetric withdrawal in extremities   Skin: Skin is warm. Capillary refill takes less than 2 seconds.         Medications:  Continuous    sodium chloride 0.9% Last Rate: 100 mL/hr at 02/06/20 1405   dexmedetomidine (PRECEDEX) infusion Last Rate: 1 mcg/kg/hr (02/06/20 1405)   norepinephrine bitartrate-D5W Last Rate: 0.01 mcg/kg/min (02/06/20 1405)   Scheduled    atorvastatin 40 mg QHS   chlorhexidine 15 mL QID   enoxaparin 40 mg Daily   escitalopram oxalate 20 mg Daily   famotidine (PF) 20 mg BID   levetiracetam IVPB 1,500 mg Q12H   piperacillin-tazobactam (ZOSYN) IVPB 4.5 g Q8H   senna-docusate 8.6-50 mg 1 tablet BID   vancomycin (VANCOCIN) IVPB 1,000 mg Q12H   PRN    acetaminophen 650 mg Q6H PRN   Dextrose 10% Bolus 12.5 g PRN   fentaNYL 25 mcg Q2H PRN   glucagon (human recombinant) 1 mg PRN   insulin aspart U-100 1-10 Units Q6H PRN   labetalol 10 mg Q4H PRN   lorazepam 2 mg Q1H PRN    magnesium oxide 800 mg PRN   magnesium oxide 800 mg PRN   ondansetron 4 mg Q8H PRN   potassium chloride 10% 40 mEq PRN   potassium chloride 10% 40 mEq PRN   potassium chloride 10% 60 mEq PRN   potassium, sodium phosphates 2 packet PRN   potassium, sodium phosphates 2 packet PRN   potassium, sodium phosphates 2 packet PRN   sodium chloride 0.9% 10 mL PRN     Today I personally reviewed pertinent medications, lines/drains/airways, imaging, laboratory results, microbiology results, notably:    Diet  Diet NPO  Diet NPO        Assessment/Plan:     Neuro  * Seizure  2/2 Admit to [facility]  -Q 1 hour neuro checks/ vital signs   Epilepsy following  EEG cap over night reconnect to 24h EEG  -continue home keppra 1500 mg   IVF /h  02/04: EEG last 24 hrs -no seizures, cont keppra at home dose iv for now    Pulmonary  Aspiration pneumonitis  Found down, concerns for aspiration pneumonia/pneumonitis.   CT C/A/P showed diffuse right colonic wall thickening concern for colitis, no obstruction noted.  Cultures pending  Vancomycin 1250mg q24  Zosyn  02/05: 7 day course of abx, afebrile and white count normalizing      Acute respiratory failure with hypoxia  2/2Arrived intubated,   Vent Mode: Spont  Oxygen Concentration (%):  [40-50] 40  Resp Rate Total:  [12 br/min-27 br/min] 21 br/min  PEEP/CPAP:  [5 cmH20-7 cmH20] 5 cmH20  Pressure Support:  [10 rxR98-28 cmH20] 10 cmH20  Mean Airway Pressure:  [8.9 koQ55-67 cmH20] 9.4 cmH20  Daily CXR / abg  Assess vent and wean as able  2/2 CT chest with LEft lobe collapse, but on CXR later today left lobe appears open. Will follow clinically  2/3 CXR pending  2/4: ETT repositioned yesterday, likely has pneumonia, POA, cont A/C support  2/5: tolerating spontaneous mode   2/6: weaned to ps 10/5, pO2 60, keep on ps setting overnight    Hematology  History of DVT (deep vein thrombosis)  2/3 Upper and lower extremity US negative for DVT  Start DVT prophylaxis, 02/04          The patient is being  Prophylaxed for:  Venous Thromboembolism with: Chemical  Stress Ulcer with: H2B  Ventilator Pneumonia with: chlorhexidine oral care    Activity Orders          Diet NPO: NPO starting at 02/02 1925    Commode at bedside starting at 02/02 1925        Full Code    Brennon Estrella MD  Neurocritical Care  [facility]

## 2020-02-06 NOTE — PT/OT/SLP PROGRESS
Occupational Therapy  Discontinued       Patient Name:  Edu Choi   MRN:  [id number]    Patient remains intubated at this time. Orders discontinued. RNs completing ROM and positioning in supine.   Please re consult if appropriate when extubated for evaluation.     DANN Mckeon  2/6/2020

## 2020-02-06 NOTE — PROGRESS NOTES
Wound consult received on patient's right medial arm. RN reports was prior IV site. Wound currently dressed with foam dressing, recommend changing weekly and prn for drainage.   Patient seen for skin proactive rounding. Sacrum and heels intact. Bruising noted to left medial lower leg, RN reports patient previously would hit area of leg with his right heel. Skin intact to leg, recommend monitor for any changes in skin integrity.   Ulceration noted to patient's tongue, unknown etiology, appears may have been device related, recommend continuing routine oral care per nursing and respiratory.   Nursing to continue care. Re-consult wound care if needed.       02/06/20 1138        Wound 02/05/20 1905 Ulceration medial Arm   Date First Assessed/Time First Assessed: 02/05/20 1905   Pre-existing: No  Primary Wound Type: Ulceration  Side: Right  Orientation: medial  Location: Arm   Wound Image    Wound WDL ex   Drainage Amount Small   Drainage Characteristics/Odor Serosanguineous;No odor   Appearance Moist;Red   Tissue loss description Partial thickness   Periwound Area Swelling   Wound Length (cm) 0.5 cm   Wound Width (cm) 0.5 cm   Wound Depth (cm) 0.1 cm   Wound Volume (cm^3) 0.02 cm^3   Wound Surface Area (cm^2) 0.25 cm^2   Dressing Foam        Wound 02/06/20 1138 Ulceration Tongue   Date First Assessed/Time First Assessed: 02/06/20 1138   Primary Wound Type: Ulceration  Location: Tongue   Wound Image    Wound WDL ex   Drainage Amount None   Drainage Characteristics/Odor No odor   Appearance Yellow   Periwound Area Intact   Wound Length (cm) 0.3 cm   Wound Width (cm) 0.3 cm   Wound Surface Area (cm^2) 0.09 cm^2   left lower medial leg

## 2020-02-06 NOTE — ASSESSMENT & PLAN NOTE
2/2Arrived intubated,   Vent Mode: Spont  Oxygen Concentration (%):  [40-50] 40  Resp Rate Total:  [12 br/min-27 br/min] 21 br/min  PEEP/CPAP:  [5 cmH20-7 cmH20] 5 cmH20  Pressure Support:  [10 aoT08-15 cmH20] 10 cmH20  Mean Airway Pressure:  [8.9 dbW92-44 cmH20] 9.4 cmH20  Daily CXR / abg  Assess vent and wean as able  2/2 CT chest with LEft lobe collapse, but on CXR later today left lobe appears open. Will follow clinically  2/3 CXR pending  2/4: ETT repositioned yesterday, likely has pneumonia, POA, cont A/C support  2/5: tolerating spontaneous mode   2/6: weaned to ps 10/5, pO2 60, keep on ps setting overnight

## 2020-02-06 NOTE — PLAN OF CARE
POC reviewed with pt at 1700. Pt intubated/sedated and unable to verbalize understanding. Precedex gtt titrated throughout shift to keep pt comfortable. Levo gtt titrated throughout shift to keep pt MAPs>65. Lasix given to help with respiratory status. Plan for ABG in the morning and possible extubation per MD orders. Wheeler in place. NS going at 100ml/hr. Will continue to monitor. See flowsheets for full assessment and VS info.       Problem: Restraint, Nonbehavioral (Nonviolent)  Goal: Discontinuation Criteria Achieved  Intervention: Implement Least-restrictive Safety Strategies  Flowsheets (Taken 2/6/2020 1712)  Medical Device Protection: IV pole/bag removed from visual field  Less Restrictive Alternatives: 1:1 observation maintained  Diversional Activities: television  Goal: Personal Dignity and Safety Maintained  Intervention: Protect Skin and Joint Integrity  Flowsheets (Taken 2/6/2020 1712)  Body Position: turned; weight shift assistance provided  Range of Motion: ROM (range of motion) performed

## 2020-02-06 NOTE — PROGRESS NOTES
"[facility]  Adult Nutrition  Progress Note    SUMMARY       Recommendations  1. If able to extubate and advance diet, recommend Regular with texture per SLP recommendations.     2. If unable to advance diet s/p extubation and enteral access gained, recommend changing TF formula to best meet pt's needs extubated.   Isosource 1.5 @ goal rate 50mL/hr.   - Provides 1800kcals, 82g protein and 917mL free water.     RD to monitor.    Goals: Pt to receive nutrition by RD follow up  Nutrition Goal Status: goal met  Communication of RD Recs: reviewed with RN    Reason for Assessment    Reason For Assessment: RD follow-up  Diagnosis: seizures  Relevant Medical History: HLD, DM, seizures, DVT/PE  Interdisciplinary Rounds: attended  General Information Comments: Pt remains intubated. TF started and tolerated at goal rate. Held this morning for possible extubation. On 2/3- Family at bedside providing nutrition hx. Per family, pt with adequate po intake/appetite PTA. No weight loss per family, weight stable approx 145-150lb > 7 years. Pt overweight and nourished without indications of malnutrition at this time.  Nutrition Discharge Planning: unable to determine at this time    Nutrition Risk Screen    Nutrition Risk Screen: dysphagia or difficulty swallowing    Nutrition/Diet History    Spiritual, Cultural Beliefs, Faith Practices, Values that Affect Care: no    Anthropometrics    Temp: 98.7 °F (37.1 °C)  Height: 5' 4" (162.6 cm)  Height (inches): 64 in  Weight Method: Bed Scale  Weight: 68.5 kg (151 lb)  Weight (lb): 151 lb  Ideal Body Weight (IBW), Male: 130 lb  % Ideal Body Weight, Male (lb): 116.15 %  BMI (Calculated): 25.9  BMI Grade: 25 - 29.9 - overweight       Lab/Procedures/Meds    Pertinent Labs Reviewed: reviewed  Pertinent Labs Comments: noted  Pertinent Medications Reviewed: reviewed  Pertinent Medications Comments: IVF, precedex, lasix    Estimated/Assessed Needs    Weight Used For Calorie " Calculations: 68.5 kg (151 lb 0.2 oz)  Energy Calorie Requirements (kcal): 1740  Energy Need Method: [place]  Protein Requirements: 82-103g(1.2-1.5g/kg)  Weight Used For Protein Calculations: 68.5 kg (151 lb 0.2 oz)  Fluid Requirements (mL): 1mL/kcal or per MD     RDA Method (mL): 1740  CHO Requirement: 227g CHO daily      Nutrition Prescription Ordered    Current Diet Order: NPO  Nutrition Order Comments: TF held for possible extubation  Current Nutrition Support Formula Ordered: Impact Peptide 1.5  Current Nutrition Support Rate Ordered: 55 (ml)  Current Nutrition Support Frequency Ordered: mL/hr    Evaluation of Received Nutrient/Fluid Intake    Enteral Calories (kcal): 1980  Enteral Protein (gm): 124  Enteral (Free Water) Fluid (mL): 1016    % Kcal Needs: 114  % Protein Needs: 100    I/O: +7.7L since admit, good UOP, LBM 2/4    Energy Calories Required: meeting needs  Protein Required: meeting needs  Fluid Required: other (see comments)(per MD)    % Intake of Estimated Energy Needs: 75 - 100 %  % Meal Intake: NPO    Nutrition Risk    Level of Risk/Frequency of Follow-up: high(f/u 2x/week)     Assessment and Plan     Nutrition Problem  Inadequate energy intake     Related to (etiology):   NPO     Signs and Symptoms (as evidenced by):   Pt receiving < 75% EEN and EPN      Interventions(treatment strategy):  Collaboration of care with providers     Nutrition Diagnosis Status:   Improving       Monitor and Evaluation    Food and Nutrient Intake: energy intake, food and beverage intake, enteral nutrition intake  Food and Nutrient Adminstration: diet order, enteral and parenteral nutrition administration  Anthropometric Measurements: weight, weight change, body mass index  Biochemical Data, Medical Tests and Procedures: electrolyte and renal panel, inflammatory profile, gastrointestinal profile, glucose/endocrine profile, lipid profile  Nutrition-Focused Physical Findings: overall appearance       Nutrition  Follow-Up    RD Follow-up?: Yes

## 2020-02-06 NOTE — PROGRESS NOTES
Pharmacokinetic Assessment Follow Up: IV Vancomycin    Assessment and Plan:  · Vancomycin trough before the 4th dose therapeutic at 15.3 mcg/mL, drawn appropriately. Goal trough 15-20 mcg/mL.  · Continue vancomycin 1 g IV every 12 hours.  · Therapy is scheduled to end on Sunday 2/9 after 7 days total. No additional levels required.    Drug levels (last 3 results):  Recent Labs   Lab Result Units 02/04/20  1254 02/06/20  1338   Vancomycin-Trough ug/mL 6.7* 15.3       Pharmacy will continue to follow and monitor vancomycin. Please call for questions regarding this assessment.    Thank you for the consult,   Malka Couch, PharmD, BCCCP  Neurocritical Care Clinical Pharmacist  Spectralink: [id number]  ___________________________________________________________________________________________________________________________     Patient brief summary:  Edu Choi is a 64 y.o. male initiated on antimicrobial therapy with IV Vancomycin for treatment of bacteremia    The patient's current regimen is 1250 mg Q24H    Drug Allergies:   Review of patient's allergies indicates:   Allergen Reactions    No known drug allergies        Actual Body Weight:   68.5 kg    Renal Function:   Estimated Creatinine Clearance: 76.1 mL/min (based on SCr of 0.8 mg/dL).,     Dialysis Method (if applicable):  N/A    CBC (last 72 hours):  Recent Labs   Lab Result Units 02/04/20  0214 02/05/20  0145 02/06/20  0130   WBC K/uL 14.89* 11.06 10.73   Hemoglobin g/dL 9.8* 10.4* 10.5*   Hematocrit % 31.1* 32.0* 32.2*   Platelets K/uL 222 243 253   Gran% % 74.1* 65.2 71.2   Lymph% % 13.1* 18.3 15.8*   Mono% % 10.7 13.1 10.3   Eosinophil% % 1.1 2.3 1.6   Basophil% % 0.5 0.7 0.6   Differential Method  Automated Automated Automated       Metabolic Panel (last 72 hours):  Recent Labs   Lab Result Units 02/04/20  0214 02/04/20  1714 02/04/20  1715 02/04/20  1842 02/05/20  0145 02/05/20  0854 02/06/20  0130 02/06/20  0756   Sodium mmol/L 138 140  --   --   141  --  140  --    Sodium Urine Random mmol/L  --   --   --  32  --   --   --   --    Potassium mmol/L 3.5  --   --   --  3.5 3.9 3.5 3.9   Chloride mmol/L 112*  --   --   --  111*  --  108  --    CO2 mmol/L 17*  --   --   --  21*  --  22*  --    Glucose mg/dL 103  --   --   --  107  --  113*  --    Glucose, UA   --   --  Negative  --   --   --   --   --    BUN, Bld mg/dL 8  --   --   --  6*  --  5*  --    Creatinine mg/dL 0.8  --   --   --  0.7  --  0.8  --    Albumin g/dL 2.5*  --   --   --  2.4*  --  2.3*  --    Total Bilirubin mg/dL 0.6  --   --   --  0.6  --  0.5  --    Alkaline Phosphatase U/L 51*  --   --   --  49*  --  51*  --    AST U/L 32  --   --   --  28  --  26  --    ALT U/L 12  --   --   --  11  --  12  --    Magnesium mg/dL 1.6  --   --   --  1.7  --  1.7  --    Phosphorus mg/dL 2.0*  --   --   --  1.7*  --  2.4*  --        Vancomycin Administrations:  vancomycin given in the last 96 hours                   vancomycin 1.25 g in dextrose 5% 250 mL IVPB (ready to mix) (mg) 1,250 mg New Bag 02/04/20 1334     1,250 mg New Bag 02/03/20 1330    vancomycin (VANCOCIN) 2,000 mg in dextrose 5 % 500 mL IVPB (mg) 2,000 mg New Bag 02/02/20 1303                Microbiologic Results:  Microbiology Results (last 7 days)     Procedure Component Value Units Date/Time    Blood culture [[id number]] Collected:  02/02/20 1910    Order Status:  Completed Specimen:  Blood from Peripheral, Ankle, Left Updated:  02/06/20 0612     Blood Culture, Routine No Growth to date      No Growth to date      No Growth to date      No Growth to date    Blood culture [[id number]] Collected:  02/02/20 1920    Order Status:  Completed Specimen:  Blood from Peripheral, Ankle, Right Updated:  02/06/20 0612     Blood Culture, Routine No Growth to date      No Growth to date      No Growth to date      No Growth to date    Culture, Respiratory with Gram Stain [[id number]] Collected:  02/02/20 1940    Order Status:  Completed Specimen:   Respiratory from Endotracheal Aspirate Updated:  02/05/20 1005     Respiratory Culture Normal respiratory tessa      No S aureus or Pseudomonas isolated.     Gram Stain (Respiratory) <10 epithelial cells per low power field.     Gram Stain (Respiratory) Rare WBC's     Gram Stain (Respiratory) Rare Gram positive cocci     Gram Stain (Respiratory) Rare Gram negative rods

## 2020-02-06 NOTE — PLAN OF CARE
Patient intubated on vent.  Not medically stable for discharge.       02/06/20 1628   Discharge Reassessment   Assessment Type Discharge Planning Reassessment   Provided patient/caregiver education on the expected discharge date and the discharge plan Yes   Do you have any problems affording any of your prescribed medications? No   Discharge Plan A Long-term acute care facility (LTAC)   Discharge Plan B Rehab;Home Health   DME Needed Upon Discharge  other (see comments)  (tbd)   Patient choice form signed by patient/caregiver N/A   Anticipated Discharge Disposition LTAC   Can the patient/caregiver answer the patient profile reliably? No, cognitively impaired   Describe the patient's ability to walk at the present time. Does not walk or unable to take any steps at all   How often would a person be available to care for the patient? Whenever needed   Number of comorbid conditions (as recorded on the chart) Three   Post-Acute Status   Post-Acute Authorization Placement   Post-Acute Placement Status Awaiting Internal Medical Clearance   Discharge Delays None known at this time       Stefanie Ponce RN, CCRN-K, [facility]  Neuro-Critical Care   [phone]

## 2020-02-07 PROBLEM — A41.9 SEPTIC SHOCK: Status: ACTIVE | Noted: 2020-02-07

## 2020-02-07 PROBLEM — R65.21 SEPTIC SHOCK: Status: ACTIVE | Noted: 2020-02-07

## 2020-02-07 PROBLEM — J69.0 ASPIRATION PNEUMONIA: Status: ACTIVE | Noted: 2020-02-07

## 2020-02-07 LAB
ALBUMIN SERPL BCP-MCNC: 2.4 G/DL (ref 3.5–5.2)
ALLENS TEST: ABNORMAL
ALLENS TEST: ABNORMAL
ALP SERPL-CCNC: 53 U/L (ref 55–135)
ALT SERPL W/O P-5'-P-CCNC: 12 U/L (ref 10–44)
AMYLASE SERPL-CCNC: 116 U/L (ref 20–110)
ANION GAP SERPL CALC-SCNC: 9 MMOL/L (ref 8–16)
AST SERPL-CCNC: 25 U/L (ref 10–40)
BACTERIA BLD CULT: NORMAL
BASOPHILS # BLD AUTO: 0.06 K/UL (ref 0–0.2)
BASOPHILS NFR BLD: 0.6 % (ref 0–1.9)
BILIRUB SERPL-MCNC: 0.5 MG/DL (ref 0.1–1)
BUN SERPL-MCNC: 5 MG/DL (ref 8–23)
CALCIUM SERPL-MCNC: 7.8 MG/DL (ref 8.7–10.5)
CHLORIDE SERPL-SCNC: 105 MMOL/L (ref 95–110)
CO2 SERPL-SCNC: 26 MMOL/L (ref 23–29)
CREAT SERPL-MCNC: 0.8 MG/DL (ref 0.5–1.4)
DELSYS: ABNORMAL
DELSYS: ABNORMAL
DIFFERENTIAL METHOD: ABNORMAL
EOSINOPHIL # BLD AUTO: 0.2 K/UL (ref 0–0.5)
EOSINOPHIL NFR BLD: 1.7 % (ref 0–8)
ERYTHROCYTE [DISTWIDTH] IN BLOOD BY AUTOMATED COUNT: 13.7 % (ref 11.5–14.5)
EST. GFR  (AFRICAN AMERICAN): >60 ML/MIN/1.73 M^2
EST. GFR  (NON AFRICAN AMERICAN): >60 ML/MIN/1.73 M^2
FIO2: 40
FIO2: 50
GLUCOSE SERPL-MCNC: 118 MG/DL (ref 70–110)
HCO3 UR-SCNC: 25.9 MMOL/L (ref 24–28)
HCO3 UR-SCNC: 26.5 MMOL/L (ref 24–28)
HCT VFR BLD AUTO: 31.1 % (ref 40–54)
HGB BLD-MCNC: 10.4 G/DL (ref 14–18)
IMM GRANULOCYTES # BLD AUTO: 0.05 K/UL (ref 0–0.04)
IMM GRANULOCYTES NFR BLD AUTO: 0.5 % (ref 0–0.5)
LIPASE SERPL-CCNC: 42 U/L (ref 4–60)
LYMPHOCYTES # BLD AUTO: 1.8 K/UL (ref 1–4.8)
LYMPHOCYTES NFR BLD: 18.5 % (ref 18–48)
MAGNESIUM SERPL-MCNC: 1.7 MG/DL (ref 1.6–2.6)
MAGNESIUM SERPL-MCNC: 1.7 MG/DL (ref 1.6–2.6)
MCH RBC QN AUTO: 31.7 PG (ref 27–31)
MCHC RBC AUTO-ENTMCNC: 33.4 G/DL (ref 32–36)
MCV RBC AUTO: 95 FL (ref 82–98)
MIN VOL: 9.08
MODE: ABNORMAL
MODE: ABNORMAL
MONOCYTES # BLD AUTO: 1.3 K/UL (ref 0.3–1)
MONOCYTES NFR BLD: 13 % (ref 4–15)
NEUTROPHILS # BLD AUTO: 6.4 K/UL (ref 1.8–7.7)
NEUTROPHILS NFR BLD: 65.7 % (ref 38–73)
NRBC BLD-RTO: 0 /100 WBC
PCO2 BLDA: 37.3 MMHG (ref 35–45)
PCO2 BLDA: 39.5 MMHG (ref 35–45)
PEEP: 5
PEEP: 7
PH SMN: 7.44 [PH] (ref 7.35–7.45)
PH SMN: 7.45 [PH] (ref 7.35–7.45)
PHOSPHATE SERPL-MCNC: 2.5 MG/DL (ref 2.7–4.5)
PHOSPHATE SERPL-MCNC: 2.5 MG/DL (ref 2.7–4.5)
PLATELET # BLD AUTO: 255 K/UL (ref 150–350)
PMV BLD AUTO: 11.6 FL (ref 9.2–12.9)
PO2 BLDA: 70 MMHG (ref 80–100)
PO2 BLDA: 81 MMHG (ref 80–100)
POC BE: 2 MMOL/L
POC BE: 2 MMOL/L
POC SATURATED O2: 95 % (ref 95–100)
POC SATURATED O2: 96 % (ref 95–100)
POC TCO2: 27 MMOL/L (ref 23–27)
POC TCO2: 28 MMOL/L (ref 23–27)
POCT GLUCOSE: 104 MG/DL (ref 70–110)
POCT GLUCOSE: 114 MG/DL (ref 70–110)
POCT GLUCOSE: 120 MG/DL (ref 70–110)
POCT GLUCOSE: 87 MG/DL (ref 70–110)
POTASSIUM SERPL-SCNC: 3.3 MMOL/L (ref 3.5–5.1)
POTASSIUM SERPL-SCNC: 4.2 MMOL/L (ref 3.5–5.1)
PROT SERPL-MCNC: 5.7 G/DL (ref 6–8.4)
PS: 10
PS: 10
RBC # BLD AUTO: 3.28 M/UL (ref 4.6–6.2)
SAMPLE: ABNORMAL
SAMPLE: ABNORMAL
SITE: ABNORMAL
SITE: ABNORMAL
SODIUM SERPL-SCNC: 140 MMOL/L (ref 136–145)
SP02: 100
SP02: 95
SPONT RATE: 21
SPONT RATE: 21
WBC # BLD AUTO: 9.77 K/UL (ref 3.9–12.7)

## 2020-02-07 PROCEDURE — 99291 CRITICAL CARE FIRST HOUR: CPT | Mod: HCNC,GC,, | Performed by: ANESTHESIOLOGY

## 2020-02-07 PROCEDURE — 63600175 PHARM REV CODE 636 W HCPCS: Mod: HCNC | Performed by: NURSE PRACTITIONER

## 2020-02-07 PROCEDURE — 82803 BLOOD GASES ANY COMBINATION: CPT | Mod: HCNC

## 2020-02-07 PROCEDURE — 99291 PR CRITICAL CARE, E/M 30-74 MINUTES: ICD-10-PCS | Mod: HCNC,GC,, | Performed by: ANESTHESIOLOGY

## 2020-02-07 PROCEDURE — 84100 ASSAY OF PHOSPHORUS: CPT | Mod: HCNC

## 2020-02-07 PROCEDURE — 94761 N-INVAS EAR/PLS OXIMETRY MLT: CPT | Mod: HCNC

## 2020-02-07 PROCEDURE — 99900035 HC TECH TIME PER 15 MIN (STAT): Mod: HCNC

## 2020-02-07 PROCEDURE — 63600175 PHARM REV CODE 636 W HCPCS: Mod: HCNC | Performed by: PHYSICIAN ASSISTANT

## 2020-02-07 PROCEDURE — P9047 ALBUMIN (HUMAN), 25%, 50ML: HCPCS | Mod: JG,HCNC | Performed by: PSYCHIATRY & NEUROLOGY

## 2020-02-07 PROCEDURE — 80053 COMPREHEN METABOLIC PANEL: CPT | Mod: HCNC

## 2020-02-07 PROCEDURE — 25000003 PHARM REV CODE 250: Mod: HCNC | Performed by: NURSE PRACTITIONER

## 2020-02-07 PROCEDURE — 82150 ASSAY OF AMYLASE: CPT | Mod: HCNC

## 2020-02-07 PROCEDURE — 63600175 PHARM REV CODE 636 W HCPCS: Mod: HCNC | Performed by: PSYCHIATRY & NEUROLOGY

## 2020-02-07 PROCEDURE — 37799 UNLISTED PX VASCULAR SURGERY: CPT | Mod: HCNC

## 2020-02-07 PROCEDURE — 83735 ASSAY OF MAGNESIUM: CPT | Mod: 91,HCNC

## 2020-02-07 PROCEDURE — 84100 ASSAY OF PHOSPHORUS: CPT | Mod: 91,HCNC

## 2020-02-07 PROCEDURE — 25000003 PHARM REV CODE 250: Mod: HCNC | Performed by: PSYCHIATRY & NEUROLOGY

## 2020-02-07 PROCEDURE — 94003 VENT MGMT INPAT SUBQ DAY: CPT | Mod: HCNC

## 2020-02-07 PROCEDURE — S0028 INJECTION, FAMOTIDINE, 20 MG: HCPCS | Mod: HCNC | Performed by: PHYSICIAN ASSISTANT

## 2020-02-07 PROCEDURE — 84132 ASSAY OF SERUM POTASSIUM: CPT | Mod: HCNC

## 2020-02-07 PROCEDURE — 63600175 PHARM REV CODE 636 W HCPCS: Mod: HCNC | Performed by: ANESTHESIOLOGY

## 2020-02-07 PROCEDURE — 99900026 HC AIRWAY MAINTENANCE (STAT): Mod: HCNC

## 2020-02-07 PROCEDURE — 85025 COMPLETE CBC W/AUTO DIFF WBC: CPT | Mod: HCNC

## 2020-02-07 PROCEDURE — 83735 ASSAY OF MAGNESIUM: CPT | Mod: HCNC

## 2020-02-07 PROCEDURE — 27200966 HC CLOSED SUCTION SYSTEM: Mod: HCNC

## 2020-02-07 PROCEDURE — 83690 ASSAY OF LIPASE: CPT | Mod: HCNC

## 2020-02-07 PROCEDURE — 63600175 PHARM REV CODE 636 W HCPCS: Mod: HCNC | Performed by: STUDENT IN AN ORGANIZED HEALTH CARE EDUCATION/TRAINING PROGRAM

## 2020-02-07 PROCEDURE — 27000221 HC OXYGEN, UP TO 24 HOURS: Mod: HCNC

## 2020-02-07 PROCEDURE — 25000003 PHARM REV CODE 250: Mod: HCNC | Performed by: PHYSICIAN ASSISTANT

## 2020-02-07 PROCEDURE — 20000000 HC ICU ROOM: Mod: HCNC

## 2020-02-07 RX ORDER — LEVETIRACETAM 5 MG/ML
500 INJECTION INTRAVASCULAR EVERY 12 HOURS
Status: DISCONTINUED | OUTPATIENT
Start: 2020-02-07 | End: 2020-02-08

## 2020-02-07 RX ORDER — CHLORHEXIDINE GLUCONATE ORAL RINSE 1.2 MG/ML
15 SOLUTION DENTAL 2 TIMES DAILY
Status: DISCONTINUED | OUTPATIENT
Start: 2020-02-07 | End: 2020-02-23

## 2020-02-07 RX ORDER — FLUCONAZOLE 2 MG/ML
200 INJECTION, SOLUTION INTRAVENOUS
Status: DISCONTINUED | OUTPATIENT
Start: 2020-02-07 | End: 2020-02-08

## 2020-02-07 RX ORDER — ALBUMIN HUMAN 250 G/1000ML
50 SOLUTION INTRAVENOUS ONCE
Status: COMPLETED | OUTPATIENT
Start: 2020-02-07 | End: 2020-02-07

## 2020-02-07 RX ADMIN — PIPERACILLIN SODIUM AND TAZOBACTAM SODIUM 4.5 G: 4; .5 INJECTION, POWDER, LYOPHILIZED, FOR SOLUTION INTRAVENOUS at 11:02

## 2020-02-07 RX ADMIN — CHLORHEXIDINE GLUCONATE 0.12% ORAL RINSE 15 ML: 1.2 LIQUID ORAL at 08:02

## 2020-02-07 RX ADMIN — Medication 800 MG: at 05:02

## 2020-02-07 RX ADMIN — POTASSIUM & SODIUM PHOSPHATES POWDER PACK 280-160-250 MG 2 PACKET: 280-160-250 PACK at 05:02

## 2020-02-07 RX ADMIN — HYDROCORTISONE SODIUM SUCCINATE 100 MG: 100 INJECTION, POWDER, FOR SOLUTION INTRAMUSCULAR; INTRAVENOUS at 09:02

## 2020-02-07 RX ADMIN — VANCOMYCIN HYDROCHLORIDE 1000 MG: 1 INJECTION, POWDER, LYOPHILIZED, FOR SOLUTION INTRAVENOUS at 03:02

## 2020-02-07 RX ADMIN — DOCUSATE SODIUM - SENNOSIDES 1 TABLET: 50; 8.6 TABLET, FILM COATED ORAL at 08:02

## 2020-02-07 RX ADMIN — SODIUM CHLORIDE: 0.9 INJECTION, SOLUTION INTRAVENOUS at 09:02

## 2020-02-07 RX ADMIN — Medication 800 MG: at 01:02

## 2020-02-07 RX ADMIN — ACETAMINOPHEN 650 MG: 325 TABLET ORAL at 09:02

## 2020-02-07 RX ADMIN — ALBUMIN (HUMAN) 50 G: 12.5 SOLUTION INTRAVENOUS at 02:02

## 2020-02-07 RX ADMIN — LEVETIRACETAM INJECTION 1500 MG: 15 INJECTION INTRAVENOUS at 08:02

## 2020-02-07 RX ADMIN — POTASSIUM & SODIUM PHOSPHATES POWDER PACK 280-160-250 MG 2 PACKET: 280-160-250 PACK at 01:02

## 2020-02-07 RX ADMIN — FAMOTIDINE 20 MG: 10 INJECTION, SOLUTION INTRAVENOUS at 08:02

## 2020-02-07 RX ADMIN — LEVETIRACETAM 500 MG: 5 INJECTION INTRAVENOUS at 08:02

## 2020-02-07 RX ADMIN — ENOXAPARIN SODIUM 40 MG: 100 INJECTION SUBCUTANEOUS at 05:02

## 2020-02-07 RX ADMIN — FLUCONAZOLE 200 MG: 2 INJECTION, SOLUTION INTRAVENOUS at 02:02

## 2020-02-07 RX ADMIN — ESCITALOPRAM OXALATE 20 MG: 10 TABLET ORAL at 08:02

## 2020-02-07 RX ADMIN — HYDROCORTISONE SODIUM SUCCINATE 100 MG: 100 INJECTION, POWDER, FOR SOLUTION INTRAMUSCULAR; INTRAVENOUS at 02:02

## 2020-02-07 RX ADMIN — DEXMEDETOMIDINE HYDROCHLORIDE 1 MCG/KG/HR: 100 INJECTION, SOLUTION, CONCENTRATE INTRAVENOUS at 01:02

## 2020-02-07 RX ADMIN — VANCOMYCIN HYDROCHLORIDE 1000 MG: 1 INJECTION, POWDER, LYOPHILIZED, FOR SOLUTION INTRAVENOUS at 01:02

## 2020-02-07 RX ADMIN — PIPERACILLIN SODIUM AND TAZOBACTAM SODIUM 4.5 G: 4; .5 INJECTION, POWDER, LYOPHILIZED, FOR SOLUTION INTRAVENOUS at 04:02

## 2020-02-07 RX ADMIN — POTASSIUM CHLORIDE 40 MEQ: 20 SOLUTION ORAL at 04:02

## 2020-02-07 RX ADMIN — POTASSIUM CHLORIDE 40 MEQ: 20 SOLUTION ORAL at 01:02

## 2020-02-07 RX ADMIN — PIPERACILLIN SODIUM AND TAZOBACTAM SODIUM 4.5 G: 4; .5 INJECTION, POWDER, LYOPHILIZED, FOR SOLUTION INTRAVENOUS at 07:02

## 2020-02-07 RX ADMIN — ATORVASTATIN CALCIUM 40 MG: 20 TABLET, FILM COATED ORAL at 08:02

## 2020-02-07 RX ADMIN — DEXMEDETOMIDINE HYDROCHLORIDE 0.5 MCG/KG/HR: 100 INJECTION, SOLUTION, CONCENTRATE INTRAVENOUS at 05:02

## 2020-02-07 NOTE — PROGRESS NOTES
ICU Progress Note  Neurocritical Care    Admit Date: 2/2/2020  LOS: 5    CC: Seizure    Code Status: Full Code     SUBJECTIVE:     Interval History/Significant Events:     Seizures-poa  Acute respiratory failure- hypoxemia-poa  Aspiration pneumonitis-poa  Septic shock-poa  Hypovolemia-poa  hypomagnesemia  hypophosphatasia  ards-poa          Medications:  Continuous Infusions:   sodium chloride 0.9% 100 mL/hr at 02/07/20 1300    norepinephrine bitartrate-D5W 0.04 mcg/kg/min (02/07/20 1300)     Scheduled Meds:   atorvastatin  40 mg Per NG tube QHS    chlorhexidine  15 mL Mouth/Throat BID    enoxaparin  40 mg Subcutaneous Daily    escitalopram oxalate  20 mg Per NG tube Daily    famotidine (PF)  20 mg Intravenous BID    hydrocortisone sod succ (PF)  100 mg Intravenous Q8H    levetiracetam IVPB  1,500 mg Intravenous Q12H    piperacillin-tazobactam (ZOSYN) IVPB  4.5 g Intravenous Q8H    senna-docusate 8.6-50 mg  1 tablet Per NG tube BID    vancomycin (VANCOCIN) IVPB  1,000 mg Intravenous Q12H     PRN Meds:.acetaminophen, Dextrose 10% Bolus, fentaNYL, glucagon (human recombinant), insulin aspart U-100, labetalol, lorazepam, magnesium oxide, magnesium oxide, ondansetron, potassium chloride 10%, potassium chloride 10%, potassium chloride 10%, potassium, sodium phosphates, potassium, sodium phosphates, potassium, sodium phosphates, sodium chloride 0.9%    OBJECTIVE:   Vital Signs (Most Recent):   Temp: 98.5 °F (36.9 °C) (02/07/20 1100)  Pulse: 84 (02/07/20 1303)  Resp: (!) 22 (02/07/20 1303)  BP: (!) 119/57 (02/07/20 1303)  SpO2: 95 % (02/07/20 1303)    Vital Signs (24h Range):   Temp:  [97.9 °F (36.6 °C)-98.8 °F (37.1 °C)] 98.5 °F (36.9 °C)  Pulse:  [49-88] 84  Resp:  [11-25] 22  SpO2:  [92 %-100 %] 95 %  BP: (111-152)/(57-92) 119/57  Arterial Line BP: (106-154)/(41-59) 127/47    ICP/CPP (Last 24h):        I & O (Last 24h):     Intake/Output Summary (Last 24 hours) at 2/7/2020 1329  Last data filed at 2/7/2020  1300  Gross per 24 hour   Intake 3861.21 ml   Output 3130 ml   Net 731.21 ml     Physical Exam:  GA: Alert, comfortable, no acute distress.   HEENT: No scleral icterus or JVD.   Pulmonary: Clear to auscultation A/P/L. No wheezing, crackles, or rhonchi.  Cardiac: RRR S1 & S2 w/o rubs/murmurs/gallops.   Abdominal: Bowel sounds present x 4. No appreciable hepatosplenomegaly.  Skin: No jaundice, rashes, or visible lesions.  Neuro:      Awake  Alert  Not following commands  Moves all 4 exts spontaneously    Vent Data:   Vent Mode: Spont  Oxygen Concentration (%):  [40-50] 50  Resp Rate Total:  [14 br/min-25 br/min] 22 br/min  PEEP/CPAP:  [5 cmH20-7 cmH20] 7 cmH20  Pressure Support:  [10 cmH20] 10 cmH20  Mean Airway Pressure:  [7.7 ehV28-42 cmH20] 11 cmH20    Lines/Drains/Airway: a4       Airway - Non-Surgical 02/02/20 0950 Endotracheal Tube (Active)   Secured at 25 cm 2/7/2020 11:50 AM   Measured At Lips 2/7/2020 11:50 AM   Secured Location Center 2/7/2020 11:50 AM   Secured by Commercial tube guo 2/7/2020 11:50 AM   Bite Block center;secure and patent 2/7/2020 11:50 AM   Site Condition Cool;Dry 2/7/2020 11:50 AM   Status Intact;Secured;Patent 2/7/2020 11:50 AM   Site Assessment Clean;Dry;No bleeding;No drainage 2/7/2020 11:50 AM   Cuff Pressure 26 cm H2O 2/7/2020  7:47 AM             Arterial Line 02/02/20 2200 Left Radial (Active)   Site Assessment Clean;Dry;Intact;No redness;No swelling 2/7/2020 11:00 AM   Line Status Pulsatile blood flow;Positional 2/7/2020 11:00 AM   Art Line Waveform Appropriate;Square wave test performed 2/7/2020 11:00 AM   Arterial Line Interventions Zeroed and calibrated;Leveled;Connections checked and tightened;Flushed per protocol 2/7/2020 11:00 AM   Color/Movement/Sensation Capillary refill less than 3 sec 2/7/2020 11:00 AM   Dressing Type Transparent 2/7/2020 11:00 AM   Dressing Status Biopatch in place;Clean;Dry;Intact 2/7/2020 11:00 AM   Dressing Intervention Dressing reinforced  "2/7/2020  3:01 AM   Dressing Change Due 02/09/20 2/7/2020 11:00 AM           NG/OG Tube 02/06/20 1030 Right nostril (Active)   Placement Check placement verified by aspirate characteristics;placement verified by x-ray 2/7/2020 11:00 AM   Tolerance no signs/symptoms of discomfort 2/7/2020 11:00 AM   Securement secured to nostril center w/ adhesive device 2/7/2020 11:00 AM   Clamp Status/Tolerance clamped 2/7/2020  3:01 AM   Insertion Site Appearance no redness, warmth, tenderness, skin breakdown, drainage 2/7/2020 11:00 AM   Flush/Irrigation flushed w/;water;no resistance met 2/7/2020  3:01 AM   Feeding Action feeding held 2/7/2020 11:00 AM   Intake (mL) 100 mL 2/7/2020  8:00 AM            Urethral Catheter 02/04/20 0031 Temperature probe (Active)   Site Assessment Clean;Intact;Dry 2/7/2020 11:00 AM   Collection Container Urimeter 2/7/2020 11:00 AM   Securement Method secured to top of thigh w/ adhesive device 2/7/2020 11:00 AM   Catheter Care Performed yes 2/7/2020 11:00 AM   Reason for Continuing Urinary Catheterization Urinary retention 2/7/2020 11:00 AM   CAUTI Prevention Bundle StatLock in place w 1" slack;Intact seal between catheter & drainage tubing;Drainage bag/urimeter off the floor;Green sheeting clip in use;Drainage bag/urimeter not overfilled (<2/3 full);No dependent loops or kinks;Drainage bag/urimeter below bladder 2/7/2020  7:00 AM   Output (mL) 75 mL 2/7/2020  1:00 PM     Nutrition/Tube Feeds (if NPO state why):  Npo levo    Labs:  ABG:   Recent Labs   Lab 02/07/20  1157   PH 7.449   PO2 70*   PCO2 37.3   HCO3 25.9   POCSATURATED 95   BE 2     BMP:  Recent Labs   Lab 02/07/20  0012 02/07/20  0907     --    K 3.3* 4.2     --    CO2 26  --    BUN 5*  --    CREATININE 0.8  --    *  --    MG 1.7  --    PHOS 2.5*  --      LFT:   Lab Results   Component Value Date    AST 25 02/07/2020    ALT 12 02/07/2020    ALKPHOS 53 (L) 02/07/2020    BILITOT 0.5 02/07/2020    ALBUMIN 2.4 (L) " 02/07/2020    PROT 5.7 (L) 02/07/2020     CBC:   Lab Results   Component Value Date    WBC 9.77 02/07/2020    HGB 10.4 (L) 02/07/2020    HCT 31.1 (L) 02/07/2020    MCV 95 02/07/2020     02/07/2020     Microbiology x 7d:   Microbiology Results (last 7 days)     Procedure Component Value Units Date/Time    Blood culture [[id number]] Collected:  02/02/20 1910    Order Status:  Completed Specimen:  Blood from Peripheral, Ankle, Left Updated:  02/07/20 0612     Blood Culture, Routine No Growth to date      No Growth to date      No Growth to date      No Growth to date      No Growth to date    Blood culture [[id number]] Collected:  02/02/20 1920    Order Status:  Completed Specimen:  Blood from Peripheral, Ankle, Right Updated:  02/07/20 0612     Blood Culture, Routine No Growth to date      No Growth to date      No Growth to date      No Growth to date      No Growth to date    Culture, Respiratory with Gram Stain [[id number]] Collected:  02/02/20 1940    Order Status:  Completed Specimen:  Respiratory from Endotracheal Aspirate Updated:  02/05/20 1005     Respiratory Culture Normal respiratory etssa      No S aureus or Pseudomonas isolated.     Gram Stain (Respiratory) <10 epithelial cells per low power field.     Gram Stain (Respiratory) Rare WBC's     Gram Stain (Respiratory) Rare Gram positive cocci     Gram Stain (Respiratory) Rare Gram negative rods        Imaging:   repeat cxr  I personally reviewed the above image.    ASSESSMENT/PLAN:     Active Hospital Problems    Diagnosis    *Seizure    Septic shock    Aspiration pneumonia    Closed extensive facial fractures    Aspiration pneumonitis    Multiple closed fractures of facial bone    Idiopathic hypotension    Chronic anticoagulation    Acute respiratory failure with hypoxia    History of DVT (deep vein thrombosis)    Depression with anxiety            Plan:  25% albumin  Wean levo gtt  Fluconazole  Steroids  Wean fio2 and peep  Follow  abg      Critical secondary to Patient has a condition that poses threat to life and bodily function: needs very close monitoring of exam/abg/wean infusions    Cc time 34 mins     Critical care was time spent personally by me on the following activities: development of treatment plan with patient or surrogate and bedside caregivers, discussions with consultants, evaluation of patient's response to treatment, examination of patient, ordering and performing treatments and interventions, ordering and review of laboratory studies, ordering and review of radiographic studies, pulse oximetry, re-evaluation of patient's condition. This critical care time did not overlap with that of any other provider or involve time for any procedures.

## 2020-02-07 NOTE — PLAN OF CARE
POC reviewed with pt and family (by phone) at 1400. Family verbalized understanding. Questions and concerns addressed. No acute events today. Pt progressing toward goals. Will continue to monitor. See flowsheets for full assessment and VS info.    Increased PEEP and FiO2 to maintain O2 sat > 90%.  Albumin, fluconazole, and hydrocortisone ordered and given.  Continuing to hold tube feeds.  Decreased keppa dose.  Weaning levo as tolerated.  Precedex discontinued.

## 2020-02-07 NOTE — PLAN OF CARE
POC reviewed with pt at 0500. Pt unable to verbalize understanding. Questions and concerns addressed. No acute events overnight. Pt progressing toward goals. Will continue to monitor. See flowsheets for full assessment and VS info   Labs replaced.

## 2020-02-07 NOTE — PT/OT/SLP PROGRESS
Physical Therapy      Patient Name:  Edu Choi   MRN:  [id number]    Patient not seen today secondary to Other (Comment)(patient intubated). Patient not appropriate for physical therapy at this time as they are intubated. Continue with RN PROM protocol. Will follow-up pending extubation.    Niki Gao, PT

## 2020-02-08 LAB
ALBUMIN SERPL BCP-MCNC: 3 G/DL (ref 3.5–5.2)
ALLENS TEST: ABNORMAL
ALP SERPL-CCNC: 53 U/L (ref 55–135)
ALT SERPL W/O P-5'-P-CCNC: 13 U/L (ref 10–44)
ANION GAP SERPL CALC-SCNC: 10 MMOL/L (ref 8–16)
APTT BLDCRRT: 23.7 SEC (ref 21–32)
APTT BLDCRRT: 28.6 SEC (ref 21–32)
AST SERPL-CCNC: 28 U/L (ref 10–40)
BACTERIA BLD CULT: NORMAL
BASOPHILS # BLD AUTO: 0.04 K/UL (ref 0–0.2)
BASOPHILS NFR BLD: 0.3 % (ref 0–1.9)
BILIRUB SERPL-MCNC: 0.5 MG/DL (ref 0.1–1)
BUN SERPL-MCNC: 7 MG/DL (ref 8–23)
CALCIUM SERPL-MCNC: 7.6 MG/DL (ref 8.7–10.5)
CHLORIDE SERPL-SCNC: 110 MMOL/L (ref 95–110)
CO2 SERPL-SCNC: 22 MMOL/L (ref 23–29)
CREAT SERPL-MCNC: 0.9 MG/DL (ref 0.5–1.4)
DELSYS: ABNORMAL
DIFFERENTIAL METHOD: ABNORMAL
EOSINOPHIL # BLD AUTO: 0 K/UL (ref 0–0.5)
EOSINOPHIL NFR BLD: 0 % (ref 0–8)
ERYTHROCYTE [DISTWIDTH] IN BLOOD BY AUTOMATED COUNT: 14.3 % (ref 11.5–14.5)
EST. GFR  (AFRICAN AMERICAN): >60 ML/MIN/1.73 M^2
EST. GFR  (NON AFRICAN AMERICAN): >60 ML/MIN/1.73 M^2
FIO2: 50
GLUCOSE SERPL-MCNC: 134 MG/DL (ref 70–110)
HCO3 UR-SCNC: 25.8 MMOL/L (ref 24–28)
HCT VFR BLD AUTO: 28.5 % (ref 40–54)
HGB BLD-MCNC: 8.8 G/DL (ref 14–18)
IMM GRANULOCYTES # BLD AUTO: 0.07 K/UL (ref 0–0.04)
IMM GRANULOCYTES NFR BLD AUTO: 0.6 % (ref 0–0.5)
INR PPP: 1.2 (ref 0.8–1.2)
LYMPHOCYTES # BLD AUTO: 0.6 K/UL (ref 1–4.8)
LYMPHOCYTES NFR BLD: 5.2 % (ref 18–48)
MAGNESIUM SERPL-MCNC: 1.8 MG/DL (ref 1.6–2.6)
MAGNESIUM SERPL-MCNC: 2.1 MG/DL (ref 1.6–2.6)
MAP: 10
MCH RBC QN AUTO: 31.2 PG (ref 27–31)
MCHC RBC AUTO-ENTMCNC: 30.9 G/DL (ref 32–36)
MCV RBC AUTO: 101 FL (ref 82–98)
MIN VOL: 8.12
MODE: ABNORMAL
MONOCYTES # BLD AUTO: 0.3 K/UL (ref 0.3–1)
MONOCYTES NFR BLD: 2.6 % (ref 4–15)
NEUTROPHILS # BLD AUTO: 10.8 K/UL (ref 1.8–7.7)
NEUTROPHILS NFR BLD: 91.3 % (ref 38–73)
NRBC BLD-RTO: 0 /100 WBC
PCO2 BLDA: 38.9 MMHG (ref 35–45)
PEEP: 7
PH SMN: 7.43 [PH] (ref 7.35–7.45)
PHOSPHATE SERPL-MCNC: 3.4 MG/DL (ref 2.7–4.5)
PLATELET # BLD AUTO: 223 K/UL (ref 150–350)
PMV BLD AUTO: 11.7 FL (ref 9.2–12.9)
PO2 BLDA: 132 MMHG (ref 80–100)
POC BE: 2 MMOL/L
POC SATURATED O2: 99 % (ref 95–100)
POC TCO2: 27 MMOL/L (ref 23–27)
POCT GLUCOSE: 109 MG/DL (ref 70–110)
POCT GLUCOSE: 113 MG/DL (ref 70–110)
POCT GLUCOSE: 117 MG/DL (ref 70–110)
POCT GLUCOSE: 124 MG/DL (ref 70–110)
POTASSIUM SERPL-SCNC: 3.6 MMOL/L (ref 3.5–5.1)
POTASSIUM SERPL-SCNC: 3.7 MMOL/L (ref 3.5–5.1)
PROT SERPL-MCNC: 5.7 G/DL (ref 6–8.4)
PROTHROMBIN TIME: 11.9 SEC (ref 9–12.5)
PS: 10
RBC # BLD AUTO: 2.82 M/UL (ref 4.6–6.2)
SAMPLE: ABNORMAL
SITE: ABNORMAL
SODIUM SERPL-SCNC: 142 MMOL/L (ref 136–145)
SP02: 98
SPONT RATE: 22
VOL: 355
WBC # BLD AUTO: 11.81 K/UL (ref 3.9–12.7)

## 2020-02-08 PROCEDURE — 63600175 PHARM REV CODE 636 W HCPCS: Mod: HCNC | Performed by: PSYCHIATRY & NEUROLOGY

## 2020-02-08 PROCEDURE — 85730 THROMBOPLASTIN TIME PARTIAL: CPT | Mod: HCNC

## 2020-02-08 PROCEDURE — 94761 N-INVAS EAR/PLS OXIMETRY MLT: CPT | Mod: HCNC

## 2020-02-08 PROCEDURE — 94003 VENT MGMT INPAT SUBQ DAY: CPT | Mod: HCNC

## 2020-02-08 PROCEDURE — 99291 PR CRITICAL CARE, E/M 30-74 MINUTES: ICD-10-PCS | Mod: HCNC,GC,, | Performed by: PSYCHIATRY & NEUROLOGY

## 2020-02-08 PROCEDURE — 85025 COMPLETE CBC W/AUTO DIFF WBC: CPT | Mod: HCNC

## 2020-02-08 PROCEDURE — 99900026 HC AIRWAY MAINTENANCE (STAT): Mod: HCNC

## 2020-02-08 PROCEDURE — S0028 INJECTION, FAMOTIDINE, 20 MG: HCPCS | Mod: HCNC | Performed by: PHYSICIAN ASSISTANT

## 2020-02-08 PROCEDURE — 25500020 PHARM REV CODE 255: Mod: HCNC | Performed by: PSYCHIATRY & NEUROLOGY

## 2020-02-08 PROCEDURE — 25000003 PHARM REV CODE 250: Mod: HCNC | Performed by: PHYSICIAN ASSISTANT

## 2020-02-08 PROCEDURE — 25000003 PHARM REV CODE 250: Mod: HCNC | Performed by: NURSE PRACTITIONER

## 2020-02-08 PROCEDURE — 27000221 HC OXYGEN, UP TO 24 HOURS: Mod: HCNC

## 2020-02-08 PROCEDURE — 27200966 HC CLOSED SUCTION SYSTEM: Mod: HCNC

## 2020-02-08 PROCEDURE — 83735 ASSAY OF MAGNESIUM: CPT | Mod: HCNC

## 2020-02-08 PROCEDURE — 95714 VEEG EA 12-26 HR UNMNTR: CPT | Mod: HCNC

## 2020-02-08 PROCEDURE — 99900035 HC TECH TIME PER 15 MIN (STAT): Mod: HCNC

## 2020-02-08 PROCEDURE — 63600175 PHARM REV CODE 636 W HCPCS: Mod: HCNC | Performed by: PHYSICIAN ASSISTANT

## 2020-02-08 PROCEDURE — 80053 COMPREHEN METABOLIC PANEL: CPT | Mod: HCNC

## 2020-02-08 PROCEDURE — 99291 CRITICAL CARE FIRST HOUR: CPT | Mod: HCNC,GC,, | Performed by: PSYCHIATRY & NEUROLOGY

## 2020-02-08 PROCEDURE — 20000000 HC ICU ROOM: Mod: HCNC

## 2020-02-08 PROCEDURE — 63600175 PHARM REV CODE 636 W HCPCS: Mod: HCNC | Performed by: NURSE PRACTITIONER

## 2020-02-08 PROCEDURE — 63600175 PHARM REV CODE 636 W HCPCS: Mod: HCNC | Performed by: ANESTHESIOLOGY

## 2020-02-08 PROCEDURE — 94002 VENT MGMT INPAT INIT DAY: CPT | Mod: HCNC

## 2020-02-08 PROCEDURE — 37799 UNLISTED PX VASCULAR SURGERY: CPT | Mod: HCNC

## 2020-02-08 PROCEDURE — 95720 EEG PHY/QHP EA INCR W/VEEG: CPT | Mod: HCNC,,, | Performed by: PSYCHIATRY & NEUROLOGY

## 2020-02-08 PROCEDURE — 95700 EEG CONT REC W/VID EEG TECH: CPT | Mod: HCNC

## 2020-02-08 PROCEDURE — 63600175 PHARM REV CODE 636 W HCPCS: Mod: HCNC | Performed by: STUDENT IN AN ORGANIZED HEALTH CARE EDUCATION/TRAINING PROGRAM

## 2020-02-08 PROCEDURE — 63600175 PHARM REV CODE 636 W HCPCS: Mod: HCNC | Performed by: INTERNAL MEDICINE

## 2020-02-08 PROCEDURE — 82803 BLOOD GASES ANY COMBINATION: CPT | Mod: HCNC

## 2020-02-08 PROCEDURE — 85610 PROTHROMBIN TIME: CPT | Mod: HCNC

## 2020-02-08 PROCEDURE — 83735 ASSAY OF MAGNESIUM: CPT | Mod: 91,HCNC

## 2020-02-08 PROCEDURE — 84100 ASSAY OF PHOSPHORUS: CPT | Mod: HCNC

## 2020-02-08 PROCEDURE — 85730 THROMBOPLASTIN TIME PARTIAL: CPT | Mod: 91,HCNC

## 2020-02-08 PROCEDURE — 82800 BLOOD PH: CPT | Mod: HCNC

## 2020-02-08 PROCEDURE — 25000003 PHARM REV CODE 250: Mod: HCNC | Performed by: PSYCHIATRY & NEUROLOGY

## 2020-02-08 PROCEDURE — 84132 ASSAY OF SERUM POTASSIUM: CPT | Mod: HCNC

## 2020-02-08 PROCEDURE — A9585 GADOBUTROL INJECTION: HCPCS | Mod: HCNC | Performed by: PSYCHIATRY & NEUROLOGY

## 2020-02-08 PROCEDURE — 95720 PR EEG, W/VIDEO, CONT RECORD, I&R, >12<26 HRS: ICD-10-PCS | Mod: HCNC,,, | Performed by: PSYCHIATRY & NEUROLOGY

## 2020-02-08 RX ORDER — LEVETIRACETAM 10 MG/ML
1000 INJECTION INTRAVASCULAR EVERY 12 HOURS
Status: DISCONTINUED | OUTPATIENT
Start: 2020-02-08 | End: 2020-02-13

## 2020-02-08 RX ORDER — HEPARIN SODIUM,PORCINE/D5W 25000/250
12 INTRAVENOUS SOLUTION INTRAVENOUS CONTINUOUS
Status: DISCONTINUED | OUTPATIENT
Start: 2020-02-08 | End: 2020-02-08

## 2020-02-08 RX ORDER — FUROSEMIDE 10 MG/ML
40 INJECTION INTRAMUSCULAR; INTRAVENOUS ONCE
Status: COMPLETED | OUTPATIENT
Start: 2020-02-08 | End: 2020-02-08

## 2020-02-08 RX ORDER — HEPARIN SODIUM,PORCINE/D5W 25000/250
12 INTRAVENOUS SOLUTION INTRAVENOUS CONTINUOUS
Status: DISCONTINUED | OUTPATIENT
Start: 2020-02-08 | End: 2020-02-28

## 2020-02-08 RX ORDER — GADOBUTROL 604.72 MG/ML
10 INJECTION INTRAVENOUS
Status: COMPLETED | OUTPATIENT
Start: 2020-02-08 | End: 2020-02-08

## 2020-02-08 RX ADMIN — Medication 800 MG: at 05:02

## 2020-02-08 RX ADMIN — LEVETIRACETAM 1000 MG: 10 INJECTION INTRAVENOUS at 09:02

## 2020-02-08 RX ADMIN — ENOXAPARIN SODIUM 40 MG: 100 INJECTION SUBCUTANEOUS at 04:02

## 2020-02-08 RX ADMIN — CHLORHEXIDINE GLUCONATE 0.12% ORAL RINSE 15 ML: 1.2 LIQUID ORAL at 09:02

## 2020-02-08 RX ADMIN — HEPARIN SODIUM 12 UNITS/KG/HR: 10000 INJECTION, SOLUTION INTRAVENOUS at 04:02

## 2020-02-08 RX ADMIN — SODIUM CHLORIDE: 0.9 INJECTION, SOLUTION INTRAVENOUS at 05:02

## 2020-02-08 RX ADMIN — HYDROCORTISONE SODIUM SUCCINATE 100 MG: 100 INJECTION, POWDER, FOR SOLUTION INTRAMUSCULAR; INTRAVENOUS at 09:02

## 2020-02-08 RX ADMIN — LEVETIRACETAM 500 MG: 5 INJECTION INTRAVENOUS at 08:02

## 2020-02-08 RX ADMIN — HYDROCORTISONE SODIUM SUCCINATE 100 MG: 100 INJECTION, POWDER, FOR SOLUTION INTRAMUSCULAR; INTRAVENOUS at 05:02

## 2020-02-08 RX ADMIN — ACETAMINOPHEN 650 MG: 325 TABLET ORAL at 09:02

## 2020-02-08 RX ADMIN — FAMOTIDINE 20 MG: 10 INJECTION, SOLUTION INTRAVENOUS at 08:02

## 2020-02-08 RX ADMIN — FAMOTIDINE 20 MG: 10 INJECTION, SOLUTION INTRAVENOUS at 09:02

## 2020-02-08 RX ADMIN — FUROSEMIDE 40 MG: 10 INJECTION, SOLUTION INTRAMUSCULAR; INTRAVENOUS at 05:02

## 2020-02-08 RX ADMIN — GADOBUTROL 10 ML: 604.72 INJECTION INTRAVENOUS at 03:02

## 2020-02-08 RX ADMIN — PIPERACILLIN SODIUM AND TAZOBACTAM SODIUM 4.5 G: 4; .5 INJECTION, POWDER, LYOPHILIZED, FOR SOLUTION INTRAVENOUS at 04:02

## 2020-02-08 RX ADMIN — ATORVASTATIN CALCIUM 40 MG: 20 TABLET, FILM COATED ORAL at 09:02

## 2020-02-08 RX ADMIN — POTASSIUM CHLORIDE 40 MEQ: 20 SOLUTION ORAL at 05:02

## 2020-02-08 RX ADMIN — VANCOMYCIN HYDROCHLORIDE 1000 MG: 1 INJECTION, POWDER, LYOPHILIZED, FOR SOLUTION INTRAVENOUS at 01:02

## 2020-02-08 RX ADMIN — Medication 800 MG: at 10:02

## 2020-02-08 RX ADMIN — HYDROCORTISONE SODIUM SUCCINATE 100 MG: 100 INJECTION, POWDER, FOR SOLUTION INTRAMUSCULAR; INTRAVENOUS at 01:02

## 2020-02-08 RX ADMIN — CHLORHEXIDINE GLUCONATE 0.12% ORAL RINSE 15 ML: 1.2 LIQUID ORAL at 08:02

## 2020-02-08 RX ADMIN — ESCITALOPRAM OXALATE 20 MG: 10 TABLET ORAL at 08:02

## 2020-02-08 RX ADMIN — FENTANYL CITRATE 25 MCG: 50 INJECTION INTRAMUSCULAR; INTRAVENOUS at 03:02

## 2020-02-08 NOTE — PROGRESS NOTES
[facility]  Neurocritical Care  Progress Note    Admit Date: 2/2/2020  Service Date: 02/08/2020  Length of Stay: 6    Subjective:     Chief Complaint: Seizure    History of Present Illness: Pt is a 64 y.o. Male with PMHx of of DVT, PE, DM, and seizures (on 1500 keppra at home) who presents to the ED via EMS with complaint of a seizure that occurred this morning. Pt has long standing history of seizures and was found down by wife on cement around 0800, LKN 0700. Patient compliant with keppra per wife and last seizure was in November. At that time his keppra dose was increased. Patient has had cough recently but no other symptoms. Patient is on AC for DVT/PE and was recently switched from Coumadin to Eliquis. He was given 10 mg Versed during EMS ride to outside facility for continued seizure activity. He was unresponsive upon arrival to ED and subsequently intubated. CTH without any acute changes, CT max/face with multiple facial fractures, and CXR with consolidation and atelectasis/collapsed lung. Patient transferred to [facility] to higher level neurologic monitoring and continuous EEG.     Hospital Course: 2/2/2020: Admit to [facility]. GS and ENT consulted. MX facial fractures and concerns of bowel ischemia on CT  2/3 will rehook to EEG for 24h. Remains NPO. US LE pending. Monitor lactic [id number] 2L  IV. Wean levophed as tolerated after bolus  2/4: remains on small amount of pressors, cultures remain negative, cont abx, cont current AEDs, EEG overnight negative for seizure activity, repeat ct abdomen today  2/5: minimal dose pressors, advance TFs. Tolerating SBT, possible trial of extubation in am  2/6: need for pressors will likely cease when able to come off sedation, currently still requires mechanical ventilation with pO2 value of 60 on 40% fiO2 and ps 10  02/08/2020 exam still poor, getting MRI brain     Interval History:  >4 elements OR status of 3 inpatient conditions    Review of Systems   Unable to  perform ROS: Mental status change     2 systems OR Unable to obtain a complete ROS due to level of consciousness.  Objective:     Vitals:  Temp: 99.5 °F (37.5 °C)  Pulse: 73  Rhythm: normal sinus rhythm  BP: (!) 121/56  MAP (mmHg): 81  Resp: (!) 22  SpO2: 96 %  Oxygen Concentration (%): 50  O2 Device (Oxygen Therapy): ventilator  Vent Mode: Spont  Pressure Support: 10 cmH20  PEEP/CPAP: 7 cmH20  Peak Airway Pressure: 17 cmH2O  Mean Airway Pressure: 10 cmH20  Plateau Pressure: 0 cmH20    Temp  Min: 98.5 °F (36.9 °C)  Max: 100.8 °F (38.2 °C)  Pulse  Min: 65  Max: 98  BP  Min: 104/51  Max: 139/65  MAP (mmHg)  Min: 74  Max: 93  Resp  Min: 20  Max: 29  SpO2  Min: 93 %  Max: 99 %  Oxygen Concentration (%)  Min: 50  Max: 50    02/07 0701 - 02/08 0700  In: 4240 [I.V.:2690]  Out: 1965 [Urine:1965]   Unmeasured Output  Urine Occurrence: 1  Stool Occurrence: 1       Physical Exam  Constitutional: No apparent distress.   Eyes: Conjunctiva clear, anicteric. Lids no lesions. Small abrasion under left eye   Head/Ears/Nose/Mouth/Throat/Neck: Moist mucous membranes. External ears, nose atraumatic.   Cardiovascular: Regular rhythm. ESM  Respiratory: Diminished breathing sounds bilateral   Gastrointestinal: No hernia. Soft, nondistended, nontender. + bowel sounds.      Neurologic Examination:    -Mental Status: Obtunded. Doesn't follow commands   -Cranial nerves: Pupils equal, round, and reactive bilateral. Extraocular movements intact with VOR. Intact corneal reflexes bilateral, intact cough reflex -Motor: withdraw x4    Medications:  Continuous  norepinephrine bitartrate-D5W Last Rate: Stopped (02/08/20 0648)   Scheduled  atorvastatin 40 mg QHS   chlorhexidine 15 mL BID   enoxaparin 40 mg Daily   escitalopram oxalate 20 mg Daily   famotidine (PF) 20 mg BID   hydrocortisone sodium succinate 100 mg Q8H   levetiracetam IVPB 1,000 mg Q12H   senna-docusate 8.6-50 mg 1 tablet BID   PRN  acetaminophen 650 mg Q6H PRN   Dextrose 10% Bolus  12.5 g PRN   fentaNYL 25 mcg Q2H PRN   glucagon (human recombinant) 1 mg PRN   insulin aspart U-100 1-10 Units Q6H PRN   labetalol 10 mg Q4H PRN   lorazepam 2 mg Q1H PRN   magnesium oxide 800 mg PRN   magnesium oxide 800 mg PRN   ondansetron 4 mg Q8H PRN   potassium chloride 10% 40 mEq PRN   potassium chloride 10% 40 mEq PRN   potassium chloride 10% 60 mEq PRN   potassium, sodium phosphates 2 packet PRN   potassium, sodium phosphates 2 packet PRN   potassium, sodium phosphates 2 packet PRN   sodium chloride 0.9% 10 mL PRN     Today I personally reviewed pertinent medications, lines/drains/airways, imaging, cardiology results, laboratory results, microbiology results, notably:    Diet  Diet NPO  Diet NPO        Assessment/Plan:     Neuro  * Seizure  Change keppra to 1g BID  If MRI brain doesn't explain his MS, we will get EEG     Psychiatric  Depression with anxiety  Continue escitalopram 20mg daily    Pulmonary  Aspiration pneumonitis  All cx are negative, dc abx       Acute respiratory failure with hypoxia  Wean vent as tolerated  Sings of pulmonary edema, diuresis     Hematology  History of DVT (deep vein thrombosis)  If no ICH on MRI, will start heparin gtt     Orthopedic  Closed extensive facial fractures  ENT following          The patient is being Prophylaxed for:  Venous Thromboembolism with: Mechanical or Chemical  Stress Ulcer with: H2B  Ventilator Pneumonia with: chlorhexidine oral care    Activity Orders          Diet NPO: NPO starting at 02/02 1925    Commode at bedside starting at 02/02 1925        Full Code    Uninterrupted Critical Care/Counseling Time (not including procedures): 35 minutes     Lisa Wyatt MD  Neurocritical Care  [facility]

## 2020-02-08 NOTE — PROGRESS NOTES
MD Nasim with NCC notified that pt's urine output has decreased since shift change. Urine output initially 50ml/hr. Urine output at 2200 was 17ml/hr. Pt bladder scanned. No urine noted. VSS. Kidney function WNL. No new orders at this time. Will continue to monitor.

## 2020-02-08 NOTE — PLAN OF CARE
POC reviewed with pt at 0500. Pt unable to verbalize understanding. Pt progressing toward goals. Will continue to monitor. See flowsheets for full assessment and VS info     Decreased urine output over night. Bladder scan shows 0 mls. NCC team aware  40 mg lasix given  Levo gtt restarted  NS gtt @ 100 ml/hr  AM labs collected. Replacing K and Mg.  BL wrist restraints in place

## 2020-02-08 NOTE — SUBJECTIVE & OBJECTIVE
Interval History:  >4 elements OR status of 3 inpatient conditions    Review of Systems   Unable to perform ROS: Mental status change     2 systems OR Unable to obtain a complete ROS due to level of consciousness.  Objective:     Vitals:  Temp: 99.5 °F (37.5 °C)  Pulse: 73  Rhythm: normal sinus rhythm  BP: (!) 121/56  MAP (mmHg): 81  Resp: (!) 22  SpO2: 96 %  Oxygen Concentration (%): 50  O2 Device (Oxygen Therapy): ventilator  Vent Mode: Spont  Pressure Support: 10 cmH20  PEEP/CPAP: 7 cmH20  Peak Airway Pressure: 17 cmH2O  Mean Airway Pressure: 10 cmH20  Plateau Pressure: 0 cmH20    Temp  Min: 98.5 °F (36.9 °C)  Max: 100.8 °F (38.2 °C)  Pulse  Min: 65  Max: 98  BP  Min: 104/51  Max: 139/65  MAP (mmHg)  Min: 74  Max: 93  Resp  Min: 20  Max: 29  SpO2  Min: 93 %  Max: 99 %  Oxygen Concentration (%)  Min: 50  Max: 50    02/07 0701 - 02/08 0700  In: 4240 [I.V.:2690]  Out: 1965 [Urine:1965]   Unmeasured Output  Urine Occurrence: 1  Stool Occurrence: 1       Physical Exam  Constitutional: No apparent distress.   Eyes: Conjunctiva clear, anicteric. Lids no lesions. Small abrasion under left eye   Head/Ears/Nose/Mouth/Throat/Neck: Moist mucous membranes. External ears, nose atraumatic.   Cardiovascular: Regular rhythm. ESM  Respiratory: Diminished breathing sounds bilateral   Gastrointestinal: No hernia. Soft, nondistended, nontender. + bowel sounds.      Neurologic Examination:    -Mental Status: Obtunded. Doesn't follow commands   -Cranial nerves: Pupils equal, round, and reactive bilateral. Extraocular movements intact with VOR. Intact corneal reflexes bilateral, intact cough reflex -Motor: withdraw x4    Medications:  Continuous  norepinephrine bitartrate-D5W Last Rate: Stopped (02/08/20 0648)   Scheduled  atorvastatin 40 mg QHS   chlorhexidine 15 mL BID   enoxaparin 40 mg Daily   escitalopram oxalate 20 mg Daily   famotidine (PF) 20 mg BID   hydrocortisone sodium succinate 100 mg Q8H   levetiracetam IVPB 1,000 mg Q12H    senna-docusate 8.6-50 mg 1 tablet BID   PRN  acetaminophen 650 mg Q6H PRN   Dextrose 10% Bolus 12.5 g PRN   fentaNYL 25 mcg Q2H PRN   glucagon (human recombinant) 1 mg PRN   insulin aspart U-100 1-10 Units Q6H PRN   labetalol 10 mg Q4H PRN   lorazepam 2 mg Q1H PRN   magnesium oxide 800 mg PRN   magnesium oxide 800 mg PRN   ondansetron 4 mg Q8H PRN   potassium chloride 10% 40 mEq PRN   potassium chloride 10% 40 mEq PRN   potassium chloride 10% 60 mEq PRN   potassium, sodium phosphates 2 packet PRN   potassium, sodium phosphates 2 packet PRN   potassium, sodium phosphates 2 packet PRN   sodium chloride 0.9% 10 mL PRN     Today I personally reviewed pertinent medications, lines/drains/airways, imaging, cardiology results, laboratory results, microbiology results, notably:    Diet  Diet NPO  Diet NPO

## 2020-02-08 NOTE — PROGRESS NOTES
Patient transferred to the MRI bed, tele, O2 sensor, and BP cuff applied, VENT,,, placed in MRI , tolerating well,, WCTM

## 2020-02-08 NOTE — PLAN OF CARE
POC reviewed with pt and family at 1400. Family verbalized understanding. Questions and concerns addressed. No acute events today. Pt progressing toward goals. Will continue to monitor. See flowsheets for full assessment and VS info.     MRI ordered.  Restarted tube feedings.

## 2020-02-09 PROBLEM — I63.9 STROKE: Status: ACTIVE | Noted: 2020-02-09

## 2020-02-09 LAB
ALBUMIN SERPL BCP-MCNC: 3 G/DL (ref 3.5–5.2)
ALLENS TEST: ABNORMAL
ALP SERPL-CCNC: 51 U/L (ref 55–135)
ALT SERPL W/O P-5'-P-CCNC: 17 U/L (ref 10–44)
ANION GAP SERPL CALC-SCNC: 9 MMOL/L (ref 8–16)
APTT BLDCRRT: 35.4 SEC (ref 21–32)
APTT BLDCRRT: 37 SEC (ref 21–32)
APTT BLDCRRT: 37.9 SEC (ref 21–32)
AST SERPL-CCNC: 34 U/L (ref 10–40)
BASOPHILS # BLD AUTO: 0.02 K/UL (ref 0–0.2)
BASOPHILS # BLD AUTO: 0.02 K/UL (ref 0–0.2)
BASOPHILS NFR BLD: 0.2 % (ref 0–1.9)
BASOPHILS NFR BLD: 0.2 % (ref 0–1.9)
BILIRUB SERPL-MCNC: 0.4 MG/DL (ref 0.1–1)
BILIRUB UR QL STRIP: NEGATIVE
BUN SERPL-MCNC: 17 MG/DL (ref 8–23)
CALCIUM SERPL-MCNC: 8.4 MG/DL (ref 8.7–10.5)
CHLORIDE SERPL-SCNC: 108 MMOL/L (ref 95–110)
CLARITY UR REFRACT.AUTO: CLEAR
CO2 SERPL-SCNC: 29 MMOL/L (ref 23–29)
COLOR UR AUTO: YELLOW
CREAT SERPL-MCNC: 0.9 MG/DL (ref 0.5–1.4)
DELSYS: ABNORMAL
DIFFERENTIAL METHOD: ABNORMAL
DIFFERENTIAL METHOD: ABNORMAL
EOSINOPHIL # BLD AUTO: 0 K/UL (ref 0–0.5)
EOSINOPHIL # BLD AUTO: 0 K/UL (ref 0–0.5)
EOSINOPHIL NFR BLD: 0 % (ref 0–8)
EOSINOPHIL NFR BLD: 0 % (ref 0–8)
ERYTHROCYTE [DISTWIDTH] IN BLOOD BY AUTOMATED COUNT: 14.3 % (ref 11.5–14.5)
ERYTHROCYTE [DISTWIDTH] IN BLOOD BY AUTOMATED COUNT: 14.3 % (ref 11.5–14.5)
EST. GFR  (AFRICAN AMERICAN): >60 ML/MIN/1.73 M^2
EST. GFR  (NON AFRICAN AMERICAN): >60 ML/MIN/1.73 M^2
FIO2: 50
GLUCOSE SERPL-MCNC: 155 MG/DL (ref 70–110)
GLUCOSE UR QL STRIP: ABNORMAL
HCO3 UR-SCNC: 30.1 MMOL/L (ref 24–28)
HCT VFR BLD AUTO: 28.7 % (ref 40–54)
HCT VFR BLD AUTO: 28.7 % (ref 40–54)
HGB BLD-MCNC: 9.2 G/DL (ref 14–18)
HGB BLD-MCNC: 9.2 G/DL (ref 14–18)
HGB UR QL STRIP: NEGATIVE
IMM GRANULOCYTES # BLD AUTO: 0.07 K/UL (ref 0–0.04)
IMM GRANULOCYTES # BLD AUTO: 0.07 K/UL (ref 0–0.04)
IMM GRANULOCYTES NFR BLD AUTO: 0.5 % (ref 0–0.5)
IMM GRANULOCYTES NFR BLD AUTO: 0.5 % (ref 0–0.5)
KETONES UR QL STRIP: NEGATIVE
LEUKOCYTE ESTERASE UR QL STRIP: NEGATIVE
LYMPHOCYTES # BLD AUTO: 0.6 K/UL (ref 1–4.8)
LYMPHOCYTES # BLD AUTO: 0.6 K/UL (ref 1–4.8)
LYMPHOCYTES NFR BLD: 4.7 % (ref 18–48)
LYMPHOCYTES NFR BLD: 4.7 % (ref 18–48)
MAGNESIUM SERPL-MCNC: 2.2 MG/DL (ref 1.6–2.6)
MAP: 10
MCH RBC QN AUTO: 31.7 PG (ref 27–31)
MCH RBC QN AUTO: 31.7 PG (ref 27–31)
MCHC RBC AUTO-ENTMCNC: 32.1 G/DL (ref 32–36)
MCHC RBC AUTO-ENTMCNC: 32.1 G/DL (ref 32–36)
MCV RBC AUTO: 99 FL (ref 82–98)
MCV RBC AUTO: 99 FL (ref 82–98)
MICROSCOPIC COMMENT: NORMAL
MIN VOL: 6.7
MODE: ABNORMAL
MONOCYTES # BLD AUTO: 0.8 K/UL (ref 0.3–1)
MONOCYTES # BLD AUTO: 0.8 K/UL (ref 0.3–1)
MONOCYTES NFR BLD: 6.3 % (ref 4–15)
MONOCYTES NFR BLD: 6.3 % (ref 4–15)
NEUTROPHILS # BLD AUTO: 11.4 K/UL (ref 1.8–7.7)
NEUTROPHILS # BLD AUTO: 11.4 K/UL (ref 1.8–7.7)
NEUTROPHILS NFR BLD: 88.3 % (ref 38–73)
NEUTROPHILS NFR BLD: 88.3 % (ref 38–73)
NITRITE UR QL STRIP: NEGATIVE
NRBC BLD-RTO: 0 /100 WBC
NRBC BLD-RTO: 0 /100 WBC
PCO2 BLDA: 40.4 MMHG (ref 35–45)
PEEP: 7
PH SMN: 7.48 [PH] (ref 7.35–7.45)
PH UR STRIP: 5 [PH] (ref 5–8)
PHOSPHATE SERPL-MCNC: 2.3 MG/DL (ref 2.7–4.5)
PLATELET # BLD AUTO: 252 K/UL (ref 150–350)
PLATELET # BLD AUTO: 252 K/UL (ref 150–350)
PMV BLD AUTO: 12.4 FL (ref 9.2–12.9)
PMV BLD AUTO: 12.4 FL (ref 9.2–12.9)
PO2 BLDA: 70 MMHG (ref 80–100)
POC BE: 7 MMOL/L
POC SATURATED O2: 95 % (ref 95–100)
POC TCO2: 31 MMOL/L (ref 23–27)
POCT GLUCOSE: 128 MG/DL (ref 70–110)
POCT GLUCOSE: 149 MG/DL (ref 70–110)
POCT GLUCOSE: 160 MG/DL (ref 70–110)
POCT GLUCOSE: 184 MG/DL (ref 70–110)
POTASSIUM SERPL-SCNC: 3.4 MMOL/L (ref 3.5–5.1)
POTASSIUM SERPL-SCNC: 4.3 MMOL/L (ref 3.5–5.1)
PROT SERPL-MCNC: 6 G/DL (ref 6–8.4)
PROT UR QL STRIP: NEGATIVE
PS: 10
RBC # BLD AUTO: 2.9 M/UL (ref 4.6–6.2)
RBC # BLD AUTO: 2.9 M/UL (ref 4.6–6.2)
RBC #/AREA URNS AUTO: 2 /HPF (ref 0–4)
SAMPLE: ABNORMAL
SITE: ABNORMAL
SODIUM SERPL-SCNC: 146 MMOL/L (ref 136–145)
SP GR UR STRIP: >1.03 (ref 1–1.03)
SP02: 96
SPONT RATE: 21
SQUAMOUS #/AREA URNS AUTO: 0 /HPF
URN SPEC COLLECT METH UR: ABNORMAL
VOL: 315
WBC # BLD AUTO: 12.95 K/UL (ref 3.9–12.7)
WBC # BLD AUTO: 12.95 K/UL (ref 3.9–12.7)
WBC #/AREA URNS AUTO: 0 /HPF (ref 0–5)

## 2020-02-09 PROCEDURE — 63600175 PHARM REV CODE 636 W HCPCS: Mod: HCNC | Performed by: PSYCHIATRY & NEUROLOGY

## 2020-02-09 PROCEDURE — 85730 THROMBOPLASTIN TIME PARTIAL: CPT | Mod: 91,HCNC

## 2020-02-09 PROCEDURE — 25000003 PHARM REV CODE 250: Mod: HCNC | Performed by: PHYSICIAN ASSISTANT

## 2020-02-09 PROCEDURE — 82803 BLOOD GASES ANY COMBINATION: CPT | Mod: HCNC

## 2020-02-09 PROCEDURE — 80053 COMPREHEN METABOLIC PANEL: CPT | Mod: HCNC

## 2020-02-09 PROCEDURE — 84132 ASSAY OF SERUM POTASSIUM: CPT | Mod: HCNC

## 2020-02-09 PROCEDURE — 99291 PR CRITICAL CARE, E/M 30-74 MINUTES: ICD-10-PCS | Mod: HCNC,GC,, | Performed by: PSYCHIATRY & NEUROLOGY

## 2020-02-09 PROCEDURE — 85025 COMPLETE CBC W/AUTO DIFF WBC: CPT | Mod: HCNC

## 2020-02-09 PROCEDURE — 99900035 HC TECH TIME PER 15 MIN (STAT): Mod: HCNC

## 2020-02-09 PROCEDURE — 27200966 HC CLOSED SUCTION SYSTEM: Mod: HCNC

## 2020-02-09 PROCEDURE — 87070 CULTURE OTHR SPECIMN AEROBIC: CPT | Mod: HCNC

## 2020-02-09 PROCEDURE — 87040 BLOOD CULTURE FOR BACTERIA: CPT | Mod: 59,HCNC

## 2020-02-09 PROCEDURE — 84100 ASSAY OF PHOSPHORUS: CPT | Mod: HCNC

## 2020-02-09 PROCEDURE — 99900026 HC AIRWAY MAINTENANCE (STAT): Mod: HCNC

## 2020-02-09 PROCEDURE — 81001 URINALYSIS AUTO W/SCOPE: CPT | Mod: HCNC

## 2020-02-09 PROCEDURE — 63600175 PHARM REV CODE 636 W HCPCS: Mod: HCNC | Performed by: NURSE PRACTITIONER

## 2020-02-09 PROCEDURE — 94761 N-INVAS EAR/PLS OXIMETRY MLT: CPT | Mod: HCNC

## 2020-02-09 PROCEDURE — 25000003 PHARM REV CODE 250: Mod: HCNC | Performed by: NURSE PRACTITIONER

## 2020-02-09 PROCEDURE — 63600175 PHARM REV CODE 636 W HCPCS: Mod: HCNC | Performed by: ANESTHESIOLOGY

## 2020-02-09 PROCEDURE — 37799 UNLISTED PX VASCULAR SURGERY: CPT | Mod: HCNC

## 2020-02-09 PROCEDURE — 87205 SMEAR GRAM STAIN: CPT | Mod: HCNC

## 2020-02-09 PROCEDURE — 94003 VENT MGMT INPAT SUBQ DAY: CPT | Mod: HCNC

## 2020-02-09 PROCEDURE — 83735 ASSAY OF MAGNESIUM: CPT | Mod: HCNC

## 2020-02-09 PROCEDURE — 25000003 PHARM REV CODE 250: Mod: HCNC | Performed by: PSYCHIATRY & NEUROLOGY

## 2020-02-09 PROCEDURE — 63600175 PHARM REV CODE 636 W HCPCS: Mod: HCNC | Performed by: PHYSICIAN ASSISTANT

## 2020-02-09 PROCEDURE — 63600175 PHARM REV CODE 636 W HCPCS: Mod: HCNC | Performed by: STUDENT IN AN ORGANIZED HEALTH CARE EDUCATION/TRAINING PROGRAM

## 2020-02-09 PROCEDURE — 27000221 HC OXYGEN, UP TO 24 HOURS: Mod: HCNC

## 2020-02-09 PROCEDURE — S0028 INJECTION, FAMOTIDINE, 20 MG: HCPCS | Mod: HCNC | Performed by: PHYSICIAN ASSISTANT

## 2020-02-09 PROCEDURE — 82800 BLOOD PH: CPT | Mod: HCNC

## 2020-02-09 PROCEDURE — 99291 CRITICAL CARE FIRST HOUR: CPT | Mod: HCNC,GC,, | Performed by: PSYCHIATRY & NEUROLOGY

## 2020-02-09 PROCEDURE — 20000000 HC ICU ROOM: Mod: HCNC

## 2020-02-09 RX ORDER — VANCOMYCIN HCL IN 5 % DEXTROSE 1G/250ML
1000 PLASTIC BAG, INJECTION (ML) INTRAVENOUS
Status: DISCONTINUED | OUTPATIENT
Start: 2020-02-09 | End: 2020-02-10

## 2020-02-09 RX ADMIN — FAMOTIDINE 20 MG: 10 INJECTION, SOLUTION INTRAVENOUS at 09:02

## 2020-02-09 RX ADMIN — PIPERACILLIN SODIUM AND TAZOBACTAM SODIUM 4.5 G: 4; .5 INJECTION, POWDER, LYOPHILIZED, FOR SOLUTION INTRAVENOUS at 11:02

## 2020-02-09 RX ADMIN — FAMOTIDINE 20 MG: 10 INJECTION, SOLUTION INTRAVENOUS at 08:02

## 2020-02-09 RX ADMIN — FENTANYL CITRATE 25 MCG: 50 INJECTION INTRAMUSCULAR; INTRAVENOUS at 05:02

## 2020-02-09 RX ADMIN — ESCITALOPRAM OXALATE 20 MG: 10 TABLET ORAL at 08:02

## 2020-02-09 RX ADMIN — HYDROCORTISONE SODIUM SUCCINATE 50 MG: 100 INJECTION, POWDER, FOR SOLUTION INTRAMUSCULAR; INTRAVENOUS at 09:02

## 2020-02-09 RX ADMIN — ACETAMINOPHEN 650 MG: 325 TABLET ORAL at 11:02

## 2020-02-09 RX ADMIN — FENTANYL CITRATE 25 MCG: 50 INJECTION INTRAMUSCULAR; INTRAVENOUS at 08:02

## 2020-02-09 RX ADMIN — FENTANYL CITRATE 25 MCG: 50 INJECTION INTRAMUSCULAR; INTRAVENOUS at 09:02

## 2020-02-09 RX ADMIN — ATORVASTATIN CALCIUM 40 MG: 20 TABLET, FILM COATED ORAL at 09:02

## 2020-02-09 RX ADMIN — ACETAMINOPHEN 650 MG: 325 TABLET ORAL at 04:02

## 2020-02-09 RX ADMIN — LEVETIRACETAM 1000 MG: 10 INJECTION INTRAVENOUS at 08:02

## 2020-02-09 RX ADMIN — POTASSIUM CHLORIDE 40 MEQ: 20 SOLUTION ORAL at 06:02

## 2020-02-09 RX ADMIN — HYDROCORTISONE SODIUM SUCCINATE 100 MG: 100 INJECTION, POWDER, FOR SOLUTION INTRAMUSCULAR; INTRAVENOUS at 06:02

## 2020-02-09 RX ADMIN — VANCOMYCIN HYDROCHLORIDE 1500 MG: 1.5 INJECTION, POWDER, LYOPHILIZED, FOR SOLUTION INTRAVENOUS at 10:02

## 2020-02-09 RX ADMIN — CHLORHEXIDINE GLUCONATE 0.12% ORAL RINSE 15 ML: 1.2 LIQUID ORAL at 09:02

## 2020-02-09 RX ADMIN — PIPERACILLIN SODIUM AND TAZOBACTAM SODIUM 4.5 G: 4; .5 INJECTION, POWDER, LYOPHILIZED, FOR SOLUTION INTRAVENOUS at 07:02

## 2020-02-09 RX ADMIN — INSULIN ASPART 2 UNITS: 100 INJECTION, SOLUTION INTRAVENOUS; SUBCUTANEOUS at 06:02

## 2020-02-09 RX ADMIN — POTASSIUM & SODIUM PHOSPHATES POWDER PACK 280-160-250 MG 2 PACKET: 280-160-250 PACK at 04:02

## 2020-02-09 RX ADMIN — HYDROCORTISONE SODIUM SUCCINATE 50 MG: 100 INJECTION, POWDER, FOR SOLUTION INTRAMUSCULAR; INTRAVENOUS at 10:02

## 2020-02-09 RX ADMIN — POTASSIUM & SODIUM PHOSPHATES POWDER PACK 280-160-250 MG 2 PACKET: 280-160-250 PACK at 08:02

## 2020-02-09 RX ADMIN — POTASSIUM CHLORIDE 40 MEQ: 20 SOLUTION ORAL at 04:02

## 2020-02-09 RX ADMIN — LEVETIRACETAM 1000 MG: 10 INJECTION INTRAVENOUS at 09:02

## 2020-02-09 RX ADMIN — CHLORHEXIDINE GLUCONATE 0.12% ORAL RINSE 15 ML: 1.2 LIQUID ORAL at 08:02

## 2020-02-09 RX ADMIN — FENTANYL CITRATE 25 MCG: 50 INJECTION INTRAMUSCULAR; INTRAVENOUS at 01:02

## 2020-02-09 NOTE — PROGRESS NOTES
[facility]  Neurocritical Care  Progress Note    Admit Date: 2/2/2020  Service Date: 02/09/2020  Length of Stay: 7    Subjective:     Chief Complaint: Seizure    History of Present Illness: Pt is a 64 y.o. Male with PMHx of of DVT, PE, DM, and seizures (on 1500 keppra at home) who presents to the ED via EMS with complaint of a seizure that occurred this morning. Pt has long standing history of seizures and was found down by wife on cement around 0800, LKN 0700. Patient compliant with keppra per wife and last seizure was in November. At that time his keppra dose was increased. Patient has had cough recently but no other symptoms. Patient is on AC for DVT/PE and was recently switched from Coumadin to Eliquis. He was given 10 mg Versed during EMS ride to outside facility for continued seizure activity. He was unresponsive upon arrival to ED and subsequently intubated. CTH without any acute changes, CT max/face with multiple facial fractures, and CXR with consolidation and atelectasis/collapsed lung. Patient transferred to [facility] to higher level neurologic monitoring and continuous EEG.     Hospital Course: 2/2/2020: Admit to [facility]. GS and ENT consulted. MX facial fractures and concerns of bowel ischemia on CT  2/3 will rehook to EEG for 24h. Remains NPO. US LE pending. Monitor lactic [id number] 2L  IV. Wean levophed as tolerated after bolus  2/4: remains on small amount of pressors, cultures remain negative, cont abx, cont current AEDs, EEG overnight negative for seizure activity, repeat ct abdomen today  2/5: minimal dose pressors, advance TFs. Tolerating SBT, possible trial of extubation in am  2/6: need for pressors will likely cease when able to come off sedation, currently still requires mechanical ventilation with pO2 value of 60 on 40% fiO2 and ps 10  02/08/2020 exam still poor, getting MRI brain   02/09/2020 Not tolerating CPAP, placed on AC     Interval History:  >4 elements OR status of 3  inpatient conditions    Review of Systems   Unable to perform ROS: Mental status change     2 systems OR Unable to obtain a complete ROS due to level of consciousness.  Objective:     Vitals:  Temp: 100.2 °F (37.9 °C)  Pulse: 76  Rhythm: normal sinus rhythm  BP: 128/60  MAP (mmHg): 87  Resp: (!) 8  SpO2: 97 %  Oxygen Concentration (%): 45  O2 Device (Oxygen Therapy): ventilator  Vent Mode: A/C  Set Rate: 16 BPM  Pressure Support: 10 cmH20  PEEP/CPAP: 7 cmH20  Peak Airway Pressure: 24 cmH2O  Mean Airway Pressure: 11 cmH20  Plateau Pressure: 0 cmH20    Temp  Min: 99.2 °F (37.3 °C)  Max: 100.2 °F (37.9 °C)  Pulse  Min: 67  Max: 116  BP  Min: 115/57  Max: 150/60  MAP (mmHg)  Min: 79  Max: 95  Resp  Min: 3  Max: 26  SpO2  Min: 95 %  Max: 99 %  Oxygen Concentration (%)  Min: 45  Max: 50    02/08 0701 - 02/09 0700  In: 1274.8 [I.V.:304.8]  Out: 1675 [Urine:1675]   Unmeasured Output  Urine Occurrence: 1  Stool Occurrence: 1  Pad Count: 1       Physical Exam    Constitutional: No apparent distress.   Eyes: Conjunctiva clear, anicteric. Lids no lesions. Small abrasion under left eye   Head/Ears/Nose/Mouth/Throat/Neck: Moist mucous membranes. External ears, nose atraumatic.   Cardiovascular: Regular rhythm. ESM  Respiratory: Diminished breathing sounds bilateral   Gastrointestinal: No hernia. Soft, nondistended, nontender. + bowel sounds.      Neurologic Examination:    -Mental Status: Open eyes to voice. Follows simple commands   -Cranial nerves: Pupils equal, round, and reactive bilateral. Extraocular movements intact with VOR. Intact corneal reflexes bilateral, intact cough reflex -Motor: Moves all ext spon     Medications:  Continuous    heparin (porcine) in D5W Last Rate: 14.944 Units/kg/hr (02/09/20 0905)   norepinephrine bitartrate-D5W Last Rate: Stopped (02/08/20 0648)   Scheduled    atorvastatin 40 mg QHS   chlorhexidine 15 mL BID   escitalopram oxalate 20 mg Daily   famotidine (PF) 20 mg BID   hydrocortisone sodium  succinate 50 mg Q8H   levetiracetam IVPB 1,000 mg Q12H   piperacillin-tazobactam (ZOSYN) IVPB 4.5 g Q8H   senna-docusate 8.6-50 mg 1 tablet BID   vancomycin (VANCOCIN) IVPB 1,500 mg Once   vancomycin (VANCOCIN) IVPB 1,000 mg Q12H   PRN    acetaminophen 650 mg Q6H PRN   Dextrose 10% Bolus 12.5 g PRN   fentaNYL 25 mcg Q2H PRN   glucagon (human recombinant) 1 mg PRN   insulin aspart U-100 1-10 Units Q6H PRN   labetalol 10 mg Q4H PRN   lorazepam 2 mg Q1H PRN   magnesium oxide 800 mg PRN   magnesium oxide 800 mg PRN   ondansetron 4 mg Q8H PRN   potassium chloride 10% 40 mEq PRN   potassium chloride 10% 40 mEq PRN   potassium chloride 10% 60 mEq PRN   potassium, sodium phosphates 2 packet PRN   potassium, sodium phosphates 2 packet PRN   potassium, sodium phosphates 2 packet PRN   sodium chloride 0.9% 10 mL PRN     Today I personally reviewed pertinent medications, lines/drains/airways, imaging, cardiology results, laboratory results, microbiology results, notably:    Diet  Diet NPO  Diet NPO        Assessment/Plan:     Neuro  * Seizure  Con keppra  F/U EEG     Stroke  - Incidentally seen on MRI. Cont current secondary prevention measures.   - Consider CTA head and neck     Psychiatric  Depression with anxiety  Continue escitalopram 20mg daily    Pulmonary  Aspiration pneumonia  Febrile, pancx and start abx     Acute respiratory failure with hypoxia  Wean vent as tolerated  Daily CXR and ABG    Hematology  History of DVT (deep vein thrombosis)   heparin gtt     Orthopedic  Closed extensive facial fractures  ENT following          The patient is being Prophylaxed for:  Venous Thromboembolism with: Mechanical or Chemical  Stress Ulcer with: H2B  Ventilator Pneumonia with: chlorhexidine oral care    Activity Orders          Discontinue arterial line starting at 02/09 0947    Diet NPO: NPO starting at 02/02 1925    Commode at bedside starting at 02/02 1925        Full Code    Uninterrupted Critical Care/Counseling Time (not  including procedures): 35 minutes     Lisa Wyatt MD  Neurocritical Care  [facility]

## 2020-02-09 NOTE — CARE UPDATE
Switched vent circuit to a heated wire circuit and completed successful SST while [place] [facility] provided oxygen via Ambu bag 15L.  Placed pt on previous vent settings.  Pt tolerated well.  Will continue to monitor.

## 2020-02-09 NOTE — ASSESSMENT & PLAN NOTE
- Incidentally seen on MRI. Cont current secondary prevention measures.   - Consider CTA head and neck

## 2020-02-09 NOTE — PLAN OF CARE
POC reviewed with pt at 0500. Pt unable to verbalize understanding. No acute events overnight. Pt progressing toward goals. Will continue to monitor. See flowsheets for full assessment and VS info     Ice packs applied and Tylenol admin x 2 for temp < 99.5  BL lower extremity ultrasound done   Heparin gtt titrated per nomogram   AM labs collected. Replacing K and Phos  EEG in place  Advancing TF per order. No residuals  BL soft wrist restraints in place

## 2020-02-09 NOTE — PROCEDURES
ICU EEG/VIDEO MONITORING REPORT    Edu Choi  [id number]  1955    DATE OF SERVICE: 2/8/20    DATE OF ADMISSION: 2/2/2020  7:24 PM    ADMITTING PROVIDER: Ricardo Yee MD    METHODOLOGY   Electroencephalographic (EEG) recording is with electrodes placed according to the International 10-20 placement system.  Thirty two (32) channels of digital signal are simultaneously recorded from the scalp and may include EKG, EMG, and/or eye monitors.   Recording band pass was 0.1 to 512 hz.  Digital video recording of the patient is simultaneously recorded with the EEG.  The nursing staff report clinical symptoms and may press an event button when the patient has symptoms of clinical interest to the treating physicians.  EEG and video recording is stored and archived in digital format.  The entire recording is visually reviewed and the times identified by computer analysis as being spikes or seizures are reviewed again.  Activation procedures which include photic stimulation, hyperventilation and instructing patients to perform simple task are done in selected patients.   Compresses spectral analysis (CSA) is also performed on the activity recorded from each individual channel.  This is displayed as a power display of frequencies from 0 to 30 Hz over time.   The CSA analysis is done and displayed continuously.  This is reviewed for asymmetries in power between homologous areas of the scalp and for presence of changes in power which canbe seen when seizures occur.  Sections of suspected abnormalities on the CSA is then compared with the original EEG recording.     [employer] software was also utilized in the review of this study.  This software suite analyzes the EEG recording in multiple domains.  Coherence and rhythmicity is computed to identify EEG sections which may contain organized seizures.  Each channel undergoes analysis to detect presence of spike and sharp waves which have special and morphological  characteristic of epileptic activity.  The routine EEG recording is converted from spacial into frequency domain.  This is then displayed comparing homologous areas to identify areas of significant asymmetry.  Algorithm to identify non-cortically generated artifact is used to separate eye movement, EMG and other artifact from the EEG.      Recording Times  Start on 2/8/20 16:36  Stop on 2/9/20 12:05    A total of 19:25 hours of EEG was recorded.    EEG FINDINGS  Background activity:   The background rhythm was medium to high voltage throughout.  Symmetric. Independent intermittent left and right temporal slowing was seen.  Diffuse theta slowing was seen in the background also.    The background was continuous and symmetric    Abnormal activity:   No epileptiform discharges, periodic discharges, lateralized rhythmic delta activity or electrographic seizures were seen.      IMPRESSION: Abnormal due to diffuse slowing. No seizures seen.      CLINICAL CORRELATION IS RECOMMENDED.

## 2020-02-09 NOTE — PROGRESS NOTES
Red, hot area noted on lower leg; medial side. Pulses still palpable. NCC team made aware. Order placed for lower extremity ultrasound. Will continue to monitor.

## 2020-02-09 NOTE — SUBJECTIVE & OBJECTIVE
Interval History:  >4 elements OR status of 3 inpatient conditions    Review of Systems   Unable to perform ROS: Mental status change     2 systems OR Unable to obtain a complete ROS due to level of consciousness.  Objective:     Vitals:  Temp: 100.2 °F (37.9 °C)  Pulse: 76  Rhythm: normal sinus rhythm  BP: 128/60  MAP (mmHg): 87  Resp: (!) 8  SpO2: 97 %  Oxygen Concentration (%): 45  O2 Device (Oxygen Therapy): ventilator  Vent Mode: A/C  Set Rate: 16 BPM  Pressure Support: 10 cmH20  PEEP/CPAP: 7 cmH20  Peak Airway Pressure: 24 cmH2O  Mean Airway Pressure: 11 cmH20  Plateau Pressure: 0 cmH20    Temp  Min: 99.2 °F (37.3 °C)  Max: 100.2 °F (37.9 °C)  Pulse  Min: 67  Max: 116  BP  Min: 115/57  Max: 150/60  MAP (mmHg)  Min: 79  Max: 95  Resp  Min: 3  Max: 26  SpO2  Min: 95 %  Max: 99 %  Oxygen Concentration (%)  Min: 45  Max: 50    02/08 0701 - 02/09 0700  In: 1274.8 [I.V.:304.8]  Out: 1675 [Urine:1675]   Unmeasured Output  Urine Occurrence: 1  Stool Occurrence: 1  Pad Count: 1       Physical Exam    Constitutional: No apparent distress.   Eyes: Conjunctiva clear, anicteric. Lids no lesions. Small abrasion under left eye   Head/Ears/Nose/Mouth/Throat/Neck: Moist mucous membranes. External ears, nose atraumatic.   Cardiovascular: Regular rhythm. ESM  Respiratory: Diminished breathing sounds bilateral   Gastrointestinal: No hernia. Soft, nondistended, nontender. + bowel sounds.      Neurologic Examination:    -Mental Status: Open eyes to voice. Follows simple commands   -Cranial nerves: Pupils equal, round, and reactive bilateral. Extraocular movements intact with VOR. Intact corneal reflexes bilateral, intact cough reflex -Motor: Moves all ext spon     Medications:  Continuous    heparin (porcine) in D5W Last Rate: 14.944 Units/kg/hr (02/09/20 0905)   norepinephrine bitartrate-D5W Last Rate: Stopped (02/08/20 0648)   Scheduled    atorvastatin 40 mg QHS   chlorhexidine 15 mL BID   escitalopram oxalate 20 mg Daily    famotidine (PF) 20 mg BID   hydrocortisone sodium succinate 50 mg Q8H   levetiracetam IVPB 1,000 mg Q12H   piperacillin-tazobactam (ZOSYN) IVPB 4.5 g Q8H   senna-docusate 8.6-50 mg 1 tablet BID   vancomycin (VANCOCIN) IVPB 1,500 mg Once   vancomycin (VANCOCIN) IVPB 1,000 mg Q12H   PRN    acetaminophen 650 mg Q6H PRN   Dextrose 10% Bolus 12.5 g PRN   fentaNYL 25 mcg Q2H PRN   glucagon (human recombinant) 1 mg PRN   insulin aspart U-100 1-10 Units Q6H PRN   labetalol 10 mg Q4H PRN   lorazepam 2 mg Q1H PRN   magnesium oxide 800 mg PRN   magnesium oxide 800 mg PRN   ondansetron 4 mg Q8H PRN   potassium chloride 10% 40 mEq PRN   potassium chloride 10% 40 mEq PRN   potassium chloride 10% 60 mEq PRN   potassium, sodium phosphates 2 packet PRN   potassium, sodium phosphates 2 packet PRN   potassium, sodium phosphates 2 packet PRN   sodium chloride 0.9% 10 mL PRN     Today I personally reviewed pertinent medications, lines/drains/airways, imaging, cardiology results, laboratory results, microbiology results, notably:    Diet  Diet NPO  Diet NPO

## 2020-02-09 NOTE — PLAN OF CARE
POC reviewed with pt and family at 1400. Pt unable to verbalize understanding;questions and concerns addressed with family. No acute events today. SBP <180 maintained. Neuro q 1, turn q 2. EEG removed. Heparin drip titrated per nomogram. Fever treated with acetaminophen PRN; pain and agitation treated with fentanyl PRN. Full bath and linen change done. Pt progressing toward goals. Will continue to monitor. See flowsheets for full assessment and VS info.

## 2020-02-09 NOTE — PROGRESS NOTES
Pharmacokinetic Initial Assessment: IV Vancomycin    Assessment/Plan:    Patient received last dose of vancomycin 1,000 mg (q12h) approximately 33 hours ago on 2/8 at 1:34 AM.   Re-load patient with intravenous vancomycin with loading dose of 1,500 mg once followed by a maintenance dose of vancomycin 1000 mg IV every 12 hours  Desired empiric serum trough concentration is 10 to 20 mcg/mL  Draw vancomycin trough level 30 min prior to fourth dose on 2/10/20 at approximately 23:00.  Pharmacy will continue to follow and monitor vancomycin.      Please contact pharmacy at extension [phone] with any questions regarding this assessment.     Thank you for the consult,   Cindi Galicia       Patient brief summary:  Edu Choi is a 64 y.o. male initiated on antimicrobial therapy with IV Vancomycin for opportunistic infection prophylaxis    Drug Allergies:   Review of patient's allergies indicates:   Allergen Reactions    No known drug allergies        Actual Body Weight:   68.5 kg    Renal Function:   Estimated Creatinine Clearance: 67.6 mL/min (based on SCr of 0.9 mg/dL).,     Dialysis Method (if applicable):  N/A    CBC (last 72 hours):  Recent Labs   Lab Result Units 02/07/20  0012 02/08/20  0219 02/09/20  0007   WBC K/uL 9.77 11.81 12.95*  12.95*   Hemoglobin g/dL 10.4* 8.8* 9.2*  9.2*   Hematocrit % 31.1* 28.5* 28.7*  28.7*   Platelets K/uL 255 223 252  252   Gran% % 65.7 91.3* 88.3*  88.3*   Lymph% % 18.5 5.2* 4.7*  4.7*   Mono% % 13.0 2.6* 6.3  6.3   Eosinophil% % 1.7 0.0 0.0  0.0   Basophil% % 0.6 0.3 0.2  0.2   Differential Method  Automated Automated Automated  Automated       Metabolic Panel (last 72 hours):  Recent Labs   Lab Result Units 02/07/20  0012 02/07/20  0907 02/07/20  1340 02/08/20  0219 02/08/20  1009 02/08/20  1758 02/09/20  0007   Sodium mmol/L 140  --   --  142  --   --  146*   Potassium mmol/L 3.3* 4.2  --  3.7 3.6  --  3.4*   Chloride mmol/L 105  --   --  110  --   --  108   CO2 mmol/L  26  --   --  22*  --   --  29   Glucose mg/dL 118*  --   --  134*  --   --  155*   BUN, Bld mg/dL 5*  --   --  7*  --   --  17   Creatinine mg/dL 0.8  --   --  0.9  --   --  0.9   Albumin g/dL 2.4*  --   --  3.0*  --   --  3.0*   Total Bilirubin mg/dL 0.5  --   --  0.5  --   --  0.4   Alkaline Phosphatase U/L 53*  --   --  53*  --   --  51*   AST U/L 25  --   --  28  --   --  34   ALT U/L 12  --   --  13  --   --  17   Magnesium mg/dL 1.7  --  1.7 1.8  --  2.1 2.2   Phosphorus mg/dL 2.5*  --  2.5* 3.4  --   --  2.3*       Drug levels (last 3 results):  Recent Labs   Lab Result Units 02/06/20  1338   Vancomycin-Trough ug/mL 15.3       Microbiologic Results:  Microbiology Results (last 7 days)     Procedure Component Value Units Date/Time    Blood culture [[id number]] Collected:  02/09/20 1034    Order Status:  Sent Specimen:  Blood from Peripheral, Ankle, Left Updated:  02/09/20 1034    Culture, Anaerobe [[id number]]     Order Status:  No result Specimen:  Sputum     Aerobic culture [[id number]]     Order Status:  No result Specimen:  Body Fluid from Abdomen     Blood culture [[id number]]     Order Status:  No result Specimen:  Blood     Blood culture [[id number]] Collected:  02/02/20 1910    Order Status:  Completed Specimen:  Blood from Peripheral, Ankle, Left Updated:  02/08/20 0612     Blood Culture, Routine No growth after 5 days.    Blood culture [[id number]] Collected:  02/02/20 1920    Order Status:  Completed Specimen:  Blood from Peripheral, Ankle, Right Updated:  02/08/20 0612     Blood Culture, Routine No growth after 5 days.    Culture, Respiratory with Gram Stain [[id number]] Collected:  02/02/20 1940    Order Status:  Completed Specimen:  Respiratory from Endotracheal Aspirate Updated:  02/05/20 1005     Respiratory Culture Normal respiratory tessa      No S aureus or Pseudomonas isolated.     Gram Stain (Respiratory) <10 epithelial cells per low power field.     Gram Stain (Respiratory) Rare WBC's     Gram  Stain (Respiratory) Rare Gram positive cocci     Gram Stain (Respiratory) Rare Gram negative rods

## 2020-02-10 LAB
ALBUMIN SERPL BCP-MCNC: 2.9 G/DL (ref 3.5–5.2)
ALLENS TEST: ABNORMAL
ALP SERPL-CCNC: 48 U/L (ref 55–135)
ALT SERPL W/O P-5'-P-CCNC: 18 U/L (ref 10–44)
ANION GAP SERPL CALC-SCNC: 9 MMOL/L (ref 8–16)
APTT BLDCRRT: 38.9 SEC (ref 21–32)
APTT BLDCRRT: 39.4 SEC (ref 21–32)
APTT BLDCRRT: 42.7 SEC (ref 21–32)
AST SERPL-CCNC: 29 U/L (ref 10–40)
BASOPHILS # BLD AUTO: 0.01 K/UL (ref 0–0.2)
BASOPHILS NFR BLD: 0.1 % (ref 0–1.9)
BILIRUB SERPL-MCNC: 0.3 MG/DL (ref 0.1–1)
BUN SERPL-MCNC: 27 MG/DL (ref 8–23)
CALCIUM SERPL-MCNC: 8 MG/DL (ref 8.7–10.5)
CHLORIDE SERPL-SCNC: 111 MMOL/L (ref 95–110)
CO2 SERPL-SCNC: 28 MMOL/L (ref 23–29)
CREAT SERPL-MCNC: 1 MG/DL (ref 0.5–1.4)
DELSYS: ABNORMAL
DIFFERENTIAL METHOD: ABNORMAL
EOSINOPHIL # BLD AUTO: 0 K/UL (ref 0–0.5)
EOSINOPHIL NFR BLD: 0 % (ref 0–8)
ERYTHROCYTE [DISTWIDTH] IN BLOOD BY AUTOMATED COUNT: 14.6 % (ref 11.5–14.5)
ERYTHROCYTE [SEDIMENTATION RATE] IN BLOOD BY WESTERGREN METHOD: 16 MM/H
EST. GFR  (AFRICAN AMERICAN): >60 ML/MIN/1.73 M^2
EST. GFR  (NON AFRICAN AMERICAN): >60 ML/MIN/1.73 M^2
FIO2: 45
FLOW: 0.95
GLUCOSE SERPL-MCNC: 169 MG/DL (ref 70–110)
HCO3 UR-SCNC: 30.4 MMOL/L (ref 24–28)
HCT VFR BLD AUTO: 28.4 % (ref 40–54)
HGB BLD-MCNC: 8.9 G/DL (ref 14–18)
IMM GRANULOCYTES # BLD AUTO: 0.08 K/UL (ref 0–0.04)
IMM GRANULOCYTES NFR BLD AUTO: 0.6 % (ref 0–0.5)
LYMPHOCYTES # BLD AUTO: 1 K/UL (ref 1–4.8)
LYMPHOCYTES NFR BLD: 6.8 % (ref 18–48)
MAGNESIUM SERPL-MCNC: 2.3 MG/DL (ref 1.6–2.6)
MCH RBC QN AUTO: 31.6 PG (ref 27–31)
MCHC RBC AUTO-ENTMCNC: 31.3 G/DL (ref 32–36)
MCV RBC AUTO: 101 FL (ref 82–98)
MODE: ABNORMAL
MONOCYTES # BLD AUTO: 1.7 K/UL (ref 0.3–1)
MONOCYTES NFR BLD: 12.2 % (ref 4–15)
NEUTROPHILS # BLD AUTO: 11.2 K/UL (ref 1.8–7.7)
NEUTROPHILS NFR BLD: 80.3 % (ref 38–73)
NRBC BLD-RTO: 0 /100 WBC
PCO2 BLDA: 36.8 MMHG (ref 35–45)
PEEP: 7
PH SMN: 7.53 [PH] (ref 7.35–7.45)
PHOSPHATE SERPL-MCNC: 2.9 MG/DL (ref 2.7–4.5)
PHOSPHATE SERPL-MCNC: 2.9 MG/DL (ref 2.7–4.5)
PIP: 24
PLATELET # BLD AUTO: 251 K/UL (ref 150–350)
PMV BLD AUTO: 12.3 FL (ref 9.2–12.9)
PO2 BLDA: 91 MMHG (ref 80–100)
POC BE: 8 MMOL/L
POC SATURATED O2: 98 % (ref 95–100)
POC TCO2: 32 MMOL/L (ref 23–27)
POCT GLUCOSE: 143 MG/DL (ref 70–110)
POCT GLUCOSE: 165 MG/DL (ref 70–110)
POCT GLUCOSE: 167 MG/DL (ref 70–110)
POCT GLUCOSE: 168 MG/DL (ref 70–110)
POTASSIUM SERPL-SCNC: 3.8 MMOL/L (ref 3.5–5.1)
POTASSIUM SERPL-SCNC: 3.9 MMOL/L (ref 3.5–5.1)
PROT SERPL-MCNC: 5.8 G/DL (ref 6–8.4)
PS: 10
RBC # BLD AUTO: 2.82 M/UL (ref 4.6–6.2)
SAMPLE: ABNORMAL
SITE: ABNORMAL
SODIUM SERPL-SCNC: 148 MMOL/L (ref 136–145)
SP02: 98
VANCOMYCIN TROUGH SERPL-MCNC: 18.1 UG/ML (ref 10–22)
VT: 450
WBC # BLD AUTO: 13.99 K/UL (ref 3.9–12.7)

## 2020-02-10 PROCEDURE — 63600175 PHARM REV CODE 636 W HCPCS: Mod: HCNC | Performed by: PHYSICIAN ASSISTANT

## 2020-02-10 PROCEDURE — 84100 ASSAY OF PHOSPHORUS: CPT | Mod: HCNC

## 2020-02-10 PROCEDURE — 25000003 PHARM REV CODE 250: Mod: HCNC | Performed by: PHYSICIAN ASSISTANT

## 2020-02-10 PROCEDURE — 80202 ASSAY OF VANCOMYCIN: CPT | Mod: HCNC

## 2020-02-10 PROCEDURE — 82800 BLOOD PH: CPT | Mod: HCNC

## 2020-02-10 PROCEDURE — 99900035 HC TECH TIME PER 15 MIN (STAT): Mod: HCNC

## 2020-02-10 PROCEDURE — 63600175 PHARM REV CODE 636 W HCPCS: Mod: HCNC | Performed by: STUDENT IN AN ORGANIZED HEALTH CARE EDUCATION/TRAINING PROGRAM

## 2020-02-10 PROCEDURE — 63600175 PHARM REV CODE 636 W HCPCS: Mod: HCNC | Performed by: PSYCHIATRY & NEUROLOGY

## 2020-02-10 PROCEDURE — 63600175 PHARM REV CODE 636 W HCPCS: Mod: HCNC | Performed by: NURSE PRACTITIONER

## 2020-02-10 PROCEDURE — 25000003 PHARM REV CODE 250: Mod: HCNC | Performed by: NURSE PRACTITIONER

## 2020-02-10 PROCEDURE — 83735 ASSAY OF MAGNESIUM: CPT | Mod: HCNC

## 2020-02-10 PROCEDURE — 20000000 HC ICU ROOM: Mod: HCNC

## 2020-02-10 PROCEDURE — S0028 INJECTION, FAMOTIDINE, 20 MG: HCPCS | Mod: HCNC | Performed by: PHYSICIAN ASSISTANT

## 2020-02-10 PROCEDURE — 85025 COMPLETE CBC W/AUTO DIFF WBC: CPT | Mod: HCNC

## 2020-02-10 PROCEDURE — 99291 CRITICAL CARE FIRST HOUR: CPT | Mod: HCNC,GC,, | Performed by: PSYCHIATRY & NEUROLOGY

## 2020-02-10 PROCEDURE — 85730 THROMBOPLASTIN TIME PARTIAL: CPT | Mod: 91,HCNC

## 2020-02-10 PROCEDURE — 82803 BLOOD GASES ANY COMBINATION: CPT | Mod: HCNC

## 2020-02-10 PROCEDURE — 99900026 HC AIRWAY MAINTENANCE (STAT): Mod: HCNC

## 2020-02-10 PROCEDURE — 84132 ASSAY OF SERUM POTASSIUM: CPT | Mod: HCNC

## 2020-02-10 PROCEDURE — 94003 VENT MGMT INPAT SUBQ DAY: CPT | Mod: HCNC

## 2020-02-10 PROCEDURE — 99291 PR CRITICAL CARE, E/M 30-74 MINUTES: ICD-10-PCS | Mod: HCNC,GC,, | Performed by: PSYCHIATRY & NEUROLOGY

## 2020-02-10 PROCEDURE — 25000003 PHARM REV CODE 250: Mod: HCNC | Performed by: PSYCHIATRY & NEUROLOGY

## 2020-02-10 PROCEDURE — 94761 N-INVAS EAR/PLS OXIMETRY MLT: CPT | Mod: HCNC

## 2020-02-10 PROCEDURE — 80053 COMPREHEN METABOLIC PANEL: CPT | Mod: HCNC

## 2020-02-10 PROCEDURE — 27000221 HC OXYGEN, UP TO 24 HOURS: Mod: HCNC

## 2020-02-10 PROCEDURE — 36600 WITHDRAWAL OF ARTERIAL BLOOD: CPT | Mod: HCNC

## 2020-02-10 RX ORDER — FUROSEMIDE 10 MG/ML
40 INJECTION INTRAMUSCULAR; INTRAVENOUS ONCE
Status: COMPLETED | OUTPATIENT
Start: 2020-02-10 | End: 2020-02-10

## 2020-02-10 RX ADMIN — VANCOMYCIN HYDROCHLORIDE 1000 MG: 1 INJECTION, POWDER, LYOPHILIZED, FOR SOLUTION INTRAVENOUS at 11:02

## 2020-02-10 RX ADMIN — INSULIN ASPART 2 UNITS: 100 INJECTION, SOLUTION INTRAVENOUS; SUBCUTANEOUS at 05:02

## 2020-02-10 RX ADMIN — POTASSIUM CHLORIDE 40 MEQ: 20 SOLUTION ORAL at 06:02

## 2020-02-10 RX ADMIN — PIPERACILLIN SODIUM AND TAZOBACTAM SODIUM 4.5 G: 4; .5 INJECTION, POWDER, LYOPHILIZED, FOR SOLUTION INTRAVENOUS at 03:02

## 2020-02-10 RX ADMIN — FUROSEMIDE 40 MG: 10 INJECTION, SOLUTION INTRAMUSCULAR; INTRAVENOUS at 10:02

## 2020-02-10 RX ADMIN — PIPERACILLIN SODIUM AND TAZOBACTAM SODIUM 4.5 G: 4; .5 INJECTION, POWDER, LYOPHILIZED, FOR SOLUTION INTRAVENOUS at 10:02

## 2020-02-10 RX ADMIN — HYDROCORTISONE SODIUM SUCCINATE 50 MG: 100 INJECTION, POWDER, FOR SOLUTION INTRAMUSCULAR; INTRAVENOUS at 09:02

## 2020-02-10 RX ADMIN — HYDROCORTISONE SODIUM SUCCINATE 50 MG: 100 INJECTION, POWDER, FOR SOLUTION INTRAMUSCULAR; INTRAVENOUS at 06:02

## 2020-02-10 RX ADMIN — FAMOTIDINE 20 MG: 10 INJECTION, SOLUTION INTRAVENOUS at 09:02

## 2020-02-10 RX ADMIN — HYDROCORTISONE SODIUM SUCCINATE 50 MG: 100 INJECTION, POWDER, FOR SOLUTION INTRAMUSCULAR; INTRAVENOUS at 01:02

## 2020-02-10 RX ADMIN — VANCOMYCIN HYDROCHLORIDE 750 MG: 750 INJECTION, POWDER, LYOPHILIZED, FOR SOLUTION INTRAVENOUS at 10:02

## 2020-02-10 RX ADMIN — ATORVASTATIN CALCIUM 40 MG: 20 TABLET, FILM COATED ORAL at 09:02

## 2020-02-10 RX ADMIN — CHLORHEXIDINE GLUCONATE 0.12% ORAL RINSE 15 ML: 1.2 LIQUID ORAL at 09:02

## 2020-02-10 RX ADMIN — INSULIN ASPART 2 UNITS: 100 INJECTION, SOLUTION INTRAVENOUS; SUBCUTANEOUS at 11:02

## 2020-02-10 RX ADMIN — HEPARIN SODIUM 19 UNITS/KG/HR: 10000 INJECTION, SOLUTION INTRAVENOUS at 05:02

## 2020-02-10 RX ADMIN — LEVETIRACETAM 1000 MG: 10 INJECTION INTRAVENOUS at 09:02

## 2020-02-10 RX ADMIN — VANCOMYCIN HYDROCHLORIDE 1000 MG: 1 INJECTION, POWDER, LYOPHILIZED, FOR SOLUTION INTRAVENOUS at 12:02

## 2020-02-10 RX ADMIN — ACETAMINOPHEN 650 MG: 325 TABLET ORAL at 01:02

## 2020-02-10 RX ADMIN — ACETAMINOPHEN 650 MG: 325 TABLET ORAL at 09:02

## 2020-02-10 RX ADMIN — INSULIN ASPART 2 UNITS: 100 INJECTION, SOLUTION INTRAVENOUS; SUBCUTANEOUS at 06:02

## 2020-02-10 RX ADMIN — PIPERACILLIN SODIUM AND TAZOBACTAM SODIUM 4.5 G: 4; .5 INJECTION, POWDER, LYOPHILIZED, FOR SOLUTION INTRAVENOUS at 06:02

## 2020-02-10 RX ADMIN — ESCITALOPRAM OXALATE 20 MG: 10 TABLET ORAL at 09:02

## 2020-02-10 NOTE — PROGRESS NOTES
[facility]  Neurocritical Care  Progress Note    Admit Date: 2/2/2020  Service Date: 02/10/2020  Length of Stay: 8    Subjective:     Chief Complaint: Seizure    History of Present Illness: Pt is a 64 y.o. Male with PMHx of of DVT, PE, DM, and seizures (on 1500 keppra at home) who presents to the ED via EMS with complaint of a seizure that occurred this morning. Pt has long standing history of seizures and was found down by wife on cement around 0800, LKN 0700. Patient compliant with keppra per wife and last seizure was in November. At that time his keppra dose was increased. Patient has had cough recently but no other symptoms. Patient is on AC for DVT/PE and was recently switched from Coumadin to Eliquis. He was given 10 mg Versed during EMS ride to outside facility for continued seizure activity. He was unresponsive upon arrival to ED and subsequently intubated. CTH without any acute changes, CT max/face with multiple facial fractures, and CXR with consolidation and atelectasis/collapsed lung. Patient transferred to [facility] to higher level neurologic monitoring and continuous EEG.     Hospital Course: 2/2/2020: Admit to [facility]. GS and ENT consulted. MX facial fractures and concerns of bowel ischemia on CT  2/3 will rehook to EEG for 24h. Remains NPO. US LE pending. Monitor lactic [id number] 2L  IV. Wean levophed as tolerated after bolus  2/4: remains on small amount of pressors, cultures remain negative, cont abx, cont current AEDs, EEG overnight negative for seizure activity, repeat ct abdomen today  2/5: minimal dose pressors, advance TFs. Tolerating SBT, possible trial of extubation in am  2/6: need for pressors will likely cease when able to come off sedation, currently still requires mechanical ventilation with pO2 value of 60 on 40% fiO2 and ps 10  02/08/2020 exam still poor, getting MRI brain   02/09/2020 Not tolerating CPAP, placed on AC   02/10/2020 NAEO    Interval History:  >4 elements OR  status of 3 inpatient conditions    Review of Systems   Unable to perform ROS: Mental status change     2 systems OR Unable to obtain a complete ROS due to level of consciousness.  Objective:     Vitals:  Temp: 98.1 °F (36.7 °C)  Pulse: (!) 52  Rhythm: normal sinus rhythm, sinus bradycardia  BP: (!) 162/70  MAP (mmHg): 101  Resp: 16  SpO2: 98 %  Oxygen Concentration (%): 40  O2 Device (Oxygen Therapy): ventilator  Vent Mode: SIMV  Set Rate: 16 BPM  Pressure Support: 10 cmH20  PEEP/CPAP: 7 cmH20  Peak Airway Pressure: 27 cmH2O  Mean Airway Pressure: 12 cmH20  Plateau Pressure: 0 cmH20    Temp  Min: 98.1 °F (36.7 °C)  Max: 100.2 °F (37.9 °C)  Pulse  Min: 52  Max: 97  BP  Min: 113/54  Max: 162/70  MAP (mmHg)  Min: 78  Max: 103  Resp  Min: 14  Max: 27  SpO2  Min: 93 %  Max: 100 %  Oxygen Concentration (%)  Min: 40  Max: 45    02/09 0701 - 02/10 0700  In: 3392.5 [I.V.:412.5]  Out: 590 [Urine:590]   Unmeasured Output  Urine Occurrence: 1  Stool Occurrence: 1  Pad Count: 1       Physical Exam    Constitutional: No apparent distress.   Eyes: Conjunctiva clear, anicteric. Lids no lesions. Small abrasion under left eye   Head/Ears/Nose/Mouth/Throat/Neck: Moist mucous membranes. External ears, nose atraumatic.   Cardiovascular: Regular rhythm. ESM  Respiratory: Diminished breathing sounds bilateral   Gastrointestinal: No hernia. Soft, nondistended, nontender. + bowel sounds.      Neurologic Examination:    -Mental Status: Open eyes to voice. Doesn't follow commands   -Cranial nerves: Pupils equal, round, and reactive bilateral. Extraocular movements intact with VOR. Intact corneal reflexes bilateral, intact cough reflex -Motor: Moves all ext spon     Medications:  Continuous    heparin (porcine) in D5W Last Rate: 18 Units/kg/hr (02/10/20 0802)   norepinephrine bitartrate-D5W Last Rate: Stopped (02/08/20 0648)   Scheduled    atorvastatin 40 mg QHS   chlorhexidine 15 mL BID   escitalopram oxalate 20 mg Daily   famotidine (PF) 20  mg BID   hydrocortisone sodium succinate 50 mg Q8H   levetiracetam IVPB 1,000 mg Q12H   piperacillin-tazobactam (ZOSYN) IVPB 4.5 g Q8H   senna-docusate 8.6-50 mg 1 tablet BID   vancomycin (VANCOCIN) IVPB 1,000 mg Q12H   PRN    acetaminophen 650 mg Q6H PRN   Dextrose 10% Bolus 12.5 g PRN   fentaNYL 25 mcg Q2H PRN   glucagon (human recombinant) 1 mg PRN   insulin aspart U-100 1-10 Units Q6H PRN   labetalol 10 mg Q4H PRN   lorazepam 2 mg Q1H PRN   magnesium oxide 800 mg PRN   magnesium oxide 800 mg PRN   ondansetron 4 mg Q8H PRN   potassium chloride 10% 40 mEq PRN   potassium chloride 10% 40 mEq PRN   potassium chloride 10% 60 mEq PRN   potassium, sodium phosphates 2 packet PRN   potassium, sodium phosphates 2 packet PRN   potassium, sodium phosphates 2 packet PRN   sodium chloride 0.9% 10 mL PRN     Today I personally reviewed pertinent medications, lines/drains/airways, imaging, cardiology results, laboratory results, microbiology results, notably:    Diet  Diet NPO  Diet NPO        Assessment/Plan:     Neuro  * Seizure  Con keppra      Stroke  - Incidentally seen on MRI. Cont current secondary prevention measures.   - CTA head and neck     Psychiatric  Depression with anxiety  Continue escitalopram 20mg daily    Pulmonary  Aspiration pneumonia  Con abx and f/u cx    Acute respiratory failure with hypoxia  Wean vent as tolerated  Daily CXR and ABG    Hematology  Chronic anticoagulation  Heparin gtt     History of DVT (deep vein thrombosis)   heparin gtt   CTH today     Orthopedic  Closed extensive facial fractures  ENT recs          The patient is being Prophylaxed for:  Venous Thromboembolism with: Mechanical or Chemical  Stress Ulcer with: H2B  Ventilator Pneumonia with: chlorhexidine oral care    Activity Orders          Diet NPO: NPO starting at 02/02 1925    Commode at bedside starting at 02/02 1925        Full Code    Uninterrupted Critical Care/Counseling Time (not including procedures): 35 minutes     Lisa GAMEZ  MD [place]  Neurocritical Care  [facility]

## 2020-02-10 NOTE — PT/OT/SLP PROGRESS
Physical Therapy      Patient Name:  Edu Choi   MRN:  [id number]    Patient not seen today secondary to Other (Comment)(intubated and sedated.). Per OT note, RNs completing ROM and positioning in supine. Orders discontinued. Please re consult s/p extubation when pt is medically appropriate for progressive mobility.    Jada Ye, PT

## 2020-02-10 NOTE — SUBJECTIVE & OBJECTIVE
Interval History:  >4 elements OR status of 3 inpatient conditions    Review of Systems   Unable to perform ROS: Mental status change     2 systems OR Unable to obtain a complete ROS due to level of consciousness.  Objective:     Vitals:  Temp: 98.1 °F (36.7 °C)  Pulse: (!) 52  Rhythm: normal sinus rhythm, sinus bradycardia  BP: (!) 162/70  MAP (mmHg): 101  Resp: 16  SpO2: 98 %  Oxygen Concentration (%): 40  O2 Device (Oxygen Therapy): ventilator  Vent Mode: SIMV  Set Rate: 16 BPM  Pressure Support: 10 cmH20  PEEP/CPAP: 7 cmH20  Peak Airway Pressure: 27 cmH2O  Mean Airway Pressure: 12 cmH20  Plateau Pressure: 0 cmH20    Temp  Min: 98.1 °F (36.7 °C)  Max: 100.2 °F (37.9 °C)  Pulse  Min: 52  Max: 97  BP  Min: 113/54  Max: 162/70  MAP (mmHg)  Min: 78  Max: 103  Resp  Min: 14  Max: 27  SpO2  Min: 93 %  Max: 100 %  Oxygen Concentration (%)  Min: 40  Max: 45    02/09 0701 - 02/10 0700  In: 3392.5 [I.V.:412.5]  Out: 590 [Urine:590]   Unmeasured Output  Urine Occurrence: 1  Stool Occurrence: 1  Pad Count: 1       Physical Exam    Constitutional: No apparent distress.   Eyes: Conjunctiva clear, anicteric. Lids no lesions. Small abrasion under left eye   Head/Ears/Nose/Mouth/Throat/Neck: Moist mucous membranes. External ears, nose atraumatic.   Cardiovascular: Regular rhythm. ESM  Respiratory: Diminished breathing sounds bilateral   Gastrointestinal: No hernia. Soft, nondistended, nontender. + bowel sounds.      Neurologic Examination:    -Mental Status: Open eyes to voice. Doesn't follow commands   -Cranial nerves: Pupils equal, round, and reactive bilateral. Extraocular movements intact with VOR. Intact corneal reflexes bilateral, intact cough reflex -Motor: Moves all ext spon     Medications:  Continuous    heparin (porcine) in D5W Last Rate: 18 Units/kg/hr (02/10/20 0802)   norepinephrine bitartrate-D5W Last Rate: Stopped (02/08/20 0648)   Scheduled    atorvastatin 40 mg QHS   chlorhexidine 15 mL BID   escitalopram  oxalate 20 mg Daily   famotidine (PF) 20 mg BID   hydrocortisone sodium succinate 50 mg Q8H   levetiracetam IVPB 1,000 mg Q12H   piperacillin-tazobactam (ZOSYN) IVPB 4.5 g Q8H   senna-docusate 8.6-50 mg 1 tablet BID   vancomycin (VANCOCIN) IVPB 1,000 mg Q12H   PRN    acetaminophen 650 mg Q6H PRN   Dextrose 10% Bolus 12.5 g PRN   fentaNYL 25 mcg Q2H PRN   glucagon (human recombinant) 1 mg PRN   insulin aspart U-100 1-10 Units Q6H PRN   labetalol 10 mg Q4H PRN   lorazepam 2 mg Q1H PRN   magnesium oxide 800 mg PRN   magnesium oxide 800 mg PRN   ondansetron 4 mg Q8H PRN   potassium chloride 10% 40 mEq PRN   potassium chloride 10% 40 mEq PRN   potassium chloride 10% 60 mEq PRN   potassium, sodium phosphates 2 packet PRN   potassium, sodium phosphates 2 packet PRN   potassium, sodium phosphates 2 packet PRN   sodium chloride 0.9% 10 mL PRN     Today I personally reviewed pertinent medications, lines/drains/airways, imaging, cardiology results, laboratory results, microbiology results, notably:    Diet  Diet NPO  Diet NPO

## 2020-02-10 NOTE — PLAN OF CARE
POC reviewed with pt at 0500. Pt unable to verbalize understanding. No acute events overnight. Pt progressing toward goals. Will continue to monitor. See flowsheets for full assessment and VS info     Heparin gtt titrated per nomogram  AM labs collected. Replacing K

## 2020-02-10 NOTE — CONSULTS
Wound consult received on patient per nursing:   Reason for Consult? Wounds scarring and irregular tissue   Location of wound: sacrum      RN today reports no need for wound care, RN recently assessed sacrum, skin intact with scar tissue present.  Nursing to continue care. Contact wound care if needed.

## 2020-02-10 NOTE — PROGRESS NOTES
Pharmacokinetic Assessment Follow Up: IV Vancomycin    Assessment and Plan:  · Vancomycin trough before the 3rd dose therapeutic at 18.1 mcg/mL, drawn appropriately. Goal trough 15-20 mcg/mL.   · Because trough therapeutic prior to achievement of steady state, recommend reducing dose to 750 mg IV every 12 hours to prevent accumulation to supratherapeutic levels.  · Trough ordered before the 4th dose on Wednesday 2/12 at 1100.    Drug levels (last 3 results):  Recent Labs   Lab Result Units 02/10/20  1119   Vancomycin-Trough ug/mL 18.1       Pharmacy will continue to follow and monitor vancomycin. Please call for questions regarding this assessment.    Thank you for the consult,   Malka Couch, Maury, [facility]  Neurocritical Care Clinical Pharmacist  Spectralink: [id number]  ___________________________________________________________________________________________________________________________     Patient brief summary:  Edu Choi is a 64 y.o. male initiated on antimicrobial therapy with IV Vancomycin for treatment of bacteremia    The patient's current regimen is 1250 mg Q24H    Drug Allergies:   Review of patient's allergies indicates:   Allergen Reactions    No known drug allergies        Actual Body Weight:   68.5 kg    Renal Function:   Estimated Creatinine Clearance: 60.8 mL/min (based on SCr of 1 mg/dL).,     Dialysis Method (if applicable):  N/A    CBC (last 72 hours):  Recent Labs   Lab Result Units 02/08/20  0219 02/09/20  0007 02/10/20  0157   WBC K/uL 11.81 12.95*  12.95* 13.99*   Hemoglobin g/dL 8.8* 9.2*  9.2* 8.9*   Hematocrit % 28.5* 28.7*  28.7* 28.4*   Platelets K/uL 223 252  252 251   Gran% % 91.3* 88.3*  88.3* 80.3*   Lymph% % 5.2* 4.7*  4.7* 6.8*   Mono% % 2.6* 6.3  6.3 12.2   Eosinophil% % 0.0 0.0  0.0 0.0   Basophil% % 0.3 0.2  0.2 0.1   Differential Method  Automated Automated  Automated Automated       Metabolic Panel (last 72 hours):  Recent Labs   Lab Result Units  02/08/20  0219 02/08/20  1009 02/08/20  1758 02/09/20  0007 02/09/20  1036 02/09/20  1955 02/10/20  0157 02/10/20  0931   Sodium mmol/L 142  --   --  146*  --   --  148*  --    Potassium mmol/L 3.7 3.6  --  3.4* 4.3  --  3.8 3.9   Chloride mmol/L 110  --   --  108  --   --  111*  --    CO2 mmol/L 22*  --   --  29  --   --  28  --    Glucose mg/dL 134*  --   --  155*  --   --  169*  --    Glucose, UA   --   --   --   --   --  1+*  --   --    BUN, Bld mg/dL 7*  --   --  17  --   --  27*  --    Creatinine mg/dL 0.9  --   --  0.9  --   --  1.0  --    Albumin g/dL 3.0*  --   --  3.0*  --   --  2.9*  --    Total Bilirubin mg/dL 0.5  --   --  0.4  --   --  0.3  --    Alkaline Phosphatase U/L 53*  --   --  51*  --   --  48*  --    AST U/L 28  --   --  34  --   --  29  --    ALT U/L 13  --   --  17  --   --  18  --    Magnesium mg/dL 1.8  --  2.1 2.2  --   --  2.3  --    Phosphorus mg/dL 3.4  --   --  2.3*  --   --  2.9  2.9  --        Vancomycin Administrations:  vancomycin given in the last 96 hours                   vancomycin 1.25 g in dextrose 5% 250 mL IVPB (ready to mix) (mg) 1,250 mg New Bag 02/04/20 1334     1,250 mg New Bag 02/03/20 1330    vancomycin (VANCOCIN) 2,000 mg in dextrose 5 % 500 mL IVPB (mg) 2,000 mg New Bag 02/02/20 1303                Microbiologic Results:  Microbiology Results (last 7 days)     Procedure Component Value Units Date/Time    Blood culture [[id number]] Collected:  02/09/20 1034    Order Status:  Completed Specimen:  Blood from Peripheral, Ankle, Left Updated:  02/10/20 1222     Blood Culture, Routine No Growth to date      No Growth to date    Blood culture [[id number]] Collected:  02/09/20 1040    Order Status:  Completed Specimen:  Blood from Peripheral, Hand, Right Updated:  02/10/20 1222     Blood Culture, Routine No Growth to date      No Growth to date    Culture, Respiratory with Gram Stain [[id number]] Collected:  02/09/20 1224    Order Status:  Completed Specimen:  Respiratory  from Endotracheal Aspirate Updated:  02/10/20 0809     Respiratory Culture Normal respiratory tessa     Gram Stain (Respiratory) <10 epithelial cells per low power field.     Gram Stain (Respiratory) Rare WBC's     Gram Stain (Respiratory) Rare Gram positive cocci     Gram Stain (Respiratory) Rare Gram negative rods    Culture, Anaerobe [[id number]]     Order Status:  Canceled Specimen:  Sputum     Aerobic culture [[id number]]     Order Status:  Canceled Specimen:  Body Fluid from Abdomen     Blood culture [[id number]] Collected:  02/02/20 1910    Order Status:  Completed Specimen:  Blood from Peripheral, Ankle, Left Updated:  02/08/20 0612     Blood Culture, Routine No growth after 5 days.    Blood culture [[id number]] Collected:  02/02/20 1920    Order Status:  Completed Specimen:  Blood from Peripheral, Ankle, Right Updated:  02/08/20 0612     Blood Culture, Routine No growth after 5 days.    Culture, Respiratory with Gram Stain [[id number]] Collected:  02/02/20 1940    Order Status:  Completed Specimen:  Respiratory from Endotracheal Aspirate Updated:  02/05/20 1005     Respiratory Culture Normal respiratory tessa      No S aureus or Pseudomonas isolated.     Gram Stain (Respiratory) <10 epithelial cells per low power field.     Gram Stain (Respiratory) Rare WBC's     Gram Stain (Respiratory) Rare Gram positive cocci     Gram Stain (Respiratory) Rare Gram negative rods

## 2020-02-11 PROBLEM — R23.9 ALTERATION IN SKIN INTEGRITY: Status: ACTIVE | Noted: 2020-02-11

## 2020-02-11 LAB
ALBUMIN SERPL BCP-MCNC: 2.9 G/DL (ref 3.5–5.2)
ALLENS TEST: ABNORMAL
ALP SERPL-CCNC: 41 U/L (ref 55–135)
ALT SERPL W/O P-5'-P-CCNC: 16 U/L (ref 10–44)
ANION GAP SERPL CALC-SCNC: 10 MMOL/L (ref 8–16)
APTT BLDCRRT: 38 SEC (ref 21–32)
APTT BLDCRRT: 45.6 SEC (ref 21–32)
AST SERPL-CCNC: 24 U/L (ref 10–40)
BACTERIA SPEC AEROBE CULT: NORMAL
BACTERIA SPEC AEROBE CULT: NORMAL
BASOPHILS # BLD AUTO: 0.01 K/UL (ref 0–0.2)
BASOPHILS NFR BLD: 0.1 % (ref 0–1.9)
BILIRUB SERPL-MCNC: 0.4 MG/DL (ref 0.1–1)
BUN SERPL-MCNC: 26 MG/DL (ref 8–23)
CALCIUM SERPL-MCNC: 7.9 MG/DL (ref 8.7–10.5)
CHLORIDE SERPL-SCNC: 105 MMOL/L (ref 95–110)
CO2 SERPL-SCNC: 30 MMOL/L (ref 23–29)
CREAT SERPL-MCNC: 1 MG/DL (ref 0.5–1.4)
DELSYS: ABNORMAL
DIFFERENTIAL METHOD: ABNORMAL
EOSINOPHIL # BLD AUTO: 0 K/UL (ref 0–0.5)
EOSINOPHIL NFR BLD: 0 % (ref 0–8)
ERYTHROCYTE [DISTWIDTH] IN BLOOD BY AUTOMATED COUNT: 15 % (ref 11.5–14.5)
ERYTHROCYTE [SEDIMENTATION RATE] IN BLOOD BY WESTERGREN METHOD: 16 MM/H
EST. GFR  (AFRICAN AMERICAN): >60 ML/MIN/1.73 M^2
EST. GFR  (NON AFRICAN AMERICAN): >60 ML/MIN/1.73 M^2
FIO2: 50
GLUCOSE SERPL-MCNC: 121 MG/DL (ref 70–110)
GRAM STN SPEC: NORMAL
HCO3 UR-SCNC: 31.5 MMOL/L (ref 24–28)
HCT VFR BLD AUTO: 27.2 % (ref 40–54)
HGB BLD-MCNC: 8.6 G/DL (ref 14–18)
IMM GRANULOCYTES # BLD AUTO: 0.1 K/UL (ref 0–0.04)
IMM GRANULOCYTES NFR BLD AUTO: 0.7 % (ref 0–0.5)
LYMPHOCYTES # BLD AUTO: 1 K/UL (ref 1–4.8)
LYMPHOCYTES NFR BLD: 7.5 % (ref 18–48)
MAGNESIUM SERPL-MCNC: 2 MG/DL (ref 1.6–2.6)
MCH RBC QN AUTO: 31.5 PG (ref 27–31)
MCHC RBC AUTO-ENTMCNC: 31.6 G/DL (ref 32–36)
MCV RBC AUTO: 100 FL (ref 82–98)
MODE: ABNORMAL
MONOCYTES # BLD AUTO: 1.8 K/UL (ref 0.3–1)
MONOCYTES NFR BLD: 13.4 % (ref 4–15)
NEUTROPHILS # BLD AUTO: 10.6 K/UL (ref 1.8–7.7)
NEUTROPHILS NFR BLD: 78.3 % (ref 38–73)
NRBC BLD-RTO: 0 /100 WBC
PCO2 BLDA: 34 MMHG (ref 35–45)
PEEP: 7
PH SMN: 7.58 [PH] (ref 7.35–7.45)
PHOSPHATE SERPL-MCNC: 3.8 MG/DL (ref 2.7–4.5)
PIP: 27
PLATELET # BLD AUTO: 255 K/UL (ref 150–350)
PMV BLD AUTO: 12 FL (ref 9.2–12.9)
PO2 BLDA: 58 MMHG (ref 80–100)
POC BE: 10 MMOL/L
POC SATURATED O2: 94 % (ref 95–100)
POC TCO2: 33 MMOL/L (ref 23–27)
POCT GLUCOSE: 148 MG/DL (ref 70–110)
POCT GLUCOSE: 150 MG/DL (ref 70–110)
POCT GLUCOSE: 153 MG/DL (ref 70–110)
POCT GLUCOSE: 171 MG/DL (ref 70–110)
POTASSIUM SERPL-SCNC: 3 MMOL/L (ref 3.5–5.1)
POTASSIUM SERPL-SCNC: 4 MMOL/L (ref 3.5–5.1)
PROT SERPL-MCNC: 5.5 G/DL (ref 6–8.4)
PS: 10
RBC # BLD AUTO: 2.73 M/UL (ref 4.6–6.2)
SAMPLE: ABNORMAL
SITE: ABNORMAL
SODIUM SERPL-SCNC: 145 MMOL/L (ref 136–145)
SP02: 97
WBC # BLD AUTO: 13.53 K/UL (ref 3.9–12.7)

## 2020-02-11 PROCEDURE — 20000000 HC ICU ROOM: Mod: HCNC

## 2020-02-11 PROCEDURE — 25000003 PHARM REV CODE 250: Mod: HCNC | Performed by: NURSE PRACTITIONER

## 2020-02-11 PROCEDURE — 63600175 PHARM REV CODE 636 W HCPCS: Mod: HCNC | Performed by: NURSE PRACTITIONER

## 2020-02-11 PROCEDURE — 83735 ASSAY OF MAGNESIUM: CPT | Mod: HCNC

## 2020-02-11 PROCEDURE — 82803 BLOOD GASES ANY COMBINATION: CPT | Mod: HCNC

## 2020-02-11 PROCEDURE — 84100 ASSAY OF PHOSPHORUS: CPT | Mod: HCNC

## 2020-02-11 PROCEDURE — 99291 PR CRITICAL CARE, E/M 30-74 MINUTES: ICD-10-PCS | Mod: HCNC,GC,, | Performed by: PSYCHIATRY & NEUROLOGY

## 2020-02-11 PROCEDURE — 36600 WITHDRAWAL OF ARTERIAL BLOOD: CPT | Mod: HCNC

## 2020-02-11 PROCEDURE — 63600175 PHARM REV CODE 636 W HCPCS: Mod: HCNC | Performed by: PSYCHIATRY & NEUROLOGY

## 2020-02-11 PROCEDURE — 63600175 PHARM REV CODE 636 W HCPCS: Mod: HCNC | Performed by: STUDENT IN AN ORGANIZED HEALTH CARE EDUCATION/TRAINING PROGRAM

## 2020-02-11 PROCEDURE — 85025 COMPLETE CBC W/AUTO DIFF WBC: CPT | Mod: HCNC

## 2020-02-11 PROCEDURE — 25500020 PHARM REV CODE 255: Mod: HCNC | Performed by: PSYCHIATRY & NEUROLOGY

## 2020-02-11 PROCEDURE — 99291 CRITICAL CARE FIRST HOUR: CPT | Mod: HCNC,GC,, | Performed by: PSYCHIATRY & NEUROLOGY

## 2020-02-11 PROCEDURE — 94761 N-INVAS EAR/PLS OXIMETRY MLT: CPT | Mod: HCNC

## 2020-02-11 PROCEDURE — 63700000 PHARM REV CODE 250 ALT 637 W/O HCPCS: Mod: HCNC | Performed by: NURSE PRACTITIONER

## 2020-02-11 PROCEDURE — 85730 THROMBOPLASTIN TIME PARTIAL: CPT | Mod: 91,HCNC

## 2020-02-11 PROCEDURE — 25000003 PHARM REV CODE 250: Mod: HCNC | Performed by: PSYCHIATRY & NEUROLOGY

## 2020-02-11 PROCEDURE — 84132 ASSAY OF SERUM POTASSIUM: CPT | Mod: HCNC

## 2020-02-11 PROCEDURE — 99900026 HC AIRWAY MAINTENANCE (STAT): Mod: HCNC

## 2020-02-11 PROCEDURE — S0028 INJECTION, FAMOTIDINE, 20 MG: HCPCS | Mod: HCNC | Performed by: PHYSICIAN ASSISTANT

## 2020-02-11 PROCEDURE — 94003 VENT MGMT INPAT SUBQ DAY: CPT | Mod: HCNC

## 2020-02-11 PROCEDURE — 80053 COMPREHEN METABOLIC PANEL: CPT | Mod: HCNC

## 2020-02-11 PROCEDURE — 85730 THROMBOPLASTIN TIME PARTIAL: CPT | Mod: HCNC

## 2020-02-11 PROCEDURE — 99900035 HC TECH TIME PER 15 MIN (STAT): Mod: HCNC

## 2020-02-11 PROCEDURE — 27000221 HC OXYGEN, UP TO 24 HOURS: Mod: HCNC

## 2020-02-11 PROCEDURE — 25000003 PHARM REV CODE 250: Mod: HCNC | Performed by: PHYSICIAN ASSISTANT

## 2020-02-11 RX ORDER — FUROSEMIDE 10 MG/ML
60 INJECTION INTRAMUSCULAR; INTRAVENOUS ONCE
Status: COMPLETED | OUTPATIENT
Start: 2020-02-11 | End: 2020-02-11

## 2020-02-11 RX ADMIN — CHLORHEXIDINE GLUCONATE 0.12% ORAL RINSE 15 ML: 1.2 LIQUID ORAL at 08:02

## 2020-02-11 RX ADMIN — HYDROCORTISONE SODIUM SUCCINATE 50 MG: 100 INJECTION, POWDER, FOR SOLUTION INTRAMUSCULAR; INTRAVENOUS at 03:02

## 2020-02-11 RX ADMIN — ATORVASTATIN CALCIUM 40 MG: 20 TABLET, FILM COATED ORAL at 08:02

## 2020-02-11 RX ADMIN — LEVETIRACETAM 1000 MG: 10 INJECTION INTRAVENOUS at 08:02

## 2020-02-11 RX ADMIN — POTASSIUM CHLORIDE 60 MEQ: 20 SOLUTION ORAL at 05:02

## 2020-02-11 RX ADMIN — PIPERACILLIN SODIUM AND TAZOBACTAM SODIUM 4.5 G: 4; .5 INJECTION, POWDER, LYOPHILIZED, FOR SOLUTION INTRAVENOUS at 06:02

## 2020-02-11 RX ADMIN — INSULIN ASPART 2 UNITS: 100 INJECTION, SOLUTION INTRAVENOUS; SUBCUTANEOUS at 11:02

## 2020-02-11 RX ADMIN — VANCOMYCIN HYDROCHLORIDE 750 MG: 750 INJECTION, POWDER, LYOPHILIZED, FOR SOLUTION INTRAVENOUS at 11:02

## 2020-02-11 RX ADMIN — PIPERACILLIN SODIUM AND TAZOBACTAM SODIUM 4.5 G: 4; .5 INJECTION, POWDER, LYOPHILIZED, FOR SOLUTION INTRAVENOUS at 03:02

## 2020-02-11 RX ADMIN — HYDROCORTISONE SODIUM SUCCINATE 50 MG: 100 INJECTION, POWDER, FOR SOLUTION INTRAMUSCULAR; INTRAVENOUS at 10:02

## 2020-02-11 RX ADMIN — FAMOTIDINE 20 MG: 10 INJECTION, SOLUTION INTRAVENOUS at 08:02

## 2020-02-11 RX ADMIN — INSULIN ASPART 1 UNITS: 100 INJECTION, SOLUTION INTRAVENOUS; SUBCUTANEOUS at 12:02

## 2020-02-11 RX ADMIN — FUROSEMIDE 60 MG: 10 INJECTION, SOLUTION INTRAMUSCULAR; INTRAVENOUS at 10:02

## 2020-02-11 RX ADMIN — HYDROCORTISONE SODIUM SUCCINATE 50 MG: 100 INJECTION, POWDER, FOR SOLUTION INTRAMUSCULAR; INTRAVENOUS at 05:02

## 2020-02-11 RX ADMIN — IOHEXOL 75 ML: 350 INJECTION, SOLUTION INTRAVENOUS at 01:02

## 2020-02-11 RX ADMIN — PIPERACILLIN SODIUM AND TAZOBACTAM SODIUM 4.5 G: 4; .5 INJECTION, POWDER, LYOPHILIZED, FOR SOLUTION INTRAVENOUS at 10:02

## 2020-02-11 RX ADMIN — POTASSIUM CHLORIDE 60 MEQ: 20 SOLUTION ORAL at 08:02

## 2020-02-11 RX ADMIN — ESCITALOPRAM OXALATE 20 MG: 10 TABLET ORAL at 08:02

## 2020-02-11 RX ADMIN — PSYLLIUM HUSK 1 PACKET: 3.4 POWDER ORAL at 10:02

## 2020-02-11 RX ADMIN — FENTANYL CITRATE 25 MCG: 50 INJECTION INTRAMUSCULAR; INTRAVENOUS at 11:02

## 2020-02-11 RX ADMIN — HEPARIN SODIUM 20 UNITS/KG/HR: 10000 INJECTION, SOLUTION INTRAVENOUS at 03:02

## 2020-02-11 NOTE — PROGRESS NOTES
Contacted by RN to assess patient's sacrum, multiple areas of scar tissue noted, RN reports patient has history of cysts with multiple cyst removals to sacrum. RN reports patient has been having multiple bowel movements.   Moisture associated dermatitis noted to patient's scrotum, condom catheter in use for urinary incontinence.  Recommend triad barrier cream to sacrum and scrotum BID/prn as needed, cleanse between applications with bathing cloths.  Dheere Bolo low airloss surface and waffle overlay in use.  Patient's heels intact. RN reports patients often moves feet which makes difficult to maintain heel boots. Recommend keeping heels elevated if patient will tolerate.  Nursing to continue care. Wound care to assist as needed.    buttocks

## 2020-02-11 NOTE — PROGRESS NOTES
"[facility]  Adult Nutrition  Progress Note    SUMMARY       Recommendations  1. Continue Impact Peptide @ goal rate 50mL/hr.   - Provides 1980kcals, 124g protein and 1016mL free water.     2. If able to extubate and advance diet, recommend Regular with texture per SLP recommendations.     RD to monitor.    Goals: Pt to receive nutrition by RD follow up  Nutrition Goal Status: goal met  Communication of RD Recs: reviewed with RN    Reason for Assessment    Reason For Assessment: RD follow-up  Diagnosis: seizures  Relevant Medical History: HLD, DM, seizures, DVT/PE  Interdisciplinary Rounds: attended  General Information Comments: Pt remains intubated. TF at goal, tolerating appropriately per nsg. On 2/3- Family at bedside providing nutrition hx. Per family, pt with adequate po intake/appetite PTA. No weight loss per family, weight stable approx 145-150lb > 7 years. Pt overweight and nourished without indications of malnutrition at this time.  Nutrition Discharge Planning: unable to determine at this time    Nutrition Risk Screen    Nutrition Risk Screen: tube feeding or parenteral nutrition    Nutrition/Diet History    Spiritual, Cultural Beliefs, Latter-day Practices, Values that Affect Care: no    Anthropometrics    Temp: 98.5 °F (36.9 °C)  Height: 5' 4" (162.6 cm)  Height (inches): 64 in  Weight Method: Bed Scale  Weight: 68.5 kg (151 lb)  Weight (lb): 151 lb  Ideal Body Weight (IBW), Male: 130 lb  % Ideal Body Weight, Male (lb): 116.15 %  BMI (Calculated): 25.9  BMI Grade: 25 - 29.9 - overweight       Lab/Procedures/Meds    Pertinent Labs Reviewed: reviewed  Pertinent Labs Comments: noted  Pertinent Medications Reviewed: reviewed  Pertinent Medications Comments: lasix, heparin, insulin    Estimated/Assessed Needs    Weight Used For Calorie Calculations: 68.5 kg (151 lb 0.2 oz)  Energy Calorie Requirements (kcal): 1533  Energy Need Method: [facility]  Protein Requirements: " 82-103g(1.2-1.5g/kg)  Weight Used For Protein Calculations: 68.5 kg (151 lb 0.2 oz)  Fluid Requirements (mL): 1mL/kcal or per MD     RDA Method (mL): 1533  CHO Requirement: 227g CHO daily      Nutrition Prescription Ordered    Current Diet Order: NPO  Nutrition Order Comments: TF at goal  Current Nutrition Support Formula Ordered: Impact Peptide 1.5  Current Nutrition Support Rate Ordered: 55 (ml)  Current Nutrition Support Frequency Ordered: mL/hr    Evaluation of Received Nutrient/Fluid Intake    Enteral Calories (kcal): 1980  Enteral Protein (gm): 124  Enteral (Free Water) Fluid (mL): 1016    % Kcal Needs: 129  % Protein Needs: 100    I/O: +12L since admit, good UOP, LBM 2/11    Energy Calories Required: meeting needs  Protein Required: meeting needs  Fluid Required: other (see comments)(per MD)    % Intake of Estimated Energy Needs: 75 - 100 %  % Meal Intake: NPO    Nutrition Risk    Level of Risk/Frequency of Follow-up: low(f/u 1x/week)     Assessment and Plan    Nutrition Problem  Inadequate energy intake     Related to (etiology):   NPO     Signs and Symptoms (as evidenced by):   Pt receiving < 75% EEN and EPN      Interventions(treatment strategy):  Collaboration of care with providers     Nutrition Diagnosis Status:   Resolved            Monitor and Evaluation    Food and Nutrient Intake: energy intake, food and beverage intake, enteral nutrition intake  Food and Nutrient Adminstration: diet order, enteral and parenteral nutrition administration  Anthropometric Measurements: weight, weight change, body mass index  Biochemical Data, Medical Tests and Procedures: electrolyte and renal panel, inflammatory profile, gastrointestinal profile, glucose/endocrine profile, lipid profile  Nutrition-Focused Physical Findings: overall appearance     Nutrition Follow-Up    RD Follow-up?: Yes

## 2020-02-11 NOTE — PROGRESS NOTES
Pt transported to CT x 2 RNs x 1 RT. Pt on portable vent and monitor. VSS during transport    0130 - Pt back in room and on monitor. VSS. Will continue to monitor.

## 2020-02-11 NOTE — SUBJECTIVE & OBJECTIVE
Interval History:  >4 elements OR status of 3 inpatient conditions    Review of Systems   Unable to perform ROS: Mental status change     2 systems OR Unable to obtain a complete ROS due to level of consciousness.  Objective:     Vitals:  Temp: 98.5 °F (36.9 °C)  Pulse: 61  Rhythm: normal sinus rhythm, sinus bradycardia  BP: (!) 119/56  MAP (mmHg): 81  Resp: 13  SpO2: 99 %  Oxygen Concentration (%): 50  O2 Device (Oxygen Therapy): ventilator  Vent Mode: SIMV  Set Rate: 16 BPM  Pressure Support: 10 cmH20  PEEP/CPAP: 10 cmH20  Peak Airway Pressure: 31 cmH2O  Mean Airway Pressure: 15 cmH20  Plateau Pressure: 0 cmH20    Temp  Min: 98.1 °F (36.7 °C)  Max: 98.6 °F (37 °C)  Pulse  Min: 52  Max: 88  BP  Min: 107/55  Max: 142/63  MAP (mmHg)  Min: 75  Max: 91  Resp  Min: 13  Max: 31  SpO2  Min: 93 %  Max: 100 %  Oxygen Concentration (%)  Min: 40  Max: 50    02/10 0701 - 02/11 0700  In: 3994.6 [I.V.:444.6]  Out: 3580 [Urine:3580]   Unmeasured Output  Urine Occurrence: 1  Stool Occurrence: 1  Pad Count: 1       Physical Exam    Constitutional: No apparent distress.   Eyes: Conjunctiva clear, anicteric. Lids no lesions. Small abrasion under left eye   Head/Ears/Nose/Mouth/Throat/Neck: Moist mucous membranes. External ears, nose atraumatic.   Cardiovascular: Regular rhythm. ESM  Respiratory: Diminished breathing sounds bilateral   Gastrointestinal: No hernia. Soft, nondistended, nontender. + bowel sounds.      Neurologic Examination:    -Mental Status: Open eyes to voice. Doesn't follow commands   -Cranial nerves: Pupils equal, round, and reactive bilateral. Extraocular movements intact with VOR. Intact corneal reflexes bilateral, intact cough reflex -Motor: Moves all ext spon     Medications:  Continuous    heparin (porcine) in D5W Last Rate: 20 Units/kg/hr (02/11/20 1002)   Scheduled    atorvastatin 40 mg QHS   chlorhexidine 15 mL BID   escitalopram oxalate 20 mg Daily   famotidine (PF) 20 mg BID   furosemide 60 mg Once    hydrocortisone sodium succinate 50 mg Q8H   levetiracetam IVPB 1,000 mg Q12H   piperacillin-tazobactam (ZOSYN) IVPB 4.5 g Q8H   psyllium husk (aspartame) 1 packet Daily   senna-docusate 8.6-50 mg 1 tablet BID   vancomycin (VANCOCIN) IVPB 750 mg Q12H   PRN    acetaminophen 650 mg Q6H PRN   Dextrose 10% Bolus 12.5 g PRN   fentaNYL 25 mcg Q2H PRN   glucagon (human recombinant) 1 mg PRN   insulin aspart U-100 1-10 Units Q6H PRN   labetalol 10 mg Q4H PRN   lorazepam 2 mg Q1H PRN   magnesium oxide 800 mg PRN   magnesium oxide 800 mg PRN   ondansetron 4 mg Q8H PRN   potassium chloride 10% 40 mEq PRN   potassium chloride 10% 40 mEq PRN   potassium chloride 10% 60 mEq PRN   potassium, sodium phosphates 2 packet PRN   potassium, sodium phosphates 2 packet PRN   potassium, sodium phosphates 2 packet PRN   sodium chloride 0.9% 10 mL PRN     Today I personally reviewed pertinent medications, lines/drains/airways, imaging, cardiology results, laboratory results, microbiology results, notably:    Diet  Diet NPO  Diet NPO

## 2020-02-11 NOTE — PLAN OF CARE
Patient intubated on vent.  Not medically stable for discharge.         02/11/20 1135   Discharge Reassessment   Assessment Type Discharge Planning Reassessment   Provided patient/caregiver education on the expected discharge date and the discharge plan Yes   Do you have any problems affording any of your prescribed medications? No   Discharge Plan A Long-term acute care facility (LTAC)   Discharge Plan B Rehab   DME Needed Upon Discharge  other (see comments)  (tbd)   Patient choice form signed by patient/caregiver N/A   Anticipated Discharge Disposition LTAC   Can the patient/caregiver answer the patient profile reliably? No, cognitively impaired   Describe the patient's ability to walk at the present time. Does not walk or unable to take any steps at all   How often would a person be available to care for the patient? Whenever needed   Number of comorbid conditions (as recorded on the chart) Three   Post-Acute Status   Post-Acute Authorization Placement   Post-Acute Placement Status Awaiting Internal Medical Clearance   Discharge Delays None known at this time       Stefanie Ponce RN, CCRN-K, [facility]  Neuro-Critical Care   [phone]

## 2020-02-11 NOTE — PROGRESS NOTES
NCC team notified that pt continues to desat to 91-92% after suctioning and lavage. Verbal order for RT to increase O2.  Will continue to monitor.

## 2020-02-11 NOTE — ASSESSMENT & PLAN NOTE
- Incidentally seen on MRI. Cont current secondary prevention measures.   - CTA head and neck with no LVO

## 2020-02-11 NOTE — PLAN OF CARE
POC reviewed with pt, wife, and daughters at 1200 and 1600. Pt verbalized understanding. Questions and concerns addressed. No acute events today. Pt progressing toward goals. Will continue to monitor. See flowsheets for full assessment and VS info.       -still requiring restraints to prevent self extubation/line removal  -pt agitated at times, requires much consoling  -tolerating tube feeds except liquid stool, added psyllium today to bulk  -60mg lasix given to help aeration  -heparin gtt @ 20u all day, aptt therapeutic  -wound care consulted for bottom and scrotum (redness from incontinence)

## 2020-02-11 NOTE — PLAN OF CARE
POC reviewed with pt at 0430. Pt unable to verbalize understanding. No acute events overnight. Pt progressing toward goals. Will continue to monitor. See flowsheets for full assessment and VS info     Increased vent settings due to O2 89-92%  CTA completed  Heparin gtt titrated per nomogram  AM labs collected. Replacing K  BL soft wrist restraints in place

## 2020-02-11 NOTE — PROGRESS NOTES
[facility]  Neurocritical Care  Progress Note    Admit Date: 2/2/2020  Service Date: 02/11/2020  Length of Stay: 9    Subjective:     Chief Complaint: Seizure    History of Present Illness: Pt is a 64 y.o. Male with PMHx of of DVT, PE, DM, and seizures (on 1500 keppra at home) who presents to the ED via EMS with complaint of a seizure that occurred this morning. Pt has long standing history of seizures and was found down by wife on cement around 0800, LKN 0700. Patient compliant with keppra per wife and last seizure was in November. At that time his keppra dose was increased. Patient has had cough recently but no other symptoms. Patient is on AC for DVT/PE and was recently switched from Coumadin to Eliquis. He was given 10 mg Versed during EMS ride to outside facility for continued seizure activity. He was unresponsive upon arrival to ED and subsequently intubated. CTH without any acute changes, CT max/face with multiple facial fractures, and CXR with consolidation and atelectasis/collapsed lung. Patient transferred to [facility] to higher level neurologic monitoring and continuous EEG.     Hospital Course: 2/2/2020: Admit to [facility]. GS and ENT consulted. MX facial fractures and concerns of bowel ischemia on CT  2/3 will rehook to EEG for 24h. Remains NPO. US LE pending. Monitor lactic [id number] 2L  IV. Wean levophed as tolerated after bolus  2/4: remains on small amount of pressors, cultures remain negative, cont abx, cont current AEDs, EEG overnight negative for seizure activity, repeat ct abdomen today  2/5: minimal dose pressors, advance TFs. Tolerating SBT, possible trial of extubation in am  2/6: need for pressors will likely cease when able to come off sedation, currently still requires mechanical ventilation with pO2 value of 60 on 40% fiO2 and ps 10  02/08/2020 exam still poor, getting MRI brain   02/09/2020 Not tolerating CPAP, placed on AC   02/10/2020 NAEO  02/11/2020 NAEO    Interval History:   >4 elements OR status of 3 inpatient conditions    Review of Systems   Unable to perform ROS: Mental status change     2 systems OR Unable to obtain a complete ROS due to level of consciousness.  Objective:     Vitals:  Temp: 98.5 °F (36.9 °C)  Pulse: 61  Rhythm: normal sinus rhythm, sinus bradycardia  BP: (!) 119/56  MAP (mmHg): 81  Resp: 13  SpO2: 99 %  Oxygen Concentration (%): 50  O2 Device (Oxygen Therapy): ventilator  Vent Mode: SIMV  Set Rate: 16 BPM  Pressure Support: 10 cmH20  PEEP/CPAP: 10 cmH20  Peak Airway Pressure: 31 cmH2O  Mean Airway Pressure: 15 cmH20  Plateau Pressure: 0 cmH20    Temp  Min: 98.1 °F (36.7 °C)  Max: 98.6 °F (37 °C)  Pulse  Min: 52  Max: 88  BP  Min: 107/55  Max: 142/63  MAP (mmHg)  Min: 75  Max: 91  Resp  Min: 13  Max: 31  SpO2  Min: 93 %  Max: 100 %  Oxygen Concentration (%)  Min: 40  Max: 50    02/10 0701 - 02/11 0700  In: 3994.6 [I.V.:444.6]  Out: 3580 [Urine:3580]   Unmeasured Output  Urine Occurrence: 1  Stool Occurrence: 1  Pad Count: 1       Physical Exam    Constitutional: No apparent distress.   Eyes: Conjunctiva clear, anicteric. Lids no lesions. Small abrasion under left eye   Head/Ears/Nose/Mouth/Throat/Neck: Moist mucous membranes. External ears, nose atraumatic.   Cardiovascular: Regular rhythm. ESM  Respiratory: Diminished breathing sounds bilateral   Gastrointestinal: No hernia. Soft, nondistended, nontender. + bowel sounds.      Neurologic Examination:    -Mental Status: Open eyes to voice. Doesn't follow commands   -Cranial nerves: Pupils equal, round, and reactive bilateral. Extraocular movements intact with VOR. Intact corneal reflexes bilateral, intact cough reflex -Motor: Moves all ext spon     Medications:  Continuous    heparin (porcine) in D5W Last Rate: 20 Units/kg/hr (02/11/20 1002)   Scheduled    atorvastatin 40 mg QHS   chlorhexidine 15 mL BID   escitalopram oxalate 20 mg Daily   famotidine (PF) 20 mg BID   furosemide 60 mg Once   hydrocortisone sodium  succinate 50 mg Q8H   levetiracetam IVPB 1,000 mg Q12H   piperacillin-tazobactam (ZOSYN) IVPB 4.5 g Q8H   psyllium husk (aspartame) 1 packet Daily   senna-docusate 8.6-50 mg 1 tablet BID   vancomycin (VANCOCIN) IVPB 750 mg Q12H   PRN    acetaminophen 650 mg Q6H PRN   Dextrose 10% Bolus 12.5 g PRN   fentaNYL 25 mcg Q2H PRN   glucagon (human recombinant) 1 mg PRN   insulin aspart U-100 1-10 Units Q6H PRN   labetalol 10 mg Q4H PRN   lorazepam 2 mg Q1H PRN   magnesium oxide 800 mg PRN   magnesium oxide 800 mg PRN   ondansetron 4 mg Q8H PRN   potassium chloride 10% 40 mEq PRN   potassium chloride 10% 40 mEq PRN   potassium chloride 10% 60 mEq PRN   potassium, sodium phosphates 2 packet PRN   potassium, sodium phosphates 2 packet PRN   potassium, sodium phosphates 2 packet PRN   sodium chloride 0.9% 10 mL PRN     Today I personally reviewed pertinent medications, lines/drains/airways, imaging, cardiology results, laboratory results, microbiology results, notably:    Diet  Diet NPO  Diet NPO        Assessment/Plan:     Neuro  * Seizure  Con keppra      Stroke  - Incidentally seen on MRI. Cont current secondary prevention measures.   - CTA head and neck with no LVO    Psychiatric  Depression with anxiety  Continue escitalopram 20mg daily    Pulmonary  Aspiration pneumonia  Con abx and f/u cx    Acute respiratory failure with hypoxia  Wean vent as tolerated  Daily CXR and ABG  Diuresis     Hematology  History of DVT (deep vein thrombosis)   heparin gtt       Orthopedic  Closed extensive facial fractures  ENT recs          The patient is being Prophylaxed for:  Venous Thromboembolism with: Mechanical or Chemical  Stress Ulcer with: H2B  Ventilator Pneumonia with: chlorhexidine oral care    Activity Orders          Diet NPO: NPO starting at 02/02 1925        Full Code    Uninterrupted Critical Care/Counseling Time (not including procedures): 35 minutes     Lisa Wyatt MD  Neurocritical Care  [facility]  [place]

## 2020-02-11 NOTE — PLAN OF CARE
POC reviewed with pt, spouse, and daughters at 1000 and 1600. Pt intubated, unable to test understanding; wife and daughters verbalized understanding. Questions and concerns addressed. No acute events today. Pt progressing toward goals. Will continue to monitor. See flowsheets for full assessment and VS info.       -plan for CTA today  -tolerating tube feedings  -large liquid bowel movements, applied FMS  -40mg lasix given, pt tolerated well - diuresed ~2.5L

## 2020-02-12 LAB
ALBUMIN SERPL BCP-MCNC: 3.1 G/DL (ref 3.5–5.2)
ALLENS TEST: ABNORMAL
ALP SERPL-CCNC: 44 U/L (ref 55–135)
ALT SERPL W/O P-5'-P-CCNC: 21 U/L (ref 10–44)
ANION GAP SERPL CALC-SCNC: 8 MMOL/L (ref 8–16)
APTT BLDCRRT: 47.2 SEC (ref 21–32)
AST SERPL-CCNC: 34 U/L (ref 10–40)
BASOPHILS # BLD AUTO: 0.02 K/UL (ref 0–0.2)
BASOPHILS NFR BLD: 0.2 % (ref 0–1.9)
BILIRUB SERPL-MCNC: 0.4 MG/DL (ref 0.1–1)
BUN SERPL-MCNC: 27 MG/DL (ref 8–23)
CALCIUM SERPL-MCNC: 8.6 MG/DL (ref 8.7–10.5)
CHLORIDE SERPL-SCNC: 105 MMOL/L (ref 95–110)
CO2 SERPL-SCNC: 32 MMOL/L (ref 23–29)
CREAT SERPL-MCNC: 1.1 MG/DL (ref 0.5–1.4)
DELSYS: ABNORMAL
DIFFERENTIAL METHOD: ABNORMAL
EOSINOPHIL # BLD AUTO: 0 K/UL (ref 0–0.5)
EOSINOPHIL NFR BLD: 0 % (ref 0–8)
ERYTHROCYTE [DISTWIDTH] IN BLOOD BY AUTOMATED COUNT: 15.3 % (ref 11.5–14.5)
ERYTHROCYTE [SEDIMENTATION RATE] IN BLOOD BY WESTERGREN METHOD: 16 MM/H
EST. GFR  (AFRICAN AMERICAN): >60 ML/MIN/1.73 M^2
EST. GFR  (NON AFRICAN AMERICAN): >60 ML/MIN/1.73 M^2
FIO2: 50
GLUCOSE SERPL-MCNC: 156 MG/DL (ref 70–110)
HCO3 UR-SCNC: 38.5 MMOL/L (ref 24–28)
HCT VFR BLD AUTO: 30.6 % (ref 40–54)
HGB BLD-MCNC: 9.5 G/DL (ref 14–18)
IMM GRANULOCYTES # BLD AUTO: 0.1 K/UL (ref 0–0.04)
IMM GRANULOCYTES NFR BLD AUTO: 0.9 % (ref 0–0.5)
LYMPHOCYTES # BLD AUTO: 0.8 K/UL (ref 1–4.8)
LYMPHOCYTES NFR BLD: 6.9 % (ref 18–48)
MAGNESIUM SERPL-MCNC: 2.2 MG/DL (ref 1.6–2.6)
MCH RBC QN AUTO: 31.3 PG (ref 27–31)
MCHC RBC AUTO-ENTMCNC: 31 G/DL (ref 32–36)
MCV RBC AUTO: 101 FL (ref 82–98)
MODE: ABNORMAL
MONOCYTES # BLD AUTO: 0.9 K/UL (ref 0.3–1)
MONOCYTES NFR BLD: 8.2 % (ref 4–15)
NEUTROPHILS # BLD AUTO: 9.5 K/UL (ref 1.8–7.7)
NEUTROPHILS NFR BLD: 83.8 % (ref 38–73)
NRBC BLD-RTO: 0 /100 WBC
PCO2 BLDA: 43.7 MMHG (ref 35–45)
PEEP: 10
PH SMN: 7.55 [PH] (ref 7.35–7.45)
PHOSPHATE SERPL-MCNC: 3.9 MG/DL (ref 2.7–4.5)
PIP: 27
PLATELET # BLD AUTO: 288 K/UL (ref 150–350)
PMV BLD AUTO: 12.9 FL (ref 9.2–12.9)
PO2 BLDA: 122 MMHG (ref 80–100)
POC BE: 16 MMOL/L
POC SATURATED O2: 99 % (ref 95–100)
POC TCO2: 40 MMOL/L (ref 23–27)
POCT GLUCOSE: 110 MG/DL (ref 70–110)
POCT GLUCOSE: 129 MG/DL (ref 70–110)
POTASSIUM SERPL-SCNC: 4 MMOL/L (ref 3.5–5.1)
PROT SERPL-MCNC: 6.5 G/DL (ref 6–8.4)
PS: 10
RBC # BLD AUTO: 3.04 M/UL (ref 4.6–6.2)
SAMPLE: ABNORMAL
SITE: ABNORMAL
SODIUM SERPL-SCNC: 145 MMOL/L (ref 136–145)
SP02: 95
VT: 320
WBC # BLD AUTO: 11.27 K/UL (ref 3.9–12.7)

## 2020-02-12 PROCEDURE — 36600 WITHDRAWAL OF ARTERIAL BLOOD: CPT | Mod: HCNC

## 2020-02-12 PROCEDURE — 20000000 HC ICU ROOM: Mod: HCNC

## 2020-02-12 PROCEDURE — 27200966 HC CLOSED SUCTION SYSTEM: Mod: HCNC

## 2020-02-12 PROCEDURE — 84100 ASSAY OF PHOSPHORUS: CPT | Mod: HCNC

## 2020-02-12 PROCEDURE — 99291 PR CRITICAL CARE, E/M 30-74 MINUTES: ICD-10-PCS | Mod: HCNC,GC,, | Performed by: PSYCHIATRY & NEUROLOGY

## 2020-02-12 PROCEDURE — 94003 VENT MGMT INPAT SUBQ DAY: CPT | Mod: HCNC

## 2020-02-12 PROCEDURE — 85730 THROMBOPLASTIN TIME PARTIAL: CPT | Mod: HCNC

## 2020-02-12 PROCEDURE — 80053 COMPREHEN METABOLIC PANEL: CPT | Mod: HCNC

## 2020-02-12 PROCEDURE — 82803 BLOOD GASES ANY COMBINATION: CPT | Mod: HCNC

## 2020-02-12 PROCEDURE — 25000003 PHARM REV CODE 250: Mod: HCNC | Performed by: PHYSICIAN ASSISTANT

## 2020-02-12 PROCEDURE — 99291 CRITICAL CARE FIRST HOUR: CPT | Mod: HCNC,GC,, | Performed by: PSYCHIATRY & NEUROLOGY

## 2020-02-12 PROCEDURE — 63600175 PHARM REV CODE 636 W HCPCS: Mod: HCNC | Performed by: STUDENT IN AN ORGANIZED HEALTH CARE EDUCATION/TRAINING PROGRAM

## 2020-02-12 PROCEDURE — 94761 N-INVAS EAR/PLS OXIMETRY MLT: CPT | Mod: HCNC

## 2020-02-12 PROCEDURE — 27000221 HC OXYGEN, UP TO 24 HOURS: Mod: HCNC

## 2020-02-12 PROCEDURE — S0028 INJECTION, FAMOTIDINE, 20 MG: HCPCS | Mod: HCNC | Performed by: PHYSICIAN ASSISTANT

## 2020-02-12 PROCEDURE — 25000003 PHARM REV CODE 250: Mod: HCNC | Performed by: NURSE PRACTITIONER

## 2020-02-12 PROCEDURE — 63600175 PHARM REV CODE 636 W HCPCS: Mod: HCNC | Performed by: NURSE PRACTITIONER

## 2020-02-12 PROCEDURE — 25000003 PHARM REV CODE 250: Mod: HCNC | Performed by: PSYCHIATRY & NEUROLOGY

## 2020-02-12 PROCEDURE — 99900035 HC TECH TIME PER 15 MIN (STAT): Mod: HCNC

## 2020-02-12 PROCEDURE — 99900026 HC AIRWAY MAINTENANCE (STAT): Mod: HCNC

## 2020-02-12 PROCEDURE — 83735 ASSAY OF MAGNESIUM: CPT | Mod: HCNC

## 2020-02-12 PROCEDURE — 85025 COMPLETE CBC W/AUTO DIFF WBC: CPT | Mod: HCNC

## 2020-02-12 PROCEDURE — 63600175 PHARM REV CODE 636 W HCPCS: Mod: HCNC | Performed by: PSYCHIATRY & NEUROLOGY

## 2020-02-12 PROCEDURE — 63700000 PHARM REV CODE 250 ALT 637 W/O HCPCS: Mod: HCNC | Performed by: NURSE PRACTITIONER

## 2020-02-12 RX ORDER — FUROSEMIDE 10 MG/ML
60 INJECTION INTRAMUSCULAR; INTRAVENOUS ONCE
Status: COMPLETED | OUTPATIENT
Start: 2020-02-12 | End: 2020-02-12

## 2020-02-12 RX ADMIN — ATORVASTATIN CALCIUM 40 MG: 20 TABLET, FILM COATED ORAL at 08:02

## 2020-02-12 RX ADMIN — FAMOTIDINE 20 MG: 10 INJECTION, SOLUTION INTRAVENOUS at 08:02

## 2020-02-12 RX ADMIN — LEVETIRACETAM 1000 MG: 10 INJECTION INTRAVENOUS at 09:02

## 2020-02-12 RX ADMIN — HYDROCORTISONE SODIUM SUCCINATE 50 MG: 100 INJECTION, POWDER, FOR SOLUTION INTRAMUSCULAR; INTRAVENOUS at 09:02

## 2020-02-12 RX ADMIN — HYDROCORTISONE SODIUM SUCCINATE 50 MG: 100 INJECTION, POWDER, FOR SOLUTION INTRAMUSCULAR; INTRAVENOUS at 02:02

## 2020-02-12 RX ADMIN — HYDROCORTISONE SODIUM SUCCINATE 50 MG: 100 INJECTION, POWDER, FOR SOLUTION INTRAMUSCULAR; INTRAVENOUS at 05:02

## 2020-02-12 RX ADMIN — PIPERACILLIN SODIUM AND TAZOBACTAM SODIUM 4.5 G: 4; .5 INJECTION, POWDER, LYOPHILIZED, FOR SOLUTION INTRAVENOUS at 03:02

## 2020-02-12 RX ADMIN — FAMOTIDINE 20 MG: 10 INJECTION, SOLUTION INTRAVENOUS at 09:02

## 2020-02-12 RX ADMIN — PSYLLIUM HUSK 1 PACKET: 3.4 POWDER ORAL at 09:02

## 2020-02-12 RX ADMIN — FUROSEMIDE 60 MG: 10 INJECTION, SOLUTION INTRAMUSCULAR; INTRAVENOUS at 10:02

## 2020-02-12 RX ADMIN — CHLORHEXIDINE GLUCONATE 0.12% ORAL RINSE 15 ML: 1.2 LIQUID ORAL at 09:02

## 2020-02-12 RX ADMIN — ESCITALOPRAM OXALATE 20 MG: 10 TABLET ORAL at 09:02

## 2020-02-12 RX ADMIN — LEVETIRACETAM 1000 MG: 10 INJECTION INTRAVENOUS at 08:02

## 2020-02-12 RX ADMIN — CHLORHEXIDINE GLUCONATE 0.12% ORAL RINSE 15 ML: 1.2 LIQUID ORAL at 08:02

## 2020-02-12 NOTE — SUBJECTIVE & OBJECTIVE
Interval History:  >4 elements OR status of 3 inpatient conditions    Review of Systems   Unable to perform ROS: Mental status change     2 systems OR Unable to obtain a complete ROS due to level of consciousness.  Objective:     Vitals:  Temp: 98.6 °F (37 °C)  Pulse: (!) 58  Rhythm: normal sinus rhythm, sinus bradycardia  BP: (!) 114/56  MAP (mmHg): 80  Resp: 16  SpO2: (!) 93 %  Oxygen Concentration (%): 50  O2 Device (Oxygen Therapy): ventilator  Vent Mode: SIMV  Set Rate: 16 BPM  Pressure Support: 10 cmH20  PEEP/CPAP: 10 cmH20  Peak Airway Pressure: 27 cmH2O  Mean Airway Pressure: 15 cmH20  Plateau Pressure: 0 cmH20    Temp  Min: 98.3 °F (36.8 °C)  Max: 99.4 °F (37.4 °C)  Pulse  Min: 54  Max: 85  BP  Min: 103/55  Max: 138/64  MAP (mmHg)  Min: 77  Max: 93  Resp  Min: 8  Max: 20  SpO2  Min: 91 %  Max: 99 %  Oxygen Concentration (%)  Min: 50  Max: 50    02/11 0701 - 02/12 0700  In: 2631.4 [I.V.:396.4]  Out: 2950 [Urine:2950]   Unmeasured Output  Urine Occurrence: 1  Stool Occurrence: 1  Pad Count: 1       Physical Exam    Constitutional: No apparent distress.   Eyes: Conjunctiva clear, anicteric. Lids no lesions. Small abrasion under left eye   Head/Ears/Nose/Mouth/Throat/Neck: Moist mucous membranes. External ears, nose atraumatic.   Cardiovascular: Regular rhythm. ESM  Respiratory: Diminished breathing sounds bilateral   Gastrointestinal: No hernia. Soft, nondistended, nontender. + bowel sounds.      Neurologic Examination:    -Mental Status: Open eyes to voice. Follows simple commands intermittently   -Cranial nerves: Pupils equal, round, and reactive bilateral. Extraocular movements intact with VOR. Intact corneal reflexes bilateral, intact cough reflex -Motor: Moves all ext spon     Medications:  Continuous    heparin (porcine) in D5W Last Rate: 20 Units/kg/hr (02/12/20 0802)   Scheduled    atorvastatin 40 mg QHS   chlorhexidine 15 mL BID   escitalopram oxalate 20 mg Daily   famotidine (PF) 20 mg BID    hydrocortisone sodium succinate 50 mg Q8H   levetiracetam IVPB 1,000 mg Q12H   senna-docusate 8.6-50 mg 1 tablet BID   PRN    acetaminophen 650 mg Q6H PRN   Dextrose 10% Bolus 12.5 g PRN   fentaNYL 25 mcg Q2H PRN   glucagon (human recombinant) 1 mg PRN   insulin aspart U-100 1-10 Units Q6H PRN   labetalol 10 mg Q4H PRN   lorazepam 2 mg Q1H PRN   magnesium oxide 800 mg PRN   magnesium oxide 800 mg PRN   ondansetron 4 mg Q8H PRN   potassium chloride 10% 40 mEq PRN   potassium chloride 10% 40 mEq PRN   potassium chloride 10% 60 mEq PRN   potassium, sodium phosphates 2 packet PRN   potassium, sodium phosphates 2 packet PRN   potassium, sodium phosphates 2 packet PRN   sodium chloride 0.9% 10 mL PRN     Today I personally reviewed pertinent medications, lines/drains/airways, imaging, cardiology results, laboratory results, microbiology results, notably:    Diet  Diet NPO  Diet NPO

## 2020-02-12 NOTE — PROGRESS NOTES
[facility]  Neurocritical Care  Progress Note    Admit Date: 2/2/2020  Service Date: 02/12/2020  Length of Stay: 10    Subjective:     Chief Complaint: Seizure    History of Present Illness: Pt is a 64 y.o. Male with PMHx of of DVT, PE, DM, and seizures (on 1500 keppra at home) who presents to the ED via EMS with complaint of a seizure that occurred this morning. Pt has long standing history of seizures and was found down by wife on cement around 0800, LKN 0700. Patient compliant with keppra per wife and last seizure was in November. At that time his keppra dose was increased. Patient has had cough recently but no other symptoms. Patient is on AC for DVT/PE and was recently switched from Coumadin to Eliquis. He was given 10 mg Versed during EMS ride to outside facility for continued seizure activity. He was unresponsive upon arrival to ED and subsequently intubated. CTH without any acute changes, CT max/face with multiple facial fractures, and CXR with consolidation and atelectasis/collapsed lung. Patient transferred to [facility] to higher level neurologic monitoring and continuous EEG.     Hospital Course: 2/2/2020: Admit to [facility]. GS and ENT consulted. MX facial fractures and concerns of bowel ischemia on CT  2/3 will rehook to EEG for 24h. Remains NPO. US LE pending. Monitor lactic [id number] 2L  IV. Wean levophed as tolerated after bolus  2/4: remains on small amount of pressors, cultures remain negative, cont abx, cont current AEDs, EEG overnight negative for seizure activity, repeat ct abdomen today  2/5: minimal dose pressors, advance TFs. Tolerating SBT, possible trial of extubation in am  2/6: need for pressors will likely cease when able to come off sedation, currently still requires mechanical ventilation with pO2 value of 60 on 40% fiO2 and ps 10  02/08/2020 exam still poor, getting MRI brain   02/09/2020 Not tolerating CPAP, placed on AC   02/10/2020 NAEO  02/11/2020 NAEO  02/12/2020 CPAP  trial this am     Interval History:  >4 elements OR status of 3 inpatient conditions    Review of Systems   Unable to perform ROS: Mental status change     2 systems OR Unable to obtain a complete ROS due to level of consciousness.  Objective:     Vitals:  Temp: 98.6 °F (37 °C)  Pulse: (!) 58  Rhythm: normal sinus rhythm, sinus bradycardia  BP: (!) 114/56  MAP (mmHg): 80  Resp: 16  SpO2: (!) 93 %  Oxygen Concentration (%): 50  O2 Device (Oxygen Therapy): ventilator  Vent Mode: SIMV  Set Rate: 16 BPM  Pressure Support: 10 cmH20  PEEP/CPAP: 10 cmH20  Peak Airway Pressure: 27 cmH2O  Mean Airway Pressure: 15 cmH20  Plateau Pressure: 0 cmH20    Temp  Min: 98.3 °F (36.8 °C)  Max: 99.4 °F (37.4 °C)  Pulse  Min: 54  Max: 85  BP  Min: 103/55  Max: 138/64  MAP (mmHg)  Min: 77  Max: 93  Resp  Min: 8  Max: 20  SpO2  Min: 91 %  Max: 99 %  Oxygen Concentration (%)  Min: 50  Max: 50    02/11 0701 - 02/12 0700  In: 2631.4 [I.V.:396.4]  Out: 2950 [Urine:2950]   Unmeasured Output  Urine Occurrence: 1  Stool Occurrence: 1  Pad Count: 1       Physical Exam    Constitutional: No apparent distress.   Eyes: Conjunctiva clear, anicteric. Lids no lesions. Small abrasion under left eye   Head/Ears/Nose/Mouth/Throat/Neck: Moist mucous membranes. External ears, nose atraumatic.   Cardiovascular: Regular rhythm. ESM  Respiratory: Diminished breathing sounds bilateral   Gastrointestinal: No hernia. Soft, nondistended, nontender. + bowel sounds.      Neurologic Examination:    -Mental Status: Open eyes to voice. Follows simple commands intermittently   -Cranial nerves: Pupils equal, round, and reactive bilateral. Extraocular movements intact with VOR. Intact corneal reflexes bilateral, intact cough reflex -Motor: Moves all ext spon     Medications:  Continuous    heparin (porcine) in D5W Last Rate: 20 Units/kg/hr (02/12/20 0802)   Scheduled    atorvastatin 40 mg QHS   chlorhexidine 15 mL BID   escitalopram oxalate 20 mg Daily   famotidine (PF) 20  mg BID   hydrocortisone sodium succinate 50 mg Q8H   levetiracetam IVPB 1,000 mg Q12H   senna-docusate 8.6-50 mg 1 tablet BID   PRN    acetaminophen 650 mg Q6H PRN   Dextrose 10% Bolus 12.5 g PRN   fentaNYL 25 mcg Q2H PRN   glucagon (human recombinant) 1 mg PRN   insulin aspart U-100 1-10 Units Q6H PRN   labetalol 10 mg Q4H PRN   lorazepam 2 mg Q1H PRN   magnesium oxide 800 mg PRN   magnesium oxide 800 mg PRN   ondansetron 4 mg Q8H PRN   potassium chloride 10% 40 mEq PRN   potassium chloride 10% 40 mEq PRN   potassium chloride 10% 60 mEq PRN   potassium, sodium phosphates 2 packet PRN   potassium, sodium phosphates 2 packet PRN   potassium, sodium phosphates 2 packet PRN   sodium chloride 0.9% 10 mL PRN     Today I personally reviewed pertinent medications, lines/drains/airways, imaging, cardiology results, laboratory results, microbiology results, notably:    Diet  Diet NPO  Diet NPO        Assessment/Plan:     Neuro  * Seizure  Con keppra      Stroke  - Incidentally seen on MRI. Cont current secondary prevention measures.   - CTA head and neck with no LVO    Psychiatric  Depression with anxiety  Continue escitalopram 20mg daily    Derm  Alteration in skin integrity  Wound care     Pulmonary  Aspiration pneumonia  All cx are negative, dc abx     Acute respiratory failure with hypoxia  Wean vent as tolerated, CPAP trial   Daily CXR and ABG  Diuresis     Hematology  History of DVT (deep vein thrombosis)   heparin gtt       Orthopedic  Closed extensive facial fractures  ENT recs          The patient is being Prophylaxed for:  Venous Thromboembolism with: Mechanical or Chemical  Stress Ulcer with: H2B  Ventilator Pneumonia with: chlorhexidine oral care    Activity Orders          Diet NPO: NPO starting at 02/02 1925        Full Code    Uninterrupted Critical Care/Counseling Time (not including procedures): 35 minutes     Lisa Wyatt MD  Neurocritical Care  [facility]

## 2020-02-12 NOTE — CONSULTS
Therapy with vancomycin complete and/or consult discontinued by provider.  Pharmacy will sign off, please re-consult as needed.        Melissa Scherer, PharmD, [facility]  Neurocritical Care Pharmacist  [phone]

## 2020-02-12 NOTE — PLAN OF CARE
POC reviewed with pt and family at 1200. Pt unable to VU, daughter at bedside updated and verbalized understanding. Questions and concerns addressed. No acute events today. Pt progressing toward goals. Will continue to monitor. See flowsheets for full assessment and VS info.       -pt on spontaneous all shift, tolerating well; copious secretions  -60mg lasix given d/t lungs/congestion  -incontinence care provided multiple times today.  -heparin continues at 20units/kg/min  -afebrile  -tolerating tube feeds, no residuals

## 2020-02-12 NOTE — PLAN OF CARE
A: Awake    RASS: Goal - RASS Goal: 0-->alert and calm  Actual - RASS (Muse Agitation-Sedation Scale): -3-->moderate sedation   Restraint necessity: Clinical Justification: Removing medical devices, Treatment Interference  B: Breath   SBT: Not attempted   C: Coordinate A & B, analgesics/sedatives   Pain: managed    SAT: Not attempted  D: Delirium   CAM-ICU: Overall CAM-ICU: Positive  E: Early Mobility   MOVE Screen: Pass   Activity: Activity Management: activity clustered for rest period  FAS: Feeding/Nutrition   Diet order: Diet/Nutrition Received: tube feeding, Specialty Diet/Nutrition Received: other (see comments)(impact peptide) Fluid restriction:    T: Thrombus   DVT prophylaxis: VTE Required Core Measure: Pharmacological prophylaxis initiated/maintained, (SCDs) Sequential compression device initiated/maintained  H: HOB Elevation   Head of Bed (HOB): HOB at 30-45 degrees  U: Ulcer Prophylaxis   GI: yes  G: Glucose control   managed    S: Skin   Bundle compliance: yes   Bathing/Skin Care: bath, chlorhexidine, dressed/undressed, incontinence care, linen changed Date: 2/12/2020  At 0501 night shift  B: Bowel Function   no issues   I: Indwelling Catheters   Wheeler necessity: [REMOVED]      Urethral Catheter 02/04/20 0031 Temperature probe-Reason for Continuing Urinary Catheterization: Urinary retention   CVC necessity: No   IPAD offered: Not appropriate  D: De-escalation Antibx   Yes  Plan for the day   Wean off vent to extubated  Family/Goals of care/Code Status   Code Status: Full Code     No acute events overnight. Patient progressing towards goals as tolerated, plan of care communicated and reviewed with Edu Choi. All concerns addressed. Will continue to monitor.   Temp:  [98.3 °F (36.8 °C)-99.4 °F (37.4 °C)]   Pulse:  [54-77]   Resp:  [8-20]   BP: (113-138)/(54-66)   SpO2:  [91 %-99 %]    I/O this shift:  In: 1360.8 [I.V.:150.8; NG/GT:660; IV Piggyback:550]  Out: 550 [Urine:550]

## 2020-02-13 ENCOUNTER — PATIENT OUTREACH (OUTPATIENT)
Dept: ADMINISTRATIVE | Facility: HOSPITAL | Age: 65
End: 2020-02-13

## 2020-02-13 LAB
ALBUMIN SERPL BCP-MCNC: 3.1 G/DL (ref 3.5–5.2)
ALLENS TEST: ABNORMAL
ALP SERPL-CCNC: 46 U/L (ref 55–135)
ALT SERPL W/O P-5'-P-CCNC: 21 U/L (ref 10–44)
ANION GAP SERPL CALC-SCNC: 8 MMOL/L (ref 8–16)
APTT BLDCRRT: 38.7 SEC (ref 21–32)
APTT BLDCRRT: 44.5 SEC (ref 21–32)
APTT BLDCRRT: 48.5 SEC (ref 21–32)
AST SERPL-CCNC: 30 U/L (ref 10–40)
BASOPHILS # BLD AUTO: 0.02 K/UL (ref 0–0.2)
BASOPHILS NFR BLD: 0.2 % (ref 0–1.9)
BILIRUB SERPL-MCNC: 0.4 MG/DL (ref 0.1–1)
BUN SERPL-MCNC: 29 MG/DL (ref 8–23)
CALCIUM SERPL-MCNC: 8.8 MG/DL (ref 8.7–10.5)
CHLORIDE SERPL-SCNC: 105 MMOL/L (ref 95–110)
CO2 SERPL-SCNC: 33 MMOL/L (ref 23–29)
CREAT SERPL-MCNC: 1 MG/DL (ref 0.5–1.4)
DELSYS: ABNORMAL
DIFFERENTIAL METHOD: ABNORMAL
EOSINOPHIL # BLD AUTO: 0 K/UL (ref 0–0.5)
EOSINOPHIL NFR BLD: 0.2 % (ref 0–8)
ERYTHROCYTE [DISTWIDTH] IN BLOOD BY AUTOMATED COUNT: 15.6 % (ref 11.5–14.5)
ERYTHROCYTE [SEDIMENTATION RATE] IN BLOOD BY WESTERGREN METHOD: 16 MM/H
EST. GFR  (AFRICAN AMERICAN): >60 ML/MIN/1.73 M^2
EST. GFR  (NON AFRICAN AMERICAN): >60 ML/MIN/1.73 M^2
FIO2: 55
GLUCOSE SERPL-MCNC: 149 MG/DL (ref 70–110)
HCO3 UR-SCNC: 33.5 MMOL/L (ref 24–28)
HCT VFR BLD AUTO: 29.8 % (ref 40–54)
HGB BLD-MCNC: 9.3 G/DL (ref 14–18)
IMM GRANULOCYTES # BLD AUTO: 0.08 K/UL (ref 0–0.04)
IMM GRANULOCYTES NFR BLD AUTO: 0.6 % (ref 0–0.5)
IP: 17
IT: 1
LYMPHOCYTES # BLD AUTO: 0.9 K/UL (ref 1–4.8)
LYMPHOCYTES NFR BLD: 7.1 % (ref 18–48)
MAGNESIUM SERPL-MCNC: 2.2 MG/DL (ref 1.6–2.6)
MCH RBC QN AUTO: 31.7 PG (ref 27–31)
MCHC RBC AUTO-ENTMCNC: 31.2 G/DL (ref 32–36)
MCV RBC AUTO: 102 FL (ref 82–98)
MODE: ABNORMAL
MONOCYTES # BLD AUTO: 0.8 K/UL (ref 0.3–1)
MONOCYTES NFR BLD: 6.4 % (ref 4–15)
NEUTROPHILS # BLD AUTO: 10.9 K/UL (ref 1.8–7.7)
NEUTROPHILS NFR BLD: 85.5 % (ref 38–73)
NRBC BLD-RTO: 0 /100 WBC
PCO2 BLDA: 43.8 MMHG (ref 35–45)
PEEP: 8
PH SMN: 7.49 [PH] (ref 7.35–7.45)
PHOSPHATE SERPL-MCNC: 3.7 MG/DL (ref 2.7–4.5)
PLATELET # BLD AUTO: 288 K/UL (ref 150–350)
PMV BLD AUTO: 12.7 FL (ref 9.2–12.9)
PO2 BLDA: 65 MMHG (ref 80–100)
POC BE: 10 MMOL/L
POC SATURATED O2: 94 % (ref 95–100)
POC TCO2: 35 MMOL/L (ref 23–27)
POCT GLUCOSE: 139 MG/DL (ref 70–110)
POCT GLUCOSE: 139 MG/DL (ref 70–110)
POCT GLUCOSE: 143 MG/DL (ref 70–110)
POCT GLUCOSE: 156 MG/DL (ref 70–110)
POTASSIUM SERPL-SCNC: 3.4 MMOL/L (ref 3.5–5.1)
POTASSIUM SERPL-SCNC: 3.5 MMOL/L (ref 3.5–5.1)
POTASSIUM SERPL-SCNC: 3.6 MMOL/L (ref 3.5–5.1)
PROT SERPL-MCNC: 6.2 G/DL (ref 6–8.4)
RBC # BLD AUTO: 2.93 M/UL (ref 4.6–6.2)
SAMPLE: ABNORMAL
SITE: ABNORMAL
SODIUM SERPL-SCNC: 146 MMOL/L (ref 136–145)
SP02: 94
WBC # BLD AUTO: 12.73 K/UL (ref 3.9–12.7)

## 2020-02-13 PROCEDURE — 25000003 PHARM REV CODE 250: Mod: HCNC | Performed by: NURSE PRACTITIONER

## 2020-02-13 PROCEDURE — 99291 CRITICAL CARE FIRST HOUR: CPT | Mod: HCNC,GC,, | Performed by: PSYCHIATRY & NEUROLOGY

## 2020-02-13 PROCEDURE — 25000003 PHARM REV CODE 250: Mod: HCNC | Performed by: PSYCHIATRY & NEUROLOGY

## 2020-02-13 PROCEDURE — 82803 BLOOD GASES ANY COMBINATION: CPT | Mod: HCNC

## 2020-02-13 PROCEDURE — 82800 BLOOD PH: CPT | Mod: HCNC

## 2020-02-13 PROCEDURE — 94003 VENT MGMT INPAT SUBQ DAY: CPT | Mod: HCNC

## 2020-02-13 PROCEDURE — 63600175 PHARM REV CODE 636 W HCPCS: Mod: HCNC | Performed by: STUDENT IN AN ORGANIZED HEALTH CARE EDUCATION/TRAINING PROGRAM

## 2020-02-13 PROCEDURE — 95720 PR EEG, W/VIDEO, CONT RECORD, I&R, >12<26 HRS: ICD-10-PCS | Mod: HCNC,,, | Performed by: PSYCHIATRY & NEUROLOGY

## 2020-02-13 PROCEDURE — 25000242 PHARM REV CODE 250 ALT 637 W/ HCPCS: Mod: HCNC | Performed by: NURSE PRACTITIONER

## 2020-02-13 PROCEDURE — 95714 VEEG EA 12-26 HR UNMNTR: CPT | Mod: HCNC

## 2020-02-13 PROCEDURE — 94761 N-INVAS EAR/PLS OXIMETRY MLT: CPT | Mod: HCNC

## 2020-02-13 PROCEDURE — 63600175 PHARM REV CODE 636 W HCPCS: Mod: HCNC | Performed by: PSYCHIATRY & NEUROLOGY

## 2020-02-13 PROCEDURE — 85730 THROMBOPLASTIN TIME PARTIAL: CPT | Mod: 91,HCNC

## 2020-02-13 PROCEDURE — S0028 INJECTION, FAMOTIDINE, 20 MG: HCPCS | Mod: HCNC | Performed by: PHYSICIAN ASSISTANT

## 2020-02-13 PROCEDURE — 99900035 HC TECH TIME PER 15 MIN (STAT): Mod: HCNC

## 2020-02-13 PROCEDURE — 36600 WITHDRAWAL OF ARTERIAL BLOOD: CPT | Mod: HCNC

## 2020-02-13 PROCEDURE — 84100 ASSAY OF PHOSPHORUS: CPT | Mod: HCNC

## 2020-02-13 PROCEDURE — 83735 ASSAY OF MAGNESIUM: CPT | Mod: HCNC

## 2020-02-13 PROCEDURE — 85025 COMPLETE CBC W/AUTO DIFF WBC: CPT | Mod: HCNC

## 2020-02-13 PROCEDURE — 27200966 HC CLOSED SUCTION SYSTEM: Mod: HCNC

## 2020-02-13 PROCEDURE — 95720 EEG PHY/QHP EA INCR W/VEEG: CPT | Mod: HCNC,,, | Performed by: PSYCHIATRY & NEUROLOGY

## 2020-02-13 PROCEDURE — 94640 AIRWAY INHALATION TREATMENT: CPT | Mod: HCNC

## 2020-02-13 PROCEDURE — 99291 PR CRITICAL CARE, E/M 30-74 MINUTES: ICD-10-PCS | Mod: HCNC,GC,, | Performed by: PSYCHIATRY & NEUROLOGY

## 2020-02-13 PROCEDURE — 63600175 PHARM REV CODE 636 W HCPCS: Mod: HCNC | Performed by: NURSE PRACTITIONER

## 2020-02-13 PROCEDURE — 80053 COMPREHEN METABOLIC PANEL: CPT | Mod: HCNC

## 2020-02-13 PROCEDURE — 95700 EEG CONT REC W/VID EEG TECH: CPT | Mod: HCNC

## 2020-02-13 PROCEDURE — 25000003 PHARM REV CODE 250: Mod: HCNC | Performed by: PHYSICIAN ASSISTANT

## 2020-02-13 PROCEDURE — 85730 THROMBOPLASTIN TIME PARTIAL: CPT | Mod: HCNC

## 2020-02-13 PROCEDURE — 20000000 HC ICU ROOM: Mod: HCNC

## 2020-02-13 PROCEDURE — 84132 ASSAY OF SERUM POTASSIUM: CPT | Mod: HCNC

## 2020-02-13 PROCEDURE — 99900026 HC AIRWAY MAINTENANCE (STAT): Mod: HCNC

## 2020-02-13 PROCEDURE — 27000221 HC OXYGEN, UP TO 24 HOURS: Mod: HCNC

## 2020-02-13 RX ORDER — IPRATROPIUM BROMIDE AND ALBUTEROL SULFATE 2.5; .5 MG/3ML; MG/3ML
3 SOLUTION RESPIRATORY (INHALATION) EVERY 6 HOURS
Status: DISCONTINUED | OUTPATIENT
Start: 2020-02-13 | End: 2020-02-17

## 2020-02-13 RX ORDER — LEVETIRACETAM 15 MG/ML
1500 INJECTION INTRAVASCULAR EVERY 12 HOURS
Status: DISCONTINUED | OUTPATIENT
Start: 2020-02-13 | End: 2020-02-22

## 2020-02-13 RX ADMIN — POTASSIUM CHLORIDE 40 MEQ: 20 SOLUTION ORAL at 03:02

## 2020-02-13 RX ADMIN — POTASSIUM CHLORIDE 40 MEQ: 20 SOLUTION ORAL at 05:02

## 2020-02-13 RX ADMIN — FAMOTIDINE 20 MG: 10 INJECTION, SOLUTION INTRAVENOUS at 08:02

## 2020-02-13 RX ADMIN — INSULIN ASPART 2 UNITS: 100 INJECTION, SOLUTION INTRAVENOUS; SUBCUTANEOUS at 12:02

## 2020-02-13 RX ADMIN — HYDROCORTISONE SODIUM SUCCINATE 50 MG: 100 INJECTION, POWDER, FOR SOLUTION INTRAMUSCULAR; INTRAVENOUS at 06:02

## 2020-02-13 RX ADMIN — CHLORHEXIDINE GLUCONATE 0.12% ORAL RINSE 15 ML: 1.2 LIQUID ORAL at 08:02

## 2020-02-13 RX ADMIN — LEVETIRACETAM 1500 MG: 1500 INJECTION, SOLUTION INTRAVENOUS at 08:02

## 2020-02-13 RX ADMIN — ACETAMINOPHEN 650 MG: 325 TABLET ORAL at 03:02

## 2020-02-13 RX ADMIN — IPRATROPIUM BROMIDE AND ALBUTEROL SULFATE 3 ML: .5; 3 SOLUTION RESPIRATORY (INHALATION) at 07:02

## 2020-02-13 RX ADMIN — HYDROCORTISONE SODIUM SUCCINATE 50 MG: 100 INJECTION, POWDER, FOR SOLUTION INTRAMUSCULAR; INTRAVENOUS at 08:02

## 2020-02-13 RX ADMIN — LEVETIRACETAM 1000 MG: 10 INJECTION INTRAVENOUS at 08:02

## 2020-02-13 RX ADMIN — ESCITALOPRAM OXALATE 20 MG: 10 TABLET ORAL at 08:02

## 2020-02-13 RX ADMIN — ATORVASTATIN CALCIUM 40 MG: 20 TABLET, FILM COATED ORAL at 08:02

## 2020-02-13 NOTE — CARE UPDATE
SBT was initiated at 1128 hrs on 2/12/2020 with vent settings PS 10 PEEP 8 FiO2 55%.  SBT was discontinued at 2244 hrs on 2/12/2020 and placed on previous vent order settings due to high RR and low Vt.  Will continue to monitor.

## 2020-02-13 NOTE — PLAN OF CARE
Neuro exam stable  Intermittently follows commands  LUE WD to noxious  Spontaneous breathing trial today  Enteral water  Wean steroids  Increase keppra to home dose  cEEG  Wean vent as tolerated  H/o BL DVT and PE - lifelong AC previously documented, continue hep gtt

## 2020-02-13 NOTE — PLAN OF CARE
Patient intubated on vent.  Not medically stable for discharge.       02/13/20 1246   Discharge Reassessment   Assessment Type Discharge Planning Reassessment   Provided patient/caregiver education on the expected discharge date and the discharge plan No   Do you have any problems affording any of your prescribed medications? No   Discharge Plan A Long-term acute care facility (LTAC)   Discharge Plan B Rehab   DME Needed Upon Discharge  other (see comments)  (tbd)   Patient choice form signed by patient/caregiver N/A   Anticipated Discharge Disposition LTAC   Can the patient/caregiver answer the patient profile reliably? No, cognitively impaired   Describe the patient's ability to walk at the present time. Does not walk or unable to take any steps at all   How often would a person be available to care for the patient? Whenever needed   Number of comorbid conditions (as recorded on the chart) Three   Post-Acute Status   Post-Acute Authorization Placement   Post-Acute Placement Status Awaiting Internal Medical Clearance   Discharge Delays None known at this time       Stefanie Ponce RN, CCRN-K, [facility]  Neuro-Critical Care   [phone]

## 2020-02-13 NOTE — PLAN OF CARE
POC reviewed with pt and family at 1700. Pt unable to verbalize understanding due to intubation; family verbalized understanding. All questions and concerns addressed. Pt therapeutic on heparin gtt at 21 units. TF remain at goal of 50 ml/hr. Pt placed on 24 hour EEG due to increased drowsiness and waxing/waning neuro status. FiO2 decreased to 50%. Clear moisture barrier cream applied to gluteal folds and scrotum due to dermatitis. Potassium replaced 2x today. Recheck ordered for 2100. Will continue to monitor. See flowsheets for full assessment and VS info.

## 2020-02-13 NOTE — PLAN OF CARE
POC reviewed with pt at 0500. Pt couldn't verbalize  understanding; he needed reinforcement. No acute events overnight.  VSS and neuro status stabled. Replaced Potassium. Pt progressing toward goals . Bath was given; linens changed. Will continue to monitor. See flowsheets for full assessment and VS info.

## 2020-02-14 PROBLEM — R41.82 ALTERED MENTAL STATUS: Status: ACTIVE | Noted: 2020-02-14

## 2020-02-14 LAB
ALBUMIN SERPL BCP-MCNC: 3 G/DL (ref 3.5–5.2)
ALLENS TEST: ABNORMAL
ALP SERPL-CCNC: 47 U/L (ref 55–135)
ALT SERPL W/O P-5'-P-CCNC: 21 U/L (ref 10–44)
ANION GAP SERPL CALC-SCNC: 8 MMOL/L (ref 8–16)
APTT BLDCRRT: 48 SEC (ref 21–32)
AST SERPL-CCNC: 26 U/L (ref 10–40)
BACTERIA BLD CULT: NORMAL
BACTERIA BLD CULT: NORMAL
BASOPHILS # BLD AUTO: 0.02 K/UL (ref 0–0.2)
BASOPHILS NFR BLD: 0.1 % (ref 0–1.9)
BILIRUB SERPL-MCNC: 0.4 MG/DL (ref 0.1–1)
BUN SERPL-MCNC: 29 MG/DL (ref 8–23)
CALCIUM SERPL-MCNC: 8.8 MG/DL (ref 8.7–10.5)
CHLORIDE SERPL-SCNC: 112 MMOL/L (ref 95–110)
CO2 SERPL-SCNC: 26 MMOL/L (ref 23–29)
CREAT SERPL-MCNC: 0.9 MG/DL (ref 0.5–1.4)
DELSYS: ABNORMAL
DIFFERENTIAL METHOD: ABNORMAL
EOSINOPHIL # BLD AUTO: 0 K/UL (ref 0–0.5)
EOSINOPHIL NFR BLD: 0 % (ref 0–8)
ERYTHROCYTE [DISTWIDTH] IN BLOOD BY AUTOMATED COUNT: 15.7 % (ref 11.5–14.5)
ERYTHROCYTE [SEDIMENTATION RATE] IN BLOOD BY WESTERGREN METHOD: 15 MM/H
EST. GFR  (AFRICAN AMERICAN): >60 ML/MIN/1.73 M^2
EST. GFR  (NON AFRICAN AMERICAN): >60 ML/MIN/1.73 M^2
FIO2: 50
FLOW: 50
GLUCOSE SERPL-MCNC: 149 MG/DL (ref 70–110)
HCO3 UR-SCNC: 27.2 MMOL/L (ref 24–28)
HCT VFR BLD AUTO: 29.2 % (ref 40–54)
HGB BLD-MCNC: 9.4 G/DL (ref 14–18)
IMM GRANULOCYTES # BLD AUTO: 0.12 K/UL (ref 0–0.04)
IMM GRANULOCYTES NFR BLD AUTO: 0.9 % (ref 0–0.5)
LYMPHOCYTES # BLD AUTO: 1 K/UL (ref 1–4.8)
LYMPHOCYTES NFR BLD: 6.8 % (ref 18–48)
MAGNESIUM SERPL-MCNC: 2.1 MG/DL (ref 1.6–2.6)
MCH RBC QN AUTO: 31.9 PG (ref 27–31)
MCHC RBC AUTO-ENTMCNC: 32.2 G/DL (ref 32–36)
MCV RBC AUTO: 99 FL (ref 82–98)
MODE: ABNORMAL
MONOCYTES # BLD AUTO: 0.8 K/UL (ref 0.3–1)
MONOCYTES NFR BLD: 5.8 % (ref 4–15)
NEUTROPHILS # BLD AUTO: 12.2 K/UL (ref 1.8–7.7)
NEUTROPHILS NFR BLD: 86.4 % (ref 38–73)
NRBC BLD-RTO: 0 /100 WBC
PCO2 BLDA: 39.4 MMHG (ref 35–45)
PEEP: 6
PH SMN: 7.45 [PH] (ref 7.35–7.45)
PHOSPHATE SERPL-MCNC: 3 MG/DL (ref 2.7–4.5)
PIP: 23
PLATELET # BLD AUTO: 318 K/UL (ref 150–350)
PMV BLD AUTO: 12.6 FL (ref 9.2–12.9)
PO2 BLDA: 83 MMHG (ref 80–100)
POC BE: 3 MMOL/L
POC SATURATED O2: 97 % (ref 95–100)
POC TCO2: 28 MMOL/L (ref 23–27)
POCT GLUCOSE: 112 MG/DL (ref 70–110)
POCT GLUCOSE: 136 MG/DL (ref 70–110)
POCT GLUCOSE: 140 MG/DL (ref 70–110)
POCT GLUCOSE: 174 MG/DL (ref 70–110)
POTASSIUM SERPL-SCNC: 3.8 MMOL/L (ref 3.5–5.1)
PROT SERPL-MCNC: 6.1 G/DL (ref 6–8.4)
PS: 10
RBC # BLD AUTO: 2.95 M/UL (ref 4.6–6.2)
SAMPLE: ABNORMAL
SITE: ABNORMAL
SODIUM SERPL-SCNC: 146 MMOL/L (ref 136–145)
SP02: 92
VT: 550
WBC # BLD AUTO: 14.08 K/UL (ref 3.9–12.7)

## 2020-02-14 PROCEDURE — 94761 N-INVAS EAR/PLS OXIMETRY MLT: CPT | Mod: HCNC

## 2020-02-14 PROCEDURE — 85730 THROMBOPLASTIN TIME PARTIAL: CPT | Mod: HCNC

## 2020-02-14 PROCEDURE — 20000000 HC ICU ROOM: Mod: HCNC

## 2020-02-14 PROCEDURE — 27000221 HC OXYGEN, UP TO 24 HOURS: Mod: HCNC

## 2020-02-14 PROCEDURE — 25000003 PHARM REV CODE 250: Mod: HCNC | Performed by: PSYCHIATRY & NEUROLOGY

## 2020-02-14 PROCEDURE — 25000003 PHARM REV CODE 250: Mod: HCNC | Performed by: NURSE PRACTITIONER

## 2020-02-14 PROCEDURE — 83735 ASSAY OF MAGNESIUM: CPT | Mod: HCNC

## 2020-02-14 PROCEDURE — S0028 INJECTION, FAMOTIDINE, 20 MG: HCPCS | Mod: HCNC | Performed by: PHYSICIAN ASSISTANT

## 2020-02-14 PROCEDURE — 99900035 HC TECH TIME PER 15 MIN (STAT): Mod: HCNC

## 2020-02-14 PROCEDURE — 25000003 PHARM REV CODE 250: Mod: HCNC | Performed by: PHYSICIAN ASSISTANT

## 2020-02-14 PROCEDURE — 95720 EEG PHY/QHP EA INCR W/VEEG: CPT | Mod: HCNC,,, | Performed by: PSYCHIATRY & NEUROLOGY

## 2020-02-14 PROCEDURE — 84100 ASSAY OF PHOSPHORUS: CPT | Mod: HCNC

## 2020-02-14 PROCEDURE — 99291 PR CRITICAL CARE, E/M 30-74 MINUTES: ICD-10-PCS | Mod: HCNC,GC,, | Performed by: PSYCHIATRY & NEUROLOGY

## 2020-02-14 PROCEDURE — 99900026 HC AIRWAY MAINTENANCE (STAT): Mod: HCNC

## 2020-02-14 PROCEDURE — 80053 COMPREHEN METABOLIC PANEL: CPT | Mod: HCNC

## 2020-02-14 PROCEDURE — 82803 BLOOD GASES ANY COMBINATION: CPT | Mod: HCNC

## 2020-02-14 PROCEDURE — 95720 PR EEG, W/VIDEO, CONT RECORD, I&R, >12<26 HRS: ICD-10-PCS | Mod: HCNC,,, | Performed by: PSYCHIATRY & NEUROLOGY

## 2020-02-14 PROCEDURE — 25000242 PHARM REV CODE 250 ALT 637 W/ HCPCS: Mod: HCNC | Performed by: NURSE PRACTITIONER

## 2020-02-14 PROCEDURE — 99291 CRITICAL CARE FIRST HOUR: CPT | Mod: HCNC,GC,, | Performed by: PSYCHIATRY & NEUROLOGY

## 2020-02-14 PROCEDURE — 63600175 PHARM REV CODE 636 W HCPCS: Mod: HCNC | Performed by: NURSE PRACTITIONER

## 2020-02-14 PROCEDURE — 82800 BLOOD PH: CPT | Mod: HCNC

## 2020-02-14 PROCEDURE — 36600 WITHDRAWAL OF ARTERIAL BLOOD: CPT | Mod: HCNC

## 2020-02-14 PROCEDURE — 95714 VEEG EA 12-26 HR UNMNTR: CPT | Mod: HCNC

## 2020-02-14 PROCEDURE — 85025 COMPLETE CBC W/AUTO DIFF WBC: CPT | Mod: HCNC

## 2020-02-14 PROCEDURE — 94640 AIRWAY INHALATION TREATMENT: CPT | Mod: HCNC

## 2020-02-14 PROCEDURE — 27200966 HC CLOSED SUCTION SYSTEM: Mod: HCNC

## 2020-02-14 PROCEDURE — 94003 VENT MGMT INPAT SUBQ DAY: CPT | Mod: HCNC

## 2020-02-14 RX ORDER — MODAFINIL 100 MG/1
100 TABLET ORAL ONCE
Status: COMPLETED | OUTPATIENT
Start: 2020-02-14 | End: 2020-02-14

## 2020-02-14 RX ADMIN — ESCITALOPRAM OXALATE 20 MG: 10 TABLET ORAL at 08:02

## 2020-02-14 RX ADMIN — HYDROCORTISONE SODIUM SUCCINATE 50 MG: 100 INJECTION, POWDER, FOR SOLUTION INTRAMUSCULAR; INTRAVENOUS at 08:02

## 2020-02-14 RX ADMIN — IPRATROPIUM BROMIDE AND ALBUTEROL SULFATE 3 ML: .5; 3 SOLUTION RESPIRATORY (INHALATION) at 01:02

## 2020-02-14 RX ADMIN — LEVETIRACETAM 1500 MG: 1500 INJECTION, SOLUTION INTRAVENOUS at 09:02

## 2020-02-14 RX ADMIN — FAMOTIDINE 20 MG: 10 INJECTION, SOLUTION INTRAVENOUS at 08:02

## 2020-02-14 RX ADMIN — POTASSIUM CHLORIDE 40 MEQ: 20 SOLUTION ORAL at 05:02

## 2020-02-14 RX ADMIN — CHLORHEXIDINE GLUCONATE 0.12% ORAL RINSE 15 ML: 1.2 LIQUID ORAL at 08:02

## 2020-02-14 RX ADMIN — ATORVASTATIN CALCIUM 40 MG: 20 TABLET, FILM COATED ORAL at 09:02

## 2020-02-14 RX ADMIN — HYDROCORTISONE SODIUM SUCCINATE 50 MG: 100 INJECTION, POWDER, FOR SOLUTION INTRAMUSCULAR; INTRAVENOUS at 09:02

## 2020-02-14 RX ADMIN — ACETAMINOPHEN 650 MG: 325 TABLET ORAL at 08:02

## 2020-02-14 RX ADMIN — IPRATROPIUM BROMIDE AND ALBUTEROL SULFATE 3 ML: .5; 3 SOLUTION RESPIRATORY (INHALATION) at 12:02

## 2020-02-14 RX ADMIN — CHLORHEXIDINE GLUCONATE 0.12% ORAL RINSE 15 ML: 1.2 LIQUID ORAL at 09:02

## 2020-02-14 RX ADMIN — IPRATROPIUM BROMIDE AND ALBUTEROL SULFATE 3 ML: .5; 3 SOLUTION RESPIRATORY (INHALATION) at 07:02

## 2020-02-14 RX ADMIN — FAMOTIDINE 20 MG: 10 INJECTION, SOLUTION INTRAVENOUS at 09:02

## 2020-02-14 RX ADMIN — LEVETIRACETAM 1500 MG: 1500 INJECTION, SOLUTION INTRAVENOUS at 08:02

## 2020-02-14 RX ADMIN — MICONAZOLE NITRATE: 20 OINTMENT TOPICAL at 09:02

## 2020-02-14 RX ADMIN — INSULIN ASPART 1 UNITS: 100 INJECTION, SOLUTION INTRAVENOUS; SUBCUTANEOUS at 12:02

## 2020-02-14 RX ADMIN — MODAFINIL 100 MG: 100 TABLET ORAL at 12:02

## 2020-02-14 NOTE — PROGRESS NOTES
[facility]  Neurocritical Care  Progress Note    Admit Date: 2/2/2020  Service Date: 02/14/2020  Length of Stay: 12    Subjective:     Chief Complaint: Seizure    History of Present Illness: Pt is a 64 y.o. Male with PMHx of of DVT, PE, DM, and seizures (on 1500 keppra at home) who presents to the ED via EMS with complaint of a seizure that occurred this morning. Pt has long standing history of seizures and was found down by wife on cement around 0800, LKN 0700. Patient compliant with keppra per wife and last seizure was in November. At that time his keppra dose was increased. Patient has had cough recently but no other symptoms. Patient is on AC for DVT/PE and was recently switched from Coumadin to Eliquis. He was given 10 mg Versed during EMS ride to outside facility for continued seizure activity. He was unresponsive upon arrival to ED and subsequently intubated. CTH without any acute changes, CT max/face with multiple facial fractures, and CXR with consolidation and atelectasis/collapsed lung. Patient transferred to [facility] to higher level neurologic monitoring and continuous EEG.     Hospital Course: 2/2/2020: Admit to [facility]. GS and ENT consulted. MX facial fractures and concerns of bowel ischemia on CT  2/3 will rehook to EEG for 24h. Remains NPO. US LE pending. Monitor lactic [id number] 2L  IV. Wean levophed as tolerated after bolus  2/4: remains on small amount of pressors, cultures remain negative, cont abx, cont current AEDs, EEG overnight negative for seizure activity, repeat ct abdomen today  2/5: minimal dose pressors, advance TFs. Tolerating SBT, possible trial of extubation in am  2/6: need for pressors will likely cease when able to come off sedation, currently still requires mechanical ventilation with pO2 value of 60 on 40% fiO2 and ps 10  02/08/2020 exam still poor, getting MRI brain   02/09/2020 Not tolerating CPAP, placed on AC   02/10/2020 NAEO  02/11/2020 NAEO  02/12/2020 CPAP  trial this am   02/13/2020 NAEON. On heparin drip with stable exam.   02/14/2020 NAEON, f/u EEG (on Keppra). Will give modafinil trial today.     Interval History:  NAEON. See hospital course.     Review of Systems  Unable to obtain a complete ROS due to level of consciousness.  Objective:     Vitals:  Temp: 99.4 °F (37.4 °C)  Pulse: 73  Rhythm: normal sinus rhythm  BP: (!) 106/52  MAP (mmHg): 74  Resp: 15  SpO2: 96 %  Oxygen Concentration (%): 50  O2 Device (Oxygen Therapy): ventilator  Vent Mode: Spont  Set Rate: 15 BPM  Pressure Support: 10 cmH20  PEEP/CPAP: 6 cmH20  Peak Airway Pressure: 16 cmH2O  Mean Airway Pressure: 8.9 cmH20  Plateau Pressure: 0 cmH20    Temp  Min: 98.6 °F (37 °C)  Max: 100.1 °F (37.8 °C)  Pulse  Min: 58  Max: 98  BP  Min: 98/49  Max: 127/60  MAP (mmHg)  Min: 71  Max: 87  Resp  Min: 15  Max: 21  SpO2  Min: 92 %  Max: 99 %  Oxygen Concentration (%)  Min: 50  Max: 55    02/13 0701 - 02/14 0700  In: 2251.8 [I.V.:426.8]  Out: 1360 [Urine:1360]   Unmeasured Output  Urine Occurrence: 1  Stool Occurrence: 1  Pad Count: 1       Physical Exam  Constitutional: No apparent distress.   Eyes: Conjunctiva clear, anicteric. Lids no lesions. Small abrasion under left eye   Head/Ears/Nose/Mouth/Throat/Neck: Moist mucous membranes. External ears, nose atraumatic.   Cardiovascular: Regular rhythm  Respiratory: intubated  Gastrointestinal: Soft, nondistended, nontender      Neurologic Examination:     -Mental Status: doesn't open eyes, doesn't follow commands  -Cranial nerves: Pupils equal, round, and reactive bilateral. Extraocular movements intact with occulocephalics. Intact corneal reflexes bilateral, intact cough reflex. No gag   -Motor: Moves all ext spon     Medications:  Continuous  heparin (porcine) in D5W Last Rate: 20.986 Units/kg/hr (02/14/20 1305)   Scheduled  albuterol-ipratropium 3 mL Q6H   atorvastatin 40 mg QHS   chlorhexidine 15 mL BID   escitalopram oxalate 20 mg Daily   famotidine (PF) 20 mg  BID   hydrocortisone sodium succinate 50 mg Q12H   levetiracetam IVPB 1,500 mg Q12H   miconazole nitrate 2%  BID   PRN  acetaminophen 650 mg Q6H PRN   Dextrose 10% Bolus 12.5 g PRN   fentaNYL 25 mcg Q2H PRN   glucagon (human recombinant) 1 mg PRN   insulin aspart U-100 1-10 Units Q6H PRN   labetalol 10 mg Q4H PRN   lorazepam 2 mg Q1H PRN   magnesium oxide 800 mg PRN   magnesium oxide 800 mg PRN   ondansetron 4 mg Q8H PRN   potassium chloride 10% 40 mEq PRN   potassium chloride 10% 40 mEq PRN   potassium chloride 10% 60 mEq PRN   potassium, sodium phosphates 2 packet PRN   potassium, sodium phosphates 2 packet PRN   potassium, sodium phosphates 2 packet PRN   sodium chloride 0.9% 10 mL PRN     Today I personally reviewed pertinent medications, lines/drains/airways, imaging, cardiology results, laboratory results, microbiology results, notably:    Diet  Diet NPO  Diet NPO        Assessment/Plan:     Neuro  * Seizure  - patient w history of seizures  - f/u cEEG, last eeg on 2/8 with intermittent L and R temporal slowing + diffuse theta. No seizures  - on keppra 1500mg BID    Altered mental status  - cannot be explained by scattered strokes seen on MRI, patient without evidence of seizures on EEG  - will trial modafinil     Stroke  - Incidentally seen on MRI w small scattered areas of subacute/remote infarcts. Cont current secondary prevention measures.   - CTA head and neck with no LVO  - patient on heparin drip and a statin    Psychiatric  Depression with anxiety  Continue escitalopram 20mg daily    Derm  Alteration in skin integrity  Wound care     Pulmonary  Acute respiratory failure with hypoxia  - s/p trach    Hematology  Chronic anticoagulation  Heparin gtt     History of DVT (deep vein thrombosis)   heparin gtt       Orthopedic  Closed extensive facial fractures  - evaluated by ENT          The patient is being Prophylaxed for:  Venous Thromboembolism with: Chemical  Stress Ulcer with: H2B  Ventilator Pneumonia  with: chlorhexidine oral care    Activity Orders          Diet NPO: NPO starting at 02/02 1925        Full Code    Gladis Galicia MD  Neurocritical Care  [facility]

## 2020-02-14 NOTE — PLAN OF CARE
POC reviewed with pt and family at 1700. Pt unable to verbalize understanding due to intubation; wife verbalized understanding. All questions and concerns addressed. Pt switched to spontaneous mode on ventilator with a PEEP of 6 and fiO2 of 60% with no issues. TF remain at goal. One dose of modafinil given to increase patients LOC. Pt remains therapeutic on heparin at 21 units. Wound care reconsulted due to worsening redness/dermatitis. Miconazole ointment ordered and placed in necessary areas. Will continue to monitor. See flowsheets for full assessment and VS info.

## 2020-02-14 NOTE — PROGRESS NOTES
[facility]  Neurocritical Care  Progress Note    Admit Date: 2/2/2020  Service Date: 02/13/2020  Length of Stay: 11    Subjective:     Chief Complaint: Seizure    History of Present Illness: Pt is a 64 y.o. Male with PMHx of of DVT, PE, DM, and seizures (on 1500 keppra at home) who presents to the ED via EMS with complaint of a seizure that occurred this morning. Pt has long standing history of seizures and was found down by wife on cement around 0800, LKN 0700. Patient compliant with keppra per wife and last seizure was in November. At that time his keppra dose was increased. Patient has had cough recently but no other symptoms. Patient is on AC for DVT/PE and was recently switched from Coumadin to Eliquis. He was given 10 mg Versed during EMS ride to outside facility for continued seizure activity. He was unresponsive upon arrival to ED and subsequently intubated. CTH without any acute changes, CT max/face with multiple facial fractures, and CXR with consolidation and atelectasis/collapsed lung. Patient transferred to [facility] to higher level neurologic monitoring and continuous EEG.     Hospital Course: 2/2/2020: Admit to [facility]. GS and ENT consulted. MX facial fractures and concerns of bowel ischemia on CT  2/3 will rehook to EEG for 24h. Remains NPO. US LE pending. Monitor lactic [id number] 2L  IV. Wean levophed as tolerated after bolus  2/4: remains on small amount of pressors, cultures remain negative, cont abx, cont current AEDs, EEG overnight negative for seizure activity, repeat ct abdomen today  2/5: minimal dose pressors, advance TFs. Tolerating SBT, possible trial of extubation in am  2/6: need for pressors will likely cease when able to come off sedation, currently still requires mechanical ventilation with pO2 value of 60 on 40% fiO2 and ps 10  02/08/2020 exam still poor, getting MRI brain   02/09/2020 Not tolerating CPAP, placed on AC   02/10/2020 NAEO  02/11/2020 NAEO  02/12/2020 CPAP  trial this am   02/13/2020  Neuro exam stable  Intermittently follows commands  LUE WD to noxious  Spontaneous breathing trial today  Enteral water  Wean steroids  Increase keppra to home dose  cEEG  Wean vent as tolerated  H/o BL DVT and PE - lifelong AC previously documented, continue hep gtt    Interval History:  No acute adverse overnight    Review of Systems  Unable to obtain a complete ROS due to level of consciousness.  Objective:     Vitals:  Temp: 100.1 °F (37.8 °C)  Pulse: 64  Rhythm: normal sinus rhythm  BP: (!) 98/49  MAP (mmHg): 71  Resp: 15  SpO2: 98 %  Oxygen Concentration (%): 50  O2 Device (Oxygen Therapy): ventilator  Vent Mode: SIMV  Set Rate: 15 BPM  Pressure Support: 10 cmH20  PEEP/CPAP: 8 cmH20  Peak Airway Pressure: 25 cmH2O  Mean Airway Pressure: 12 cmH20  Plateau Pressure: 0 cmH20    Temp  Min: 97.8 °F (36.6 °C)  Max: 100.1 °F (37.8 °C)  Pulse  Min: 56  Max: 77  BP  Min: 94/48  Max: 127/60  MAP (mmHg)  Min: 68  Max: 87  Resp  Min: 14  Max: 22  SpO2  Min: 92 %  Max: 98 %  Oxygen Concentration (%)  Min: 50  Max: 55    02/12 0701 - 02/13 0700  In: 1959.8 [I.V.:434.8]  Out: 3245 [Urine:3245]   Unmeasured Output  Urine Occurrence: 1  Stool Occurrence: 1  Pad Count: 1       Physical Exam  Constitutional: No apparent distress.   Eyes: Conjunctiva clear, anicteric. Lids no lesions. Small abrasion under left eye   Head/Ears/Nose/Mouth/Throat/Neck: Moist mucous membranes. External ears, nose atraumatic.   Cardiovascular: Regular rhythm  Respiratory: mechanical bs throughout  Gastrointestinal: No hernia. Soft, nondistended, nontender. + bowel sounds.      Neurologic Examination:     -Mental Status: doesn't open eyes, doesn't follow commands  -Cranial nerves: Pupils equal, round, and reactive bilateral. Extraocular movements intact with VOR. Intact corneal reflexes bilateral, intact cough reflex -Motor: Moves all ext spon     Medications:  Continuous  heparin (porcine) in D5W Last Rate: 20.986  Units/kg/hr (02/13/20 1805)   Scheduled  albuterol-ipratropium 3 mL Q6H   atorvastatin 40 mg QHS   chlorhexidine 15 mL BID   escitalopram oxalate 20 mg Daily   famotidine (PF) 20 mg BID   hydrocortisone sodium succinate 50 mg Q12H   levetiracetam IVPB 1,500 mg Q12H   PRN  acetaminophen 650 mg Q6H PRN   Dextrose 10% Bolus 12.5 g PRN   fentaNYL 25 mcg Q2H PRN   glucagon (human recombinant) 1 mg PRN   insulin aspart U-100 1-10 Units Q6H PRN   labetalol 10 mg Q4H PRN   lorazepam 2 mg Q1H PRN   magnesium oxide 800 mg PRN   magnesium oxide 800 mg PRN   ondansetron 4 mg Q8H PRN   potassium chloride 10% 40 mEq PRN   potassium chloride 10% 40 mEq PRN   potassium chloride 10% 60 mEq PRN   potassium, sodium phosphates 2 packet PRN   potassium, sodium phosphates 2 packet PRN   potassium, sodium phosphates 2 packet PRN   sodium chloride 0.9% 10 mL PRN       Diet  Diet NPO  Diet NPO        Assessment/Plan:     Neuro  * Seizure  Neuro exam stable  Intermittently follows commands  LUE WD to noxious  Spontaneous breathing trial today  Enteral water  Wean steroids  Increase keppra to home dose  cEEG  Wean vent as tolerated  H/o BL DVT and PE - lifelong AC previously documented, continue hep gtt    Active Hospital Problems    Diagnosis  POA    *Seizure [R56.9]  Yes    Alteration in skin integrity [R23.9]  Yes    Stroke [I63.9]  Yes    Septic shock [A41.9, R65.21]  Yes    Aspiration pneumonia [J69.0]  Yes    Closed extensive facial fractures [S02.92XA]  Yes    Aspiration pneumonitis [J69.0]  Yes    Multiple closed fractures of facial bone [S02.92XA]  Yes    Idiopathic hypotension [I95.0]  Yes    Chronic anticoagulation [Z79.01]  Not Applicable    Acute respiratory failure with hypoxia [J96.01]  Yes    History of DVT (deep vein thrombosis) [Z86.718]  Not Applicable    Depression with anxiety [F41.8]  Yes      Resolved Hospital Problems    Diagnosis Date Resolved POA    Large bowel ischemia [K55.9] 02/05/2020 Yes      Critical condition in that Patient has a condition that poses threat to life and bodily function: resp failure with hypoxia and vent management, seizure     42 minutes of Critical care time was spent personally by me on the following activities: development of treatment plan with patient or surrogate and bedside caregivers, discussions with consultants, evaluation of patient's response to treatment, examination of patient, ordering and performing treatments and interventions, ordering and review of laboratory studies, ordering and review of radiographic studies, pulse oximetry, antibiotic titration if applicable, vasopressor titration if applicable, re-evaluation of patient's condition. This critical care time did not overlap with that of any other provider or involve time for any procedures. There is high probability for acute neurological change leading to clinical and possibly life-threatening deterioration requiring highest level of physician preparedness for urgent intervention.            The patient is being Prophylaxed for:  Venous Thromboembolism with: Mechanical or Chemical  Stress Ulcer with: H2B  Ventilator Pneumonia with: chlorhexidine oral care    Activity Orders          Diet NPO: NPO starting at 02/02 1925        Full Code    Pascual Ch MD  Neurocritical Care  [facility]

## 2020-02-14 NOTE — ASSESSMENT & PLAN NOTE
Neuro exam stable  Intermittently follows commands  LUE WD to noxious  Spontaneous breathing trial today  Enteral water  Wean steroids  Increase keppra to home dose  cEEG  Wean vent as tolerated  H/o BL DVT and PE - lifelong AC previously documented, continue hep gtt    Active Hospital Problems    Diagnosis  POA    *Seizure [R56.9]  Yes    Alteration in skin integrity [R23.9]  Yes    Stroke [I63.9]  Yes    Septic shock [A41.9, R65.21]  Yes    Aspiration pneumonia [J69.0]  Yes    Closed extensive facial fractures [S02.92XA]  Yes    Aspiration pneumonitis [J69.0]  Yes    Multiple closed fractures of facial bone [S02.92XA]  Yes    Idiopathic hypotension [I95.0]  Yes    Chronic anticoagulation [Z79.01]  Not Applicable    Acute respiratory failure with hypoxia [J96.01]  Yes    History of DVT (deep vein thrombosis) [Z86.718]  Not Applicable    Depression with anxiety [F41.8]  Yes      Resolved Hospital Problems    Diagnosis Date Resolved POA    Large bowel ischemia [K55.9] 02/05/2020 Yes     Critical condition in that Patient has a condition that poses threat to life and bodily function: resp failure with hypoxia and vent management, seizure     42 minutes of Critical care time was spent personally by me on the following activities: development of treatment plan with patient or surrogate and bedside caregivers, discussions with consultants, evaluation of patient's response to treatment, examination of patient, ordering and performing treatments and interventions, ordering and review of laboratory studies, ordering and review of radiographic studies, pulse oximetry, antibiotic titration if applicable, vasopressor titration if applicable, re-evaluation of patient's condition. This critical care time did not overlap with that of any other provider or involve time for any procedures. There is high probability for acute neurological change leading to clinical and possibly life-threatening deterioration requiring  highest level of physician preparedness for urgent intervention.

## 2020-02-14 NOTE — ASSESSMENT & PLAN NOTE
- cannot be explained by scattered strokes seen on MRI, patient without evidence of seizures on EEG  - will trial modafinil

## 2020-02-14 NOTE — SUBJECTIVE & OBJECTIVE
Interval History:  No acute adverse overnight    Review of Systems  Unable to obtain a complete ROS due to level of consciousness.  Objective:     Vitals:  Temp: 100.1 °F (37.8 °C)  Pulse: 64  Rhythm: normal sinus rhythm  BP: (!) 98/49  MAP (mmHg): 71  Resp: 15  SpO2: 98 %  Oxygen Concentration (%): 50  O2 Device (Oxygen Therapy): ventilator  Vent Mode: SIMV  Set Rate: 15 BPM  Pressure Support: 10 cmH20  PEEP/CPAP: 8 cmH20  Peak Airway Pressure: 25 cmH2O  Mean Airway Pressure: 12 cmH20  Plateau Pressure: 0 cmH20    Temp  Min: 97.8 °F (36.6 °C)  Max: 100.1 °F (37.8 °C)  Pulse  Min: 56  Max: 77  BP  Min: 94/48  Max: 127/60  MAP (mmHg)  Min: 68  Max: 87  Resp  Min: 14  Max: 22  SpO2  Min: 92 %  Max: 98 %  Oxygen Concentration (%)  Min: 50  Max: 55    02/12 0701 - 02/13 0700  In: 1959.8 [I.V.:434.8]  Out: 3245 [Urine:3245]   Unmeasured Output  Urine Occurrence: 1  Stool Occurrence: 1  Pad Count: 1       Physical Exam  Constitutional: No apparent distress.   Eyes: Conjunctiva clear, anicteric. Lids no lesions. Small abrasion under left eye   Head/Ears/Nose/Mouth/Throat/Neck: Moist mucous membranes. External ears, nose atraumatic.   Cardiovascular: Regular rhythm  Respiratory: mechanical bs throughout  Gastrointestinal: No hernia. Soft, nondistended, nontender. + bowel sounds.      Neurologic Examination:     -Mental Status: doesn't open eyes, doesn't follow commands  -Cranial nerves: Pupils equal, round, and reactive bilateral. Extraocular movements intact with VOR. Intact corneal reflexes bilateral, intact cough reflex -Motor: Moves all ext spon     Medications:  Continuous  heparin (porcine) in D5W Last Rate: 20.986 Units/kg/hr (02/13/20 1805)   Scheduled  albuterol-ipratropium 3 mL Q6H   atorvastatin 40 mg QHS   chlorhexidine 15 mL BID   escitalopram oxalate 20 mg Daily   famotidine (PF) 20 mg BID   hydrocortisone sodium succinate 50 mg Q12H   levetiracetam IVPB 1,500 mg Q12H   PRN  acetaminophen 650 mg Q6H PRN    Dextrose 10% Bolus 12.5 g PRN   fentaNYL 25 mcg Q2H PRN   glucagon (human recombinant) 1 mg PRN   insulin aspart U-100 1-10 Units Q6H PRN   labetalol 10 mg Q4H PRN   lorazepam 2 mg Q1H PRN   magnesium oxide 800 mg PRN   magnesium oxide 800 mg PRN   ondansetron 4 mg Q8H PRN   potassium chloride 10% 40 mEq PRN   potassium chloride 10% 40 mEq PRN   potassium chloride 10% 60 mEq PRN   potassium, sodium phosphates 2 packet PRN   potassium, sodium phosphates 2 packet PRN   potassium, sodium phosphates 2 packet PRN   sodium chloride 0.9% 10 mL PRN       Diet  Diet NPO  Diet NPO

## 2020-02-14 NOTE — PROGRESS NOTES
Wound consult received on patient. Redness and partial thickness skin loss noted to scrotum and perineum due moisture. Scar tissue noted to buttocks from history of cyst removals. Recommend applying clear barrier cream with miconazole BID/prn. Sonia Carpenter NP with [facility] approved recommendations.   Waffle overlay and styker low airloss surface utilized.  Heels intact, foams in place. Patient not compliant with heel boots, patient continuously moving feet. Folded end of waffle to assist with elevating heels.  Nursing to continue care. Wound care to follow prn.     02/14/20 0957        Wound 02/14/20 0957 Moisture associated dermatitis Perineum   Date First Assessed/Time First Assessed: 02/14/20 0957   Primary Wound Type: Moisture associated dermatitis  Location: Perineum   Wound Image    Wound WDL ex   Drainage Amount None   Drainage Characteristics/Odor No odor   Appearance Moist;Red   Tissue loss description Partial thickness   Periwound Area Scar tissue;Satellite lesion

## 2020-02-14 NOTE — ASSESSMENT & PLAN NOTE
- Incidentally seen on MRI w small scattered areas of subacute/remote infarcts. Cont current secondary prevention measures.   - CTA head and neck with no LVO  - patient on heparin drip and a statin

## 2020-02-14 NOTE — PLAN OF CARE
Neuro exam stable  Spontaneous breathing trial  Increase enteral water  Wean steroids  Continue keppra  F/u eeg  Hep gtt  Modafinil trial

## 2020-02-14 NOTE — SUBJECTIVE & OBJECTIVE
Interval History:  NAEON. See hospital course.     Review of Systems  Unable to obtain a complete ROS due to level of consciousness.  Objective:     Vitals:  Temp: 99.4 °F (37.4 °C)  Pulse: 73  Rhythm: normal sinus rhythm  BP: (!) 106/52  MAP (mmHg): 74  Resp: 15  SpO2: 96 %  Oxygen Concentration (%): 50  O2 Device (Oxygen Therapy): ventilator  Vent Mode: Spont  Set Rate: 15 BPM  Pressure Support: 10 cmH20  PEEP/CPAP: 6 cmH20  Peak Airway Pressure: 16 cmH2O  Mean Airway Pressure: 8.9 cmH20  Plateau Pressure: 0 cmH20    Temp  Min: 98.6 °F (37 °C)  Max: 100.1 °F (37.8 °C)  Pulse  Min: 58  Max: 98  BP  Min: 98/49  Max: 127/60  MAP (mmHg)  Min: 71  Max: 87  Resp  Min: 15  Max: 21  SpO2  Min: 92 %  Max: 99 %  Oxygen Concentration (%)  Min: 50  Max: 55    02/13 0701 - 02/14 0700  In: 2251.8 [I.V.:426.8]  Out: 1360 [Urine:1360]   Unmeasured Output  Urine Occurrence: 1  Stool Occurrence: 1  Pad Count: 1       Physical Exam  Constitutional: No apparent distress.   Eyes: Conjunctiva clear, anicteric. Lids no lesions. Small abrasion under left eye   Head/Ears/Nose/Mouth/Throat/Neck: Moist mucous membranes. External ears, nose atraumatic.   Cardiovascular: Regular rhythm  Respiratory: intubated  Gastrointestinal: Soft, nondistended, nontender      Neurologic Examination:     -Mental Status: doesn't open eyes, doesn't follow commands  -Cranial nerves: Pupils equal, round, and reactive bilateral. Extraocular movements intact with occulocephalics. Intact corneal reflexes bilateral, intact cough reflex. No gag   -Motor: Moves all ext spon     Medications:  Continuous  heparin (porcine) in D5W Last Rate: 20.986 Units/kg/hr (02/14/20 1305)   Scheduled  albuterol-ipratropium 3 mL Q6H   atorvastatin 40 mg QHS   chlorhexidine 15 mL BID   escitalopram oxalate 20 mg Daily   famotidine (PF) 20 mg BID   hydrocortisone sodium succinate 50 mg Q12H   levetiracetam IVPB 1,500 mg Q12H   miconazole nitrate 2%  BID   PRN  acetaminophen 650 mg Q6H  PRN   Dextrose 10% Bolus 12.5 g PRN   fentaNYL 25 mcg Q2H PRN   glucagon (human recombinant) 1 mg PRN   insulin aspart U-100 1-10 Units Q6H PRN   labetalol 10 mg Q4H PRN   lorazepam 2 mg Q1H PRN   magnesium oxide 800 mg PRN   magnesium oxide 800 mg PRN   ondansetron 4 mg Q8H PRN   potassium chloride 10% 40 mEq PRN   potassium chloride 10% 40 mEq PRN   potassium chloride 10% 60 mEq PRN   potassium, sodium phosphates 2 packet PRN   potassium, sodium phosphates 2 packet PRN   potassium, sodium phosphates 2 packet PRN   sodium chloride 0.9% 10 mL PRN     Today I personally reviewed pertinent medications, lines/drains/airways, imaging, cardiology results, laboratory results, microbiology results, notably:    Diet  Diet NPO  Diet NPO

## 2020-02-14 NOTE — ASSESSMENT & PLAN NOTE
- patient w history of seizures  - f/u cEEG, last eeg on 2/8 with intermittent L and R temporal slowing + diffuse theta. No seizures  - on keppra 1500mg BID

## 2020-02-14 NOTE — PLAN OF CARE
POC reviewed with pt and his daughter at 0500. Pt couldn't verbalize understanding. Questions and concerns addressed with his daughter. VSS,  neuro status didn't change. Potassium replaced. Still have a lot of secretion.  No acute events overnight. Will continue to monitor. See flowsheets for full assessment and VS info.

## 2020-02-14 NOTE — PROCEDURES
ICU EEG/VIDEO MONITORING REPORT    DATE OF SERVICE:  02/13/2020  EEG NUMBER: [id number]  REQUESTED BY: Dr Wyatt  LOCATION OF SERVICE:  [id number]    METHODOLOGY   Electroencephalographic (EEG) recording is with electrodes placed according to the International 10-20 placement system.  Thirty two (32) channels of digital signal are simultaneously recorded from the scalp and may include EKG, EMG, and/or eye monitors.   Recording band pass was 0.1 to 512 hz.  Digital video recording of the patient is simultaneously recorded with the EEG.  The nursing staff report clinical symptoms and may press an event button when the patient has symptoms of clinical interest to the treating physicians.  EEG and video recording is stored and archived in digital format.  The entire recording is visually reviewed and the times identified by computer analysis as being spikes or seizures are reviewed again.  Activation procedures which include photic stimulation, hyperventilation and instructing patients to perform simple task are done in selected patients.   Compresses spectral analysis (CSA) is also performed on the activity recorded from each individual channel.  This is displayed as a power display of frequencies from 0 to 30 Hz over time.   The CSA analysis is done and displayed continuously.  This is reviewed for asymmetries in power between homologous areas of the scalp and for presence of changes in power which canbe seen when seizures occur.  Sections of suspected abnormalities on the CSA is then compared with the original EEG recording.     [employer] software was also utilized in the review of this study.  This software suite analyzes the EEG recording in multiple domains.  Coherence and rhythmicity is computed to identify EEG sections which may contain organized seizures.  Each channel undergoes analysis to detect presence of spike and sharp waves which have special and morphological characteristic of epileptic activity.  The routine  EEG recording is converted from spacial into frequency domain.  This is then displayed comparing homologous areas to identify areas of significant asymmetry.  Algorithm to identify non-cortically generated artifact is used to separate eye movement, EMG and other artifact from the EEG.      Recording Times  Start on 02/13/2020 at 4:33 p.m.  Stop on 02/14/2020 at 7:00 a.m.  A total of 14 hr and 13 min of EEG was recorded.    EEG FINDINGS  Recording was obtained at the patient's bedside in the ICU.  Patient was intubated on a respirator but was moving around spontaneously.  Background consisted of a somewhat irregular 9 hertz rhythm seen occipital parietal posterior temporal and central regions bilaterally.  Irregular mid-range beta beta was noted diffusely.  There is also some intermixed the [place] 5 hertz theta noted a more evident on the right hemisphere.  The no spike or sharp wave activity was seen.  Wake/Sleep  Not observed  Activation Procedures  Hyperventilation and intermittent photic stimulation were not performed.  Cardiac Monitor:   Single lead EKG was obtained and normal heart rate noted    IMPRESSION:  Abnormal EEG:  Normal posterior dominant rhythm is present but there is theta frequencies intermixed bilaterally with a more on the right side indicating presence of a mild nonfocal and nonspecific encephalopathy.  There is no evidence for an epileptic process in this recording.

## 2020-02-15 LAB
ALBUMIN SERPL BCP-MCNC: 2.8 G/DL (ref 3.5–5.2)
ALLENS TEST: ABNORMAL
ALP SERPL-CCNC: 49 U/L (ref 55–135)
ALT SERPL W/O P-5'-P-CCNC: 20 U/L (ref 10–44)
ANION GAP SERPL CALC-SCNC: 9 MMOL/L (ref 8–16)
APTT BLDCRRT: 21 SEC (ref 21–32)
APTT BLDCRRT: 41.7 SEC (ref 21–32)
AST SERPL-CCNC: 28 U/L (ref 10–40)
BASOPHILS # BLD AUTO: 0.02 K/UL (ref 0–0.2)
BASOPHILS NFR BLD: 0.1 % (ref 0–1.9)
BILIRUB SERPL-MCNC: 0.4 MG/DL (ref 0.1–1)
BUN SERPL-MCNC: 29 MG/DL (ref 8–23)
CALCIUM SERPL-MCNC: 8.4 MG/DL (ref 8.7–10.5)
CHLORIDE SERPL-SCNC: 112 MMOL/L (ref 95–110)
CO2 SERPL-SCNC: 24 MMOL/L (ref 23–29)
CREAT SERPL-MCNC: 0.8 MG/DL (ref 0.5–1.4)
DELSYS: ABNORMAL
DIFFERENTIAL METHOD: ABNORMAL
EOSINOPHIL # BLD AUTO: 0 K/UL (ref 0–0.5)
EOSINOPHIL NFR BLD: 0.2 % (ref 0–8)
ERYTHROCYTE [DISTWIDTH] IN BLOOD BY AUTOMATED COUNT: 16.5 % (ref 11.5–14.5)
ERYTHROCYTE [SEDIMENTATION RATE] IN BLOOD BY WESTERGREN METHOD: 20 MM/H
EST. GFR  (AFRICAN AMERICAN): >60 ML/MIN/1.73 M^2
EST. GFR  (NON AFRICAN AMERICAN): >60 ML/MIN/1.73 M^2
FIO2: 50
GLUCOSE SERPL-MCNC: 134 MG/DL (ref 70–110)
HCO3 UR-SCNC: 27.8 MMOL/L (ref 24–28)
HCT VFR BLD AUTO: 29.4 % (ref 40–54)
HGB BLD-MCNC: 9 G/DL (ref 14–18)
IMM GRANULOCYTES # BLD AUTO: 0.14 K/UL (ref 0–0.04)
IMM GRANULOCYTES NFR BLD AUTO: 0.8 % (ref 0–0.5)
LYMPHOCYTES # BLD AUTO: 1.2 K/UL (ref 1–4.8)
LYMPHOCYTES NFR BLD: 7 % (ref 18–48)
MAGNESIUM SERPL-MCNC: 2.1 MG/DL (ref 1.6–2.6)
MCH RBC QN AUTO: 32.4 PG (ref 27–31)
MCHC RBC AUTO-ENTMCNC: 30.6 G/DL (ref 32–36)
MCV RBC AUTO: 106 FL (ref 82–98)
MODE: ABNORMAL
MONOCYTES # BLD AUTO: 0.8 K/UL (ref 0.3–1)
MONOCYTES NFR BLD: 4.7 % (ref 4–15)
NEUTROPHILS # BLD AUTO: 15.3 K/UL (ref 1.8–7.7)
NEUTROPHILS NFR BLD: 87.2 % (ref 38–73)
NRBC BLD-RTO: 0 /100 WBC
PCO2 BLDA: 37 MMHG (ref 35–45)
PH SMN: 7.48 [PH] (ref 7.35–7.45)
PHOSPHATE SERPL-MCNC: 3.4 MG/DL (ref 2.7–4.5)
PLATELET # BLD AUTO: 281 K/UL (ref 150–350)
PMV BLD AUTO: 13.4 FL (ref 9.2–12.9)
PO2 BLDA: 111 MMHG (ref 80–100)
POC BE: 4 MMOL/L
POC SATURATED O2: 99 % (ref 95–100)
POC TCO2: 29 MMOL/L (ref 23–27)
POCT GLUCOSE: 108 MG/DL (ref 70–110)
POCT GLUCOSE: 127 MG/DL (ref 70–110)
POCT GLUCOSE: 130 MG/DL (ref 70–110)
POTASSIUM SERPL-SCNC: 4.1 MMOL/L (ref 3.5–5.1)
PROT SERPL-MCNC: 5.9 G/DL (ref 6–8.4)
RBC # BLD AUTO: 2.78 M/UL (ref 4.6–6.2)
SAMPLE: ABNORMAL
SITE: ABNORMAL
SODIUM SERPL-SCNC: 145 MMOL/L (ref 136–145)
SP02: 97
WBC # BLD AUTO: 17.57 K/UL (ref 3.9–12.7)

## 2020-02-15 PROCEDURE — S0028 INJECTION, FAMOTIDINE, 20 MG: HCPCS | Mod: HCNC | Performed by: PHYSICIAN ASSISTANT

## 2020-02-15 PROCEDURE — 87205 SMEAR GRAM STAIN: CPT | Mod: HCNC

## 2020-02-15 PROCEDURE — 62270 LUMBAR PUNCTURE: ICD-10-PCS | Mod: HCNC,,, | Performed by: PSYCHIATRY & NEUROLOGY

## 2020-02-15 PROCEDURE — 25000242 PHARM REV CODE 250 ALT 637 W/ HCPCS: Mod: HCNC | Performed by: NURSE PRACTITIONER

## 2020-02-15 PROCEDURE — 25000003 PHARM REV CODE 250: Mod: HCNC | Performed by: PSYCHIATRY & NEUROLOGY

## 2020-02-15 PROCEDURE — 87102 FUNGUS ISOLATION CULTURE: CPT | Mod: HCNC

## 2020-02-15 PROCEDURE — 87798 DETECT AGENT NOS DNA AMP: CPT | Mod: 91,HCNC

## 2020-02-15 PROCEDURE — 25000003 PHARM REV CODE 250: Mod: HCNC | Performed by: PHYSICIAN ASSISTANT

## 2020-02-15 PROCEDURE — 82945 GLUCOSE OTHER FLUID: CPT | Mod: HCNC

## 2020-02-15 PROCEDURE — 95714 VEEG EA 12-26 HR UNMNTR: CPT | Mod: HCNC

## 2020-02-15 PROCEDURE — 36600 WITHDRAWAL OF ARTERIAL BLOOD: CPT | Mod: HCNC

## 2020-02-15 PROCEDURE — 63600175 PHARM REV CODE 636 W HCPCS: Mod: HCNC | Performed by: NURSE PRACTITIONER

## 2020-02-15 PROCEDURE — 87116 MYCOBACTERIA CULTURE: CPT | Mod: HCNC

## 2020-02-15 PROCEDURE — 62270 DX LMBR SPI PNXR: CPT | Mod: HCNC,,, | Performed by: PSYCHIATRY & NEUROLOGY

## 2020-02-15 PROCEDURE — 99900026 HC AIRWAY MAINTENANCE (STAT): Mod: HCNC

## 2020-02-15 PROCEDURE — 99291 PR CRITICAL CARE, E/M 30-74 MINUTES: ICD-10-PCS | Mod: HCNC,,, | Performed by: NURSE PRACTITIONER

## 2020-02-15 PROCEDURE — 87206 SMEAR FLUORESCENT/ACID STAI: CPT | Mod: HCNC

## 2020-02-15 PROCEDURE — 94003 VENT MGMT INPAT SUBQ DAY: CPT | Mod: HCNC

## 2020-02-15 PROCEDURE — 87556 M.TUBERCULO DNA AMP PROBE: CPT | Mod: HCNC

## 2020-02-15 PROCEDURE — 87798 DETECT AGENT NOS DNA AMP: CPT | Mod: HCNC

## 2020-02-15 PROCEDURE — 85730 THROMBOPLASTIN TIME PARTIAL: CPT | Mod: 91,HCNC

## 2020-02-15 PROCEDURE — 85730 THROMBOPLASTIN TIME PARTIAL: CPT | Mod: HCNC

## 2020-02-15 PROCEDURE — 87529 HSV DNA AMP PROBE: CPT | Mod: HCNC

## 2020-02-15 PROCEDURE — 89051 BODY FLUID CELL COUNT: CPT | Mod: HCNC

## 2020-02-15 PROCEDURE — 94640 AIRWAY INHALATION TREATMENT: CPT | Mod: HCNC

## 2020-02-15 PROCEDURE — 86592 SYPHILIS TEST NON-TREP QUAL: CPT | Mod: HCNC

## 2020-02-15 PROCEDURE — 95720 EEG PHY/QHP EA INCR W/VEEG: CPT | Mod: HCNC,,, | Performed by: PSYCHIATRY & NEUROLOGY

## 2020-02-15 PROCEDURE — 82803 BLOOD GASES ANY COMBINATION: CPT | Mod: HCNC

## 2020-02-15 PROCEDURE — 87070 CULTURE OTHR SPECIMN AEROBIC: CPT | Mod: HCNC

## 2020-02-15 PROCEDURE — 80053 COMPREHEN METABOLIC PANEL: CPT | Mod: HCNC

## 2020-02-15 PROCEDURE — 20000000 HC ICU ROOM: Mod: HCNC

## 2020-02-15 PROCEDURE — 27000221 HC OXYGEN, UP TO 24 HOURS: Mod: HCNC

## 2020-02-15 PROCEDURE — 94761 N-INVAS EAR/PLS OXIMETRY MLT: CPT | Mod: HCNC

## 2020-02-15 PROCEDURE — 95720 PR EEG, W/VIDEO, CONT RECORD, I&R, >12<26 HRS: ICD-10-PCS | Mod: HCNC,,, | Performed by: PSYCHIATRY & NEUROLOGY

## 2020-02-15 PROCEDURE — 85025 COMPLETE CBC W/AUTO DIFF WBC: CPT | Mod: HCNC

## 2020-02-15 PROCEDURE — 25000003 PHARM REV CODE 250: Mod: HCNC | Performed by: NURSE PRACTITIONER

## 2020-02-15 PROCEDURE — 99900035 HC TECH TIME PER 15 MIN (STAT): Mod: HCNC

## 2020-02-15 PROCEDURE — 87498 ENTEROVIRUS PROBE&REVRS TRNS: CPT | Mod: HCNC

## 2020-02-15 PROCEDURE — 86403 PARTICLE AGGLUT ANTBDY SCRN: CPT | Mod: HCNC

## 2020-02-15 PROCEDURE — 83735 ASSAY OF MAGNESIUM: CPT | Mod: HCNC

## 2020-02-15 PROCEDURE — 84157 ASSAY OF PROTEIN OTHER: CPT | Mod: HCNC

## 2020-02-15 PROCEDURE — 84100 ASSAY OF PHOSPHORUS: CPT | Mod: HCNC

## 2020-02-15 PROCEDURE — 99291 CRITICAL CARE FIRST HOUR: CPT | Mod: HCNC,,, | Performed by: NURSE PRACTITIONER

## 2020-02-15 RX ORDER — VANCOMYCIN 1.75 GRAM/500 ML IN 0.9 % SODIUM CHLORIDE INTRAVENOUS
1750 ONCE
Status: DISCONTINUED | OUTPATIENT
Start: 2020-02-15 | End: 2020-02-15

## 2020-02-15 RX ORDER — PHENYLEPHRINE HCL IN 0.9% NACL 1 MG/10 ML
SYRINGE (ML) INTRAVENOUS
Status: COMPLETED
Start: 2020-02-15 | End: 2020-02-15

## 2020-02-15 RX ORDER — VANCOMYCIN 1.75 GRAM/500 ML IN 0.9 % SODIUM CHLORIDE INTRAVENOUS
1750
Status: DISCONTINUED | OUTPATIENT
Start: 2020-02-15 | End: 2020-02-16

## 2020-02-15 RX ADMIN — ACYCLOVIR SODIUM 590 MG: 50 INJECTION, SOLUTION INTRAVENOUS at 12:02

## 2020-02-15 RX ADMIN — ACYCLOVIR SODIUM 590 MG: 50 INJECTION, SOLUTION INTRAVENOUS at 07:02

## 2020-02-15 RX ADMIN — IPRATROPIUM BROMIDE AND ALBUTEROL SULFATE 3 ML: .5; 3 SOLUTION RESPIRATORY (INHALATION) at 07:02

## 2020-02-15 RX ADMIN — CHLORHEXIDINE GLUCONATE 0.12% ORAL RINSE 15 ML: 1.2 LIQUID ORAL at 08:02

## 2020-02-15 RX ADMIN — CEFTRIAXONE 2 G: 2 INJECTION, SOLUTION INTRAVENOUS at 12:02

## 2020-02-15 RX ADMIN — VANCOMYCIN 1.75 GRAM/500 ML IN 0.9 % SODIUM CHLORIDE INTRAVENOUS 1750 MG: at 01:02

## 2020-02-15 RX ADMIN — ESCITALOPRAM OXALATE 20 MG: 10 TABLET ORAL at 08:02

## 2020-02-15 RX ADMIN — ATORVASTATIN CALCIUM 40 MG: 20 TABLET, FILM COATED ORAL at 09:02

## 2020-02-15 RX ADMIN — AMPICILLIN SODIUM 2 G: 2 INJECTION, POWDER, FOR SOLUTION INTRAMUSCULAR; INTRAVENOUS at 04:02

## 2020-02-15 RX ADMIN — LEVETIRACETAM 1500 MG: 1500 INJECTION, SOLUTION INTRAVENOUS at 08:02

## 2020-02-15 RX ADMIN — AMPICILLIN SODIUM 2 G: 2 INJECTION, POWDER, FOR SOLUTION INTRAMUSCULAR; INTRAVENOUS at 07:02

## 2020-02-15 RX ADMIN — LEVETIRACETAM 1500 MG: 1500 INJECTION, SOLUTION INTRAVENOUS at 09:02

## 2020-02-15 RX ADMIN — IPRATROPIUM BROMIDE AND ALBUTEROL SULFATE 3 ML: .5; 3 SOLUTION RESPIRATORY (INHALATION) at 12:02

## 2020-02-15 RX ADMIN — HYDROCORTISONE SODIUM SUCCINATE 50 MG: 100 INJECTION, POWDER, FOR SOLUTION INTRAMUSCULAR; INTRAVENOUS at 08:02

## 2020-02-15 RX ADMIN — FAMOTIDINE 20 MG: 10 INJECTION, SOLUTION INTRAVENOUS at 08:02

## 2020-02-15 RX ADMIN — MICONAZOLE NITRATE: 20 OINTMENT TOPICAL at 08:02

## 2020-02-15 RX ADMIN — FAMOTIDINE 20 MG: 10 INJECTION, SOLUTION INTRAVENOUS at 09:02

## 2020-02-15 RX ADMIN — MICONAZOLE NITRATE: 20 OINTMENT TOPICAL at 09:02

## 2020-02-15 RX ADMIN — CHLORHEXIDINE GLUCONATE 0.12% ORAL RINSE 15 ML: 1.2 LIQUID ORAL at 09:02

## 2020-02-15 RX ADMIN — IPRATROPIUM BROMIDE AND ALBUTEROL SULFATE 3 ML: .5; 3 SOLUTION RESPIRATORY (INHALATION) at 01:02

## 2020-02-15 RX ADMIN — AMPICILLIN SODIUM 2 G: 2 INJECTION, POWDER, FOR SOLUTION INTRAMUSCULAR; INTRAVENOUS at 01:02

## 2020-02-15 NOTE — PROGRESS NOTES
Pharmacokinetic Initial Assessment: IV Vancomycin    Assessment/Plan:    Initiate intravenous vancomycin with loading dose of 1750   mg once followed by a maintenance dose of vancomycin 1750mg IV every 24 hours  Desired empiric serum trough concentration is 15 to 20 mcg/mL  Draw vancomycin trough level 30 min prior to third dose on 2/17 at approximately 1230  Pharmacy will continue to follow and monitor vancomycin.      Please contact pharmacy at extension [phone] with any questions regarding this assessment.     Thank you for the consult,   Chaitanya Galicia       Patient brief summary:  Edu Choi is a 64 y.o. male initiated on antimicrobial therapy with IV Vancomycin for treatment of suspected meningitis    Drug Allergies:   Review of patient's allergies indicates:   Allergen Reactions    No known drug allergies        Actual Body Weight:   68.5 kg      Renal Function:   Estimated Creatinine Clearance: 76.1 mL/min (based on SCr of 0.8 mg/dL).,     Dialysis Method (if applicable):      CBC (last 72 hours):  Recent Labs   Lab Result Units 02/13/20  0125 02/14/20  0123 02/15/20  0053   WBC K/uL 12.73* 14.08* 17.57*   Hemoglobin g/dL 9.3* 9.4* 9.0*   Hematocrit % 29.8* 29.2* 29.4*   Platelets K/uL 288 318 281   Gran% % 85.5* 86.4* 87.2*   Lymph% % 7.1* 6.8* 7.0*   Mono% % 6.4 5.8 4.7   Eosinophil% % 0.2 0.0 0.2   Basophil% % 0.2 0.1 0.1   Differential Method  Automated Automated Automated       Metabolic Panel (last 72 hours):  Recent Labs   Lab Result Units 02/13/20  0125 02/13/20  1003 02/13/20  2100 02/14/20  0123 02/15/20  0400   Sodium mmol/L 146*  --   --  146* 145   Potassium mmol/L 3.5 3.4* 3.6 3.8 4.1   Chloride mmol/L 105  --   --  112* 112*   CO2 mmol/L 33*  --   --  26 24   Glucose mg/dL 149*  --   --  149* 134*   BUN, Bld mg/dL 29*  --   --  29* 29*   Creatinine mg/dL 1.0  --   --  0.9 0.8   Albumin g/dL 3.1*  --   --  3.0* 2.8*   Total Bilirubin mg/dL 0.4  --   --  0.4 0.4   Alkaline Phosphatase U/L 46*   --   --  47* 49*   AST U/L 30  --   --  26 28   ALT U/L 21  --   --  21 20   Magnesium mg/dL 2.2  --   --  2.1 2.1   Phosphorus mg/dL 3.7  --   --  3.0 3.4       Drug levels (last 3 results):  No results for input(s): VANCOMYCINRA, VANCOMYCINPE, VANCOMYCINTR in the last 72 hours.    Microbiologic Results:  Microbiology Results (last 7 days)     Procedure Component Value Units Date/Time    Blood culture [[id number]] Collected:  02/09/20 1034    Order Status:  Completed Specimen:  Blood from Peripheral, Ankle, Left Updated:  02/14/20 1222     Blood Culture, Routine No growth after 5 days.    Blood culture [[id number]] Collected:  02/09/20 1040    Order Status:  Completed Specimen:  Blood from Peripheral, Hand, Right Updated:  02/14/20 1222     Blood Culture, Routine No growth after 5 days.    Culture, Respiratory with Gram Stain [[id number]] Collected:  02/09/20 1224    Order Status:  Completed Specimen:  Respiratory from Endotracheal Aspirate Updated:  02/11/20 0917     Respiratory Culture Normal respiratory tessa      No S aureus or Pseudomonas isolated.     Gram Stain (Respiratory) <10 epithelial cells per low power field.     Gram Stain (Respiratory) Rare WBC's     Gram Stain (Respiratory) Rare Gram positive cocci     Gram Stain (Respiratory) Rare Gram negative rods    Culture, Anaerobe [[id number]]     Order Status:  Canceled Specimen:  Sputum     Aerobic culture [[id number]]     Order Status:  Canceled Specimen:  Body Fluid from Abdomen

## 2020-02-15 NOTE — PROCEDURES
[facility] EEG/VIDEO MONITORING REPORT  Edu Choi  [id number]  1955    DATE OF SERVICE:  02/14/2020  DATE OF ADMISSION: 2/2/2020  7:24 PM    ADMITTING/REQUESTING PROVIDER: Ricardo Yee MD    REASON FOR CONSULT:  64-year-old man with a reported history of seizures admitted after a fall found to have facial fractures and multiple scattered infarcts who is currently lethargic with decreased responsiveness.  Evaluate for evidence of epileptiform activity.    METHODOLOGY   Electroencephalographic (EEG) recording is with electrodes placed according to the International 10-20 placement system.  Thirty two (32) channels of digital signal (sampling rate of 512/sec) including T1 and T2 was simultaneously recorded from the scalp and may include  EKG, EMG, and/or eye monitors.  Recording band pass was 0.1 to 512 hz.  Digital video recording of the patient is simultaneously recorded with the EEG.  The patient is instructed report clinical symptoms which may occur during the recording session.  EEG and video recording is stored and archived in digital format.  Activation procedures which include photic stimulation, hyperventilation and instructing patients to perform simple task are done in selected patients.   The EEG is displayed on a monitor screen and can be reviewed using different montages.  Computer assisted analysis is employed to detect spike and electrographic seizure activity.   The entire record is submitted for computer analysis.  The entire recording is visually reviewed and the times identified by computer analysis as being spikes or seizures are reviewed again.  Compresses spectral analysis (CSA) is also performed on the activity recorded from each individual channel.  This is displayed as a power display of frequencies from 0 to 30 Hz over time.   The CSA is reviewed looking for asymmetries in power between homologous areas of the scalp and then compared with the original EEG recording.     [employer]  software is also utilized in the review of this study.  This software suite analyzes the EEG recording in multiple domains.  Coherence and rhythmicity is computed to identify EEG sections which may contain organized seizures.  Each channel undergoes analysis to detect presence of spike and sharp waves which have special and morphological characteristic of epileptic activity.  The routine EEG recording is converted from spacial into frequency domain.  This is then displayed comparing homologous areas to identify areas of significant asymmetry.  Algorithm to identify non-cortically generated artifact is used to separate eye movement, EMG and other artifact from the EEG.      RECORDING TIMES  Start on 02/14/2020 at 07:00 a.m.  Stop on 02/15/2020 at 07:00 a.m.  A total of 23 hr and 52 min of EEG recording is obtained.    EEG FINDINGS  Background activity:   The waking background is continuous predominantly moderate-high voltage, disorganized, often sharply contoured, theta activity with plenty of admixed delta seen intermittently throughout both hemispheres.  At times, there is a poorly sustained 7.5 hz maximal posterior dominant rhythm.    Sleep:  There is preserved variability throughout the record with periods with higher voltage more disorganized activity when the patient has his eyes open looking around the room alternating with more suppressed lower voltage/frequency activity.  However, there is no normal sleep architecture.    Activation procedures:   Hyperventilation is not performed  Photic stimulation is not performed    Cardiac Monitor:   Heart rate appears generally regular on a single lead EKG.    Impression:   This is an abnormal continuous EEG monitoring study because generalized background slowing that can be sharply contoured at times consistent with a moderate encephalopathy with a potential for cortical hyperexcitability.  There are no prominent areas of focal slowing however encephalopathy obscures  focal findings.  There are no pushbutton activations, no definite epileptiform discharges, and no electrographic seizures.    Suma Sherman MD PhD  Neurology-Epilepsy  [facility].  [facility]

## 2020-02-15 NOTE — ASSESSMENT & PLAN NOTE
- cannot be explained by scattered strokes seen on MRI, patient without evidence of seizures on EEG  - will trial modafinil- little improvement with modafinil, will treat for meningitis, and obtain LP today after Heparin gtt stopped

## 2020-02-15 NOTE — PLAN OF CARE
Neuro exam stable poor  EEG non-revealing  Start meningitic coverage  Hold hep gtt  Plan for LP once heparin cleared  Midline consult

## 2020-02-15 NOTE — ASSESSMENT & PLAN NOTE
- Incidentally seen on MRI w small scattered areas of subacute/remote infarcts. Cont current secondary prevention measures.   - CTA head and neck with no LVO  - Continue heparin gtt and statin

## 2020-02-15 NOTE — SUBJECTIVE & OBJECTIVE
Interval History: No acute events overnight. Remains intubated.    Review of Systems: Unable to obtain a complete ROS due to level of consciousness.     Vitals:   Temp: 98.7 °F (37.1 °C)  Pulse: 89  Rhythm: normal sinus rhythm  BP: (!) 99/54  MAP (mmHg): 73  Resp: 18  SpO2: 100 %  Oxygen Concentration (%): 50  O2 Device (Oxygen Therapy): ventilator  Vent Mode: Spont  Pressure Support: 8 cmH20  PEEP/CPAP: 6 cmH20  Peak Airway Pressure: 14 cmH2O  Mean Airway Pressure: 8.6 cmH20  Plateau Pressure: 0 cmH20    Temp  Min: 98.7 °F (37.1 °C)  Max: 99.6 °F (37.6 °C)  Pulse  Min: 65  Max: 106  BP  Min: 90/55  Max: 124/61  MAP (mmHg)  Min: 68  Max: 85  Resp  Min: 14  Max: 24  SpO2  Min: 93 %  Max: 100 %  Oxygen Concentration (%)  Min: 50  Max: 50    02/14 0701 - 02/15 0700  In: 2621.8 [I.V.:436.8]  Out: 1035 [Urine:1035]   Unmeasured Output  Urine Occurrence: 1  Stool Occurrence: 1  Pad Count: 2     Examination:   Constitutional: Well-nourished and -developed. No apparent distress.   Eyes: Conjunctiva clear, anicteric. Lids no lesions.  Head/Ears/Nose/Mouth/Throat/Neck: Moist mucous membranes. External ears, nose atraumatic.   Cardiovascular: Regular rhythm. No murmurs. No leg edema.  Respiratory: Comfortable respirations. Clear to auscultation.  Gastrointestinal: Soft, nondistended, nontender. + bowel sounds.    Neurologic:  -GCS E 4 V 1t M 5  -Lethargic. Responds to voice. Does not follow commands.  -Cranial nerves: EOM intact, PERRL, no facial droop, +cough/gag  -Motor: Moves all extremities spontaneously, antigravity  Unable to test orientation, language, memory, judgment, insight, fund of knowledge, shoulder shrug, tongue protrusion, coordination, gait due to level of consciousness.    Medications:   Continuous  heparin (porcine) in D5W Last Rate: Stopped (02/15/20 1130)   Scheduled  acyclovir 10 mg/kg (Ideal) Q8H   albuterol-ipratropium 3 mL Q6H   ampicillin IVPB 2 g Q4H   atorvastatin 40 mg QHS   cefTRIAXone (ROCEPHIN)  IVPB 2 g Q12H   chlorhexidine 15 mL BID   escitalopram oxalate 20 mg Daily   famotidine (PF) 20 mg BID   levetiracetam IVPB 1,500 mg Q12H   miconazole nitrate 2%  BID   vancomycin (VANCOCIN) IVPB 1,750 mg Q24H   PRN  acetaminophen 650 mg Q6H PRN   Dextrose 10% Bolus 12.5 g PRN   fentaNYL 25 mcg Q2H PRN   glucagon (human recombinant) 1 mg PRN   insulin aspart U-100 1-10 Units Q6H PRN   labetalol 10 mg Q4H PRN   lorazepam 2 mg Q1H PRN   magnesium oxide 800 mg PRN   magnesium oxide 800 mg PRN   ondansetron 4 mg Q8H PRN   potassium chloride 10% 40 mEq PRN   potassium chloride 10% 40 mEq PRN   potassium chloride 10% 60 mEq PRN   potassium, sodium phosphates 2 packet PRN   potassium, sodium phosphates 2 packet PRN   potassium, sodium phosphates 2 packet PRN   sodium chloride 0.9% 10 mL PRN      Today I independently reviewed pertinent medications, lines/drains/airways, imaging, cardiology results, laboratory results, notably:     ISTAT: Recent Labs   Lab 02/15/20  0318   PH 7.484*   PCO2 37.0   PO2 111*   POCSATURATED 99   HCO3 27.8   BE 4   POCTCO2 29*   SAMPLE ARTERIAL      Chem: Recent Labs   Lab 02/15/20  0400      K 4.1   *   CO2 24   *   BUN 29*   CREATININE 0.8   ESTGFRAFRICA >60.0   EGFRNONAA >60.0   CALCIUM 8.4*   MG 2.1   PHOS 3.4   ANIONGAP 9   PROT 5.9*   ALBUMIN 2.8*   BILITOT 0.4   ALKPHOS 49*   AST 28   ALT 20     Heme: Recent Labs   Lab 02/15/20  0053   WBC 17.57*   HGB 9.0*   HCT 29.4*        Endo:   Recent Labs   Lab 02/14/20  1746 02/15/20  0013 02/15/20  0625   POCTGLUCOSE 112* 127* 130*

## 2020-02-15 NOTE — PLAN OF CARE
POC reviewed with pt and family at 1700. Pt unable to verbalize understanding due to intubation and poor neurological status; family verbalized understanding. All questions and concerns addressed. Pt remains drowsy and at times difficult to arouse. Consent obtained for a lumbar puncture to rule out spinal infection. Pt started on vancomycin, ampicillin, ceftriaxone, and acyclovir as prophylaxis. Heparin gtt stopped at 1130 today in preparation for lumbar puncture. Midline consult placed due to poor venous access and  IVs. Patient remains on spontaneous mode on ventilator with no issues. TF remain at goal of 50 ml/hr. Will continue to monitor. See flowsheets for full assessment and VS info.

## 2020-02-15 NOTE — ASSESSMENT & PLAN NOTE
- cannot be explained by scattered strokes seen on MRI, patient without evidence of seizures on EEG  - 2/15 will trial modafinil- little improvement with modafinil, will treat for meningitis, and obtain LP today after Heparin gtt stopped  - 2/16 amantadine started, more awake today, LP performed overnight:

## 2020-02-15 NOTE — ASSESSMENT & PLAN NOTE
- Incidentally seen on MRI w small scattered areas of subacute/remote infarcts. Cont current secondary prevention measures.   - CTA head and neck with no LVO  - patient on heparin drip and a statin- Heparin gtt stopped for LP procedure tonight

## 2020-02-15 NOTE — ASSESSMENT & PLAN NOTE
Intubated since 2/2    Wean vent settings as tolerated, still not awake enough for extubation  Peridex, Pepcid  CXR, ABG-  daily    Vent Mode: Spont  Oxygen Concentration (%):  [50-60] 50  Resp Rate Total:  [14 br/min-25 br/min] 19 br/min  PEEP/CPAP:  [6 cmH20] 6 cmH20  Pressure Support:  [8 cmH20] 8 cmH20  Mean Airway Pressure:  [8.2 cmH20-9.3 cmH20] 8.5 cmH20

## 2020-02-15 NOTE — PROGRESS NOTES
[facility]  Neurocritical Care  Progress Note    Admit Date: 2/2/2020  Service Date: 02/15/2020  Length of Stay: 13    Subjective:     Chief Complaint: Seizure    History of Present Illness: Pt is a 64 y.o. Male with PMHx of of DVT, PE, DM, and seizures (on 1500 keppra at home) who presents to the ED via EMS with complaint of a seizure that occurred this morning. Pt has long standing history of seizures and was found down by wife on cement around 0800, LKN 0700. Patient compliant with keppra per wife and last seizure was in November. At that time his keppra dose was increased. Patient has had cough recently but no other symptoms. Patient is on AC for DVT/PE and was recently switched from Coumadin to Eliquis. He was given 10 mg Versed during EMS ride to outside facility for continued seizure activity. He was unresponsive upon arrival to ED and subsequently intubated. CTH without any acute changes, CT max/face with multiple facial fractures, and CXR with consolidation and atelectasis/collapsed lung. Patient transferred to [facility] to higher level neurologic monitoring and continuous EEG.     Hospital Course: 2/2/2020: Admit to [facility]. GS and ENT consulted. MX facial fractures and concerns of bowel ischemia on CT  2/3 will rehook to EEG for 24h. Remains NPO. US LE pending. Monitor lactic [id number] 2L  IV. Wean levophed as tolerated after bolus  2/4: remains on small amount of pressors, cultures remain negative, cont abx, cont current AEDs, EEG overnight negative for seizure activity, repeat ct abdomen today  2/5: minimal dose pressors, advance TFs. Tolerating SBT, possible trial of extubation in am  2/6: need for pressors will likely cease when able to come off sedation, currently still requires mechanical ventilation with pO2 value of 60 on 40% fiO2 and ps 10  02/08/2020 exam still poor, getting MRI brain   02/09/2020 Not tolerating CPAP, placed on AC   02/10/2020 NAEO  02/11/2020 NAEO  02/12/2020 CPAP  trial this am   02/13/2020 NAEON. On heparin drip with stable exam.   02/14/2020 NAEON, f/u EEG (on Keppra). Will give modafinil trial today.   02/15/2020: Stable overnight. Little improvement with modafinil. Heparin gtt stopped for LP procedure. Meningitic coverage started.    Interval History: No acute events overnight. Remains intubated.    Review of Systems: Unable to obtain a complete ROS due to level of consciousness.     Vitals:   Temp: 98.7 °F (37.1 °C)  Pulse: 89  Rhythm: normal sinus rhythm  BP: (!) 99/54  MAP (mmHg): 73  Resp: 18  SpO2: 100 %  Oxygen Concentration (%): 50  O2 Device (Oxygen Therapy): ventilator  Vent Mode: Spont  Pressure Support: 8 cmH20  PEEP/CPAP: 6 cmH20  Peak Airway Pressure: 14 cmH2O  Mean Airway Pressure: 8.6 cmH20  Plateau Pressure: 0 cmH20    Temp  Min: 98.7 °F (37.1 °C)  Max: 99.6 °F (37.6 °C)  Pulse  Min: 65  Max: 106  BP  Min: 90/55  Max: 124/61  MAP (mmHg)  Min: 68  Max: 85  Resp  Min: 14  Max: 24  SpO2  Min: 93 %  Max: 100 %  Oxygen Concentration (%)  Min: 50  Max: 50    02/14 0701 - 02/15 0700  In: 2621.8 [I.V.:436.8]  Out: 1035 [Urine:1035]   Unmeasured Output  Urine Occurrence: 1  Stool Occurrence: 1  Pad Count: 2     Examination:   Constitutional: Well-nourished and -developed. No apparent distress.   Eyes: Conjunctiva clear, anicteric. Lids no lesions.  Head/Ears/Nose/Mouth/Throat/Neck: Moist mucous membranes. External ears, nose atraumatic.   Cardiovascular: Regular rhythm. No murmurs. No leg edema.  Respiratory: Comfortable respirations. Clear to auscultation.  Gastrointestinal: Soft, nondistended, nontender. + bowel sounds.    Neurologic:  -GCS E 4 V 1t M 5  -Lethargic. Responds to voice. Does not follow commands.  -Cranial nerves: EOM intact, PERRL, no facial droop, +cough/gag  -Motor: Moves all extremities spontaneously, antigravity  Unable to test orientation, language, memory, judgment, insight, fund of knowledge, shoulder shrug, tongue protrusion, coordination,  gait due to level of consciousness.    Medications:   Continuous  heparin (porcine) in D5W Last Rate: Stopped (02/15/20 1130)   Scheduled  acyclovir 10 mg/kg (Ideal) Q8H   albuterol-ipratropium 3 mL Q6H   ampicillin IVPB 2 g Q4H   atorvastatin 40 mg QHS   cefTRIAXone (ROCEPHIN) IVPB 2 g Q12H   chlorhexidine 15 mL BID   escitalopram oxalate 20 mg Daily   famotidine (PF) 20 mg BID   levetiracetam IVPB 1,500 mg Q12H   miconazole nitrate 2%  BID   vancomycin (VANCOCIN) IVPB 1,750 mg Q24H   PRN  acetaminophen 650 mg Q6H PRN   Dextrose 10% Bolus 12.5 g PRN   fentaNYL 25 mcg Q2H PRN   glucagon (human recombinant) 1 mg PRN   insulin aspart U-100 1-10 Units Q6H PRN   labetalol 10 mg Q4H PRN   lorazepam 2 mg Q1H PRN   magnesium oxide 800 mg PRN   magnesium oxide 800 mg PRN   ondansetron 4 mg Q8H PRN   potassium chloride 10% 40 mEq PRN   potassium chloride 10% 40 mEq PRN   potassium chloride 10% 60 mEq PRN   potassium, sodium phosphates 2 packet PRN   potassium, sodium phosphates 2 packet PRN   potassium, sodium phosphates 2 packet PRN   sodium chloride 0.9% 10 mL PRN      Today I independently reviewed pertinent medications, lines/drains/airways, imaging, cardiology results, laboratory results, notably:     ISTAT: Recent Labs   Lab 02/15/20  0318   PH 7.484*   PCO2 37.0   PO2 111*   POCSATURATED 99   HCO3 27.8   BE 4   POCTCO2 29*   SAMPLE ARTERIAL      Chem: Recent Labs   Lab 02/15/20  0400      K 4.1   *   CO2 24   *   BUN 29*   CREATININE 0.8   ESTGFRAFRICA >60.0   EGFRNONAA >60.0   CALCIUM 8.4*   MG 2.1   PHOS 3.4   ANIONGAP 9   PROT 5.9*   ALBUMIN 2.8*   BILITOT 0.4   ALKPHOS 49*   AST 28   ALT 20     Heme: Recent Labs   Lab 02/15/20  0053   WBC 17.57*   HGB 9.0*   HCT 29.4*        Endo:   Recent Labs   Lab 02/14/20  1746 02/15/20  0013 02/15/20  0625   POCTGLUCOSE 112* 127* 130*          Assessment/Plan:     Neuro  * Seizure  - patient w history of seizures  - f/u cEEG, last eeg on 2/8 with  intermittent L and R temporal slowing + diffuse theta. No seizures  - on keppra 1500mg BID    Altered mental status  - cannot be explained by scattered strokes seen on MRI, patient without evidence of seizures on EEG  - will trial modafinil- little improvement with modafinil, will treat for meningitis, and obtain LP today after Heparin gtt stopped    Stroke  - Incidentally seen on MRI w small scattered areas of subacute/remote infarcts. Cont current secondary prevention measures.   - CTA head and neck with no LVO  - patient on heparin drip and a statin- Heparin gtt stopped for LP procedure tonight    Psychiatric  Depression with anxiety  Continue escitalopram 20mg daily    Derm  Alteration in skin integrity  Wound care     Pulmonary  Acute respiratory failure with hypoxia  Intubated since 2/2    Wean vent settings as tolerated, still not awake enough for extubation  Peridex, Pepcid  CXR, ABG-  daily    Vent Mode: Spont  Oxygen Concentration (%):  [50] 50  Resp Rate Total:  [14 br/min-25 br/min] 18 br/min  PEEP/CPAP:  [6 cmH20] 6 cmH20  Pressure Support:  [8 cmH20-10 cmH20] 8 cmH20  Mean Airway Pressure:  [8.4 cmH20-9.8 cmH20] 8.9 cmH20      Hematology  Chronic anticoagulation  Heparin gtt     History of DVT (deep vein thrombosis)   heparin gtt       Orthopedic  Closed extensive facial fractures  - evaluated by ENT    The patient is being Prophylaxed for:  Venous Thromboembolism with: Mechanical or Chemical  Stress Ulcer with: H2B  Ventilator Pneumonia with: not applicable    Activity Orders          Diet NPO: NPO starting at 02/02 1925        Full Code    CC time spent: 33 minutes    Sonia Carpenter NP  Neurocritical Care  [facility]

## 2020-02-15 NOTE — PLAN OF CARE
POC reviewed with pt at 0500. Pt couldn't verbalize understanding d/t pt intubated. He needed reinforcement.  te events overnight. VSS; neuro status stabled; pt became little restless. Pt still on spontaneous mode  setting with PEEP of 6. FiO2 at 50   he did fine .. Bath was given, linens changed . Pt progressing toward goals.No acute events overnight. Will continue to monitor. See flowsheets for full assessment and VS info.

## 2020-02-16 LAB
ALBUMIN SERPL BCP-MCNC: 2.8 G/DL (ref 3.5–5.2)
ALLENS TEST: ABNORMAL
ALP SERPL-CCNC: 46 U/L (ref 55–135)
ALT SERPL W/O P-5'-P-CCNC: 23 U/L (ref 10–44)
ANION GAP SERPL CALC-SCNC: 8 MMOL/L (ref 8–16)
APTT BLDCRRT: 23.9 SEC (ref 21–32)
APTT BLDCRRT: 33.9 SEC (ref 21–32)
APTT BLDCRRT: <21 SEC (ref 21–32)
AST SERPL-CCNC: 28 U/L (ref 10–40)
BASOPHILS # BLD AUTO: 0.02 K/UL (ref 0–0.2)
BASOPHILS NFR BLD: 0.2 % (ref 0–1.9)
BILIRUB SERPL-MCNC: 0.3 MG/DL (ref 0.1–1)
BUN SERPL-MCNC: 24 MG/DL (ref 8–23)
CALCIUM SERPL-MCNC: 8.1 MG/DL (ref 8.7–10.5)
CHLORIDE SERPL-SCNC: 113 MMOL/L (ref 95–110)
CLARITY CSF: CLEAR
CLARITY CSF: CLEAR
CO2 SERPL-SCNC: 27 MMOL/L (ref 23–29)
COLOR CSF: ABNORMAL
COLOR CSF: ABNORMAL
CREAT SERPL-MCNC: 0.8 MG/DL (ref 0.5–1.4)
CRYPTOC AG CSF QL LA: NEGATIVE
DELSYS: ABNORMAL
DIFFERENTIAL METHOD: ABNORMAL
EOSINOPHIL # BLD AUTO: 0.3 K/UL (ref 0–0.5)
EOSINOPHIL NFR BLD: 2.1 % (ref 0–8)
ERYTHROCYTE [DISTWIDTH] IN BLOOD BY AUTOMATED COUNT: 16.4 % (ref 11.5–14.5)
EST. GFR  (AFRICAN AMERICAN): >60 ML/MIN/1.73 M^2
EST. GFR  (NON AFRICAN AMERICAN): >60 ML/MIN/1.73 M^2
FIO2: 50
GLUCOSE CSF-MCNC: 74 MG/DL (ref 40–70)
GLUCOSE SERPL-MCNC: 101 MG/DL (ref 70–110)
HCO3 UR-SCNC: 26.4 MMOL/L (ref 24–28)
HCT VFR BLD AUTO: 27.5 % (ref 40–54)
HGB BLD-MCNC: 8.7 G/DL (ref 14–18)
IMM GRANULOCYTES # BLD AUTO: 0.11 K/UL (ref 0–0.04)
IMM GRANULOCYTES NFR BLD AUTO: 0.9 % (ref 0–0.5)
LYMPHOCYTES # BLD AUTO: 2.8 K/UL (ref 1–4.8)
LYMPHOCYTES NFR BLD: 22.8 % (ref 18–48)
LYMPHOCYTES NFR CSF MANUAL: 73 % (ref 40–80)
LYMPHOCYTES NFR CSF MANUAL: 77 % (ref 40–80)
MAGNESIUM SERPL-MCNC: 2.1 MG/DL (ref 1.6–2.6)
MCH RBC QN AUTO: 32.2 PG (ref 27–31)
MCHC RBC AUTO-ENTMCNC: 31.6 G/DL (ref 32–36)
MCV RBC AUTO: 102 FL (ref 82–98)
MIN VOL: 7.74
MODE: ABNORMAL
MONOCYTES # BLD AUTO: 1.6 K/UL (ref 0.3–1)
MONOCYTES NFR BLD: 12.9 % (ref 4–15)
MONOS+MACROS NFR CSF MANUAL: 22 % (ref 15–45)
MONOS+MACROS NFR CSF MANUAL: 27 % (ref 15–45)
NEUTROPHILS # BLD AUTO: 7.5 K/UL (ref 1.8–7.7)
NEUTROPHILS NFR BLD: 61.1 % (ref 38–73)
NEUTROPHILS NFR CSF MANUAL: 1 % (ref 0–6)
NRBC BLD-RTO: 0 /100 WBC
PCO2 BLDA: 40.1 MMHG (ref 35–45)
PEEP: 18
PH SMN: 7.43 [PH] (ref 7.35–7.45)
PHOSPHATE SERPL-MCNC: 3.1 MG/DL (ref 2.7–4.5)
PLATELET # BLD AUTO: 263 K/UL (ref 150–350)
PMV BLD AUTO: 13.3 FL (ref 9.2–12.9)
PO2 BLDA: 65 MMHG (ref 80–100)
POC BE: 2 MMOL/L
POC SATURATED O2: 93 % (ref 95–100)
POC TCO2: 28 MMOL/L (ref 23–27)
POCT GLUCOSE: 105 MG/DL (ref 70–110)
POCT GLUCOSE: 117 MG/DL (ref 70–110)
POCT GLUCOSE: 131 MG/DL (ref 70–110)
POCT GLUCOSE: 134 MG/DL (ref 70–110)
POTASSIUM SERPL-SCNC: 3.6 MMOL/L (ref 3.5–5.1)
POTASSIUM SERPL-SCNC: 4.1 MMOL/L (ref 3.5–5.1)
PROT CSF-MCNC: 50 MG/DL (ref 15–40)
PROT SERPL-MCNC: 5.7 G/DL (ref 6–8.4)
PS: 8
RBC # BLD AUTO: 2.7 M/UL (ref 4.6–6.2)
RBC # CSF: 767 /CU MM
RBC # CSF: 863 /CU MM
SAMPLE: ABNORMAL
SITE: ABNORMAL
SODIUM SERPL-SCNC: 148 MMOL/L (ref 136–145)
SP02: 94
SPECIMEN VOL CSF: 6 ML
SPECIMEN VOL CSF: 8 ML
SPONT RATE: 16
WBC # BLD AUTO: 12.22 K/UL (ref 3.9–12.7)
WBC # CSF: 2 /CU MM (ref 0–5)
WBC # CSF: 3 /CU MM (ref 0–5)

## 2020-02-16 PROCEDURE — 99233 PR SUBSEQUENT HOSPITAL CARE,LEVL III: ICD-10-PCS | Mod: HCNC,,, | Performed by: NURSE PRACTITIONER

## 2020-02-16 PROCEDURE — 99233 SBSQ HOSP IP/OBS HIGH 50: CPT | Mod: HCNC,,, | Performed by: NURSE PRACTITIONER

## 2020-02-16 PROCEDURE — 25000003 PHARM REV CODE 250: Mod: HCNC | Performed by: NURSE PRACTITIONER

## 2020-02-16 PROCEDURE — 85025 COMPLETE CBC W/AUTO DIFF WBC: CPT | Mod: HCNC

## 2020-02-16 PROCEDURE — 25000242 PHARM REV CODE 250 ALT 637 W/ HCPCS: Mod: HCNC | Performed by: NURSE PRACTITIONER

## 2020-02-16 PROCEDURE — 27000221 HC OXYGEN, UP TO 24 HOURS: Mod: HCNC

## 2020-02-16 PROCEDURE — 25000003 PHARM REV CODE 250: Mod: HCNC | Performed by: PHYSICIAN ASSISTANT

## 2020-02-16 PROCEDURE — 94003 VENT MGMT INPAT SUBQ DAY: CPT | Mod: HCNC

## 2020-02-16 PROCEDURE — 25000003 PHARM REV CODE 250: Mod: HCNC | Performed by: PSYCHIATRY & NEUROLOGY

## 2020-02-16 PROCEDURE — 95718 EEG PHYS/QHP 2-12 HR W/VEEG: CPT | Mod: HCNC,,, | Performed by: PSYCHIATRY & NEUROLOGY

## 2020-02-16 PROCEDURE — 36415 COLL VENOUS BLD VENIPUNCTURE: CPT | Mod: HCNC

## 2020-02-16 PROCEDURE — 94761 N-INVAS EAR/PLS OXIMETRY MLT: CPT | Mod: HCNC

## 2020-02-16 PROCEDURE — 85730 THROMBOPLASTIN TIME PARTIAL: CPT | Mod: 91,HCNC

## 2020-02-16 PROCEDURE — 63600175 PHARM REV CODE 636 W HCPCS: Mod: HCNC | Performed by: STUDENT IN AN ORGANIZED HEALTH CARE EDUCATION/TRAINING PROGRAM

## 2020-02-16 PROCEDURE — 99900035 HC TECH TIME PER 15 MIN (STAT): Mod: HCNC

## 2020-02-16 PROCEDURE — 82803 BLOOD GASES ANY COMBINATION: CPT | Mod: HCNC

## 2020-02-16 PROCEDURE — 36600 WITHDRAWAL OF ARTERIAL BLOOD: CPT | Mod: HCNC

## 2020-02-16 PROCEDURE — 83735 ASSAY OF MAGNESIUM: CPT | Mod: HCNC

## 2020-02-16 PROCEDURE — 95718 PR EEG, W/VIDEO, CONT RECORD, I&R, 2-12 HRS: ICD-10-PCS | Mod: HCNC,,, | Performed by: PSYCHIATRY & NEUROLOGY

## 2020-02-16 PROCEDURE — 99900026 HC AIRWAY MAINTENANCE (STAT): Mod: HCNC

## 2020-02-16 PROCEDURE — 80053 COMPREHEN METABOLIC PANEL: CPT | Mod: HCNC

## 2020-02-16 PROCEDURE — 85730 THROMBOPLASTIN TIME PARTIAL: CPT | Mod: HCNC

## 2020-02-16 PROCEDURE — 84132 ASSAY OF SERUM POTASSIUM: CPT | Mod: HCNC

## 2020-02-16 PROCEDURE — 84100 ASSAY OF PHOSPHORUS: CPT | Mod: HCNC

## 2020-02-16 PROCEDURE — 20000000 HC ICU ROOM: Mod: HCNC

## 2020-02-16 PROCEDURE — S0028 INJECTION, FAMOTIDINE, 20 MG: HCPCS | Mod: HCNC | Performed by: PHYSICIAN ASSISTANT

## 2020-02-16 PROCEDURE — 63600175 PHARM REV CODE 636 W HCPCS: Mod: HCNC | Performed by: NURSE PRACTITIONER

## 2020-02-16 PROCEDURE — 94640 AIRWAY INHALATION TREATMENT: CPT | Mod: HCNC

## 2020-02-16 RX ORDER — AMANTADINE HYDROCHLORIDE 50 MG/5ML
100 SOLUTION ORAL 2 TIMES DAILY
Status: DISCONTINUED | OUTPATIENT
Start: 2020-02-16 | End: 2020-02-27

## 2020-02-16 RX ADMIN — AMANTADINE HYDROCHLORIDE 100 MG: 50 SOLUTION ORAL at 01:02

## 2020-02-16 RX ADMIN — FAMOTIDINE 20 MG: 10 INJECTION, SOLUTION INTRAVENOUS at 09:02

## 2020-02-16 RX ADMIN — CHLORHEXIDINE GLUCONATE 0.12% ORAL RINSE 15 ML: 1.2 LIQUID ORAL at 09:02

## 2020-02-16 RX ADMIN — AMANTADINE HYDROCHLORIDE 100 MG: 50 SOLUTION ORAL at 09:02

## 2020-02-16 RX ADMIN — MICONAZOLE NITRATE: 20 OINTMENT TOPICAL at 09:02

## 2020-02-16 RX ADMIN — HEPARIN SODIUM 15 UNITS/KG/HR: 10000 INJECTION, SOLUTION INTRAVENOUS at 11:02

## 2020-02-16 RX ADMIN — IPRATROPIUM BROMIDE AND ALBUTEROL SULFATE 3 ML: .5; 3 SOLUTION RESPIRATORY (INHALATION) at 08:02

## 2020-02-16 RX ADMIN — ACYCLOVIR SODIUM 590 MG: 50 INJECTION, SOLUTION INTRAVENOUS at 05:02

## 2020-02-16 RX ADMIN — CEFTRIAXONE 2 G: 2 INJECTION, SOLUTION INTRAVENOUS at 12:02

## 2020-02-16 RX ADMIN — IPRATROPIUM BROMIDE AND ALBUTEROL SULFATE 3 ML: .5; 3 SOLUTION RESPIRATORY (INHALATION) at 11:02

## 2020-02-16 RX ADMIN — ATORVASTATIN CALCIUM 40 MG: 20 TABLET, FILM COATED ORAL at 09:02

## 2020-02-16 RX ADMIN — AMPICILLIN SODIUM 2 G: 2 INJECTION, POWDER, FOR SOLUTION INTRAMUSCULAR; INTRAVENOUS at 05:02

## 2020-02-16 RX ADMIN — ESCITALOPRAM OXALATE 20 MG: 10 TABLET ORAL at 09:02

## 2020-02-16 RX ADMIN — ACYCLOVIR SODIUM 590 MG: 50 INJECTION, SOLUTION INTRAVENOUS at 09:02

## 2020-02-16 RX ADMIN — AMPICILLIN SODIUM 2 G: 2 INJECTION, POWDER, FOR SOLUTION INTRAMUSCULAR; INTRAVENOUS at 09:02

## 2020-02-16 RX ADMIN — AMPICILLIN SODIUM 2 G: 2 INJECTION, POWDER, FOR SOLUTION INTRAMUSCULAR; INTRAVENOUS at 12:02

## 2020-02-16 RX ADMIN — LEVETIRACETAM 1500 MG: 1500 INJECTION, SOLUTION INTRAVENOUS at 09:02

## 2020-02-16 RX ADMIN — ACYCLOVIR SODIUM 590 MG: 50 INJECTION, SOLUTION INTRAVENOUS at 12:02

## 2020-02-16 RX ADMIN — IPRATROPIUM BROMIDE AND ALBUTEROL SULFATE 3 ML: .5; 3 SOLUTION RESPIRATORY (INHALATION) at 12:02

## 2020-02-16 RX ADMIN — HEPARIN SODIUM 12 UNITS/KG/HR: 10000 INJECTION, SOLUTION INTRAVENOUS at 09:02

## 2020-02-16 NOTE — CONSULTS
Therapy with vancomycin complete and consult discontinued by provider.  Pharmacy will sign off, please re-consult as needed.    Vanesa Tariq, PharmD, BCCCP  [id number]

## 2020-02-16 NOTE — SUBJECTIVE & OBJECTIVE
Interval History: Patient more awake today. Will remove EEG.    Review of Systems: Unable to obtain a complete ROS due to level of consciousness.     Vitals:   Temp: 98.5 °F (36.9 °C)  Pulse: 86  Rhythm: normal sinus rhythm  BP: (!) 102/55  MAP (mmHg): 72  Resp: 17  SpO2: (!) 93 %  Oxygen Concentration (%): 50  O2 Device (Oxygen Therapy): ventilator  Vent Mode: Spont  Pressure Support: 8 cmH20  PEEP/CPAP: 6 cmH20  Peak Airway Pressure: 14 cmH2O  Mean Airway Pressure: 8.6 cmH20  Plateau Pressure: 0 cmH20    Temp  Min: 98.5 °F (36.9 °C)  Max: 99.2 °F (37.3 °C)  Pulse  Min: 70  Max: 99  BP  Min: 94/55  Max: 124/60  MAP (mmHg)  Min: 70  Max: 86  Resp  Min: 14  Max: 23  SpO2  Min: 86 %  Max: 99 %  Oxygen Concentration (%)  Min: 50  Max: 60    02/15 0701 - 02/16 0700  In: 3781.5 [I.V.:321.5]  Out: 1050 [Urine:1050]   Unmeasured Output  Urine Occurrence: 1  Stool Occurrence: 1  Pad Count: 1     Examination:   Constitutional: Well-nourished and -developed. No apparent distress.   Eyes: Conjunctiva clear, anicteric. Lids no lesions.  Head/Ears/Nose/Mouth/Throat/Neck: Moist mucous membranes. External ears, nose atraumatic.   Cardiovascular: Regular rhythm. No murmurs. No leg edema.  Respiratory: Intubated. Comfortable respirations. Clear to auscultation.  Gastrointestinal: Soft, nondistended, nontender. + bowel sounds.    Neurologic:  -GCS E 4 V 1t M 5  -Awake, drowsy. Intubated.Does not follow commands.  -Cranial nerves: EOM intact, PERRL 3mm, no facial droop, + cough/gag  -Motor: Moves all extremities spontaneously and antigravity.  -Sensation: Intact to light touch.  Unable to test orientation, language, memory, judgment, insight, fund of knowledge, shoulder shrug, tongue protrusion, coordination, gait due to level of consciousness.    Medications:   Continuous  heparin (porcine) in D5W Last Rate: 12 Units/kg/hr (02/16/20 0909)   Scheduled  acyclovir 10 mg/kg (Ideal) Q8H   albuterol-ipratropium 3 mL Q6H   amantadine HCL  100 mg BID   atorvastatin 40 mg QHS   chlorhexidine 15 mL BID   escitalopram oxalate 20 mg Daily   famotidine (PF) 20 mg BID   levetiracetam IVPB 1,500 mg Q12H   miconazole nitrate 2%  BID   PRN  acetaminophen 650 mg Q6H PRN   Dextrose 10% Bolus 12.5 g PRN   fentaNYL 25 mcg Q2H PRN   glucagon (human recombinant) 1 mg PRN   insulin aspart U-100 1-10 Units Q6H PRN   labetalol 10 mg Q4H PRN   lorazepam 2 mg Q1H PRN   magnesium oxide 800 mg PRN   magnesium oxide 800 mg PRN   ondansetron 4 mg Q8H PRN   potassium chloride 10% 40 mEq PRN   potassium chloride 10% 40 mEq PRN   potassium chloride 10% 60 mEq PRN   potassium, sodium phosphates 2 packet PRN   potassium, sodium phosphates 2 packet PRN   potassium, sodium phosphates 2 packet PRN   sodium chloride 0.9% 10 mL PRN      Today I independently reviewed pertinent medications, lines/drains/airways, imaging, cardiology results, laboratory results, microbiology results, notably:     ISTAT: Recent Labs   Lab 02/16/20  0216   PH 7.428   PCO2 40.1   PO2 65*   POCSATURATED 93*   HCO3 26.4   BE 2   POCTCO2 28*   SAMPLE ARTERIAL      Chem: Recent Labs   Lab 02/16/20  0245   *   K 3.6   *   CO2 27      BUN 24*   CREATININE 0.8   ESTGFRAFRICA >60.0   EGFRNONAA >60.0   CALCIUM 8.1*   MG 2.1   PHOS 3.1   ANIONGAP 8   PROT 5.7*   ALBUMIN 2.8*   BILITOT 0.3   ALKPHOS 46*   AST 28   ALT 23     Heme: Recent Labs   Lab 02/16/20  0245   WBC 12.22   HGB 8.7*   HCT 27.5*        Endo:   Recent Labs   Lab 02/15/20  1752 02/16/20  0645 02/16/20  1317   POCTGLUCOSE 108 134* 117*

## 2020-02-16 NOTE — PLAN OF CARE
POC reviewed with pt and family at 1530. Pt family verbalized understanding. Questions and concerns addressed. Heparin drip restarted. EEG discontinued. No acute events today. Pt progressing toward goals. Will continue to monitor. See flowsheets for full assessment and VS info.

## 2020-02-16 NOTE — PROCEDURES
[facility] EEG/VIDEO MONITORING REPORT  Edu Choi  [id number]  1955    DATE OF SERVICE:  02/15/2020  DATE OF ADMISSION: 2/2/2020  7:24 PM    ADMITTING/REQUESTING PROVIDER: Ricardo Yee MD    REASON FOR CONSULT:  64-year-old man with a reported history of seizures admitted after a fall found to have facial fractures and multiple scattered infarcts who is currently lethargic with decreased responsiveness.  Evaluate for evidence of epileptiform activity.    METHODOLOGY   Electroencephalographic (EEG) recording is with electrodes placed according to the International 10-20 placement system.  Thirty two (32) channels of digital signal (sampling rate of 512/sec) including T1 and T2 was simultaneously recorded from the scalp and may include  EKG, EMG, and/or eye monitors.  Recording band pass was 0.1 to 512 hz.  Digital video recording of the patient is simultaneously recorded with the EEG.  The patient is instructed report clinical symptoms which may occur during the recording session.  EEG and video recording is stored and archived in digital format.  Activation procedures which include photic stimulation, hyperventilation and instructing patients to perform simple task are done in selected patients.   The EEG is displayed on a monitor screen and can be reviewed using different montages.  Computer assisted analysis is employed to detect spike and electrographic seizure activity.   The entire record is submitted for computer analysis.  The entire recording is visually reviewed and the times identified by computer analysis as being spikes or seizures are reviewed again.  Compresses spectral analysis (CSA) is also performed on the activity recorded from each individual channel.  This is displayed as a power display of frequencies from 0 to 30 Hz over time.   The CSA is reviewed looking for asymmetries in power between homologous areas of the scalp and then compared with the original EEG recording.     Persyst  software is also utilized in the review of this study.  This software suite analyzes the EEG recording in multiple domains.  Coherence and rhythmicity is computed to identify EEG sections which may contain organized seizures.  Each channel undergoes analysis to detect presence of spike and sharp waves which have special and morphological characteristic of epileptic activity.  The routine EEG recording is converted from spacial into frequency domain.  This is then displayed comparing homologous areas to identify areas of significant asymmetry.  Algorithm to identify non-cortically generated artifact is used to separate eye movement, EMG and other artifact from the EEG.      RECORDING TIMES  Start on 02/15/2020 at 07:00 a.m.  Stop on 02/16/2020 at 07:00 a.m.  A total of 23 hr and 53 min of EEG recording is obtained.    Start on 02/16/2020 at 07:00 a.m.  Stop on 02/16/2020 at 16:58 p.m. -> End of the Recording Session  A total of 9 hr and 56 min of EEG recording is obtained.    EEG FINDINGS  Background activity:   The waking background is continuous predominantly moderate-high voltage, disorganized theta activity with plenty of admixed delta seen intermittently throughout both hemispheres.  There is sharply contoured slowing most prominent over the temporal regions, especially the right posterior quadrant, but seen throughout.  There are occasional sharply contoured waveforms over the left temporal region, phase reversing at T3, which are sometimes followed by a slow wave and, on rare occasions, occur in brief runs lasting several seconds.  At times, there is a poorly sustained 7.5 hz maximal posterior dominant rhythm.    Sleep:  There is preserved variability throughout the record with periods with higher voltage more disorganized activity when the patient has his eyes open looking around the room alternating with more suppressed lower voltage/frequency activity.  However, there is no normal sleep  architecture.    Activation procedures:   Hyperventilation is not performed  Photic stimulation is not performed    Cardiac Monitor:   Heart rate appears generally regular on a single lead EKG.    Impression:   This is an abnormal continuous EEG monitoring study because of epileptiform appearing discharges over the left temporal region consistent with a potential seizure focus in this area.  There is also sharply contoured slowing over the right posterior quadrant suggesting the potential of an irritative process in this region as well.  There is generalized background slowing consistent with a moderate encephalopathy.  There are no pushbutton activations and no electrographic seizures.    Suma Sherman MD PhD  Neurology-Epilepsy  [facility].  [facility]

## 2020-02-16 NOTE — PLAN OF CARE
POC reviewed with pt at 0500. Pt unable to verbalize understanding. LP performed overnight.   No acute events overnight. Pt progressing toward goals. Will continue to monitor. See flowsheets for full assessment and VS info

## 2020-02-16 NOTE — PROCEDURES
"Edu Choi is a 64 y.o. male patient.    Temp: 98.7 °F (37.1 °C) (02/15/20 1505)  Pulse: 80 (02/15/20 2127)  Resp: 19 (02/15/20 2127)  BP: (!) 99/53 (02/15/20 1805)  SpO2: 97 % (02/15/20 2127)  Weight: 68.5 kg (151 lb) (02/03/20 0902)  Height: 5' 4" (162.6 cm) (02/03/20 0902)       Lumbar Puncture  Date/Time: 2/15/2020 10:46 PM  Location procedure was performed: [facility] NEURO CRITICAL CARE  Performed by: Henry Rice MD  Authorized by: Henry Rice MD   Indications: diagnostic evaluation    Anesthesia:  Local Anesthetic: lidocaine 2% without epinephrine  Anesthetic total: 3 mL  Preparation: Patient was prepped and draped in the usual sterile fashion.  Lumbar space: L3-L4 interspace  Patient's position: left lateral decubitus  Needle gauge: 18  Needle type: spinal needle - Quincke tip  Needle length: 3.5 in  Number of attempts: 1  Fluid appearance: clear  Tubes of fluid: 4  Total volume: 20 ml  Post-procedure: site cleaned and pressure dressing applied  Complications: No  Patient tolerance: Patient tolerated the procedure well with no immediate complications  Comments: Opening pressure 29cm          Henry Rice  2/15/2020  "

## 2020-02-17 LAB
ALBUMIN SERPL BCP-MCNC: 2.9 G/DL (ref 3.5–5.2)
ALLENS TEST: ABNORMAL
ALP SERPL-CCNC: 55 U/L (ref 55–135)
ALT SERPL W/O P-5'-P-CCNC: 23 U/L (ref 10–44)
ANION GAP SERPL CALC-SCNC: 10 MMOL/L (ref 8–16)
APTT BLDCRRT: 31.2 SEC (ref 21–32)
APTT BLDCRRT: 33 SEC (ref 21–32)
APTT BLDCRRT: 33 SEC (ref 21–32)
APTT BLDCRRT: 39.3 SEC (ref 21–32)
AST SERPL-CCNC: 26 U/L (ref 10–40)
BASOPHILS # BLD AUTO: 0.02 K/UL (ref 0–0.2)
BASOPHILS NFR BLD: 0.2 % (ref 0–1.9)
BILIRUB SERPL-MCNC: 0.4 MG/DL (ref 0.1–1)
BUN SERPL-MCNC: 22 MG/DL (ref 8–23)
CALCIUM SERPL-MCNC: 8.2 MG/DL (ref 8.7–10.5)
CHLORIDE SERPL-SCNC: 109 MMOL/L (ref 95–110)
CO2 SERPL-SCNC: 25 MMOL/L (ref 23–29)
CREAT SERPL-MCNC: 0.8 MG/DL (ref 0.5–1.4)
DELSYS: ABNORMAL
DIFFERENTIAL METHOD: ABNORMAL
EOSINOPHIL # BLD AUTO: 0.3 K/UL (ref 0–0.5)
EOSINOPHIL NFR BLD: 2 % (ref 0–8)
ERYTHROCYTE [DISTWIDTH] IN BLOOD BY AUTOMATED COUNT: 16.5 % (ref 11.5–14.5)
EST. GFR  (AFRICAN AMERICAN): >60 ML/MIN/1.73 M^2
EST. GFR  (NON AFRICAN AMERICAN): >60 ML/MIN/1.73 M^2
FIO2: 50
GLUCOSE SERPL-MCNC: 121 MG/DL (ref 70–110)
HCO3 UR-SCNC: 27.1 MMOL/L (ref 24–28)
HCT VFR BLD AUTO: 28.8 % (ref 40–54)
HGB BLD-MCNC: 8.9 G/DL (ref 14–18)
IMM GRANULOCYTES # BLD AUTO: 0.06 K/UL (ref 0–0.04)
IMM GRANULOCYTES NFR BLD AUTO: 0.5 % (ref 0–0.5)
LYMPHOCYTES # BLD AUTO: 2 K/UL (ref 1–4.8)
LYMPHOCYTES NFR BLD: 15 % (ref 18–48)
MAGNESIUM SERPL-MCNC: 2.1 MG/DL (ref 1.6–2.6)
MCH RBC QN AUTO: 31.7 PG (ref 27–31)
MCHC RBC AUTO-ENTMCNC: 30.9 G/DL (ref 32–36)
MCV RBC AUTO: 103 FL (ref 82–98)
MIN VOL: 10.9
MODE: ABNORMAL
MONOCYTES # BLD AUTO: 1.5 K/UL (ref 0.3–1)
MONOCYTES NFR BLD: 11.4 % (ref 4–15)
NEUTROPHILS # BLD AUTO: 9.4 K/UL (ref 1.8–7.7)
NEUTROPHILS NFR BLD: 70.9 % (ref 38–73)
NRBC BLD-RTO: 0 /100 WBC
PCO2 BLDA: 37.4 MMHG (ref 35–45)
PEEP: 6
PH SMN: 7.47 [PH] (ref 7.35–7.45)
PHOSPHATE SERPL-MCNC: 3.2 MG/DL (ref 2.7–4.5)
PLATELET # BLD AUTO: 268 K/UL (ref 150–350)
PMV BLD AUTO: 13.9 FL (ref 9.2–12.9)
PO2 BLDA: 69 MMHG (ref 80–100)
POC BE: 3 MMOL/L
POC SATURATED O2: 95 % (ref 95–100)
POC TCO2: 28 MMOL/L (ref 23–27)
POCT GLUCOSE: 111 MG/DL (ref 70–110)
POCT GLUCOSE: 125 MG/DL (ref 70–110)
POCT GLUCOSE: 128 MG/DL (ref 70–110)
POTASSIUM SERPL-SCNC: 3.8 MMOL/L (ref 3.5–5.1)
PROT SERPL-MCNC: 6 G/DL (ref 6–8.4)
PS: 8
RBC # BLD AUTO: 2.81 M/UL (ref 4.6–6.2)
SAMPLE: ABNORMAL
SITE: ABNORMAL
SODIUM SERPL-SCNC: 144 MMOL/L (ref 136–145)
SP02: 97
SPONT RATE: 26
VDRL CSF QL: NORMAL
WBC # BLD AUTO: 13.17 K/UL (ref 3.9–12.7)

## 2020-02-17 PROCEDURE — 27200966 HC CLOSED SUCTION SYSTEM: Mod: HCNC

## 2020-02-17 PROCEDURE — S0028 INJECTION, FAMOTIDINE, 20 MG: HCPCS | Mod: HCNC | Performed by: PHYSICIAN ASSISTANT

## 2020-02-17 PROCEDURE — 63600175 PHARM REV CODE 636 W HCPCS: Mod: HCNC | Performed by: NURSE PRACTITIONER

## 2020-02-17 PROCEDURE — 94640 AIRWAY INHALATION TREATMENT: CPT | Mod: HCNC

## 2020-02-17 PROCEDURE — 99233 SBSQ HOSP IP/OBS HIGH 50: CPT | Mod: HCNC,GC,, | Performed by: PSYCHIATRY & NEUROLOGY

## 2020-02-17 PROCEDURE — 99900026 HC AIRWAY MAINTENANCE (STAT): Mod: HCNC

## 2020-02-17 PROCEDURE — 25000242 PHARM REV CODE 250 ALT 637 W/ HCPCS: Mod: HCNC | Performed by: NURSE PRACTITIONER

## 2020-02-17 PROCEDURE — 99900035 HC TECH TIME PER 15 MIN (STAT): Mod: HCNC

## 2020-02-17 PROCEDURE — 36600 WITHDRAWAL OF ARTERIAL BLOOD: CPT | Mod: HCNC

## 2020-02-17 PROCEDURE — 94761 N-INVAS EAR/PLS OXIMETRY MLT: CPT | Mod: HCNC

## 2020-02-17 PROCEDURE — 36410 VNPNXR 3YR/> PHY/QHP DX/THER: CPT | Mod: HCNC

## 2020-02-17 PROCEDURE — 85730 THROMBOPLASTIN TIME PARTIAL: CPT | Mod: 91,HCNC

## 2020-02-17 PROCEDURE — C1751 CATH, INF, PER/CENT/MIDLINE: HCPCS | Mod: HCNC

## 2020-02-17 PROCEDURE — 27000221 HC OXYGEN, UP TO 24 HOURS: Mod: HCNC

## 2020-02-17 PROCEDURE — 82803 BLOOD GASES ANY COMBINATION: CPT | Mod: HCNC

## 2020-02-17 PROCEDURE — 99233 PR SUBSEQUENT HOSPITAL CARE,LEVL III: ICD-10-PCS | Mod: HCNC,GC,, | Performed by: PSYCHIATRY & NEUROLOGY

## 2020-02-17 PROCEDURE — 76937 US GUIDE VASCULAR ACCESS: CPT | Mod: HCNC

## 2020-02-17 PROCEDURE — 84100 ASSAY OF PHOSPHORUS: CPT | Mod: HCNC

## 2020-02-17 PROCEDURE — 82800 BLOOD PH: CPT | Mod: HCNC

## 2020-02-17 PROCEDURE — 80053 COMPREHEN METABOLIC PANEL: CPT | Mod: HCNC

## 2020-02-17 PROCEDURE — 25000003 PHARM REV CODE 250: Mod: HCNC | Performed by: NURSE PRACTITIONER

## 2020-02-17 PROCEDURE — 85025 COMPLETE CBC W/AUTO DIFF WBC: CPT | Mod: HCNC

## 2020-02-17 PROCEDURE — 94003 VENT MGMT INPAT SUBQ DAY: CPT | Mod: HCNC

## 2020-02-17 PROCEDURE — 85730 THROMBOPLASTIN TIME PARTIAL: CPT | Mod: HCNC

## 2020-02-17 PROCEDURE — 83735 ASSAY OF MAGNESIUM: CPT | Mod: HCNC

## 2020-02-17 PROCEDURE — 25000003 PHARM REV CODE 250: Mod: HCNC | Performed by: PHYSICIAN ASSISTANT

## 2020-02-17 PROCEDURE — 20000000 HC ICU ROOM: Mod: HCNC

## 2020-02-17 PROCEDURE — 25000003 PHARM REV CODE 250: Mod: HCNC | Performed by: PSYCHIATRY & NEUROLOGY

## 2020-02-17 RX ORDER — FENTANYL CITRATE 50 UG/ML
50 INJECTION, SOLUTION INTRAMUSCULAR; INTRAVENOUS
Status: DISCONTINUED | OUTPATIENT
Start: 2020-02-17 | End: 2020-02-28 | Stop reason: HOSPADM

## 2020-02-17 RX ORDER — IPRATROPIUM BROMIDE AND ALBUTEROL SULFATE 2.5; .5 MG/3ML; MG/3ML
3 SOLUTION RESPIRATORY (INHALATION) EVERY 4 HOURS
Status: DISCONTINUED | OUTPATIENT
Start: 2020-02-17 | End: 2020-02-24

## 2020-02-17 RX ORDER — FENTANYL CITRATE 50 UG/ML
25 INJECTION, SOLUTION INTRAMUSCULAR; INTRAVENOUS ONCE
Status: DISCONTINUED | OUTPATIENT
Start: 2020-02-17 | End: 2020-02-18

## 2020-02-17 RX ORDER — SODIUM CHLORIDE FOR INHALATION 3 %
4 VIAL, NEBULIZER (ML) INHALATION EVERY 4 HOURS
Status: DISCONTINUED | OUTPATIENT
Start: 2020-02-17 | End: 2020-02-24

## 2020-02-17 RX ADMIN — LEVETIRACETAM 1500 MG: 1500 INJECTION, SOLUTION INTRAVENOUS at 08:02

## 2020-02-17 RX ADMIN — CHLORHEXIDINE GLUCONATE 0.12% ORAL RINSE 15 ML: 1.2 LIQUID ORAL at 08:02

## 2020-02-17 RX ADMIN — ATORVASTATIN CALCIUM 40 MG: 20 TABLET, FILM COATED ORAL at 08:02

## 2020-02-17 RX ADMIN — ACYCLOVIR SODIUM 590 MG: 50 INJECTION, SOLUTION INTRAVENOUS at 08:02

## 2020-02-17 RX ADMIN — IPRATROPIUM BROMIDE AND ALBUTEROL SULFATE 3 ML: .5; 3 SOLUTION RESPIRATORY (INHALATION) at 07:02

## 2020-02-17 RX ADMIN — IPRATROPIUM BROMIDE AND ALBUTEROL SULFATE 3 ML: .5; 3 SOLUTION RESPIRATORY (INHALATION) at 08:02

## 2020-02-17 RX ADMIN — AMANTADINE HYDROCHLORIDE 100 MG: 50 SOLUTION ORAL at 08:02

## 2020-02-17 RX ADMIN — ESCITALOPRAM OXALATE 20 MG: 10 TABLET ORAL at 08:02

## 2020-02-17 RX ADMIN — FAMOTIDINE 20 MG: 10 INJECTION, SOLUTION INTRAVENOUS at 08:02

## 2020-02-17 RX ADMIN — POTASSIUM CHLORIDE 40 MEQ: 20 SOLUTION ORAL at 06:02

## 2020-02-17 RX ADMIN — MICONAZOLE NITRATE: 20 OINTMENT TOPICAL at 08:02

## 2020-02-17 RX ADMIN — FENTANYL CITRATE 25 MCG: 50 INJECTION INTRAMUSCULAR; INTRAVENOUS at 08:02

## 2020-02-17 RX ADMIN — FENTANYL CITRATE 25 MCG: 50 INJECTION INTRAMUSCULAR; INTRAVENOUS at 09:02

## 2020-02-17 RX ADMIN — ACETAMINOPHEN 650 MG: 325 TABLET ORAL at 08:02

## 2020-02-17 RX ADMIN — IPRATROPIUM BROMIDE AND ALBUTEROL SULFATE 3 ML: .5; 3 SOLUTION RESPIRATORY (INHALATION) at 12:02

## 2020-02-17 RX ADMIN — ACYCLOVIR SODIUM 590 MG: 50 INJECTION, SOLUTION INTRAVENOUS at 12:02

## 2020-02-17 RX ADMIN — ACYCLOVIR SODIUM 590 MG: 50 INJECTION, SOLUTION INTRAVENOUS at 03:02

## 2020-02-17 RX ADMIN — IPRATROPIUM BROMIDE AND ALBUTEROL SULFATE 3 ML: .5; 3 SOLUTION RESPIRATORY (INHALATION) at 01:02

## 2020-02-17 RX ADMIN — IPRATROPIUM BROMIDE AND ALBUTEROL SULFATE 3 ML: .5; 3 SOLUTION RESPIRATORY (INHALATION) at 11:02

## 2020-02-17 NOTE — PROGRESS NOTES
[facility]  Neurocritical Care  Progress Note    Admit Date: 2/2/2020  Service Date: 02/16/2020  Length of Stay: 14    Subjective:     Chief Complaint: Seizure    History of Present Illness: Pt is a 64 y.o. Male with PMHx of of DVT, PE, DM, and seizures (on 1500 keppra at home) who presents to the ED via EMS with complaint of a seizure that occurred this morning. Pt has long standing history of seizures and was found down by wife on cement around 0800, LKN 0700. Patient compliant with keppra per wife and last seizure was in November. At that time his keppra dose was increased. Patient has had cough recently but no other symptoms. Patient is on AC for DVT/PE and was recently switched from Coumadin to Eliquis. He was given 10 mg Versed during EMS ride to outside facility for continued seizure activity. He was unresponsive upon arrival to ED and subsequently intubated. CTH without any acute changes, CT max/face with multiple facial fractures, and CXR with consolidation and atelectasis/collapsed lung. Patient transferred to [facility] to higher level neurologic monitoring and continuous EEG.     Hospital Course: 2/2/2020: Admit to [facility]. GS and ENT consulted. MX facial fractures and concerns of bowel ischemia on CT  2/3 will rehook to EEG for 24h. Remains NPO. US LE pending. Monitor lactic [id number] 2L  IV. Wean levophed as tolerated after bolus  2/4: remains on small amount of pressors, cultures remain negative, cont abx, cont current AEDs, EEG overnight negative for seizure activity, repeat ct abdomen today  2/5: minimal dose pressors, advance TFs. Tolerating SBT, possible trial of extubation in am  2/6: need for pressors will likely cease when able to come off sedation, currently still requires mechanical ventilation with pO2 value of 60 on 40% fiO2 and ps 10  02/08/2020 exam still poor, getting MRI brain   02/09/2020 Not tolerating CPAP, placed on AC   02/10/2020 NAEO  02/11/2020 NAEO  02/12/2020 CPAP  trial this am   02/13/2020 NAEON. On heparin drip with stable exam.   02/14/2020 NAEON, f/u EEG (on Keppra). Will give modafinil trial today.   02/15/2020: Stable overnight. Little improvement with modafinil. Heparin gtt stopped for LP procedure. Meningitic coverage started.  02/16/2020: LP performed overnight. Heparin gtt restarted. Antibiotics stopped. Continue acyclovir. Patient more awake today. Increased free water flushes. Started amantadine.    Interval History: Patient more awake today. Will remove EEG.    Review of Systems: Unable to obtain a complete ROS due to level of consciousness.     Vitals:   Temp: 98.5 °F (36.9 °C)  Pulse: 86  Rhythm: normal sinus rhythm  BP: (!) 102/55  MAP (mmHg): 72  Resp: 17  SpO2: (!) 93 %  Oxygen Concentration (%): 50  O2 Device (Oxygen Therapy): ventilator  Vent Mode: Spont  Pressure Support: 8 cmH20  PEEP/CPAP: 6 cmH20  Peak Airway Pressure: 14 cmH2O  Mean Airway Pressure: 8.6 cmH20  Plateau Pressure: 0 cmH20    Temp  Min: 98.5 °F (36.9 °C)  Max: 99.2 °F (37.3 °C)  Pulse  Min: 70  Max: 99  BP  Min: 94/55  Max: 124/60  MAP (mmHg)  Min: 70  Max: 86  Resp  Min: 14  Max: 23  SpO2  Min: 86 %  Max: 99 %  Oxygen Concentration (%)  Min: 50  Max: 60    02/15 0701 - 02/16 0700  In: 3781.5 [I.V.:321.5]  Out: 1050 [Urine:1050]   Unmeasured Output  Urine Occurrence: 1  Stool Occurrence: 1  Pad Count: 1     Examination:   Constitutional: Well-nourished and -developed. No apparent distress.   Eyes: Conjunctiva clear, anicteric. Lids no lesions.  Head/Ears/Nose/Mouth/Throat/Neck: Moist mucous membranes. External ears, nose atraumatic.   Cardiovascular: Regular rhythm. No murmurs. No leg edema.  Respiratory: Intubated. Comfortable respirations. Clear to auscultation.  Gastrointestinal: Soft, nondistended, nontender. + bowel sounds.    Neurologic:  -GCS E 4 V 1t M 5  -Awake, drowsy. Intubated.Does not follow commands.  -Cranial nerves: EOM intact, PERRL 3mm, no facial droop, +  cough/gag  -Motor: Moves all extremities spontaneously and antigravity.  -Sensation: Intact to light touch.  Unable to test orientation, language, memory, judgment, insight, fund of knowledge, shoulder shrug, tongue protrusion, coordination, gait due to level of consciousness.    Medications:   Continuous  heparin (porcine) in D5W Last Rate: 12 Units/kg/hr (02/16/20 0909)   Scheduled  acyclovir 10 mg/kg (Ideal) Q8H   albuterol-ipratropium 3 mL Q6H   amantadine  mg BID   atorvastatin 40 mg QHS   chlorhexidine 15 mL BID   escitalopram oxalate 20 mg Daily   famotidine (PF) 20 mg BID   levetiracetam IVPB 1,500 mg Q12H   miconazole nitrate 2%  BID   PRN  acetaminophen 650 mg Q6H PRN   Dextrose 10% Bolus 12.5 g PRN   fentaNYL 25 mcg Q2H PRN   glucagon (human recombinant) 1 mg PRN   insulin aspart U-100 1-10 Units Q6H PRN   labetalol 10 mg Q4H PRN   lorazepam 2 mg Q1H PRN   magnesium oxide 800 mg PRN   magnesium oxide 800 mg PRN   ondansetron 4 mg Q8H PRN   potassium chloride 10% 40 mEq PRN   potassium chloride 10% 40 mEq PRN   potassium chloride 10% 60 mEq PRN   potassium, sodium phosphates 2 packet PRN   potassium, sodium phosphates 2 packet PRN   potassium, sodium phosphates 2 packet PRN   sodium chloride 0.9% 10 mL PRN      Today I independently reviewed pertinent medications, lines/drains/airways, imaging, cardiology results, laboratory results, microbiology results, notably:     ISTAT: Recent Labs   Lab 02/16/20 0216   PH 7.428   PCO2 40.1   PO2 65*   POCSATURATED 93*   HCO3 26.4   BE 2   POCTCO2 28*   SAMPLE ARTERIAL      Chem: Recent Labs   Lab 02/16/20 0245   *   K 3.6   *   CO2 27      BUN 24*   CREATININE 0.8   ESTGFRAFRICA >60.0   EGFRNONAA >60.0   CALCIUM 8.1*   MG 2.1   PHOS 3.1   ANIONGAP 8   PROT 5.7*   ALBUMIN 2.8*   BILITOT 0.3   ALKPHOS 46*   AST 28   ALT 23     Heme: Recent Labs   Lab 02/16/20 0245   WBC 12.22   HGB 8.7*   HCT 27.5*        Endo:   Recent Labs   Lab  02/15/20  1752 02/16/20  0645 02/16/20  1317   POCTGLUCOSE 108 134* 117*          Assessment/Plan:     Neuro  * Seizure  - patient w history of seizures  - f/u cEEG, last eeg on 2/8 with intermittent L and R temporal slowing + diffuse theta. No seizures  - on keppra 1500mg BID    Altered mental status  - cannot be explained by scattered strokes seen on MRI, patient without evidence of seizures on EEG  - 2/15 will trial modafinil- little improvement with modafinil, will treat for meningitis, and obtain LP today after Heparin gtt stopped  - 2/16 amantadine started, more awake today, LP performed overnight- unremarkable, antibiotics stopped, continue acyclovir    Stroke  - Incidentally seen on MRI w small scattered areas of subacute/remote infarcts. Cont current secondary prevention measures.   - CTA head and neck with no LVO  - Continue heparin gtt and statin    Psychiatric  Depression with anxiety  Continue escitalopram 20mg daily    Derm  Alteration in skin integrity  Wound care     Pulmonary  Acute respiratory failure with hypoxia  Intubated since 2/2    Wean vent settings as tolerated, still not awake enough for extubation  Peridex, Pepcid  CXR, ABG-  daily    Vent Mode: Spont  Oxygen Concentration (%):  [50-60] 50  Resp Rate Total:  [14 br/min-25 br/min] 19 br/min  PEEP/CPAP:  [6 cmH20] 6 cmH20  Pressure Support:  [8 cmH20] 8 cmH20  Mean Airway Pressure:  [8.2 cmH20-9.3 cmH20] 8.5 cmH20    Hematology  Chronic anticoagulation  Heparin gtt     History of DVT (deep vein thrombosis)   heparin gtt     Orthopedic  Closed extensive facial fractures  - evaluated by ENT     The patient is being Prophylaxed for:  Venous Thromboembolism with: Mechanical or Chemical  Stress Ulcer with: H2B  Ventilator Pneumonia with: chlorhexidine oral care    Activity Orders          Diet NPO: NPO starting at 02/02 1925        Full Code    Sonia Carpenter NP  Neurocritical Care  [facility]

## 2020-02-17 NOTE — PROGRESS NOTES
[facility]  Neurocritical Care  Progress Note    Admit Date: 2/2/2020  Service Date: 02/17/2020  Length of Stay: 15    Subjective:     Chief Complaint: Seizure    History of Present Illness: Pt is a 64 y.o. Male with PMHx of of DVT, PE, DM, and seizures (on 1500 keppra at home) who presents to the ED via EMS with complaint of a seizure that occurred this morning. Pt has long standing history of seizures and was found down by wife on cement around 0800, LKN 0700. Patient compliant with keppra per wife and last seizure was in November. At that time his keppra dose was increased. Patient has had cough recently but no other symptoms. Patient is on AC for DVT/PE and was recently switched from Coumadin to Eliquis. He was given 10 mg Versed during EMS ride to outside facility for continued seizure activity. He was unresponsive upon arrival to ED and subsequently intubated. CTH without any acute changes, CT max/face with multiple facial fractures, and CXR with consolidation and atelectasis/collapsed lung. Patient transferred to [facility] to higher level neurologic monitoring and continuous EEG.     Hospital Course: 2/2/2020: Admit to [facility]. GS and ENT consulted. MX facial fractures and concerns of bowel ischemia on CT  2/3 will rehook to EEG for 24h. Remains NPO. US LE pending. Monitor lactic [id number] 2L  IV. Wean levophed as tolerated after bolus  2/4: remains on small amount of pressors, cultures remain negative, cont abx, cont current AEDs, EEG overnight negative for seizure activity, repeat ct abdomen today  2/5: minimal dose pressors, advance TFs. Tolerating SBT, possible trial of extubation in am  2/6: need for pressors will likely cease when able to come off sedation, currently still requires mechanical ventilation with pO2 value of 60 on 40% fiO2 and ps 10  02/08/2020 exam still poor, getting MRI brain   02/09/2020 Not tolerating CPAP, placed on AC   02/10/2020 NAEO  02/11/2020 NAEO  02/12/2020 CPAP  trial this am   02/13/2020 NAEON. On heparin drip with stable exam.   02/14/2020 NAEON, f/u EEG (on Keppra). Will give modafinil trial today.   02/15/2020: Stable overnight. Little improvement with modafinil. Heparin gtt stopped for LP procedure. Meningitic coverage started.  02/16/2020: LP performed overnight. Heparin gtt restarted. Antibiotics stopped. Continue acyclovir. Patient more awake today. Increased free water flushes. Started amantadine.  02/17/2020 NAEO. Planning a family meeting to discuss trach/peg     Interval History:  >4 elements OR status of 3 inpatient conditions    Review of Systems   Unable to perform ROS: Mental status change     2 systems OR Unable to obtain a complete ROS due to level of consciousness.  Objective:     Vitals:  Temp: 99.1 °F (37.3 °C)  Pulse: (!) 126  Rhythm: sinus tachycardia  BP: 134/68  MAP (mmHg): 94  Resp: (!) 29  SpO2: (!) 92 %  Oxygen Concentration (%): 50  O2 Device (Oxygen Therapy): ventilator  Vent Mode: Spont  Pressure Support: 8 cmH20  PEEP/CPAP: 6 cmH20  Peak Airway Pressure: 14 cmH2O  Mean Airway Pressure: 9.5 cmH20  Plateau Pressure: 0 cmH20    Temp  Min: 98.4 °F (36.9 °C)  Max: 99.1 °F (37.3 °C)  Pulse  Min: 79  Max: 126  BP  Min: 102/55  Max: 134/68  MAP (mmHg)  Min: 72  Max: 94  Resp  Min: 17  Max: 29  SpO2  Min: 77 %  Max: 99 %  Oxygen Concentration (%)  Min: 50  Max: 60    02/16 0701 - 02/17 0700  In: 3528.5 [I.V.:178.5]  Out: 1670 [Urine:1670]   Unmeasured Output  Urine Occurrence: 1  Stool Occurrence: 1  Pad Count: 2       Physical Exam    Constitutional: No apparent distress.   Eyes: Conjunctiva clear, anicteric. Lids no lesions. Small abrasion under left eye   Head/Ears/Nose/Mouth/Throat/Neck: Moist mucous membranes. External ears, nose atraumatic.   Cardiovascular: Regular rhythm. ESM  Respiratory: clear  Gastrointestinal: No hernia. Soft, nondistended, nontender. + bowel sounds.      Neurologic Examination:    -Mental Status: Open eyes to voice.  Doesn't follow commands   -Cranial nerves: Pupils equal, round, and reactive bilateral. Extraocular movements intact with VOR. Intact corneal reflexes bilateral, intact cough reflex -Motor: Moves all ext spon     Medications:  Continuous    heparin (porcine) in D5W Last Rate: 16 Units/kg/hr (02/17/20 1305)   Scheduled    acyclovir 10 mg/kg (Ideal) Q8H   albuterol-ipratropium 3 mL Q6H   amantadine  mg BID   atorvastatin 40 mg QHS   chlorhexidine 15 mL BID   escitalopram oxalate 20 mg Daily   famotidine (PF) 20 mg BID   levetiracetam IVPB 1,500 mg Q12H   miconazole nitrate 2%  BID   PRN    acetaminophen 650 mg Q6H PRN   Dextrose 10% Bolus 12.5 g PRN   fentaNYL 25 mcg Q2H PRN   glucagon (human recombinant) 1 mg PRN   insulin aspart U-100 1-10 Units Q6H PRN   labetalol 10 mg Q4H PRN   lorazepam 2 mg Q1H PRN   magnesium oxide 800 mg PRN   magnesium oxide 800 mg PRN   ondansetron 4 mg Q8H PRN   potassium chloride 10% 40 mEq PRN   potassium chloride 10% 40 mEq PRN   potassium chloride 10% 60 mEq PRN   potassium, sodium phosphates 2 packet PRN   potassium, sodium phosphates 2 packet PRN   potassium, sodium phosphates 2 packet PRN   sodium chloride 0.9% 10 mL PRN     Today I personally reviewed pertinent medications, lines/drains/airways, imaging, cardiology results, laboratory results, microbiology results, notably:    Diet  Diet NPO  Diet NPO        Assessment/Plan:     Neuro  * Seizure  Con keppra     Altered mental status  Multifactorial  F/u HSV PCR      Stroke  - Incidentally seen on MRI w small scattered areas of subacute/remote infarcts. Cont current secondary prevention measures.   - CTA head and neck with no LVO  - Continue heparin gtt and statin    Psychiatric  Depression with anxiety  Continue escitalopram 20mg daily    Derm  Alteration in skin integrity  Wound care     Pulmonary  Acute respiratory failure with hypoxia  Unable to wean off the vent  Trach/peg discussion       Hematology  History of DVT  (deep vein thrombosis)   heparin gtt       Orthopedic  Closed extensive facial fractures  - evaluated by ENT          The patient is being Prophylaxed for:  Venous Thromboembolism with: Mechanical or Chemical  Stress Ulcer with: H2B  Ventilator Pneumonia with: chlorhexidine oral care    Activity Orders          Diet NPO: NPO starting at 02/02 1925        Full Code    Level 3    Lisa Wyatt MD  Neurocritical Care  [facility]

## 2020-02-17 NOTE — PLAN OF CARE
POC reviewed with pt and family at 1700. Spouse verbalized understanding and POC. Questions and concerns addressed. No acute events today. Heparin gtt titrated per nomogram. Bath given and linens changed. Pt progressing toward goals. Will continue to monitor. See flowsheets for full assessment and VS info.

## 2020-02-17 NOTE — CONSULTS
Placed 18g, 8 cm Midline in left basilic vein using u/s guidance.  Max dwell date 3/17/2020.  Lot # [id number].

## 2020-02-17 NOTE — PROGRESS NOTES
"[facility]  Adult Nutrition  Progress Note    SUMMARY       Recommendations  1. Continue Impact Peptide @ goal rate 50mL/hr.   - Provides 1980kcals, 124g protein and 1016mL free water.     2. If able to extubate and advance diet, recommend Regular with texture per SLP recommendations.     RD to monitor.    Goals: Pt to receive nutrition by RD follow up  Nutrition Goal Status: goal met  Communication of RD Recs: reviewed with RN    Reason for Assessment    Reason For Assessment: RD follow-up  Diagnosis: seizures  Relevant Medical History: HLD, DM, seizures, DVT/PE  Interdisciplinary Rounds: attended  General Information Comments: Pt remains intubated. TF at goal, tolerating appropriately per nsg. On 2/3- Family at bedside providing nutrition hx. Per family, pt with adequate po intake/appetite PTA. No weight loss per family, weight stable approx 145-150lb > 7 years. Pt overweight and nourished without indications of malnutrition at this time.  Nutrition Discharge Planning: unable to determine at this time    Nutrition Risk Screen    Nutrition Risk Screen: tube feeding or parenteral nutrition, dysphagia or difficulty swallowing    Nutrition/Diet History    Spiritual, Cultural Beliefs, Scientologist Practices, Values that Affect Care: no    Anthropometrics    Temp: 99.1 °F (37.3 °C)  Height: 5' 4" (162.6 cm)  Height (inches): 64 in  Weight Method: Bed Scale  Weight: 68.5 kg (151 lb)  Weight (lb): 151 lb  Ideal Body Weight (IBW), Male: 130 lb  % Ideal Body Weight, Male (lb): 116.15 %  BMI (Calculated): 25.9  BMI Grade: 25 - 29.9 - overweight       Lab/Procedures/Meds    Pertinent Labs Reviewed: reviewed  Pertinent Labs Comments: noted  Pertinent Medications Reviewed: reviewed  Pertinent Medications Comments: heparin    Estimated/Assessed Needs    Weight Used For Calorie Calculations: 68.5 kg (151 lb 0.2 oz)  Energy Calorie Requirements (kcal): 1623  Energy Need Method: [facility]  Protein Requirements: " 82-103g(1.2-1.5g/kg)  Weight Used For Protein Calculations: 68.5 kg (151 lb 0.2 oz)  Fluid Requirements (mL): 1mL/kcal or per MD     RDA Method (mL): 1623  CHO Requirement: 227g CHO daily      Nutrition Prescription Ordered    Current Diet Order: NPO  Nutrition Order Comments: TF at goal  Current Nutrition Support Formula Ordered: Impact Peptide 1.5  Current Nutrition Support Rate Ordered: 50 (ml)  Current Nutrition Support Frequency Ordered: mL/hr    Evaluation of Received Nutrient/Fluid Intake    Enteral Calories (kcal): 1800  Enteral Protein (gm): 113  Enteral (Free Water) Fluid (mL): 924    % Kcal Needs: 111  % Protein Needs: 100    I/O: +17.6L since admit, good UOP, LBM 2/16    Energy Calories Required: meeting needs  Protein Required: meeting needs  Fluid Required: other (see comments)(per MD)    Tolerance: tolerating    % Intake of Estimated Energy Needs: 75 - 100 %  % Meal Intake: NPO    Nutrition Risk    Level of Risk/Frequency of Follow-up: low(f/u 1x/week)     Assessment and Plan  Nutrition Problem  Inadequate energy intake     Related to (etiology):   NPO     Signs and Symptoms (as evidenced by):   Pt receiving < 75% EEN and EPN      Interventions(treatment strategy):  Collaboration of care with providers     Nutrition Diagnosis Status:   Resolved         Monitor and Evaluation    Food and Nutrient Intake: energy intake, food and beverage intake, enteral nutrition intake  Food and Nutrient Adminstration: diet order, enteral and parenteral nutrition administration  Anthropometric Measurements: weight, weight change, body mass index  Biochemical Data, Medical Tests and Procedures: electrolyte and renal panel, inflammatory profile, gastrointestinal profile, glucose/endocrine profile, lipid profile  Nutrition-Focused Physical Findings: overall appearance       Nutrition Follow-Up    RD Follow-up?: Yes

## 2020-02-17 NOTE — PLAN OF CARE
POC reviewed with pt at 0500. Pt unable to verbalized understanding. Pt remains on heparin drip currently at 15 pending results of 5 am APTT.  No acute events overnight. Pt progressing toward goals. Will continue to monitor. See flowsheets for full assessment and VS info

## 2020-02-18 ENCOUNTER — TELEPHONE (OUTPATIENT)
Dept: INTERNAL MEDICINE | Facility: CLINIC | Age: 65
End: 2020-02-18

## 2020-02-18 ENCOUNTER — ANESTHESIA (OUTPATIENT)
Dept: NEUROLOGY | Facility: HOSPITAL | Age: 65
DRG: 870 | End: 2020-02-18
Payer: MEDICARE

## 2020-02-18 ENCOUNTER — ANESTHESIA EVENT (OUTPATIENT)
Dept: NEUROLOGY | Facility: HOSPITAL | Age: 65
DRG: 870 | End: 2020-02-18
Payer: MEDICARE

## 2020-02-18 ENCOUNTER — ANESTHESIA EVENT (OUTPATIENT)
Dept: SURGERY | Facility: HOSPITAL | Age: 65
DRG: 870 | End: 2020-02-18
Payer: MEDICARE

## 2020-02-18 LAB
ALBUMIN SERPL BCP-MCNC: 3 G/DL (ref 3.5–5.2)
ALLENS TEST: ABNORMAL
ALLENS TEST: ABNORMAL
ALP SERPL-CCNC: 58 U/L (ref 55–135)
ALT SERPL W/O P-5'-P-CCNC: 20 U/L (ref 10–44)
ANION GAP SERPL CALC-SCNC: 10 MMOL/L (ref 8–16)
APTT BLDCRRT: 34.3 SEC (ref 21–32)
APTT BLDCRRT: 39.4 SEC (ref 21–32)
AST SERPL-CCNC: 24 U/L (ref 10–40)
BASOPHILS # BLD AUTO: 0.02 K/UL (ref 0–0.2)
BASOPHILS NFR BLD: 0.2 % (ref 0–1.9)
BILIRUB SERPL-MCNC: 0.4 MG/DL (ref 0.1–1)
BUN SERPL-MCNC: 24 MG/DL (ref 8–23)
CALCIUM SERPL-MCNC: 8.4 MG/DL (ref 8.7–10.5)
CHLORIDE SERPL-SCNC: 109 MMOL/L (ref 95–110)
CO2 SERPL-SCNC: 25 MMOL/L (ref 23–29)
CREAT SERPL-MCNC: 0.8 MG/DL (ref 0.5–1.4)
DELSYS: ABNORMAL
DELSYS: ABNORMAL
DIFFERENTIAL METHOD: ABNORMAL
EOSINOPHIL # BLD AUTO: 0.2 K/UL (ref 0–0.5)
EOSINOPHIL NFR BLD: 1.8 % (ref 0–8)
ERYTHROCYTE [DISTWIDTH] IN BLOOD BY AUTOMATED COUNT: 16.4 % (ref 11.5–14.5)
EST. GFR  (AFRICAN AMERICAN): >60 ML/MIN/1.73 M^2
EST. GFR  (NON AFRICAN AMERICAN): >60 ML/MIN/1.73 M^2
EV RNA SPEC QL NAA+PROBE: NEGATIVE
FIO2: 50
FIO2: 65
GLUCOSE SERPL-MCNC: 118 MG/DL (ref 70–110)
HCO3 UR-SCNC: 27.3 MMOL/L (ref 24–28)
HCO3 UR-SCNC: 28.8 MMOL/L (ref 24–28)
HCT VFR BLD AUTO: 28.3 % (ref 40–54)
HGB BLD-MCNC: 8.8 G/DL (ref 14–18)
HSV1, PCR, CSF: NEGATIVE
HSV2, PCR, CSF: NEGATIVE
IMM GRANULOCYTES # BLD AUTO: 0.05 K/UL (ref 0–0.04)
IMM GRANULOCYTES NFR BLD AUTO: 0.4 % (ref 0–0.5)
INR PPP: 1 (ref 0.8–1.2)
LYMPHOCYTES # BLD AUTO: 1.7 K/UL (ref 1–4.8)
LYMPHOCYTES NFR BLD: 13.5 % (ref 18–48)
MAGNESIUM SERPL-MCNC: 2.1 MG/DL (ref 1.6–2.6)
MAP: 11
MCH RBC QN AUTO: 31.5 PG (ref 27–31)
MCHC RBC AUTO-ENTMCNC: 31.1 G/DL (ref 32–36)
MCV RBC AUTO: 101 FL (ref 82–98)
MIN VOL: 9.62
MODE: ABNORMAL
MODE: ABNORMAL
MONOCYTES # BLD AUTO: 1.2 K/UL (ref 0.3–1)
MONOCYTES NFR BLD: 9.5 % (ref 4–15)
NEUTROPHILS # BLD AUTO: 9.5 K/UL (ref 1.8–7.7)
NEUTROPHILS NFR BLD: 74.6 % (ref 38–73)
NRBC BLD-RTO: 0 /100 WBC
PCO2 BLDA: 39.3 MMHG (ref 35–45)
PCO2 BLDA: 49.5 MMHG (ref 35–45)
PEEP: 8
PH SMN: 7.37 [PH] (ref 7.35–7.45)
PH SMN: 7.45 [PH] (ref 7.35–7.45)
PHOSPHATE SERPL-MCNC: 3.1 MG/DL (ref 2.7–4.5)
PLATELET # BLD AUTO: 274 K/UL (ref 150–350)
PMV BLD AUTO: 13.4 FL (ref 9.2–12.9)
PO2 BLDA: 112 MMHG (ref 80–100)
PO2 BLDA: 81 MMHG (ref 80–100)
POC BE: 3 MMOL/L
POC BE: 4 MMOL/L
POC SATURATED O2: 96 % (ref 95–100)
POC SATURATED O2: 98 % (ref 95–100)
POC TCO2: 29 MMOL/L (ref 23–27)
POC TCO2: 30 MMOL/L (ref 23–27)
POCT GLUCOSE: 111 MG/DL (ref 70–110)
POCT GLUCOSE: 128 MG/DL (ref 70–110)
POTASSIUM SERPL-SCNC: 3.8 MMOL/L (ref 3.5–5.1)
PROT SERPL-MCNC: 6.1 G/DL (ref 6–8.4)
PROTHROMBIN TIME: 10.8 SEC (ref 9–12.5)
PS: 8
RBC # BLD AUTO: 2.79 M/UL (ref 4.6–6.2)
SAMPLE: ABNORMAL
SAMPLE: ABNORMAL
SITE: ABNORMAL
SITE: ABNORMAL
SODIUM SERPL-SCNC: 144 MMOL/L (ref 136–145)
SPECIMEN SOURCE: NORMAL
SPECIMEN SOURCE: NORMAL
SPONT RATE: 15
VARICELLA ZOSTER BY PCR RESULT: NEGATIVE
VOL: 500
WBC # BLD AUTO: 12.66 K/UL (ref 3.9–12.7)

## 2020-02-18 PROCEDURE — 99900026 HC AIRWAY MAINTENANCE (STAT): Mod: HCNC

## 2020-02-18 PROCEDURE — 25000003 PHARM REV CODE 250: Mod: HCNC | Performed by: NURSE PRACTITIONER

## 2020-02-18 PROCEDURE — 94640 AIRWAY INHALATION TREATMENT: CPT | Mod: HCNC

## 2020-02-18 PROCEDURE — 94003 VENT MGMT INPAT SUBQ DAY: CPT | Mod: HCNC

## 2020-02-18 PROCEDURE — 63600175 PHARM REV CODE 636 W HCPCS: Mod: HCNC | Performed by: STUDENT IN AN ORGANIZED HEALTH CARE EDUCATION/TRAINING PROGRAM

## 2020-02-18 PROCEDURE — 84100 ASSAY OF PHOSPHORUS: CPT | Mod: HCNC

## 2020-02-18 PROCEDURE — 85730 THROMBOPLASTIN TIME PARTIAL: CPT | Mod: 91,HCNC

## 2020-02-18 PROCEDURE — 83735 ASSAY OF MAGNESIUM: CPT | Mod: HCNC

## 2020-02-18 PROCEDURE — 20000000 HC ICU ROOM: Mod: HCNC

## 2020-02-18 PROCEDURE — 80053 COMPREHEN METABOLIC PANEL: CPT | Mod: HCNC

## 2020-02-18 PROCEDURE — 63600175 PHARM REV CODE 636 W HCPCS: Mod: HCNC | Performed by: NURSE PRACTITIONER

## 2020-02-18 PROCEDURE — 99900035 HC TECH TIME PER 15 MIN (STAT): Mod: HCNC

## 2020-02-18 PROCEDURE — 25000003 PHARM REV CODE 250: Mod: HCNC | Performed by: PHYSICIAN ASSISTANT

## 2020-02-18 PROCEDURE — 85610 PROTHROMBIN TIME: CPT | Mod: HCNC

## 2020-02-18 PROCEDURE — 85730 THROMBOPLASTIN TIME PARTIAL: CPT | Mod: HCNC

## 2020-02-18 PROCEDURE — 85025 COMPLETE CBC W/AUTO DIFF WBC: CPT | Mod: HCNC

## 2020-02-18 PROCEDURE — 25000003 PHARM REV CODE 250: Mod: HCNC | Performed by: PSYCHIATRY & NEUROLOGY

## 2020-02-18 PROCEDURE — 99233 SBSQ HOSP IP/OBS HIGH 50: CPT | Mod: HCNC,GC,, | Performed by: PSYCHIATRY & NEUROLOGY

## 2020-02-18 PROCEDURE — 36600 WITHDRAWAL OF ARTERIAL BLOOD: CPT | Mod: HCNC

## 2020-02-18 PROCEDURE — 94761 N-INVAS EAR/PLS OXIMETRY MLT: CPT | Mod: HCNC

## 2020-02-18 PROCEDURE — 25000242 PHARM REV CODE 250 ALT 637 W/ HCPCS: Mod: HCNC | Performed by: NURSE PRACTITIONER

## 2020-02-18 PROCEDURE — 31500 AIRWAY MANAGEMENT: ICD-10-PCS | Mod: HCNC,,, | Performed by: ANESTHESIOLOGY

## 2020-02-18 PROCEDURE — 27200966 HC CLOSED SUCTION SYSTEM: Mod: HCNC

## 2020-02-18 PROCEDURE — 63600175 PHARM REV CODE 636 W HCPCS: Mod: HCNC

## 2020-02-18 PROCEDURE — 82803 BLOOD GASES ANY COMBINATION: CPT | Mod: HCNC

## 2020-02-18 PROCEDURE — 27000221 HC OXYGEN, UP TO 24 HOURS: Mod: HCNC

## 2020-02-18 PROCEDURE — 31500 INSERT EMERGENCY AIRWAY: CPT | Mod: HCNC,,, | Performed by: ANESTHESIOLOGY

## 2020-02-18 PROCEDURE — S0028 INJECTION, FAMOTIDINE, 20 MG: HCPCS | Mod: HCNC | Performed by: PHYSICIAN ASSISTANT

## 2020-02-18 PROCEDURE — 99233 PR SUBSEQUENT HOSPITAL CARE,LEVL III: ICD-10-PCS | Mod: HCNC,GC,, | Performed by: PSYCHIATRY & NEUROLOGY

## 2020-02-18 RX ORDER — SUCCINYLCHOLINE CHLORIDE 20 MG/ML
INJECTION INTRAMUSCULAR; INTRAVENOUS
Status: DISPENSED
Start: 2020-02-18 | End: 2020-02-19

## 2020-02-18 RX ORDER — NOREPINEPHRINE BITARTRATE/D5W 4MG/250ML
PLASTIC BAG, INJECTION (ML) INTRAVENOUS
Status: COMPLETED
Start: 2020-02-18 | End: 2020-02-18

## 2020-02-18 RX ORDER — ROCURONIUM BROMIDE 10 MG/ML
INJECTION, SOLUTION INTRAVENOUS
Status: DISCONTINUED
Start: 2020-02-18 | End: 2020-02-18 | Stop reason: WASHOUT

## 2020-02-18 RX ORDER — PROPOFOL 10 MG/ML
INJECTION, EMULSION INTRAVENOUS
Status: COMPLETED
Start: 2020-02-18 | End: 2020-02-18

## 2020-02-18 RX ORDER — ETOMIDATE 2 MG/ML
INJECTION INTRAVENOUS
Status: DISCONTINUED
Start: 2020-02-18 | End: 2020-02-18 | Stop reason: WASHOUT

## 2020-02-18 RX ORDER — PROPOFOL 10 MG/ML
200 VIAL (ML) INTRAVENOUS ONCE
Status: COMPLETED | OUTPATIENT
Start: 2020-02-18 | End: 2020-02-18

## 2020-02-18 RX ADMIN — IPRATROPIUM BROMIDE AND ALBUTEROL SULFATE 3 ML: .5; 3 SOLUTION RESPIRATORY (INHALATION) at 11:02

## 2020-02-18 RX ADMIN — SODIUM CHLORIDE 30 MG/ML INHALATION SOLUTION 15 ML: 30 SOLUTION INHALANT at 12:02

## 2020-02-18 RX ADMIN — LEVETIRACETAM 1500 MG: 1500 INJECTION, SOLUTION INTRAVENOUS at 08:02

## 2020-02-18 RX ADMIN — ESCITALOPRAM OXALATE 20 MG: 10 TABLET ORAL at 08:02

## 2020-02-18 RX ADMIN — AMANTADINE HYDROCHLORIDE 100 MG: 50 SOLUTION ORAL at 08:02

## 2020-02-18 RX ADMIN — CHLORHEXIDINE GLUCONATE 0.12% ORAL RINSE 15 ML: 1.2 LIQUID ORAL at 08:02

## 2020-02-18 RX ADMIN — FAMOTIDINE 20 MG: 10 INJECTION, SOLUTION INTRAVENOUS at 08:02

## 2020-02-18 RX ADMIN — MICONAZOLE NITRATE: 20 OINTMENT TOPICAL at 08:02

## 2020-02-18 RX ADMIN — SODIUM CHLORIDE 30 MG/ML INHALATION SOLUTION 4 ML: 30 SOLUTION INHALANT at 11:02

## 2020-02-18 RX ADMIN — FENTANYL CITRATE 50 MCG: 50 INJECTION INTRAMUSCULAR; INTRAVENOUS at 12:02

## 2020-02-18 RX ADMIN — ATORVASTATIN CALCIUM 40 MG: 20 TABLET, FILM COATED ORAL at 08:02

## 2020-02-18 RX ADMIN — IPRATROPIUM BROMIDE AND ALBUTEROL SULFATE 3 ML: .5; 3 SOLUTION RESPIRATORY (INHALATION) at 04:02

## 2020-02-18 RX ADMIN — IPRATROPIUM BROMIDE AND ALBUTEROL SULFATE 3 ML: .5; 3 SOLUTION RESPIRATORY (INHALATION) at 07:02

## 2020-02-18 RX ADMIN — SODIUM CHLORIDE 30 MG/ML INHALATION SOLUTION 4 ML: 30 SOLUTION INHALANT at 03:02

## 2020-02-18 RX ADMIN — FENTANYL CITRATE 50 MCG: 50 INJECTION INTRAMUSCULAR; INTRAVENOUS at 03:02

## 2020-02-18 RX ADMIN — HEPARIN SODIUM 19 UNITS/KG/HR: 10000 INJECTION, SOLUTION INTRAVENOUS at 06:02

## 2020-02-18 RX ADMIN — SODIUM CHLORIDE 30 MG/ML INHALATION SOLUTION 4 ML: 30 SOLUTION INHALANT at 07:02

## 2020-02-18 RX ADMIN — FENTANYL CITRATE 50 MCG: 50 INJECTION INTRAMUSCULAR; INTRAVENOUS at 11:02

## 2020-02-18 RX ADMIN — AMANTADINE HYDROCHLORIDE 100 MG: 50 SOLUTION ORAL at 06:02

## 2020-02-18 RX ADMIN — SODIUM CHLORIDE 30 MG/ML INHALATION SOLUTION 4 ML: 30 SOLUTION INHALANT at 04:02

## 2020-02-18 RX ADMIN — ACYCLOVIR SODIUM 590 MG: 50 INJECTION, SOLUTION INTRAVENOUS at 04:02

## 2020-02-18 RX ADMIN — Medication 200 MG: at 08:02

## 2020-02-18 RX ADMIN — FENTANYL CITRATE 50 MCG: 50 INJECTION INTRAMUSCULAR; INTRAVENOUS at 08:02

## 2020-02-18 RX ADMIN — POTASSIUM CHLORIDE 40 MEQ: 20 SOLUTION ORAL at 06:02

## 2020-02-18 RX ADMIN — IPRATROPIUM BROMIDE AND ALBUTEROL SULFATE 3 ML: .5; 3 SOLUTION RESPIRATORY (INHALATION) at 03:02

## 2020-02-18 RX ADMIN — ACYCLOVIR SODIUM 590 MG: 50 INJECTION, SOLUTION INTRAVENOUS at 11:02

## 2020-02-18 RX ADMIN — PROPOFOL 200 MG: 10 INJECTION, EMULSION INTRAVENOUS at 08:02

## 2020-02-18 RX ADMIN — FENTANYL CITRATE 50 MCG: 50 INJECTION INTRAMUSCULAR; INTRAVENOUS at 06:02

## 2020-02-18 NOTE — SUBJECTIVE & OBJECTIVE
Interval History:  >4 elements OR status of 3 inpatient conditions    Review of Systems   Unable to perform ROS: Mental status change     2 systems OR Unable to obtain a complete ROS due to level of consciousness.  Objective:     Vitals:  Temp: 99 °F (37.2 °C)  Pulse: 89  Rhythm: normal sinus rhythm  BP: (!) 97/52  MAP (mmHg): 73  Resp: 14  SpO2: (!) 93 %  Oxygen Concentration (%): 50  O2 Device (Oxygen Therapy): ventilator  Vent Mode: Spont  Pressure Support: 8 cmH20  PEEP/CPAP: 8 cmH20  Peak Airway Pressure: 17 cmH2O  Mean Airway Pressure: 11 cmH20  Plateau Pressure: 0 cmH20    Temp  Min: 99 °F (37.2 °C)  Max: 100.4 °F (38 °C)  Pulse  Min: 76  Max: 126  BP  Min: 97/52  Max: 136/66  MAP (mmHg)  Min: 73  Max: 94  Resp  Min: 7  Max: 31  SpO2  Min: 90 %  Max: 100 %  Oxygen Concentration (%)  Min: 50  Max: 50    02/17 0701 - 02/18 0700  In: 3345.7 [I.V.:380.7]  Out: 1255 [Urine:1155]   Unmeasured Output  Urine Occurrence: 1  Stool Occurrence: 0  Pad Count: 2       Physical Exam    Constitutional: No apparent distress.   Eyes: Conjunctiva clear, anicteric. Lids no lesions. Small abrasion under left eye   Head/Ears/Nose/Mouth/Throat/Neck: Moist mucous membranes. External ears, nose atraumatic.   Cardiovascular: Regular rhythm. ESM  Respiratory: clear  Gastrointestinal: No hernia. Soft, nondistended, nontender. + bowel sounds.      Neurologic Examination:    -Mental Status: Open eyes to voice. Doesn't follow commands   -Cranial nerves: Pupils equal, round, and reactive bilateral. Extraocular movements intact with VOR. Intact corneal reflexes bilateral, intact cough reflex -Motor: Moves all ext spon     Medications:  Continuous    heparin (porcine) in D5W Last Rate: 19 Units/kg/hr (02/18/20 0705)   Scheduled    acyclovir 10 mg/kg (Ideal) Q8H   albuterol-ipratropium 3 mL Q4H   amantadine  mg BID   atorvastatin 40 mg QHS   chlorhexidine 15 mL BID   escitalopram oxalate 20 mg Daily   famotidine (PF) 20 mg BID    fentaNYL 25 mcg Once   levetiracetam IVPB 1,500 mg Q12H   miconazole nitrate 2%  BID   sodium chloride 3% 4 mL Q4H   PRN    acetaminophen 650 mg Q6H PRN   Dextrose 10% Bolus 12.5 g PRN   fentaNYL 50 mcg Q2H PRN   glucagon (human recombinant) 1 mg PRN   insulin aspart U-100 1-10 Units Q6H PRN   labetalol 10 mg Q4H PRN   magnesium oxide 800 mg PRN   magnesium oxide 800 mg PRN   ondansetron 4 mg Q8H PRN   potassium chloride 10% 40 mEq PRN   potassium chloride 10% 40 mEq PRN   potassium chloride 10% 60 mEq PRN   potassium, sodium phosphates 2 packet PRN   potassium, sodium phosphates 2 packet PRN   potassium, sodium phosphates 2 packet PRN   sodium chloride 0.9% 10 mL PRN     Today I personally reviewed pertinent medications, lines/drains/airways, imaging, cardiology results, laboratory results, microbiology results, notably:    Diet  Diet NPO  Diet NPO

## 2020-02-18 NOTE — TELEPHONE ENCOUNTER
----- Message from Carol Link sent at 2/18/2020  2:25 PM CST -----  Contact: Nicole (wife) [phone]  Patients wife is calling to inform MD that patient is currently in the hospital in ICU. Patients wife is canceling patients appt on 2/28.    Please advise

## 2020-02-18 NOTE — PLAN OF CARE
Per MD, he will discuss trach and peg with family.  Patient intubated on vent.  Not medically stable for discharge.         02/18/20 1111   Discharge Reassessment   Assessment Type Discharge Planning Reassessment   Provided patient/caregiver education on the expected discharge date and the discharge plan No   Do you have any problems affording any of your prescribed medications? No   Discharge Plan A Long-term acute care facility (LTAC)   Discharge Plan B Skilled Nursing Facility   DME Needed Upon Discharge  other (see comments)  (tbd)   Patient choice form signed by patient/caregiver N/A   Anticipated Discharge Disposition LTAC   Can the patient/caregiver answer the patient profile reliably? No, cognitively impaired   How does the patient rate their overall health at the present time? Poor   Describe the patient's ability to walk at the present time. Does not walk or unable to take any steps at all   How often would a person be available to care for the patient? Whenever needed   Number of comorbid conditions (as recorded on the chart) Three   Post-Acute Status   Post-Acute Authorization Placement   Post-Acute Placement Status Awaiting Internal Medical Clearance   Discharge Delays None known at this time       Stefanie Ponce RN, CCRN-K, [facility]  Neuro-Critical Care   [phone]

## 2020-02-18 NOTE — PLAN OF CARE
POC reviewed with pt at 0500. Pt unable to verbalize understanding at this time. Heparin gtt continued and titrated per nomogram. PRN fentanyl given for agitation and S/S of pain overnight. TF continued @ goal. Oral care provided per protocol. Pt turned per protocol. Cream applied to dermatitis area PRN. RN Will continue to monitor. See flowsheets for full assessment and VS info

## 2020-02-18 NOTE — PROGRESS NOTES
[facility]  Neurocritical Care  Progress Note    Admit Date: 2/2/2020  Service Date: 02/18/2020  Length of Stay: 16    Subjective:     Chief Complaint: Seizure    History of Present Illness: Pt is a 64 y.o. Male with PMHx of of DVT, PE, DM, and seizures (on 1500 keppra at home) who presents to the ED via EMS with complaint of a seizure that occurred this morning. Pt has long standing history of seizures and was found down by wife on cement around 0800, LKN 0700. Patient compliant with keppra per wife and last seizure was in November. At that time his keppra dose was increased. Patient has had cough recently but no other symptoms. Patient is on AC for DVT/PE and was recently switched from Coumadin to Eliquis. He was given 10 mg Versed during EMS ride to outside facility for continued seizure activity. He was unresponsive upon arrival to ED and subsequently intubated. CTH without any acute changes, CT max/face with multiple facial fractures, and CXR with consolidation and atelectasis/collapsed lung. Patient transferred to [facility] to higher level neurologic monitoring and continuous EEG.     Hospital Course: 2/2/2020: Admit to [facility]. GS and ENT consulted. MX facial fractures and concerns of bowel ischemia on CT  2/3 will rehook to EEG for 24h. Remains NPO. US LE pending. Monitor lactic [id number] 2L  IV. Wean levophed as tolerated after bolus  2/4: remains on small amount of pressors, cultures remain negative, cont abx, cont current AEDs, EEG overnight negative for seizure activity, repeat ct abdomen today  2/5: minimal dose pressors, advance TFs. Tolerating SBT, possible trial of extubation in am  2/6: need for pressors will likely cease when able to come off sedation, currently still requires mechanical ventilation with pO2 value of 60 on 40% fiO2 and ps 10  02/08/2020 exam still poor, getting MRI brain   02/09/2020 Not tolerating CPAP, placed on AC   02/10/2020 NAEO  02/11/2020 NAEO  02/12/2020 CPAP  trial this am   02/13/2020 NAEON. On heparin drip with stable exam.   02/14/2020 NAEON, f/u EEG (on Keppra). Will give modafinil trial today.   02/15/2020: Stable overnight. Little improvement with modafinil. Heparin gtt stopped for LP procedure. Meningitic coverage started.  02/16/2020: LP performed overnight. Heparin gtt restarted. Antibiotics stopped. Continue acyclovir. Patient more awake today. Increased free water flushes. Started amantadine.  02/17/2020 NAEO. Planning a family meeting to discuss trach/peg   02/18/2020 family still discussing trach/peg. Required increased PEEP overnight     Interval History:  >4 elements OR status of 3 inpatient conditions    Review of Systems   Unable to perform ROS: Mental status change     2 systems OR Unable to obtain a complete ROS due to level of consciousness.  Objective:     Vitals:  Temp: 99 °F (37.2 °C)  Pulse: 89  Rhythm: normal sinus rhythm  BP: (!) 97/52  MAP (mmHg): 73  Resp: 14  SpO2: (!) 93 %  Oxygen Concentration (%): 50  O2 Device (Oxygen Therapy): ventilator  Vent Mode: Spont  Pressure Support: 8 cmH20  PEEP/CPAP: 8 cmH20  Peak Airway Pressure: 17 cmH2O  Mean Airway Pressure: 11 cmH20  Plateau Pressure: 0 cmH20    Temp  Min: 99 °F (37.2 °C)  Max: 100.4 °F (38 °C)  Pulse  Min: 76  Max: 126  BP  Min: 97/52  Max: 136/66  MAP (mmHg)  Min: 73  Max: 94  Resp  Min: 7  Max: 31  SpO2  Min: 90 %  Max: 100 %  Oxygen Concentration (%)  Min: 50  Max: 50    02/17 0701 - 02/18 0700  In: 3345.7 [I.V.:380.7]  Out: 1255 [Urine:1155]   Unmeasured Output  Urine Occurrence: 1  Stool Occurrence: 0  Pad Count: 2       Physical Exam    Constitutional: No apparent distress.   Eyes: Conjunctiva clear, anicteric. Lids no lesions. Small abrasion under left eye   Head/Ears/Nose/Mouth/Throat/Neck: Moist mucous membranes. External ears, nose atraumatic.   Cardiovascular: Regular rhythm. ESM  Respiratory: clear  Gastrointestinal: No hernia. Soft, nondistended, nontender. + bowel  sounds.      Neurologic Examination:    -Mental Status: Open eyes to voice. Doesn't follow commands   -Cranial nerves: Pupils equal, round, and reactive bilateral. Extraocular movements intact with VOR. Intact corneal reflexes bilateral, intact cough reflex -Motor: Moves all ext spon     Medications:  Continuous    heparin (porcine) in D5W Last Rate: 19 Units/kg/hr (02/18/20 0705)   Scheduled    acyclovir 10 mg/kg (Ideal) Q8H   albuterol-ipratropium 3 mL Q4H   amantadine  mg BID   atorvastatin 40 mg QHS   chlorhexidine 15 mL BID   escitalopram oxalate 20 mg Daily   famotidine (PF) 20 mg BID   fentaNYL 25 mcg Once   levetiracetam IVPB 1,500 mg Q12H   miconazole nitrate 2%  BID   sodium chloride 3% 4 mL Q4H   PRN    acetaminophen 650 mg Q6H PRN   Dextrose 10% Bolus 12.5 g PRN   fentaNYL 50 mcg Q2H PRN   glucagon (human recombinant) 1 mg PRN   insulin aspart U-100 1-10 Units Q6H PRN   labetalol 10 mg Q4H PRN   magnesium oxide 800 mg PRN   magnesium oxide 800 mg PRN   ondansetron 4 mg Q8H PRN   potassium chloride 10% 40 mEq PRN   potassium chloride 10% 40 mEq PRN   potassium chloride 10% 60 mEq PRN   potassium, sodium phosphates 2 packet PRN   potassium, sodium phosphates 2 packet PRN   potassium, sodium phosphates 2 packet PRN   sodium chloride 0.9% 10 mL PRN     Today I personally reviewed pertinent medications, lines/drains/airways, imaging, cardiology results, laboratory results, microbiology results, notably:    Diet  Diet NPO  Diet NPO        Assessment/Plan:     Neuro  * Seizure  Con keppra     Altered mental status  Multifactorial  F/u HSV PCR      Stroke  - Incidentally seen on MRI w small scattered areas of subacute/remote infarcts. Cont current secondary prevention measures.   - CTA head and neck with no LVO  - Continue heparin gtt and statin    Psychiatric  Depression with anxiety  Continue escitalopram 20mg daily    Derm  Alteration in skin integrity  Wound care     Pulmonary  Acute respiratory  failure with hypoxia  Unable to wean off the vent  Trach/peg discussion is going       Hematology  History of DVT (deep vein thrombosis)   heparin gtt       Orthopedic  Multiple closed fractures of facial bone  ENT consulted,            The patient is being Prophylaxed for:  Venous Thromboembolism with: Mechanical or Chemical  Stress Ulcer with: H2B  Ventilator Pneumonia with: chlorhexidine oral care    Activity Orders          Diet NPO: NPO starting at 02/02 1925        Full Code    Level 3    Lisa Wyatt MD  Neurocritical Care  [facility]

## 2020-02-19 ENCOUNTER — ANESTHESIA (OUTPATIENT)
Dept: SURGERY | Facility: HOSPITAL | Age: 65
DRG: 870 | End: 2020-02-19
Payer: MEDICARE

## 2020-02-19 LAB
ABO + RH BLD: NORMAL
ALBUMIN SERPL BCP-MCNC: 2.9 G/DL (ref 3.5–5.2)
ALLENS TEST: ABNORMAL
ALP SERPL-CCNC: 56 U/L (ref 55–135)
ALT SERPL W/O P-5'-P-CCNC: 17 U/L (ref 10–44)
ANION GAP SERPL CALC-SCNC: 7 MMOL/L (ref 8–16)
APTT BLDCRRT: 29.3 SEC (ref 21–32)
APTT BLDCRRT: 55.9 SEC (ref 21–32)
APTT BLDCRRT: <21 SEC (ref 21–32)
AST SERPL-CCNC: 22 U/L (ref 10–40)
BASOPHILS # BLD AUTO: 0.03 K/UL (ref 0–0.2)
BASOPHILS NFR BLD: 0.2 % (ref 0–1.9)
BILIRUB SERPL-MCNC: 0.5 MG/DL (ref 0.1–1)
BLD GP AB SCN CELLS X3 SERPL QL: NORMAL
BUN SERPL-MCNC: 23 MG/DL (ref 8–23)
CALCIUM SERPL-MCNC: 8.7 MG/DL (ref 8.7–10.5)
CHLORIDE SERPL-SCNC: 107 MMOL/L (ref 95–110)
CO2 SERPL-SCNC: 27 MMOL/L (ref 23–29)
CREAT SERPL-MCNC: 0.8 MG/DL (ref 0.5–1.4)
DELSYS: ABNORMAL
DIFFERENTIAL METHOD: ABNORMAL
EOSINOPHIL # BLD AUTO: 0.2 K/UL (ref 0–0.5)
EOSINOPHIL NFR BLD: 1.4 % (ref 0–8)
ERYTHROCYTE [DISTWIDTH] IN BLOOD BY AUTOMATED COUNT: 16.6 % (ref 11.5–14.5)
ERYTHROCYTE [SEDIMENTATION RATE] IN BLOOD BY WESTERGREN METHOD: 15 MM/H
EST. GFR  (AFRICAN AMERICAN): >60 ML/MIN/1.73 M^2
EST. GFR  (NON AFRICAN AMERICAN): >60 ML/MIN/1.73 M^2
FIO2: 60
GLUCOSE SERPL-MCNC: 102 MG/DL (ref 70–110)
HCO3 UR-SCNC: 28.3 MMOL/L (ref 24–28)
HCT VFR BLD AUTO: 26.8 % (ref 40–54)
HGB BLD-MCNC: 8.3 G/DL (ref 14–18)
IMM GRANULOCYTES # BLD AUTO: 0.06 K/UL (ref 0–0.04)
IMM GRANULOCYTES NFR BLD AUTO: 0.5 % (ref 0–0.5)
INR PPP: 1.2 (ref 0.8–1.2)
LYMPHOCYTES # BLD AUTO: 1.8 K/UL (ref 1–4.8)
LYMPHOCYTES NFR BLD: 14.6 % (ref 18–48)
M TB CMPLX DNA SPEC QL NAA+PROBE: NEGATIVE
MAGNESIUM SERPL-MCNC: 2.1 MG/DL (ref 1.6–2.6)
MCH RBC QN AUTO: 31.9 PG (ref 27–31)
MCHC RBC AUTO-ENTMCNC: 31 G/DL (ref 32–36)
MCV RBC AUTO: 103 FL (ref 82–98)
MIN VOL: 8.66
MODE: ABNORMAL
MONOCYTES # BLD AUTO: 1.1 K/UL (ref 0.3–1)
MONOCYTES NFR BLD: 9.2 % (ref 4–15)
NEUTROPHILS # BLD AUTO: 9.2 K/UL (ref 1.8–7.7)
NEUTROPHILS NFR BLD: 74.1 % (ref 38–73)
NRBC BLD-RTO: 0 /100 WBC
PCO2 BLDA: 40.1 MMHG (ref 35–45)
PEEP: 12
PH SMN: 7.46 [PH] (ref 7.35–7.45)
PHOSPHATE SERPL-MCNC: 3.1 MG/DL (ref 2.7–4.5)
PIP: 27
PLATELET # BLD AUTO: 266 K/UL (ref 150–350)
PMV BLD AUTO: 13.4 FL (ref 9.2–12.9)
PO2 BLDA: 152 MMHG (ref 80–100)
POC BE: 4 MMOL/L
POC SATURATED O2: 99 % (ref 95–100)
POC TCO2: 30 MMOL/L (ref 23–27)
POCT GLUCOSE: 101 MG/DL (ref 70–110)
POCT GLUCOSE: 81 MG/DL (ref 70–110)
POCT GLUCOSE: 91 MG/DL (ref 70–110)
POTASSIUM SERPL-SCNC: 3.5 MMOL/L (ref 3.5–5.1)
PROT SERPL-MCNC: 6.1 G/DL (ref 6–8.4)
PROTHROMBIN TIME: 11.9 SEC (ref 9–12.5)
RBC # BLD AUTO: 2.6 M/UL (ref 4.6–6.2)
SAMPLE: ABNORMAL
SITE: ABNORMAL
SODIUM SERPL-SCNC: 141 MMOL/L (ref 136–145)
SP02: 97
SPECIMEN SOURCE: NORMAL
T GONDII DNA CSF QL NAA+PROBE: NEGATIVE
VT: 500
WBC # BLD AUTO: 12.43 K/UL (ref 3.9–12.7)
WNV RNA CSF QL NAA+PROBE: NEGATIVE

## 2020-02-19 PROCEDURE — 63600175 PHARM REV CODE 636 W HCPCS: Mod: HCNC | Performed by: STUDENT IN AN ORGANIZED HEALTH CARE EDUCATION/TRAINING PROGRAM

## 2020-02-19 PROCEDURE — 99900035 HC TECH TIME PER 15 MIN (STAT): Mod: HCNC

## 2020-02-19 PROCEDURE — 83735 ASSAY OF MAGNESIUM: CPT | Mod: HCNC

## 2020-02-19 PROCEDURE — 86850 RBC ANTIBODY SCREEN: CPT | Mod: HCNC

## 2020-02-19 PROCEDURE — 27200966 HC CLOSED SUCTION SYSTEM: Mod: HCNC

## 2020-02-19 PROCEDURE — 99900026 HC AIRWAY MAINTENANCE (STAT): Mod: HCNC

## 2020-02-19 PROCEDURE — 25000003 PHARM REV CODE 250: Mod: HCNC | Performed by: NURSE PRACTITIONER

## 2020-02-19 PROCEDURE — 20000000 HC ICU ROOM: Mod: HCNC

## 2020-02-19 PROCEDURE — 84100 ASSAY OF PHOSPHORUS: CPT | Mod: HCNC

## 2020-02-19 PROCEDURE — 36600 WITHDRAWAL OF ARTERIAL BLOOD: CPT | Mod: HCNC

## 2020-02-19 PROCEDURE — 63600175 PHARM REV CODE 636 W HCPCS: Mod: HCNC | Performed by: NURSE PRACTITIONER

## 2020-02-19 PROCEDURE — 94761 N-INVAS EAR/PLS OXIMETRY MLT: CPT | Mod: HCNC

## 2020-02-19 PROCEDURE — 82800 BLOOD PH: CPT | Mod: HCNC

## 2020-02-19 PROCEDURE — 82803 BLOOD GASES ANY COMBINATION: CPT | Mod: HCNC

## 2020-02-19 PROCEDURE — 25000242 PHARM REV CODE 250 ALT 637 W/ HCPCS: Mod: HCNC | Performed by: NURSE PRACTITIONER

## 2020-02-19 PROCEDURE — 99221 1ST HOSP IP/OBS SF/LOW 40: CPT | Mod: HCNC,,, | Performed by: SURGERY

## 2020-02-19 PROCEDURE — 80053 COMPREHEN METABOLIC PANEL: CPT | Mod: HCNC

## 2020-02-19 PROCEDURE — 99221 PR INITIAL HOSPITAL CARE,LEVL I: ICD-10-PCS | Mod: HCNC,,, | Performed by: SURGERY

## 2020-02-19 PROCEDURE — 27000221 HC OXYGEN, UP TO 24 HOURS: Mod: HCNC

## 2020-02-19 PROCEDURE — 85025 COMPLETE CBC W/AUTO DIFF WBC: CPT | Mod: HCNC

## 2020-02-19 PROCEDURE — 85730 THROMBOPLASTIN TIME PARTIAL: CPT | Mod: HCNC

## 2020-02-19 PROCEDURE — 85730 THROMBOPLASTIN TIME PARTIAL: CPT | Mod: 91,HCNC

## 2020-02-19 PROCEDURE — 99233 SBSQ HOSP IP/OBS HIGH 50: CPT | Mod: HCNC,GC,, | Performed by: PSYCHIATRY & NEUROLOGY

## 2020-02-19 PROCEDURE — S0028 INJECTION, FAMOTIDINE, 20 MG: HCPCS | Mod: HCNC | Performed by: PHYSICIAN ASSISTANT

## 2020-02-19 PROCEDURE — 25000003 PHARM REV CODE 250: Mod: HCNC | Performed by: PSYCHIATRY & NEUROLOGY

## 2020-02-19 PROCEDURE — 99233 PR SUBSEQUENT HOSPITAL CARE,LEVL III: ICD-10-PCS | Mod: HCNC,GC,, | Performed by: PSYCHIATRY & NEUROLOGY

## 2020-02-19 PROCEDURE — 94640 AIRWAY INHALATION TREATMENT: CPT | Mod: HCNC

## 2020-02-19 PROCEDURE — 25000003 PHARM REV CODE 250: Mod: HCNC | Performed by: PHYSICIAN ASSISTANT

## 2020-02-19 PROCEDURE — 85610 PROTHROMBIN TIME: CPT | Mod: HCNC

## 2020-02-19 PROCEDURE — 94003 VENT MGMT INPAT SUBQ DAY: CPT | Mod: HCNC

## 2020-02-19 RX ORDER — DEXMEDETOMIDINE HYDROCHLORIDE 4 UG/ML
0.2 INJECTION, SOLUTION INTRAVENOUS CONTINUOUS
Status: DISCONTINUED | OUTPATIENT
Start: 2020-02-19 | End: 2020-02-20

## 2020-02-19 RX ADMIN — IPRATROPIUM BROMIDE AND ALBUTEROL SULFATE 3 ML: .5; 3 SOLUTION RESPIRATORY (INHALATION) at 11:02

## 2020-02-19 RX ADMIN — FAMOTIDINE 20 MG: 10 INJECTION, SOLUTION INTRAVENOUS at 08:02

## 2020-02-19 RX ADMIN — IPRATROPIUM BROMIDE AND ALBUTEROL SULFATE 3 ML: .5; 3 SOLUTION RESPIRATORY (INHALATION) at 03:02

## 2020-02-19 RX ADMIN — HEPARIN SODIUM 12 UNITS/KG/HR: 10000 INJECTION, SOLUTION INTRAVENOUS at 11:02

## 2020-02-19 RX ADMIN — CHLORHEXIDINE GLUCONATE 0.12% ORAL RINSE 15 ML: 1.2 LIQUID ORAL at 08:02

## 2020-02-19 RX ADMIN — AMANTADINE HYDROCHLORIDE 100 MG: 50 SOLUTION ORAL at 05:02

## 2020-02-19 RX ADMIN — SODIUM CHLORIDE 30 MG/ML INHALATION SOLUTION 4 ML: 30 SOLUTION INHALANT at 07:02

## 2020-02-19 RX ADMIN — IPRATROPIUM BROMIDE AND ALBUTEROL SULFATE 3 ML: .5; 3 SOLUTION RESPIRATORY (INHALATION) at 07:02

## 2020-02-19 RX ADMIN — POTASSIUM CHLORIDE 40 MEQ: 20 SOLUTION ORAL at 05:02

## 2020-02-19 RX ADMIN — SODIUM CHLORIDE 30 MG/ML INHALATION SOLUTION 4 ML: 30 SOLUTION INHALANT at 11:02

## 2020-02-19 RX ADMIN — MICONAZOLE NITRATE: 20 OINTMENT TOPICAL at 08:02

## 2020-02-19 RX ADMIN — LEVETIRACETAM 1500 MG: 1500 INJECTION, SOLUTION INTRAVENOUS at 08:02

## 2020-02-19 RX ADMIN — SODIUM CHLORIDE 30 MG/ML INHALATION SOLUTION 4 ML: 30 SOLUTION INHALANT at 03:02

## 2020-02-19 RX ADMIN — AMANTADINE HYDROCHLORIDE 100 MG: 50 SOLUTION ORAL at 02:02

## 2020-02-19 RX ADMIN — DEXMEDETOMIDINE HYDROCHLORIDE 0.2 MCG/KG/HR: 100 INJECTION, SOLUTION, CONCENTRATE INTRAVENOUS at 11:02

## 2020-02-19 RX ADMIN — ATORVASTATIN CALCIUM 40 MG: 20 TABLET, FILM COATED ORAL at 08:02

## 2020-02-19 RX ADMIN — FENTANYL CITRATE 50 MCG: 50 INJECTION INTRAMUSCULAR; INTRAVENOUS at 08:02

## 2020-02-19 RX ADMIN — ACETAMINOPHEN 650 MG: 325 TABLET ORAL at 08:02

## 2020-02-19 NOTE — PROGRESS NOTES
GENERAL SURGERY    Will hold off on trach/PEG today given respiratory issues overnight - desaturation requiring bagging. On higher vent settings this morning.   Plan on surgery once patient more stable and able to be maintained on minimal vent settings.     Johnathon Davidson MD   General Surgery PGYIII

## 2020-02-19 NOTE — SUBJECTIVE & OBJECTIVE
Interval History:  naeon    Review of Systems   Unable to perform ROS: Mental status change    Unable to obtain a complete ROS due to level of consciousness.  Objective:     Vitals:  Temp: 99.3 °F (37.4 °C)  Pulse: 81  Rhythm: normal sinus rhythm  BP: (!) 112/57  MAP (mmHg): 79  Resp: 15  SpO2: 97 %  Oxygen Concentration (%): 40  O2 Device (Oxygen Therapy): ventilator  Vent Mode: A/C  Set Rate: 15 BPM  Vt Set: 470 mL  PEEP/CPAP: 6 cmH20  Peak Airway Pressure: 20 cmH2O  Mean Airway Pressure: 9.3 cmH20  Plateau Pressure: 25 cmH20    Temp  Min: 99 °F (37.2 °C)  Max: 100.1 °F (37.8 °C)  Pulse  Min: 75  Max: 122  BP  Min: 102/57  Max: 145/71  MAP (mmHg)  Min: 73  Max: 95  Resp  Min: 0  Max: 29  SpO2  Min: 90 %  Max: 100 %  Oxygen Concentration (%)  Min: 40  Max: 100    02/18 0701 - 02/19 0700  In: 1927 [I.V.:362]  Out: 3070 [Urine:2220]   Unmeasured Output  Urine Occurrence: 1  Stool Occurrence: 1  Pad Count: 2       Physical Exam    Constitutional: No apparent distress.   Eyes: Conjunctiva clear, anicteric. Lids no lesions. Small abrasion under left eye   Head/Ears/Nose/Mouth/Throat/Neck: Moist mucous membranes. External ears, nose atraumatic.   Cardiovascular: Regular rhythm. ESM  Respiratory: clear  Gastrointestinal: No hernia. Soft, nondistended, nontender. + bowel sounds.      Neurologic Examination:    -Mental Status: Open eyes to voice. Doesn't follow commands   -Cranial nerves: Pupils equal, round, and reactive bilateral. Extraocular movements intact with VOR. Intact corneal reflexes bilateral, intact cough reflex -Motor: Moves all ext spon     Medications:  Continuous    heparin (porcine) in D5W Last Rate: 12 Units/kg/hr (02/19/20 1405)   Scheduled    albuterol-ipratropium 3 mL Q4H   amantadine  mg BID   atorvastatin 40 mg QHS   chlorhexidine 15 mL BID   escitalopram oxalate 20 mg Daily   famotidine (PF) 20 mg BID   levetiracetam IVPB 1,500 mg Q12H   miconazole nitrate 2%  BID   sodium chloride 3% 4 mL  Q4H   PRN    acetaminophen 650 mg Q6H PRN   Dextrose 10% Bolus 12.5 g PRN   fentaNYL 50 mcg Q2H PRN   glucagon (human recombinant) 1 mg PRN   insulin aspart U-100 1-10 Units Q6H PRN   labetalol 10 mg Q4H PRN   magnesium oxide 800 mg PRN   magnesium oxide 800 mg PRN   ondansetron 4 mg Q8H PRN   potassium chloride 10% 40 mEq PRN   potassium chloride 10% 40 mEq PRN   potassium chloride 10% 60 mEq PRN   potassium, sodium phosphates 2 packet PRN   potassium, sodium phosphates 2 packet PRN   potassium, sodium phosphates 2 packet PRN   sodium chloride 0.9% 10 mL PRN     Today I personally reviewed pertinent medications, lines/drains/airways, imaging, cardiology results, laboratory results, microbiology results, notably:    Diet  Diet NPO  Diet NPO

## 2020-02-19 NOTE — PROGRESS NOTES
[facility]  Neurocritical Care  Progress Note    Admit Date: 2/2/2020  Service Date: 02/19/2020  Length of Stay: 17    Subjective:     Chief Complaint: Seizure    History of Present Illness: Pt is a 64 y.o. Male with PMHx of of DVT, PE, DM, and seizures (on 1500 keppra at home) who presents to the ED via EMS with complaint of a seizure that occurred this morning. Pt has long standing history of seizures and was found down by wife on cement around 0800, LKN 0700. Patient compliant with keppra per wife and last seizure was in November. At that time his keppra dose was increased. Patient has had cough recently but no other symptoms. Patient is on AC for DVT/PE and was recently switched from Coumadin to Eliquis. He was given 10 mg Versed during EMS ride to outside facility for continued seizure activity. He was unresponsive upon arrival to ED and subsequently intubated. CTH without any acute changes, CT max/face with multiple facial fractures, and CXR with consolidation and atelectasis/collapsed lung. Patient transferred to [facility] to higher level neurologic monitoring and continuous EEG.     Hospital Course: 2/2/2020: Admit to [facility]. GS and ENT consulted. MX facial fractures and concerns of bowel ischemia on CT  2/3 will rehook to EEG for 24h. Remains NPO. US LE pending. Monitor lactic [id number] 2L  IV. Wean levophed as tolerated after bolus  2/4: remains on small amount of pressors, cultures remain negative, cont abx, cont current AEDs, EEG overnight negative for seizure activity, repeat ct abdomen today  2/5: minimal dose pressors, advance TFs. Tolerating SBT, possible trial of extubation in am  2/6: need for pressors will likely cease when able to come off sedation, currently still requires mechanical ventilation with pO2 value of 60 on 40% fiO2 and ps 10  02/08/2020 exam still poor, getting MRI brain   02/09/2020 Not tolerating CPAP, placed on AC   02/10/2020 NAEO  02/11/2020 NAEO  02/12/2020 CPAP  trial this am   02/13/2020 NAEON. On heparin drip with stable exam.   02/14/2020 NAEON, f/u EEG (on Keppra). Will give modafinil trial today.   02/15/2020: Stable overnight. Little improvement with modafinil. Heparin gtt stopped for LP procedure. Meningitic coverage started.  02/16/2020: LP performed overnight. Heparin gtt restarted. Antibiotics stopped. Continue acyclovir. Patient more awake today. Increased free water flushes. Started amantadine.  02/17/2020 NAEO. Planning a family meeting to discuss trach/peg   02/18/2020 family still discussing trach/peg. Required increased PEEP overnight   02/19/2020 vent requirements stable, awaiting trach/PEG    Interval History:  naeon    Review of Systems   Unable to perform ROS: Mental status change    Unable to obtain a complete ROS due to level of consciousness.  Objective:     Vitals:  Temp: 99.3 °F (37.4 °C)  Pulse: 81  Rhythm: normal sinus rhythm  BP: (!) 112/57  MAP (mmHg): 79  Resp: 15  SpO2: 97 %  Oxygen Concentration (%): 40  O2 Device (Oxygen Therapy): ventilator  Vent Mode: A/C  Set Rate: 15 BPM  Vt Set: 470 mL  PEEP/CPAP: 6 cmH20  Peak Airway Pressure: 20 cmH2O  Mean Airway Pressure: 9.3 cmH20  Plateau Pressure: 25 cmH20    Temp  Min: 99 °F (37.2 °C)  Max: 100.1 °F (37.8 °C)  Pulse  Min: 75  Max: 122  BP  Min: 102/57  Max: 145/71  MAP (mmHg)  Min: 73  Max: 95  Resp  Min: 0  Max: 29  SpO2  Min: 90 %  Max: 100 %  Oxygen Concentration (%)  Min: 40  Max: 100    02/18 0701 - 02/19 0700  In: 1927 [I.V.:362]  Out: 3070 [Urine:2220]   Unmeasured Output  Urine Occurrence: 1  Stool Occurrence: 1  Pad Count: 2       Physical Exam    Constitutional: No apparent distress.   Eyes: Conjunctiva clear, anicteric. Lids no lesions. Small abrasion under left eye   Head/Ears/Nose/Mouth/Throat/Neck: Moist mucous membranes. External ears, nose atraumatic.   Cardiovascular: Regular rhythm. ESM  Respiratory: clear  Gastrointestinal: No hernia. Soft, nondistended, nontender. + bowel  sounds.      Neurologic Examination:    -Mental Status: Open eyes to voice. Doesn't follow commands   -Cranial nerves: Pupils equal, round, and reactive bilateral. Extraocular movements intact with VOR. Intact corneal reflexes bilateral, intact cough reflex -Motor: Moves all ext spon     Medications:  Continuous    heparin (porcine) in D5W Last Rate: 12 Units/kg/hr (02/19/20 1405)   Scheduled    albuterol-ipratropium 3 mL Q4H   amantadine  mg BID   atorvastatin 40 mg QHS   chlorhexidine 15 mL BID   escitalopram oxalate 20 mg Daily   famotidine (PF) 20 mg BID   levetiracetam IVPB 1,500 mg Q12H   miconazole nitrate 2%  BID   sodium chloride 3% 4 mL Q4H   PRN    acetaminophen 650 mg Q6H PRN   Dextrose 10% Bolus 12.5 g PRN   fentaNYL 50 mcg Q2H PRN   glucagon (human recombinant) 1 mg PRN   insulin aspart U-100 1-10 Units Q6H PRN   labetalol 10 mg Q4H PRN   magnesium oxide 800 mg PRN   magnesium oxide 800 mg PRN   ondansetron 4 mg Q8H PRN   potassium chloride 10% 40 mEq PRN   potassium chloride 10% 40 mEq PRN   potassium chloride 10% 60 mEq PRN   potassium, sodium phosphates 2 packet PRN   potassium, sodium phosphates 2 packet PRN   potassium, sodium phosphates 2 packet PRN   sodium chloride 0.9% 10 mL PRN     Today I personally reviewed pertinent medications, lines/drains/airways, imaging, cardiology results, laboratory results, microbiology results, notably:    Diet  Diet NPO  Diet NPO        Assessment/Plan:     Neuro  * Seizure  Continue keppra     Altered mental status  Multifactorial  HSV PCR negative, dc acyclovir        Stroke  - Incidentally seen on MRI w small scattered areas of subacute/remote infarcts. Cont current secondary prevention measures.   - CTA head and neck with no LVO  - Continue heparin gtt and statin    Psychiatric  Depression with anxiety  Continue escitalopram 20mg daily    Derm  Alteration in skin integrity  Wound care     Pulmonary  Acute respiratory failure with hypoxia  Unable to  wean off the vent  Awaiting trach/PEG      Hematology  History of DVT (deep vein thrombosis)   heparin gtt       Orthopedic  Multiple closed fractures of facial bone  ENT consulted            The patient is being Prophylaxed for:  Venous Thromboembolism with: Mechanical or Chemical  Stress Ulcer with: H2B  Ventilator Pneumonia with: chlorhexidine oral care    Activity Orders          Diet NPO: NPO starting at 02/19 0001        Full Code    Pascual Ch MD  Neurocritical Care  [facility]      Level 3 of hospital care time was spent personally by me on the following activities: development of treatment plan with patient or surrogate and bedside caregivers, discussions with consultants, evaluation of patient's response to treatment, examination of patient, ordering and performing treatments and interventions, ordering and review of laboratory studies, ordering and review of radiographic studies, pulse oximetry, antibiotic titration if applicable, vasopressor titration if applicable, re-evaluation of patient's condition. This care time did not overlap with that of any other provider or involve time for any procedures.

## 2020-02-19 NOTE — SUBJECTIVE & OBJECTIVE
No current facility-administered medications on file prior to encounter.      Current Outpatient Medications on File Prior to Encounter   Medication Sig    apixaban (ELIQUIS) 5 mg Tab Take 1 tablet (5 mg total) by mouth 2 (two) times daily.    atorvastatin (LIPITOR) 40 MG tablet TAKE 1 TABLET EVERY EVENING    escitalopram oxalate (LEXAPRO) 20 MG tablet TAKE 1 TABLET EVERY DAY    FLUZONE HIGH-DOSE 2019-20, PF, 180 mcg/0.5 mL Syrg     lancets 30 gauge Misc 1 lancet by Misc.(Non-Drug; Combo Route) route once daily.    levETIRAcetam (KEPPRA) 750 MG Tab Take 2 tablets (1,500 mg total) by mouth 2 (two) times daily.    TRUE METRIX GLUCOSE TEST STRIP Strp 1 strip by Misc.(Non-Drug; Combo Route) route once daily.       Review of patient's allergies indicates:   Allergen Reactions    No known drug allergies        Past Medical History:   Diagnosis Date    Atherosclerosis of aorta 6/22/2017    Atherosclerosis of native coronary artery of native heart without angina pectoris 6/22/2017    Blood clotting tendency     Depression     Diabetes mellitus type 2 in nonobese     DVT (deep venous thrombosis)     Hyperlipidemia     Measles complicated by meningitis     during childhood, with subsequent seizures    Newly diagnosed diabetes 4/18/2017    PE (pulmonary embolism)     Seizures     Splenomegaly     Thrombophlebitis leg     bilateral legs     Past Surgical History:   Procedure Laterality Date    ANAL FISTULOTOMY      COLONOSCOPY N/A 10/14/2016    Procedure: COLONOSCOPY;  Surgeon: Edu Stratton MD;  Location: [address];  Service: Endoscopy;  Laterality: N/A;  Patient may hold Coumadin x 3 days prior to procedure with out Lovenox bridge as per C.C./svn/coumadin lab 1st    SPLENECTOMY, TOTAL       Family History     Problem Relation (Age of Onset)    Heart attack Father    Heart failure Father    Hypertension Father    Thrombophlebitis Father        Tobacco Use    Smoking status: Current Every  Day Smoker     Packs/day: 0.25     Years: 33.00     Pack years: 8.25    Smokeless tobacco: Never Used   Substance and Sexual Activity    Alcohol use: No    Drug use: No    Sexual activity: Never     Review of Systems- Unable to obtain  Objective:     Vital Signs (Most Recent):  Temp: 99.9 °F (37.7 °C) (02/18/20 1905)  Pulse: 91 (02/18/20 1916)  Resp: 16 (02/18/20 1916)  BP: (!) 145/71 (02/18/20 1905)  SpO2: 97 % (02/18/20 1916) Vital Signs (24h Range):  Temp:  [98.8 °F (37.1 °C)-100.4 °F (38 °C)] 99.9 °F (37.7 °C)  Pulse:  [] 91  Resp:  [0-31] 16  SpO2:  [90 %-98 %] 97 %  BP: ()/(52-78) 145/71     Weight: 68.5 kg (151 lb)  Body mass index is 25.92 kg/m².    Physical Exam   Constitutional:   Intubated and sedated   Eyes: Right eye exhibits no discharge. Left eye exhibits no discharge.   Cardiovascular: Normal rate and regular rhythm.   Pulmonary/Chest:   Vent Mode: Spont  Oxygen Concentration (%):  () 65  Resp Rate Total:  (8.6 br/min-35 br/min) 16 br/min  PEEP/CPAP:  (8 cmH20) 8 cmH20  Pressure Support:  (8 cmH20) 8 cmH20  Mean Airway Pressure:  (9.5 caR10-60 cmH20) 10 cmH20     Abdominal: Soft. He exhibits no distension.   Well healed midline abdominal incision    Small scar to left of abdomen, likely was a LINDSAY drain following splenectomy.  Soft, non-distended   Neurological:   Intermittently will follow commands   Skin: Skin is warm and dry.   Psychiatric: He has a normal mood and affect. His behavior is normal.   Vitals reviewed.      Significant Labs:  CBC:   Recent Labs   Lab 02/18/20 0215   WBC 12.66   RBC 2.79*   HGB 8.8*   HCT 28.3*      *   MCH 31.5*   MCHC 31.1*     BMP:   Recent Labs   Lab 02/18/20 0215   *      K 3.8      CO2 25   BUN 24*   CREATININE 0.8   CALCIUM 8.4*   MG 2.1       Significant Diagnostics:  I have reviewed and interpreted all pertinent imaging results/findings within the past 24 hours.

## 2020-02-19 NOTE — PROGRESS NOTES
GENERAL SURGERY PROGRESS NOTE    Tentative plan for tracheostomy and PEG tube placement tomorrow if ventilatory support requirements remain stable. Please hold enteral feeds at 12:00 AM and heparin gtt 3:00 AM.      Darnell Alvarado MD  Surgery Resident, PGY-V  Pager: [phone]  2/19/2020 4:29 PM

## 2020-02-19 NOTE — CONSULTS
[facility]  General Surgery  Consult Note    Patient Name: Edu Choi  MRN: [id number]  Code Status: Full Code  Admission Date: 2/2/2020  Hospital Length of Stay: 16 days  Attending Physician: Lisa Wyatt MD  Primary Care Provider: Gladis Blankenship MD    Patient information was obtained from relative(s), past medical records and ER records.     Inpatient consult to General Surgery  Consult performed by: Eagle Keita MD  Consult ordered by: Yani Alva NP  Reason for consult: Trach/PEG        Subjective:     Principal Problem: Seizure    History of Present Illness: 65 yo male with PMHx of of DVT/PE (on chronic anticoagulation, Eliquis currently), HLD, DM, seizures on keppra, and aortic atherosclerosis who presented to OSH after found down for about an hour. He was [place] [facility] [place] care. He was given 10 mg Versed during EMS ride to outside facility for continued seizure activity. He was unresponsive upon arrival to ED and subsequently intubated. At outside ED, he was noted to have left lung collapse and maxillofacial fractures. Upon arrival here, repeat CT C/A/P was order, demonstrating right colonic wall thickening and portal venous gas. He arrived intubated and sedated.  We were initially were consulted for right colonic wall thickening seen on CT, but signed off a few days later because exam was not concerning, white count normalized, and pt was having bowel function.  We are reconsulted for Trach/PEG.  Pt has not been able to wean off vent.  Has been on spontaneous for about five days, but have been unable to extubate due to neuro status.  Per pt's nurse, his neuro status changes frequently and sometimes he will follow commands, but minutes later will be almost unarousable.  He is off all sedation.  Currently he is on PS of 8 and a PEEP of 8 on Spontaneous mode.    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  No prior neck surgeries.      No current  facility-administered medications on file prior to encounter.      Current Outpatient Medications on File Prior to Encounter   Medication Sig    apixaban (ELIQUIS) 5 mg Tab Take 1 tablet (5 mg total) by mouth 2 (two) times daily.    atorvastatin (LIPITOR) 40 MG tablet TAKE 1 TABLET EVERY EVENING    escitalopram oxalate (LEXAPRO) 20 MG tablet TAKE 1 TABLET EVERY DAY    FLUZONE HIGH-DOSE 2019-20, PF, 180 mcg/0.5 mL Syrg     lancets 30 gauge Misc 1 lancet by Misc.(Non-Drug; Combo Route) route once daily.    levETIRAcetam (KEPPRA) 750 MG Tab Take 2 tablets (1,500 mg total) by mouth 2 (two) times daily.    TRUE METRIX GLUCOSE TEST STRIP Strp 1 strip by Misc.(Non-Drug; Combo Route) route once daily.       Review of patient's allergies indicates:   Allergen Reactions    No known drug allergies        Past Medical History:   Diagnosis Date    Atherosclerosis of aorta 6/22/2017    Atherosclerosis of native coronary artery of native heart without angina pectoris 6/22/2017    Blood clotting tendency     Depression     Diabetes mellitus type 2 in nonobese     DVT (deep venous thrombosis)     Hyperlipidemia     Measles complicated by meningitis     during childhood, with subsequent seizures    Newly diagnosed diabetes 4/18/2017    PE (pulmonary embolism)     Seizures     Splenomegaly     Thrombophlebitis leg     bilateral legs     Past Surgical History:   Procedure Laterality Date    ANAL FISTULOTOMY      COLONOSCOPY N/A 10/14/2016    Procedure: COLONOSCOPY;  Surgeon: Edu Stratton MD;  Location: [address];  Service: Endoscopy;  Laterality: N/A;  Patient may hold Coumadin x 3 days prior to procedure with out Lovenox bridge as per C.C./svn/coumadin lab 1st    SPLENECTOMY, TOTAL       Family History     Problem Relation (Age of Onset)    Heart attack Father    Heart failure Father    Hypertension Father    Thrombophlebitis Father        Tobacco Use    Smoking status: Current Every Day Smoker      Packs/day: 0.25     Years: 33.00     Pack years: 8.25    Smokeless tobacco: Never Used   Substance and Sexual Activity    Alcohol use: No    Drug use: No    Sexual activity: Never     Review of Systems- Unable to obtain  Objective:     Vital Signs (Most Recent):  Temp: 99.9 °F (37.7 °C) (02/18/20 1905)  Pulse: 91 (02/18/20 1916)  Resp: 16 (02/18/20 1916)  BP: (!) 145/71 (02/18/20 1905)  SpO2: 97 % (02/18/20 1916) Vital Signs (24h Range):  Temp:  [98.8 °F (37.1 °C)-100.4 °F (38 °C)] 99.9 °F (37.7 °C)  Pulse:  [] 91  Resp:  [0-31] 16  SpO2:  [90 %-98 %] 97 %  BP: ()/(52-78) 145/71     Weight: 68.5 kg (151 lb)  Body mass index is 25.92 kg/m².    Physical Exam   Constitutional:   Intubated and sedated   Eyes: Right eye exhibits no discharge. Left eye exhibits no discharge.   Cardiovascular: Normal rate and regular rhythm.   Pulmonary/Chest:   Vent Mode: Spont  Oxygen Concentration (%):  () 65  Resp Rate Total:  (8.6 br/min-35 br/min) 16 br/min  PEEP/CPAP:  (8 cmH20) 8 cmH20  Pressure Support:  (8 cmH20) 8 cmH20  Mean Airway Pressure:  (9.5 vlX37-60 cmH20) 10 cmH20     Abdominal: Soft. He exhibits no distension.   Well healed midline abdominal incision    Small scar to left of abdomen, likely was a LINDSAY drain following splenectomy.  Soft, non-distended   Neurological:   Intermittently will follow commands   Skin: Skin is warm and dry.   Psychiatric: He has a normal mood and affect. His behavior is normal.   Vitals reviewed.      Significant Labs:  CBC:   Recent Labs   Lab 02/18/20 0215   WBC 12.66   RBC 2.79*   HGB 8.8*   HCT 28.3*      *   MCH 31.5*   MCHC 31.1*     BMP:   Recent Labs   Lab 02/18/20 0215   *      K 3.8      CO2 25   BUN 24*   CREATININE 0.8   CALCIUM 8.4*   MG 2.1       Significant Diagnostics:  I have reviewed and interpreted all pertinent imaging results/findings within the past 24 hours.    Assessment/Plan:     Aspiration pneumonitis  Edu  Steph is a 64 y.o. male w/ hx of PE and seizures in Neuro ICU after a fall.  General surgery consulted for Trach/PEG.    -Pt seems unable to wean from vent due to neuro status  -Will plan on PEG/Trach tomorrow in operating room  -Will need his 7.5 ETT changed out for an 8.0 tube in order to perform bronch during tracheostomy  -Hep gtt off at 0300 tomorrow  -Tube feeds off at midnight  -Consent obtained, case booked  -Spoke with family who have realistic expectations and understand risks/benefits of procedure        VTE Risk Mitigation (From admission, onward)         Ordered     heparin 25,000 units in dextrose 5% 250 ml (100 units/mL) infusion MINIMAL INTENSITY nomogram - [facility]  Continuous     Question:  Heparin Infusion Adjustment (DO NOT MODIFY ANSWER)  Answer:  \\[email or web address]\epic\Images\Pharmacy\HeparinInfusions\heparin MINIMAL  INTENSITY nomogram for [facility] [id number].pdf    02/08/20 1634     IP VTE LOW RISK PATIENT  Once      02/02/20 1925     Place sequential compression device  Until discontinued      02/02/20 1925                Thank you for your consult. I will follow-up with patient. Please contact us if you have any additional questions.    Eagle Keita MD  General Surgery  [facility]

## 2020-02-19 NOTE — ANESTHESIA PREPROCEDURE EVALUATION
[facility]  Anesthesia Pre-Operative Evaluation         Patient Name: Edu Choi  YOB: 1955  MRN: [id number]    SUBJECTIVE:     Pre-operative evaluation for Procedure(s) (LRB):  CREATION, TRACHEOSTOMY (N/A)  EGD, WITH PEG TUBE INSERTION (N/A)     02/26/2020    Edu Choi is a 64 y.o. male w/ a significant PMHx of DVT/PE (chronic anticoagulation w/ Eliquis at home, currently on heparin gtt), HLD, DM, and hx of seizures (on keppra) who presented as a transfer following seizure activity for higher level of care. Patient required re-intubation following acute hypoxia and lethargy on 2/26. Sedated on propofol. HDS.    Patient now presents for the above procedure(s).      LDA:        Peripheral IV - Single Lumen 02/15/20 0330 22 G Left;Posterior Forearm (Active)   Site Assessment Clean;Dry;Intact;No redness;No swelling 2/18/2020  3:05 PM   Line Status Infusing 2/18/2020  3:05 PM   Dressing Status Clean;Dry;Intact 2/18/2020  3:05 PM   Dressing Intervention Integrity maintained 2/18/2020  3:05 PM   Dressing Change Due 02/19/20 2/18/2020  3:05 PM   Site Change Due 02/19/20 2/18/2020 11:05 AM   Reason Not Rotated Poor venous access 2/18/2020  3:05 PM   Number of days: 3            Midline Catheter Insertion/Assessment  - Single Lumen 02/17/20 1508 Left basilic vein (medial side of arm) 18g x 8cm (Active)   Site Assessment Clean;Dry;Intact;No redness;No swelling 2/18/2020  3:05 PM   IV Device Securement catheter securement device 2/18/2020  3:05 PM   Line Status Blood return noted 2/18/2020  3:05 PM   Dressing Status Clean;Dry;Intact 2/18/2020  3:05 PM   Dressing Intervention Integrity maintained 2/18/2020  3:05 PM   Dressing Change Due 02/24/20 2/18/2020  3:05 PM   Site Change Due 03/17/20 2/18/2020 11:05 AM   Number of days: 1            NG/OG Tube 02/06/20 1030 Right nostril (Active)   Placement Check placement verified by aspirate characteristics;placement verified by distal  tube length measurement;placement verified by x-ray 2/18/2020  3:05 PM   Advancement advanced manually 2/18/2020  3:05 PM   Tolerance no signs/symptoms of discomfort 2/18/2020  3:05 PM   Securement secured to nostril center w/ adhesive device 2/18/2020  3:05 PM   Clamp Status/Tolerance unclamped 2/18/2020  3:05 PM   Suction Setting/Drainage Method suction at the bedside 2/18/2020  3:05 PM   Insertion Site Appearance no redness, warmth, tenderness, skin breakdown, drainage 2/18/2020  3:05 PM   Drainage None 2/18/2020  3:05 PM   Flush/Irrigation flushed w/;water;no resistance met 2/18/2020  3:05 PM   Feeding Type continuous 2/18/2020  3:05 PM   (RETIRED) Feeding Method continuous 2/13/2020  3:05 AM   Feeding Action feeding continued 2/18/2020  3:05 PM   Current Rate (mL/hr) 50 mL/hr 2/18/2020  3:05 PM   Goal Rate (mL/hr) 50 mL/hr 2/18/2020  3:05 PM   Intake (mL) 65 mL 2/18/2020  6:05 AM   Water Bolus (mL) 200 mL 2/18/2020  4:05 PM   Tube Output(mL)(Include Discarded Residual) 0 mL 2/9/2020  7:05 AM   Rate Formula Tube Feeding (mL/hr) 50 mL/hr 2/18/2020  3:05 PM   Formula Name Impact Peptide 2/18/2020  3:05 PM   Intake (mL) - Formula Tube Feeding 50 2/18/2020  7:05 PM   Residual Amount (ml) 15 ml 2/17/2020  7:05 PM   Number of days: 12            Rectal Tube 02/17/20 1600 fecal management system (Active)   Balloon Inflation Volume (mL) 45 2/18/2020  3:05 PM   Reposition drainage bags for BMS & Wheeler on opposite sides of bed 2/18/2020  3:05 PM   Outcome gas expelled, stool evacuated 2/18/2020  3:05 PM   Stool Color Brown 2/18/2020  3:05 PM   Insertion Site Appearance no redness, warmth, tenderness, skin breakdown, drainage 2/18/2020  3:05 PM   Flush/Irrigation flushed w/;water;no resistance met 2/18/2020  3:05 PM   Intake (mL) 20 mL 2/18/2020  6:05 PM   Rectal Tube Output 650 mL 2/18/2020  6:05 PM   Number of days: 1       Male External Urinary Catheter 02/08/20 8332 Small (Active)   Collection Container Urimeter  2/18/2020  3:05 PM   Securement Method secured to top of thigh w/ adhesive device 2/18/2020  3:05 PM   Skin no redness;no breakdown;penis/scrotum cleansed w/ soap and water 2/18/2020  3:05 PM   Tolerance no signs/symptoms of discomfort 2/18/2020  3:05 PM   Output (mL) 125 mL 2/18/2020  9:05 PM   Catheter Change Date 02/18/20 2/18/2020  3:05 PM   Catheter Change Time 0305 2/18/2020  3:05 AM   Number of days: 9       Prev airway: RSI, glidescope, MAC 4, ETT 7.5    Drips:    heparin (porcine) in D5W 19 Units/kg/hr (02/18/20 2105)       Patient Active Problem List   Diagnosis    Partial epilepsy with impairment of consciousness    Depression with anxiety    Tobacco abuse    History of DVT (deep vein thrombosis)    History of pulmonary embolism    Acute respiratory failure with hypoxia    Chronic anticoagulation    S/P splenectomy    Hyperlipidemia    Special screening for malignant neoplasms, colon    Atherosclerosis of aorta    Atherosclerosis of native coronary artery of native heart without angina pectoris    History of CVA (cerebrovascular accident)    Calculus of gallbladder without cholecystitis without obstruction    Excessive daytime sleepiness    CHRIS (obstructive sleep apnea)    Idiopathic hypotension    Seizure    Closed extensive facial fractures    Aspiration pneumonitis    Multiple closed fractures of facial bone    Septic shock    Aspiration pneumonia    Stroke    Alteration in skin integrity    Altered mental status       Review of patient's allergies indicates:   Allergen Reactions    No known drug allergies        Current Inpatient Medications:   albuterol-ipratropium  3 mL Nebulization Q4H    amantadine HCL  100 mg Per NG tube BID    atorvastatin  40 mg Per NG tube QHS    chlorhexidine  15 mL Mouth/Throat BID    escitalopram oxalate  20 mg Per NG tube Daily    famotidine (PF)  20 mg Intravenous BID    levetiracetam IVPB  1,500 mg Intravenous Q12H    miconazole  nitrate 2%   Topical (Top) BID    sodium chloride 3%  4 mL Nebulization Q4H    succinylcholine           No current facility-administered medications on file prior to encounter.      Current Outpatient Medications on File Prior to Encounter   Medication Sig Dispense Refill    apixaban (ELIQUIS) 5 mg Tab Take 1 tablet (5 mg total) by mouth 2 (two) times daily. 60 tablet 0    atorvastatin (LIPITOR) 40 MG tablet TAKE 1 TABLET EVERY EVENING 90 tablet 3    escitalopram oxalate (LEXAPRO) 20 MG tablet TAKE 1 TABLET EVERY DAY 90 tablet 3    FLUZONE HIGH-DOSE 2019-20, PF, 180 mcg/0.5 mL Syrg       lancets 30 gauge Misc 1 lancet by Misc.(Non-Drug; Combo Route) route once daily. 100 each 11    levETIRAcetam (KEPPRA) 750 MG Tab Take 2 tablets (1,500 mg total) by mouth 2 (two) times daily. 360 tablet 1    TRUE METRIX GLUCOSE TEST STRIP Strp 1 strip by Misc.(Non-Drug; Combo Route) route once daily. 100 strip 11       Past Surgical History:   Procedure Laterality Date    ANAL FISTULOTOMY      COLONOSCOPY N/A 10/14/2016    Procedure: COLONOSCOPY;  Surgeon: Edu Stratton MD;  Location: [address]);  Service: Endoscopy;  Laterality: N/A;  Patient may hold Coumadin x 3 days prior to procedure with out Lovenox bridge as per C.C./svn/coumadin lab 1st    SPLENECTOMY, TOTAL         Social History     Socioeconomic History    Marital status:      Spouse name: Not on file    Number of children: Not on file    Years of education: Not on file    Highest education level: Not on file   Occupational History    Not on file   Social Needs    Financial resource strain: Not on file    Food insecurity:     Worry: Not on file     Inability: Not on file    Transportation needs:     Medical: Not on file     Non-medical: Not on file   Tobacco Use    Smoking status: Current Every Day Smoker     Packs/day: 0.25     Years: 33.00     Pack years: 8.25    Smokeless tobacco: Never Used   Substance and Sexual Activity     Alcohol use: No    Drug use: No    Sexual activity: Never   Lifestyle    Physical activity:     Days per week: Not on file     Minutes per session: Not on file    Stress: Not on file   Relationships    Social connections:     Talks on phone: Not on file     Gets together: Not on file     Attends Faith service: Not on file     Active member of club or organization: Not on file     Attends meetings of clubs or organizations: Not on file     Relationship status: Not on file   Other Topics Concern    Not on file   Social History Narrative    Not on file       OBJECTIVE:     Vital Signs Range (Last 24H):  Temp:  [37.1 °C (98.8 °F)-37.7 °C (99.9 °F)]   Pulse:  []   Resp:  [0-29]   BP: ()/(52-78)   SpO2:  [90 %-100 %]       Significant Labs:  Lab Results   Component Value Date    WBC 12.66 02/18/2020    HGB 8.8 (L) 02/18/2020    HCT 28.3 (L) 02/18/2020     02/18/2020    CHOL 114 (L) 02/02/2020    TRIG 63 02/02/2020    HDL 45 02/02/2020    ALT 20 02/18/2020    AST 24 02/18/2020     02/18/2020    K 3.8 02/18/2020     02/18/2020    CREATININE 0.8 02/18/2020    BUN 24 (H) 02/18/2020    CO2 25 02/18/2020    TSH 0.520 02/02/2020    INR 1.0 02/18/2020    HGBA1C 6.1 (H) 02/02/2020       Diagnostic Studies: No relevant studies.    EKG:   Results for orders placed or performed during the hospital encounter of 02/02/20   EKG, 12 - Lead    Collection Time: 02/02/20  7:20 PM    Narrative    Test Reason : R56.9    Vent. Rate : 091 BPM     Atrial Rate : 091 BPM     P-R Int : 156 ms          QRS Dur : 074 ms      QT Int : 378 ms       P-R-T Axes : 071 081 044 degrees     QTc Int : 464 ms    Normal sinus rhythm  Normal ECG  When compared with ECG of 02-FEB-2020 10:01,  No significant change was found  Confirmed by Mihai De Jesus MD ([id number]) on 2/3/2020 9:39:33 PM    Referred By: LEILANI ERAZO           Confirmed By:Mihai De Jesus MD       2D ECHO:  TTE:  Results for orders placed or performed during  the hospital encounter of 02/02/20   Echo Color Flow Doppler? Yes   Result Value Ref Range    BSA 1.76 m2    TDI SEPTAL 0.10 m/s    LV LATERAL E/E' RATIO 9.00 m/s    LV SEPTAL E/E' RATIO 10.80 m/s    LA WIDTH 3.28 cm    TDI LATERAL 0.12 m/s    LVIDD 3.88 3.5 - 6.0 cm    IVS 0.89 0.6 - 1.1 cm    PW 0.69 0.6 - 1.1 cm    LVIDS 2.39 2.1 - 4.0 cm    FS 38 28 - 44 %    LA volume 44.30 cm3    Sinus 3.39 cm    STJ 2.77 cm    Ascending aorta 3.16 cm    LV mass 87.43 g    LA size 3.36 cm    RVDD 3.60 cm    TAPSE 2.33 cm    Left Ventricle Relative Wall Thickness 0.36 cm    AV mean gradient 2 mmHg    AV valve area 3.61 cm2    AV Velocity Ratio 1.09     AV index (prosthetic) 1.15     E/A ratio 1.20     Mean e' 0.11 m/s    E wave decelartion time 190.85 msec    LVOT diameter 2.00 cm    LVOT area 3.1 cm2    LVOT peak aime 1.02 m/s    LVOT peak VTI 22.75 cm    Ao peak aime 0.94 m/s    Ao VTI 19.80 cm    LVOT stroke volume 71.44 cm3    AV peak gradient 4 mmHg    E/E' ratio 9.82 m/s    MV Peak E Aime 1.08 m/s    TR Max Aime 2.56 m/s    MV Peak A Aime 0.90 m/s    LV Systolic Volume 19.88 mL    LV Systolic Volume Index 11.5 mL/m2    LV Diastolic Volume 65.09 mL    LV Diastolic Volume Index 37.50 mL/m2    LA Volume Index 25.5 mL/m2    LV Mass Index 50 g/m2    RA Major Axis 5.26 cm    Left Atrium Minor Axis 4.56 cm    Left Atrium Major Axis 4.91 cm    Triscuspid Valve Regurgitation Peak Gradient 26 mmHg    RA Width 3.15 cm    RV S' 13 cm/s    Narrative    · Normal left ventricular systolic function. The estimated ejection   fraction is 65%.  · Normal right ventricular systolic function.  · Normal LV diastolic function.  · No wall motion abnormalities.  · Mechanically ventilated; cannot use inferior caval vein diameter to   estimate central venous pressure.          MARIBEL:  No results found for this or any previous visit.    ASSESSMENT/PLAN:                                                                                                                  02/18/2020  Edu Choi is a 64 y.o., male.    Anesthesia Evaluation    I have reviewed the Patient Summary Reports.    I have reviewed the Nursing Notes.   I have reviewed the Medications.     Review of Systems  Anesthesia Hx:  No problems with previous Anesthesia  History of prior surgery of interest to airway management or planning:  Denies Personal Hx of Anesthesia complications.   Social:  Smoker, No Alcohol Use    Hematology/Oncology:     Oncology Normal    -- Anemia: Hematology Comments: WBC 12.66   HGB 8.8 (L)   HCT 28.3 (L)     EENT/Dental:EENT/Dental Normal   Cardiovascular:   CAD   H/o DVT, PE, on OAC at home, now on heparin gtt, should be stopped 0300.    2/2/20 TTE: Normal left ventricular systolic function. The estimated ejection   fraction is 65%.  · Normal right ventricular systolic function.  · Normal LV diastolic function.  · No wall motion abnormalities.  · Mechanically ventilated; cannot use inferior caval vein diameter to   estimate central venous pressure.   Pulmonary:   Pneumonia Sleep Apnea    Renal/:  Renal/ Normal     Hepatic/GI:   S/p splenectomy due to splenic lac   Neurological:   CVA Seizures, well controlled    Endocrine:   Diabetes    Psych:   Psychiatric History          Physical Exam  General:  Well nourished    Airway/Jaw/Neck:  Airway Findings: Tongue: Normal Pre-Existing Airway Tube(s): Oral Endotracheal tube  Size: 7.5 ETT  General Airway Assessment: Adult  TM Distance: Normal, at least 6 cm     Eyes/Ears/Nose:  EYES/EARS/NOSE FINDINGS: Normal   Dental:  Dental Findings: In tact        Mental Status:  Mental Status Findings: (intubated, but awake, not sedated. )         Anesthesia Plan  Type of Anesthesia, risks & benefits discussed:  Anesthesia Type:  general  Patient's Preference:   Intra-op Monitoring Plan: standard ASA monitors  Intra-op Monitoring Plan Comments:   Post Op Pain Control Plan: multimodal analgesia, IV/PO Opioids PRN and per primary service  following discharge from PACU  Post Op Pain Control Plan Comments:   Induction:   IV  Beta Blocker:  Patient is not currently on a Beta-Blocker (No further documentation required).       Informed Consent: Patient representative understands risks and agrees with Anesthesia plan.  Questions answered. Anesthesia consent signed with patient representative.  ASA Score: 4     Day of Surgery Review of History & Physical:    H&P update referred to the surgeon.     Anesthesia Plan Notes: Consent by wife over the phone.         Ready For Surgery From Anesthesia Perspective.

## 2020-02-19 NOTE — ANESTHESIA PROCEDURE NOTES
Ad Hoc Intubation  Performed by: Terri Khan MD  Authorized by: Mohamud Sparrow MD     Indications:  Airway protection  Diagnosis:  Respiratpry failure  Patient Location:  ICU  Timeout:  2/18/2020 8:40 PM  Procedure Start Time:  2/18/2020 8:41 PM  Procedure End Time:  2/18/2020 8:50 PM  Staff:     patient identified, IV checked, site marked, risks and benefits discussed, surgical consent, monitors and equipment checked, pre-op evaluation, timeout performed and anesthesia consent      Anesthesiologist Present: Yes    Intubation:     Induction:  Intravenous    Intubated:  N/a    Attempts:  1    Attempted By:  Resident anesthesiologist    Intubation method: tube exchange with cook catheter.    Difficult Airway Encountered?: No      Chest x-ray findings: pt biting tube.    Airway Device:  Oral endotracheal tube    Airway Device Size:  8.0    Style/Cuff Inflation:  Cuffed (inflated to minimal occlusive pressure)    Tube secured:  24    Secured at:  The lips    Placement Verified By:  Capnometry, Chest x-ray and Colorimetric ETCO2 device    Complicating Factors:  None    Findings Post-Intubation:  BS equal bilateral  Notes:      200mg propofol IV titrated to facilitate tube exchange. Pt with sats into the 80's while biting tube, but resolved in less than 30 seconds once more adequately sedated.

## 2020-02-19 NOTE — PROGRESS NOTES
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 He was transferred to [facility] [place] [facility] for higher level of care. He was given 10 mg Versed during EMS ride to outside facility for continued seizure activity. He was unresponsive upon arrival to ED and subsequently intubated. At outside ED, he was noted to have left lung collapse and maxillofacial fractures. Upon arrival here, repeat CT C/A/P was order, demonstrating right colonic wall thickening and portal venous gas. He arrived intubated and sedated.  We were initially were consulted for right colonic wall thickening seen on CT, but signed off a few days later because exam was not concerning, white count normalized, and pt was having bowel function.  We are reconsulted for Trach/PEG.  Pt has not been able to wean off vent.  Has been on spontaneous for about five days, but have been unable to extubate due to neuro status.  Per pt's nurse, his neuro status changes frequently and sometimes he will follow commands, but minutes later will be almost unarousable.  He is off all sedation.  Currently he is on PS of 8 and a PEEP of 8 on Spontaneous mode.    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  No prior neck surgeries.      Post-Op Info:  Procedure(s) (LRB):  CREATION, TRACHEOSTOMY (N/A)  EGD, WITH PEG TUBE INSERTION (N/A)         Interval History: PEEP increased overnight to 12    Medications:  Continuous Infusions:   heparin (porcine) in D5W Stopped (02/19/20 0305)     Scheduled Meds:   albuterol-ipratropium  3 mL Nebulization Q4H    amantadine HCL  100 mg Per NG tube BID    atorvastatin  40 mg Per NG tube QHS    chlorhexidine  15 mL Mouth/Throat BID    escitalopram oxalate  20 mg Per NG tube Daily    famotidine (PF)  20  mg Intravenous BID    levetiracetam IVPB  1,500 mg Intravenous Q12H    miconazole nitrate 2%   Topical (Top) BID    sodium chloride 3%  4 mL Nebulization Q4H     PRN Meds:acetaminophen, Dextrose 10% Bolus, fentaNYL, glucagon (human recombinant), insulin aspart U-100, labetalol, magnesium oxide, magnesium oxide, ondansetron, potassium chloride 10%, potassium chloride 10%, potassium chloride 10%, potassium, sodium phosphates, potassium, sodium phosphates, potassium, sodium phosphates, sodium chloride 0.9%     Review of patient's allergies indicates:   Allergen Reactions    No known drug allergies      Objective:     Vital Signs (Most Recent):  Temp: 100.1 °F (37.8 °C) (02/19/20 0813)  Pulse: 75 (02/19/20 0748)  Resp: 15 (02/19/20 0748)  BP: (!) 106/55 (02/19/20 0705)  SpO2: 100 % (02/19/20 0748) Vital Signs (24h Range):  Temp:  [98.8 °F (37.1 °C)-100.1 °F (37.8 °C)] 100.1 °F (37.8 °C)  Pulse:  [] 75  Resp:  [0-29] 15  SpO2:  [90 %-100 %] 100 %  BP: ()/(52-78) 106/55     Weight: 68.5 kg (151 lb)  Body mass index is 25.92 kg/m².    Intake/Output - Last 3 Shifts       02/17 0700 - 02/18 0659 02/18 0700 - 02/19 0659 02/19 0700 - 02/20 0659    I.V. (mL/kg) 380.7 (5.6) 362 (5.3) 5 (0.1)    NG/GT 2465 1265     IV Piggyback 500 300     Total Intake(mL/kg) 3345.7 (48.8) 1927 (28.1) 5 (0.1)    Urine (mL/kg/hr) 1155 (0.7) 2220 (1.4) 0 (0)    Other 0      Stool 100 850     Total Output 1255 3070 0    Net +2090.7 -1143 +5           Urine Occurrence 2 x      Stool Occurrence 4 x 3 x           Physical Exam   Constitutional: He appears well-developed and well-nourished. No distress.   Cardiovascular: Normal rate and regular rhythm.   Pulmonary/Chest:   Vent Mode: A/C  Oxygen Concentration (%):  () 40  Resp Rate Total:  (9.4 br/min-27 br/min) 15 br/min  Vt Set:  (470 mL-500 mL) 470 mL  PEEP/CPAP:  (6 hoJ22-63 cmH20) 6 cmH20  Pressure Support:  (8 cmH20) 8 cmH20  Mean Airway Pressure:  (9.3 idN75-34 cmH20) 9.3  cmH20     Abdominal: Soft. He exhibits no distension. There is no tenderness.       Significant Labs:  CBC:   Recent Labs   Lab 02/19/20 0130   WBC 12.43   RBC 2.60*   HGB 8.3*   HCT 26.8*      *   MCH 31.9*   MCHC 31.0*     CMP:   Recent Labs   Lab 02/19/20 0130      CALCIUM 8.7   ALBUMIN 2.9*   PROT 6.1      K 3.5   CO2 27      BUN 23   CREATININE 0.8   ALKPHOS 56   ALT 17   AST 22   BILITOT 0.5       Significant Diagnostics:      Assessment/Plan:     Aspiration pneumonitis  Edu Choi is a 64 y.o. male w/ hx of PE and seizures in Neuro ICU after a fall.  General surgery consulted for Trach/PEG.    -Pt seems unable to wean from vent due to neuro status  -Will plan on PEG/Trach today if PEEP can be weaned to 8 (on 12 this morning)  -Hep gtt off at 0300  -Tube feeds off at midnight  -Consent obtained, case booked            Jill Joseph MD  General Surgery  [facility]

## 2020-02-19 NOTE — CARE UPDATE
CYNTHIA Dunham RRT advanced ETT tube 1cm as directed by JALIL Mar NP order.  New placement is 25cm @ center lip.  Awaiting placement confirmation by ordered chest x-ray.

## 2020-02-19 NOTE — PLAN OF CARE
POC reviewed with pt and family at 1700. Spouse & daughters verbalized understanding. Questions and concerns addressed. No acute events today. Heparin gtt titrated per nomogram. Fentanyl given x2 for restlessness. Plan for trach/peg. Pt progressing toward goals. Will continue to monitor. See flowsheets for full assessment and VS info.

## 2020-02-19 NOTE — SUBJECTIVE & OBJECTIVE
Interval History: PEEP increased overnight to 12    Medications:  Continuous Infusions:   heparin (porcine) in D5W Stopped (02/19/20 0305)     Scheduled Meds:   albuterol-ipratropium  3 mL Nebulization Q4H    amantadine HCL  100 mg Per NG tube BID    atorvastatin  40 mg Per NG tube QHS    chlorhexidine  15 mL Mouth/Throat BID    escitalopram oxalate  20 mg Per NG tube Daily    famotidine (PF)  20 mg Intravenous BID    levetiracetam IVPB  1,500 mg Intravenous Q12H    miconazole nitrate 2%   Topical (Top) BID    sodium chloride 3%  4 mL Nebulization Q4H     PRN Meds:acetaminophen, Dextrose 10% Bolus, fentaNYL, glucagon (human recombinant), insulin aspart U-100, labetalol, magnesium oxide, magnesium oxide, ondansetron, potassium chloride 10%, potassium chloride 10%, potassium chloride 10%, potassium, sodium phosphates, potassium, sodium phosphates, potassium, sodium phosphates, sodium chloride 0.9%     Review of patient's allergies indicates:   Allergen Reactions    No known drug allergies      Objective:     Vital Signs (Most Recent):  Temp: 100.1 °F (37.8 °C) (02/19/20 0813)  Pulse: 75 (02/19/20 0748)  Resp: 15 (02/19/20 0748)  BP: (!) 106/55 (02/19/20 0705)  SpO2: 100 % (02/19/20 0748) Vital Signs (24h Range):  Temp:  [98.8 °F (37.1 °C)-100.1 °F (37.8 °C)] 100.1 °F (37.8 °C)  Pulse:  [] 75  Resp:  [0-29] 15  SpO2:  [90 %-100 %] 100 %  BP: ()/(52-78) 106/55     Weight: 68.5 kg (151 lb)  Body mass index is 25.92 kg/m².    Intake/Output - Last 3 Shifts       02/17 0700 - 02/18 0659 02/18 0700 - 02/19 0659 02/19 0700 - 02/20 0659    I.V. (mL/kg) 380.7 (5.6) 362 (5.3) 5 (0.1)    NG/GT 2465 1265     IV Piggyback 500 300     Total Intake(mL/kg) 3345.7 (48.8) 1927 (28.1) 5 (0.1)    Urine (mL/kg/hr) 1155 (0.7) 2220 (1.4) 0 (0)    Other 0      Stool 100 850     Total Output 1255 3070 0    Net +2090.7 -1143 +5           Urine Occurrence 2 x      Stool Occurrence 4 x 3 x           Physical Exam    Constitutional: He appears well-developed and well-nourished. No distress.   Cardiovascular: Normal rate and regular rhythm.   Pulmonary/Chest:   Vent Mode: A/C  Oxygen Concentration (%):  () 40  Resp Rate Total:  (9.4 br/min-27 br/min) 15 br/min  Vt Set:  (470 mL-500 mL) 470 mL  PEEP/CPAP:  (6 yvC77-39 cmH20) 6 cmH20  Pressure Support:  (8 cmH20) 8 cmH20  Mean Airway Pressure:  (9.3 ntB85-56 cmH20) 9.3 cmH20     Abdominal: Soft. He exhibits no distension. There is no tenderness.       Significant Labs:  CBC:   Recent Labs   Lab 02/19/20 0130   WBC 12.43   RBC 2.60*   HGB 8.3*   HCT 26.8*      *   MCH 31.9*   MCHC 31.0*     CMP:   Recent Labs   Lab 02/19/20 0130      CALCIUM 8.7   ALBUMIN 2.9*   PROT 6.1      K 3.5   CO2 27      BUN 23   CREATININE 0.8   ALKPHOS 56   ALT 17   AST 22   BILITOT 0.5       Significant Diagnostics:

## 2020-02-19 NOTE — ASSESSMENT & PLAN NOTE
Edu Choi is a 64 y.o. male w/ hx of PE and seizures in Neuro ICU after a fall.  General surgery consulted for Trach/PEG.    -Pt seems unable to wean from vent due to neuro status  -Will plan on PEG/Trach tomorrow in operating room  -Will need his 7.5 ETT changed out for an 8.0 tube in order to perform bronch during tracheostomy  -Hep gtt off at 0300 tomorrow  -Tube feeds off at midnight  -Consent obtained, case booked  -Spoke with family who have realistic expectations and understand risks/benefits of procedure

## 2020-02-19 NOTE — RESPIRATORY THERAPY
LAUREN Noriega performed Cook Tube Exchange with FRANK Vega CRT assisting from a size 7.5 ETT to ETT size 8 @ 24 cm measured at Right Lip in preparation for surgery in the AM.  FiO2 was raised to 100% for the procedure and lowered back to previous setting once procedure was over.  Confirmed tube placement with BS equal and bilateral, positive etCO2 color change and chest X-ray ordered for further confirm placement.  Pt tolerated well.  Will continue to monitor    Arterial Blood Gas result before procedure was ordered at 2041 hrs:  pH 7.45, pCO2 39.3; pO2 81; BE 3, HCO3 27.3, sO2 96.  Critical: None

## 2020-02-19 NOTE — PLAN OF CARE
02/19/20 1651   Post-Acute Status   Post-Acute Authorization Placement   Post-Acute Placement Status Referrals Sent  (gave LTAC list, sent [facility])     LONNY advised by MD team that the Pt is due to receive a trache/peg tomorrow, 2/20/2020. LONNY met with Pt wife and Pt son at bedside to discuss LTAC and provide list. Pt has [facility] plan therefore only option is [facility]. Pt wife agreeable to this. LONNY sent referral via [facility].    Lashaun Estevez LCSW  Neurocritical Care   [facility]  [id number]

## 2020-02-19 NOTE — PLAN OF CARE
POC reviewed with pt at 0500. Pt unable to verbalize understanding at this time. Questions and concerns addressed with pt's spouse via telephone. No acute events overnight. Tube exchange completed per anesthesia for TREG with bronch planned for today. Desatting episodes overnight resolved with bagging, increased O2 requirements and peep and neb tx. Fentanyl PRN given for agitation and desatting episodes. Pt turned per protocol. Oral care provided per protocol. Heparin gtt turned off at 0300 for TREG as ordered. Pt NPO at MN per order. RN Will continue to monitor. See flowsheets for full assessment and VS info

## 2020-02-19 NOTE — ASSESSMENT & PLAN NOTE
Edu Choi is a 64 y.o. male w/ hx of PE and seizures in Neuro ICU after a fall.  General surgery consulted for Trach/PEG.    -Pt seems unable to wean from vent due to neuro status  -Will plan on PEG/Trach today if PEEP can be weaned to 8 (on 12 this morning)  -Hep gtt off at 0300  -Tube feeds off at midnight  -Consent obtained, case booked

## 2020-02-20 PROBLEM — G93.40 ACUTE ENCEPHALOPATHY: Status: ACTIVE | Noted: 2020-02-20

## 2020-02-20 LAB
ALBUMIN SERPL BCP-MCNC: 3.1 G/DL (ref 3.5–5.2)
ALLENS TEST: ABNORMAL
ALP SERPL-CCNC: 61 U/L (ref 55–135)
ALT SERPL W/O P-5'-P-CCNC: 18 U/L (ref 10–44)
ANION GAP SERPL CALC-SCNC: 8 MMOL/L (ref 8–16)
APTT BLDCRRT: 25.6 SEC (ref 21–32)
APTT BLDCRRT: 31.6 SEC (ref 21–32)
AST SERPL-CCNC: 24 U/L (ref 10–40)
BASOPHILS # BLD AUTO: 0.03 K/UL (ref 0–0.2)
BASOPHILS NFR BLD: 0.3 % (ref 0–1.9)
BILIRUB SERPL-MCNC: 0.5 MG/DL (ref 0.1–1)
BUN SERPL-MCNC: 18 MG/DL (ref 8–23)
CALCIUM SERPL-MCNC: 8.6 MG/DL (ref 8.7–10.5)
CHLORIDE SERPL-SCNC: 106 MMOL/L (ref 95–110)
CO2 SERPL-SCNC: 26 MMOL/L (ref 23–29)
CREAT SERPL-MCNC: 0.8 MG/DL (ref 0.5–1.4)
DELSYS: ABNORMAL
DIFFERENTIAL METHOD: ABNORMAL
EOSINOPHIL # BLD AUTO: 0.2 K/UL (ref 0–0.5)
EOSINOPHIL NFR BLD: 1.6 % (ref 0–8)
ERYTHROCYTE [DISTWIDTH] IN BLOOD BY AUTOMATED COUNT: 16.1 % (ref 11.5–14.5)
ERYTHROCYTE [SEDIMENTATION RATE] IN BLOOD BY WESTERGREN METHOD: 15 MM/H
EST. GFR  (AFRICAN AMERICAN): >60 ML/MIN/1.73 M^2
EST. GFR  (NON AFRICAN AMERICAN): >60 ML/MIN/1.73 M^2
FIO2: 45
GLUCOSE SERPL-MCNC: 92 MG/DL (ref 70–110)
HCO3 UR-SCNC: 25.1 MMOL/L (ref 24–28)
HCT VFR BLD AUTO: 26.9 % (ref 40–54)
HGB BLD-MCNC: 8.7 G/DL (ref 14–18)
IMM GRANULOCYTES # BLD AUTO: 0.04 K/UL (ref 0–0.04)
IMM GRANULOCYTES NFR BLD AUTO: 0.4 % (ref 0–0.5)
LYMPHOCYTES # BLD AUTO: 1.2 K/UL (ref 1–4.8)
LYMPHOCYTES NFR BLD: 13 % (ref 18–48)
MAGNESIUM SERPL-MCNC: 2.2 MG/DL (ref 1.6–2.6)
MCH RBC QN AUTO: 32.8 PG (ref 27–31)
MCHC RBC AUTO-ENTMCNC: 32.3 G/DL (ref 32–36)
MCV RBC AUTO: 102 FL (ref 82–98)
MIN VOL: 7.4
MODE: ABNORMAL
MONOCYTES # BLD AUTO: 0.9 K/UL (ref 0.3–1)
MONOCYTES NFR BLD: 9.2 % (ref 4–15)
NEUTROPHILS # BLD AUTO: 6.9 K/UL (ref 1.8–7.7)
NEUTROPHILS NFR BLD: 75.5 % (ref 38–73)
NRBC BLD-RTO: 0 /100 WBC
PCO2 BLDA: 35.9 MMHG (ref 35–45)
PEEP: 8
PH SMN: 7.45 [PH] (ref 7.35–7.45)
PHOSPHATE SERPL-MCNC: 3.3 MG/DL (ref 2.7–4.5)
PIP: 23
PLATELET # BLD AUTO: 283 K/UL (ref 150–350)
PMV BLD AUTO: 12.9 FL (ref 9.2–12.9)
PO2 BLDA: 74 MMHG (ref 80–100)
POC BE: 1 MMOL/L
POC SATURATED O2: 95 % (ref 95–100)
POC TCO2: 26 MMOL/L (ref 23–27)
POCT GLUCOSE: 72 MG/DL (ref 70–110)
POCT GLUCOSE: 86 MG/DL (ref 70–110)
POCT GLUCOSE: 88 MG/DL (ref 70–110)
POCT GLUCOSE: 99 MG/DL (ref 70–110)
POTASSIUM SERPL-SCNC: 4.1 MMOL/L (ref 3.5–5.1)
PROT SERPL-MCNC: 6.4 G/DL (ref 6–8.4)
RBC # BLD AUTO: 2.65 M/UL (ref 4.6–6.2)
SAMPLE: ABNORMAL
SITE: ABNORMAL
SODIUM SERPL-SCNC: 140 MMOL/L (ref 136–145)
SP02: 96
VT: 470
WBC # BLD AUTO: 9.21 K/UL (ref 3.9–12.7)

## 2020-02-20 PROCEDURE — 31600 PLANNED TRACHEOSTOMY: CPT | Mod: 59,HCNC,, | Performed by: SURGERY

## 2020-02-20 PROCEDURE — 43246 PR EGD, FLEX, W/PLCMT, GASTROSTOMY TUBE: ICD-10-PCS | Mod: 51,HCNC,, | Performed by: SURGERY

## 2020-02-20 PROCEDURE — 83735 ASSAY OF MAGNESIUM: CPT | Mod: HCNC

## 2020-02-20 PROCEDURE — 25000003 PHARM REV CODE 250: Mod: HCNC | Performed by: PHYSICIAN ASSISTANT

## 2020-02-20 PROCEDURE — 85730 THROMBOPLASTIN TIME PARTIAL: CPT | Mod: 91,HCNC

## 2020-02-20 PROCEDURE — 80053 COMPREHEN METABOLIC PANEL: CPT | Mod: HCNC

## 2020-02-20 PROCEDURE — 20000000 HC ICU ROOM: Mod: HCNC

## 2020-02-20 PROCEDURE — 63600175 PHARM REV CODE 636 W HCPCS: Mod: HCNC | Performed by: NURSE PRACTITIONER

## 2020-02-20 PROCEDURE — 99900026 HC AIRWAY MAINTENANCE (STAT): Mod: HCNC

## 2020-02-20 PROCEDURE — 94761 N-INVAS EAR/PLS OXIMETRY MLT: CPT | Mod: HCNC

## 2020-02-20 PROCEDURE — 43246 EGD PLACE GASTROSTOMY TUBE: CPT | Mod: 51,HCNC,, | Performed by: SURGERY

## 2020-02-20 PROCEDURE — 85025 COMPLETE CBC W/AUTO DIFF WBC: CPT | Mod: HCNC

## 2020-02-20 PROCEDURE — 25000003 PHARM REV CODE 250: Mod: HCNC | Performed by: NURSE PRACTITIONER

## 2020-02-20 PROCEDURE — 99233 PR SUBSEQUENT HOSPITAL CARE,LEVL III: ICD-10-PCS | Mod: HCNC,GC,, | Performed by: PSYCHIATRY & NEUROLOGY

## 2020-02-20 PROCEDURE — 84100 ASSAY OF PHOSPHORUS: CPT | Mod: HCNC

## 2020-02-20 PROCEDURE — 36600 WITHDRAWAL OF ARTERIAL BLOOD: CPT | Mod: HCNC

## 2020-02-20 PROCEDURE — 25000242 PHARM REV CODE 250 ALT 637 W/ HCPCS: Mod: HCNC | Performed by: NURSE PRACTITIONER

## 2020-02-20 PROCEDURE — S0028 INJECTION, FAMOTIDINE, 20 MG: HCPCS | Mod: HCNC | Performed by: PHYSICIAN ASSISTANT

## 2020-02-20 PROCEDURE — 94003 VENT MGMT INPAT SUBQ DAY: CPT | Mod: HCNC

## 2020-02-20 PROCEDURE — 27000221 HC OXYGEN, UP TO 24 HOURS: Mod: HCNC

## 2020-02-20 PROCEDURE — 82803 BLOOD GASES ANY COMBINATION: CPT | Mod: HCNC

## 2020-02-20 PROCEDURE — 25000003 PHARM REV CODE 250: Mod: HCNC | Performed by: PSYCHIATRY & NEUROLOGY

## 2020-02-20 PROCEDURE — 94640 AIRWAY INHALATION TREATMENT: CPT | Mod: HCNC

## 2020-02-20 PROCEDURE — 27200966 HC CLOSED SUCTION SYSTEM: Mod: HCNC

## 2020-02-20 PROCEDURE — 99233 SBSQ HOSP IP/OBS HIGH 50: CPT | Mod: HCNC,GC,, | Performed by: PSYCHIATRY & NEUROLOGY

## 2020-02-20 PROCEDURE — 99900035 HC TECH TIME PER 15 MIN (STAT): Mod: HCNC

## 2020-02-20 PROCEDURE — 31600 PR TRACHEOSTOMY, PLANNED: ICD-10-PCS | Mod: 59,HCNC,, | Performed by: SURGERY

## 2020-02-20 PROCEDURE — 63600175 PHARM REV CODE 636 W HCPCS: Mod: HCNC | Performed by: STUDENT IN AN ORGANIZED HEALTH CARE EDUCATION/TRAINING PROGRAM

## 2020-02-20 PROCEDURE — 85730 THROMBOPLASTIN TIME PARTIAL: CPT | Mod: HCNC

## 2020-02-20 RX ORDER — DEXMEDETOMIDINE HYDROCHLORIDE 4 UG/ML
0.2 INJECTION, SOLUTION INTRAVENOUS CONTINUOUS
Status: DISCONTINUED | OUTPATIENT
Start: 2020-02-20 | End: 2020-02-22

## 2020-02-20 RX ADMIN — FAMOTIDINE 20 MG: 10 INJECTION, SOLUTION INTRAVENOUS at 08:02

## 2020-02-20 RX ADMIN — SODIUM CHLORIDE 30 MG/ML INHALATION SOLUTION 4 ML: 30 SOLUTION INHALANT at 11:02

## 2020-02-20 RX ADMIN — AMANTADINE HYDROCHLORIDE 100 MG: 50 SOLUTION ORAL at 05:02

## 2020-02-20 RX ADMIN — CHLORHEXIDINE GLUCONATE 0.12% ORAL RINSE 15 ML: 1.2 LIQUID ORAL at 08:02

## 2020-02-20 RX ADMIN — IPRATROPIUM BROMIDE AND ALBUTEROL SULFATE 3 ML: .5; 3 SOLUTION RESPIRATORY (INHALATION) at 07:02

## 2020-02-20 RX ADMIN — DEXMEDETOMIDINE HYDROCHLORIDE 0.6 MCG/KG/HR: 100 INJECTION, SOLUTION, CONCENTRATE INTRAVENOUS at 05:02

## 2020-02-20 RX ADMIN — LEVETIRACETAM 1500 MG: 1500 INJECTION, SOLUTION INTRAVENOUS at 08:02

## 2020-02-20 RX ADMIN — ATORVASTATIN CALCIUM 40 MG: 20 TABLET, FILM COATED ORAL at 08:02

## 2020-02-20 RX ADMIN — IPRATROPIUM BROMIDE AND ALBUTEROL SULFATE 3 ML: .5; 3 SOLUTION RESPIRATORY (INHALATION) at 03:02

## 2020-02-20 RX ADMIN — SODIUM CHLORIDE 30 MG/ML INHALATION SOLUTION 4 ML: 30 SOLUTION INHALANT at 03:02

## 2020-02-20 RX ADMIN — DEXMEDETOMIDINE HYDROCHLORIDE 0.2 MCG/KG/HR: 100 INJECTION, SOLUTION, CONCENTRATE INTRAVENOUS at 09:02

## 2020-02-20 RX ADMIN — MICONAZOLE NITRATE: 20 OINTMENT TOPICAL at 08:02

## 2020-02-20 RX ADMIN — IPRATROPIUM BROMIDE AND ALBUTEROL SULFATE 3 ML: .5; 3 SOLUTION RESPIRATORY (INHALATION) at 11:02

## 2020-02-20 RX ADMIN — AMANTADINE HYDROCHLORIDE 100 MG: 50 SOLUTION ORAL at 03:02

## 2020-02-20 RX ADMIN — SODIUM CHLORIDE 30 MG/ML INHALATION SOLUTION 4 ML: 30 SOLUTION INHALANT at 07:02

## 2020-02-20 RX ADMIN — MICONAZOLE NITRATE: 20 OINTMENT TOPICAL at 09:02

## 2020-02-20 RX ADMIN — HEPARIN SODIUM 12 UNITS/KG/HR: 10000 INJECTION, SOLUTION INTRAVENOUS at 03:02

## 2020-02-20 RX ADMIN — ESCITALOPRAM OXALATE 20 MG: 10 TABLET ORAL at 08:02

## 2020-02-20 NOTE — PLAN OF CARE
Patient intubated on vent.  Not medically stable for discharge.    Per General Surgery: - Will hold on trach until patient's vent settings can be weaned to 40/5    Not medically ready for discharge.        02/20/20 1720   Discharge Reassessment   Assessment Type Discharge Planning Reassessment   Provided patient/caregiver education on the expected discharge date and the discharge plan No   Do you have any problems affording any of your prescribed medications? No   Discharge Plan A Long-term acute care facility (LTAC)   Discharge Plan B Skilled Nursing Facility   DME Needed Upon Discharge  other (see comments)  (tbd)   Patient choice form signed by patient/caregiver N/A   Anticipated Discharge Disposition LTAC   Can the patient/caregiver answer the patient profile reliably? No, cognitively impaired   How does the patient rate their overall health at the present time? Poor   Describe the patient's ability to walk at the present time. Does not walk or unable to take any steps at all   How often would a person be available to care for the patient? Whenever needed   Number of comorbid conditions (as recorded on the chart) Three   Post-Acute Status   Post-Acute Authorization Placement   Post-Acute Placement Status Awaiting Internal Medical Clearance   Discharge Delays None known at this time     Stefanie Ponce RN, CCRN-K, [facility]  Neuro-Critical Care   [phone]

## 2020-02-20 NOTE — PLAN OF CARE
POC reviewed with pt and family at 1500. Pt's family verbalized understanding. Questions and concerns addressed. No acute events today. Pt progressing toward goals. Heparin gtt and TF restarted. ENT consulted for trach placement. Will continue to monitor. See flowsheets for full assessment and VS info.

## 2020-02-20 NOTE — ASSESSMENT & PLAN NOTE
Edu Mchughtea is a 64 y.o. male w/ hx of PE and seizures in Neuro ICU after a fall.  General surgery consulted for Trach/PEG.    -Pt seems unable to wean from vent due to neuro status  - Will hold on trach until patient's vent settings can be weaned to 40/5  - Please call when vent settings more appropriate and stable for 24 hours

## 2020-02-20 NOTE — PLAN OF CARE
POC reviewed with pt and family at 1700. Pts spouse verbalized understanding. Questions and concerns addressed. No acute events today. Plan for TREG tomorrow AM. Heparin gtt restarted per orders. Will continue to monitor. See flowsheets for full assessment and VS info.

## 2020-02-20 NOTE — PROGRESS NOTES
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 He was transferred to [facility] [place] for higher level of care. He was given 10 mg Versed during EMS ride to outside facility for continued seizure activity. He was unresponsive upon arrival to ED and subsequently intubated. At outside ED, he was noted to have left lung collapse and maxillofacial fractures. Upon arrival here, repeat CT C/A/P was order, demonstrating right colonic wall thickening and portal venous gas. He arrived intubated and sedated.  We were initially were consulted for right colonic wall thickening seen on CT, but signed off a few days later because exam was not concerning, white count normalized, and pt was having bowel function.  We are reconsulted for Trach/PEG.  Pt has not been able to wean off vent.  Has been on spontaneous for about five days, but have been unable to extubate due to neuro status.  Per pt's nurse, his neuro status changes frequently and sometimes he will follow commands, but minutes later will be almost unarousable.  He is off all sedation.  Currently he is on PS of 8 and a PEEP of 8 on Spontaneous mode.    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  No prior neck surgeries.      Post-Op Info:  Procedure(s) (LRB):  CREATION, TRACHEOSTOMY (N/A)  EGD, WITH PEG TUBE INSERTION (N/A)   Day of Surgery     Interval History: Desatted overnight requiring increase in PEEP to 10. Satting 96% this morning.    Medications:  Continuous Infusions:   dexmedetomidine (PRECEDEX) infusion 0.4 mcg/kg/hr (02/20/20 0605)    heparin (porcine) in D5W Stopped (02/20/20 0205)     Scheduled Meds:   albuterol-ipratropium  3 mL Nebulization Q4H    amantadine HCL  100 mg Per NG tube BID    atorvastatin  40 mg  Per NG tube QHS    chlorhexidine  15 mL Mouth/Throat BID    escitalopram oxalate  20 mg Per NG tube Daily    famotidine (PF)  20 mg Intravenous BID    levetiracetam IVPB  1,500 mg Intravenous Q12H    miconazole nitrate 2%   Topical (Top) BID    sodium chloride 3%  4 mL Nebulization Q4H     PRN Meds:acetaminophen, Dextrose 10% Bolus, fentaNYL, glucagon (human recombinant), insulin aspart U-100, labetalol, magnesium oxide, magnesium oxide, ondansetron, potassium chloride 10%, potassium chloride 10%, potassium chloride 10%, potassium, sodium phosphates, potassium, sodium phosphates, potassium, sodium phosphates, sodium chloride 0.9%     Review of patient's allergies indicates:   Allergen Reactions    No known drug allergies      Objective:     Vital Signs (Most Recent):  Temp: 98.7 °F (37.1 °C) (02/20/20 0305)  Pulse: 74 (02/20/20 0605)  Resp: 18 (02/20/20 0605)  BP: (!) 115/59 (02/20/20 0605)  SpO2: (!) 94 % (02/20/20 0605) Vital Signs (24h Range):  Temp:  [98.7 °F (37.1 °C)-100.1 °F (37.8 °C)] 98.7 °F (37.1 °C)  Pulse:  [] 74  Resp:  [0-28] 18  SpO2:  [91 %-100 %] 94 %  BP: ()/(53-81) 115/59     Weight: 68.5 kg (151 lb)  Body mass index is 25.92 kg/m².    Intake/Output - Last 3 Shifts       02/18 0700 - 02/19 0659 02/19 0700 - 02/20 0659 02/20 0700 - 02/21 0659    I.V. (mL/kg) 362 (5.3) 282.1 (4.1)     NG/GT 1265 660     IV Piggyback 300 200     Total Intake(mL/kg) 1927 (28.1) 1142.1 (16.7)     Urine (mL/kg/hr) 2220 (1.4) 1595 (1)     Other       Stool 850 250     Total Output 3070 1845     Net -1143 -702.9            Stool Occurrence 3 x 3 x           Physical Exam   Constitutional: He appears well-developed and well-nourished. No distress.   Cardiovascular: Normal rate and regular rhythm.   Pulmonary/Chest:   Vent Mode: A/C  Oxygen Concentration (%):  (40-45) 45  Resp Rate Total:  (15 br/min-24 br/min) 19 br/min  Vt Set:  (470 mL) 470 mL  PEEP/CPAP:  (6 cmH20-8 cmH20) 8 cmH20  Mean Airway  Pressure:  (9.1 pxF68-44 cmH20) 11 cmH20       Abdominal: Soft. He exhibits no distension. There is no tenderness.       Significant Labs:  CBC:   Recent Labs   Lab 02/20/20  0159   WBC 9.21   RBC 2.65*   HGB 8.7*   HCT 26.9*      *   MCH 32.8*   MCHC 32.3     CMP:   Recent Labs   Lab 02/20/20  0159   GLU 92   CALCIUM 8.6*   ALBUMIN 3.1*   PROT 6.4      K 4.1   CO2 26      BUN 18   CREATININE 0.8   ALKPHOS 61   ALT 18   AST 24   BILITOT 0.5       Significant Diagnostics:      Assessment/Plan:     Aspiration pneumonitis  Edu Choi is a 64 y.o. male w/ hx of PE and seizures in Neuro ICU after a fall.  General surgery consulted for Trach/PEG.    -Pt seems unable to wean from vent due to neuro status  - Will hold on trach until patient's vent settings can be weaned to 40/5  - Please call when vent settings more appropriate and stable for 24 hours            Jill Joseph MD  General Surgery  [facility]

## 2020-02-20 NOTE — SUBJECTIVE & OBJECTIVE
Interval History: Desatted overnight requiring increase in PEEP to 10. Satting 96% this morning.    Medications:  Continuous Infusions:   dexmedetomidine (PRECEDEX) infusion 0.4 mcg/kg/hr (02/20/20 0605)    heparin (porcine) in D5W Stopped (02/20/20 0205)     Scheduled Meds:   albuterol-ipratropium  3 mL Nebulization Q4H    amantadine HCL  100 mg Per NG tube BID    atorvastatin  40 mg Per NG tube QHS    chlorhexidine  15 mL Mouth/Throat BID    escitalopram oxalate  20 mg Per NG tube Daily    famotidine (PF)  20 mg Intravenous BID    levetiracetam IVPB  1,500 mg Intravenous Q12H    miconazole nitrate 2%   Topical (Top) BID    sodium chloride 3%  4 mL Nebulization Q4H     PRN Meds:acetaminophen, Dextrose 10% Bolus, fentaNYL, glucagon (human recombinant), insulin aspart U-100, labetalol, magnesium oxide, magnesium oxide, ondansetron, potassium chloride 10%, potassium chloride 10%, potassium chloride 10%, potassium, sodium phosphates, potassium, sodium phosphates, potassium, sodium phosphates, sodium chloride 0.9%     Review of patient's allergies indicates:   Allergen Reactions    No known drug allergies      Objective:     Vital Signs (Most Recent):  Temp: 98.7 °F (37.1 °C) (02/20/20 0305)  Pulse: 74 (02/20/20 0605)  Resp: 18 (02/20/20 0605)  BP: (!) 115/59 (02/20/20 0605)  SpO2: (!) 94 % (02/20/20 0605) Vital Signs (24h Range):  Temp:  [98.7 °F (37.1 °C)-100.1 °F (37.8 °C)] 98.7 °F (37.1 °C)  Pulse:  [] 74  Resp:  [0-28] 18  SpO2:  [91 %-100 %] 94 %  BP: ()/(53-81) 115/59     Weight: 68.5 kg (151 lb)  Body mass index is 25.92 kg/m².    Intake/Output - Last 3 Shifts       02/18 0700 - 02/19 0659 02/19 0700 - 02/20 0659 02/20 0700 - 02/21 0659    I.V. (mL/kg) 362 (5.3) 282.1 (4.1)     NG/GT 1265 660     IV Piggyback 300 200     Total Intake(mL/kg) 1927 (28.1) 1142.1 (16.7)     Urine (mL/kg/hr) 2220 (1.4) 1595 (1)     Other       Stool 850 250     Total Output 3070 1295     Net -1143 -702.9             Stool Occurrence 3 x 3 x           Physical Exam   Constitutional: He appears well-developed and well-nourished. No distress.   Cardiovascular: Normal rate and regular rhythm.   Pulmonary/Chest:   Vent Mode: A/C  Oxygen Concentration (%):  (40-45) 45  Resp Rate Total:  (15 br/min-24 br/min) 19 br/min  Vt Set:  (470 mL) 470 mL  PEEP/CPAP:  (6 cmH20-8 cmH20) 8 cmH20  Mean Airway Pressure:  (9.1 vmR64-34 cmH20) 11 cmH20       Abdominal: Soft. He exhibits no distension. There is no tenderness.       Significant Labs:  CBC:   Recent Labs   Lab 02/20/20  0159   WBC 9.21   RBC 2.65*   HGB 8.7*   HCT 26.9*      *   MCH 32.8*   MCHC 32.3     CMP:   Recent Labs   Lab 02/20/20  0159   GLU 92   CALCIUM 8.6*   ALBUMIN 3.1*   PROT 6.4      K 4.1   CO2 26      BUN 18   CREATININE 0.8   ALKPHOS 61   ALT 18   AST 24   BILITOT 0.5       Significant Diagnostics:

## 2020-02-20 NOTE — PROGRESS NOTES
[facility]  Neurocritical Care  Progress Note    Admit Date: 2/2/2020  Service Date: 02/20/2020  Length of Stay: 18    Subjective:     Chief Complaint: Seizure    History of Present Illness: Pt is a 64 y.o. Male with PMHx of of DVT, PE, DM, and seizures (on 1500 keppra at home) who presents to the ED via EMS with complaint of a seizure that occurred this morning. Pt has long standing history of seizures and was found down by wife on cement around 0800, LKN 0700. Patient compliant with keppra per wife and last seizure was in November. At that time his keppra dose was increased. Patient has had cough recently but no other symptoms. Patient is on AC for DVT/PE and was recently switched from Coumadin to Eliquis. He was given 10 mg Versed during EMS ride to outside facility for continued seizure activity. He was unresponsive upon arrival to ED and subsequently intubated. CTH without any acute changes, CT max/face with multiple facial fractures, and CXR with consolidation and atelectasis/collapsed lung. Patient transferred to [facility] to higher level neurologic monitoring and continuous EEG.     Hospital Course: 2/2/2020: Admit to [facility]. GS and ENT consulted. MX facial fractures and concerns of bowel ischemia on CT  2/3 will rehook to EEG for 24h. Remains NPO. US LE pending. Monitor lactic [id number] 2L  IV. Wean levophed as tolerated after bolus  2/4: remains on small amount of pressors, cultures remain negative, cont abx, cont current AEDs, EEG overnight negative for seizure activity, repeat ct abdomen today  2/5: minimal dose pressors, advance TFs. Tolerating SBT, possible trial of extubation in am  2/6: need for pressors will likely cease when able to come off sedation, currently still requires mechanical ventilation with pO2 value of 60 on 40% fiO2 and ps 10  02/08/2020 exam still poor, getting MRI brain   02/09/2020 Not tolerating CPAP, placed on AC   02/10/2020 NAEO  02/11/2020 NAEO  02/12/2020 CPAP  trial this am   02/13/2020 NAEON. On heparin drip with stable exam.   02/14/2020 NAEON, f/u EEG (on Keppra). Will give modafinil trial today.   02/15/2020: Stable overnight. Little improvement with modafinil. Heparin gtt stopped for LP procedure. Meningitic coverage started.  02/16/2020: LP performed overnight. Heparin gtt restarted. Antibiotics stopped. Continue acyclovir. Patient more awake today. Increased free water flushes. Started amantadine.  02/17/2020 NAEO. Planning a family meeting to discuss trach/peg   02/18/2020 family still discussing trach/peg. Required increased PEEP overnight   02/19/2020 vent requirements stable, awaiting trach/PEG  02/20/2020  Trach/PEG pending  ENT consulted due to vent requirements  Weaning vent as tolerated, now down to PEEP of 8 and 45%  Neuro exam waxing and waning but stable    Interval History:  naeon    Review of Systems   Unable to perform ROS: Mental status change    Unable to obtain a complete ROS due to level of consciousness.  Objective:     Vitals:  Temp: 98.6 °F (37 °C)  Pulse: 68  Rhythm: normal sinus rhythm  BP: (!) 124/59  MAP (mmHg): 83  Resp: 15  SpO2: 100 %  Oxygen Concentration (%): 45  O2 Device (Oxygen Therapy): ventilator  Vent Mode: A/C  Set Rate: 15 BPM  Vt Set: 470 mL  PEEP/CPAP: 5 cmH20  Peak Airway Pressure: 21 cmH2O  Mean Airway Pressure: 8.6 cmH20  Plateau Pressure: 25 cmH20    Temp  Min: 98.3 °F (36.8 °C)  Max: 100 °F (37.8 °C)  Pulse  Min: 63  Max: 114  BP  Min: 97/53  Max: 147/63  MAP (mmHg)  Min: 69  Max: 91  Resp  Min: 13  Max: 28  SpO2  Min: 91 %  Max: 100 %  Oxygen Concentration (%)  Min: 40  Max: 45    02/19 0701 - 02/20 0700  In: 1142.1 [I.V.:282.1]  Out: 1845 [Urine:1595]   Unmeasured Output  Urine Occurrence: 1  Stool Occurrence: 1  Pad Count: 2       Physical Exam    Constitutional: No apparent distress.   Eyes: Conjunctiva clear, anicteric. Lids no lesions. Small abrasion under left eye   Head/Ears/Nose/Mouth/Throat/Neck: Moist mucous  membranes. External ears, nose atraumatic.   Cardiovascular: Regular rhythm. ESM  Respiratory: clear  Gastrointestinal: No hernia. Soft, nondistended, nontender. + bowel sounds.      Neurologic Examination:    -Mental Status: Open eyes to voice. Doesn't follow commands   -Cranial nerves: Pupils equal, round, and reactive bilateral. Extraocular movements intact with VOR. Intact corneal reflexes bilateral, intact cough reflex -Motor: Moves all ext spon     Medications:  Continuous    dexmedetomidine (PRECEDEX) infusion Last Rate: 0.4 mcg/kg/hr (02/20/20 1605)   heparin (porcine) in D5W Last Rate: 12 Units/kg/hr (02/20/20 1605)   Scheduled    albuterol-ipratropium 3 mL Q4H   amantadine  mg BID   atorvastatin 40 mg QHS   chlorhexidine 15 mL BID   escitalopram oxalate 20 mg Daily   famotidine (PF) 20 mg BID   levetiracetam IVPB 1,500 mg Q12H   miconazole nitrate 2%  BID   sodium chloride 3% 4 mL Q4H   PRN    acetaminophen 650 mg Q6H PRN   Dextrose 10% Bolus 12.5 g PRN   fentaNYL 50 mcg Q2H PRN   glucagon (human recombinant) 1 mg PRN   insulin aspart U-100 1-10 Units Q6H PRN   labetalol 10 mg Q4H PRN   magnesium oxide 800 mg PRN   magnesium oxide 800 mg PRN   ondansetron 4 mg Q8H PRN   potassium chloride 10% 40 mEq PRN   potassium chloride 10% 40 mEq PRN   potassium chloride 10% 60 mEq PRN   potassium, sodium phosphates 2 packet PRN   potassium, sodium phosphates 2 packet PRN   potassium, sodium phosphates 2 packet PRN   sodium chloride 0.9% 10 mL PRN     Today I personally reviewed pertinent medications, lines/drains/airways, imaging, cardiology results, laboratory results, microbiology results, notably:    Diet  Diet NPO  Diet NPO        Assessment/Plan:     Neuro  * Seizure  Continue keppra     Acute encephalopathy  Multifactorial    Trach/PEG pending  ENT consulted due to vent requirements  Weaning vent as tolerated, now down to PEEP of 8 and 45%  Neuro exam waxing and waning but stable    Altered mental  status  Multifactorial          Stroke  - Incidentally seen on MRI w small scattered areas of subacute/remote infarcts. Cont current secondary prevention measures.   - CTA head and neck with no LVO  - Continue heparin gtt and statin    Psychiatric  Depression with anxiety  Continue escitalopram 20mg daily    Derm  Alteration in skin integrity  Wound care     Pulmonary  Acute respiratory failure with hypoxia  Unable to wean off the vent  Awaiting trach/PEG      Hematology  History of DVT (deep vein thrombosis)   heparin gtt       Orthopedic  Multiple closed fractures of facial bone  ENT consulted            The patient is being Prophylaxed for:  Venous Thromboembolism with: Mechanical or Chemical  Stress Ulcer with: H2B  Ventilator Pneumonia with: chlorhexidine oral care    Activity Orders          Diet NPO: NPO starting at 02/20 0001        Full Code    Pascual Ch MD  Neurocritical Care  [facility]    Level 3 of hospital care time was spent personally by me on the following activities: development of treatment plan with patient or surrogate and bedside caregivers, discussions with consultants, evaluation of patient's response to treatment, examination of patient, ordering and performing treatments and interventions, ordering and review of laboratory studies, ordering and review of radiographic studies, pulse oximetry, antibiotic titration if applicable, vasopressor titration if applicable, re-evaluation of patient's condition. This care time did not overlap with that of any other provider or involve time for any procedures.

## 2020-02-20 NOTE — SUBJECTIVE & OBJECTIVE
Interval History:  naeon    Review of Systems   Unable to perform ROS: Mental status change    Unable to obtain a complete ROS due to level of consciousness.  Objective:     Vitals:  Temp: 98.6 °F (37 °C)  Pulse: 68  Rhythm: normal sinus rhythm  BP: (!) 124/59  MAP (mmHg): 83  Resp: 15  SpO2: 100 %  Oxygen Concentration (%): 45  O2 Device (Oxygen Therapy): ventilator  Vent Mode: A/C  Set Rate: 15 BPM  Vt Set: 470 mL  PEEP/CPAP: 5 cmH20  Peak Airway Pressure: 21 cmH2O  Mean Airway Pressure: 8.6 cmH20  Plateau Pressure: 25 cmH20    Temp  Min: 98.3 °F (36.8 °C)  Max: 100 °F (37.8 °C)  Pulse  Min: 63  Max: 114  BP  Min: 97/53  Max: 147/63  MAP (mmHg)  Min: 69  Max: 91  Resp  Min: 13  Max: 28  SpO2  Min: 91 %  Max: 100 %  Oxygen Concentration (%)  Min: 40  Max: 45    02/19 0701 - 02/20 0700  In: 1142.1 [I.V.:282.1]  Out: 1845 [Urine:1595]   Unmeasured Output  Urine Occurrence: 1  Stool Occurrence: 1  Pad Count: 2       Physical Exam    Constitutional: No apparent distress.   Eyes: Conjunctiva clear, anicteric. Lids no lesions. Small abrasion under left eye   Head/Ears/Nose/Mouth/Throat/Neck: Moist mucous membranes. External ears, nose atraumatic.   Cardiovascular: Regular rhythm. ESM  Respiratory: clear  Gastrointestinal: No hernia. Soft, nondistended, nontender. + bowel sounds.      Neurologic Examination:    -Mental Status: Open eyes to voice. Doesn't follow commands   -Cranial nerves: Pupils equal, round, and reactive bilateral. Extraocular movements intact with VOR. Intact corneal reflexes bilateral, intact cough reflex -Motor: Moves all ext spon     Medications:  Continuous    dexmedetomidine (PRECEDEX) infusion Last Rate: 0.4 mcg/kg/hr (02/20/20 1605)   heparin (porcine) in D5W Last Rate: 12 Units/kg/hr (02/20/20 1605)   Scheduled    albuterol-ipratropium 3 mL Q4H   amantadine  mg BID   atorvastatin 40 mg QHS   chlorhexidine 15 mL BID   escitalopram oxalate 20 mg Daily   famotidine (PF) 20 mg BID    levetiracetam IVPB 1,500 mg Q12H   miconazole nitrate 2%  BID   sodium chloride 3% 4 mL Q4H   PRN    acetaminophen 650 mg Q6H PRN   Dextrose 10% Bolus 12.5 g PRN   fentaNYL 50 mcg Q2H PRN   glucagon (human recombinant) 1 mg PRN   insulin aspart U-100 1-10 Units Q6H PRN   labetalol 10 mg Q4H PRN   magnesium oxide 800 mg PRN   magnesium oxide 800 mg PRN   ondansetron 4 mg Q8H PRN   potassium chloride 10% 40 mEq PRN   potassium chloride 10% 40 mEq PRN   potassium chloride 10% 60 mEq PRN   potassium, sodium phosphates 2 packet PRN   potassium, sodium phosphates 2 packet PRN   potassium, sodium phosphates 2 packet PRN   sodium chloride 0.9% 10 mL PRN     Today I personally reviewed pertinent medications, lines/drains/airways, imaging, cardiology results, laboratory results, microbiology results, notably:    Diet  Diet NPO  Diet NPO

## 2020-02-20 NOTE — ASSESSMENT & PLAN NOTE
Multifactorial    Trach/PEG pending  ENT consulted due to vent requirements  Weaning vent as tolerated, now down to PEEP of 8 and 45%  Neuro exam waxing and waning but stable

## 2020-02-20 NOTE — PLAN OF CARE
Trach/PEG pending  ENT consulted due to vent requirements  Weaning vent as tolerated, now down to PEEP of 8 and 45%  Neuro exam waxing and waning but stable

## 2020-02-21 LAB
ALBUMIN SERPL BCP-MCNC: 3.1 G/DL (ref 3.5–5.2)
ALLENS TEST: ABNORMAL
ALP SERPL-CCNC: 69 U/L (ref 55–135)
ALT SERPL W/O P-5'-P-CCNC: 20 U/L (ref 10–44)
ANION GAP SERPL CALC-SCNC: 12 MMOL/L (ref 8–16)
APTT BLDCRRT: 30.5 SEC (ref 21–32)
APTT BLDCRRT: 37.5 SEC (ref 21–32)
APTT BLDCRRT: 38 SEC (ref 21–32)
AST SERPL-CCNC: 34 U/L (ref 10–40)
BACTERIA CSF CULT: NO GROWTH
BASOPHILS # BLD AUTO: 0.04 K/UL (ref 0–0.2)
BASOPHILS NFR BLD: 0.5 % (ref 0–1.9)
BILIRUB SERPL-MCNC: 0.5 MG/DL (ref 0.1–1)
BUN SERPL-MCNC: 17 MG/DL (ref 8–23)
CALCIUM SERPL-MCNC: 8.5 MG/DL (ref 8.7–10.5)
CHLORIDE SERPL-SCNC: 106 MMOL/L (ref 95–110)
CO2 SERPL-SCNC: 22 MMOL/L (ref 23–29)
CREAT SERPL-MCNC: 0.8 MG/DL (ref 0.5–1.4)
DELSYS: ABNORMAL
DIFFERENTIAL METHOD: ABNORMAL
EOSINOPHIL # BLD AUTO: 0.2 K/UL (ref 0–0.5)
EOSINOPHIL NFR BLD: 2.5 % (ref 0–8)
ERYTHROCYTE [DISTWIDTH] IN BLOOD BY AUTOMATED COUNT: 15.9 % (ref 11.5–14.5)
ERYTHROCYTE [SEDIMENTATION RATE] IN BLOOD BY WESTERGREN METHOD: 15 MM/H
EST. GFR  (AFRICAN AMERICAN): >60 ML/MIN/1.73 M^2
EST. GFR  (NON AFRICAN AMERICAN): >60 ML/MIN/1.73 M^2
FIO2: 40
GLUCOSE SERPL-MCNC: 95 MG/DL (ref 70–110)
GRAM STN SPEC: NORMAL
HCO3 UR-SCNC: 22.6 MMOL/L (ref 24–28)
HCT VFR BLD AUTO: 29.6 % (ref 40–54)
HGB BLD-MCNC: 9.2 G/DL (ref 14–18)
IMM GRANULOCYTES # BLD AUTO: 0.03 K/UL (ref 0–0.04)
IMM GRANULOCYTES NFR BLD AUTO: 0.4 % (ref 0–0.5)
LYMPHOCYTES # BLD AUTO: 1.1 K/UL (ref 1–4.8)
LYMPHOCYTES NFR BLD: 13.5 % (ref 18–48)
MAGNESIUM SERPL-MCNC: 2.2 MG/DL (ref 1.6–2.6)
MCH RBC QN AUTO: 31.6 PG (ref 27–31)
MCHC RBC AUTO-ENTMCNC: 31.1 G/DL (ref 32–36)
MCV RBC AUTO: 102 FL (ref 82–98)
MIN VOL: 7.22
MODE: ABNORMAL
MONOCYTES # BLD AUTO: 0.9 K/UL (ref 0.3–1)
MONOCYTES NFR BLD: 11.5 % (ref 4–15)
NEUTROPHILS # BLD AUTO: 5.8 K/UL (ref 1.8–7.7)
NEUTROPHILS NFR BLD: 71.6 % (ref 38–73)
NRBC BLD-RTO: 0 /100 WBC
PCO2 BLDA: 33.5 MMHG (ref 35–45)
PEEP: 5
PH SMN: 7.44 [PH] (ref 7.35–7.45)
PHOSPHATE SERPL-MCNC: 3.4 MG/DL (ref 2.7–4.5)
PIP: 20
PLATELET # BLD AUTO: 267 K/UL (ref 150–350)
PMV BLD AUTO: 13.7 FL (ref 9.2–12.9)
PO2 BLDA: 83 MMHG (ref 80–100)
POC BE: -2 MMOL/L
POC SATURATED O2: 97 % (ref 95–100)
POC TCO2: 24 MMOL/L (ref 23–27)
POCT GLUCOSE: 106 MG/DL (ref 70–110)
POCT GLUCOSE: 106 MG/DL (ref 70–110)
POTASSIUM SERPL-SCNC: 4.6 MMOL/L (ref 3.5–5.1)
PROT SERPL-MCNC: 6.9 G/DL (ref 6–8.4)
RBC # BLD AUTO: 2.91 M/UL (ref 4.6–6.2)
SAMPLE: ABNORMAL
SITE: ABNORMAL
SODIUM SERPL-SCNC: 140 MMOL/L (ref 136–145)
SP02: 100
VT: 470
WBC # BLD AUTO: 8.02 K/UL (ref 3.9–12.7)

## 2020-02-21 PROCEDURE — 63600175 PHARM REV CODE 636 W HCPCS: Mod: HCNC | Performed by: NURSE PRACTITIONER

## 2020-02-21 PROCEDURE — 99233 PR SUBSEQUENT HOSPITAL CARE,LEVL III: ICD-10-PCS | Mod: HCNC,,, | Performed by: NURSE PRACTITIONER

## 2020-02-21 PROCEDURE — 99233 SBSQ HOSP IP/OBS HIGH 50: CPT | Mod: HCNC,,, | Performed by: NURSE PRACTITIONER

## 2020-02-21 PROCEDURE — 80053 COMPREHEN METABOLIC PANEL: CPT | Mod: HCNC

## 2020-02-21 PROCEDURE — 63600175 PHARM REV CODE 636 W HCPCS: Mod: HCNC | Performed by: STUDENT IN AN ORGANIZED HEALTH CARE EDUCATION/TRAINING PROGRAM

## 2020-02-21 PROCEDURE — 27200966 HC CLOSED SUCTION SYSTEM: Mod: HCNC

## 2020-02-21 PROCEDURE — 82800 BLOOD PH: CPT | Mod: HCNC

## 2020-02-21 PROCEDURE — 85730 THROMBOPLASTIN TIME PARTIAL: CPT | Mod: 91,HCNC

## 2020-02-21 PROCEDURE — 84100 ASSAY OF PHOSPHORUS: CPT | Mod: HCNC

## 2020-02-21 PROCEDURE — 36600 WITHDRAWAL OF ARTERIAL BLOOD: CPT | Mod: HCNC

## 2020-02-21 PROCEDURE — 94761 N-INVAS EAR/PLS OXIMETRY MLT: CPT | Mod: HCNC

## 2020-02-21 PROCEDURE — 94010 BREATHING CAPACITY TEST: CPT | Mod: HCNC

## 2020-02-21 PROCEDURE — 27000221 HC OXYGEN, UP TO 24 HOURS: Mod: HCNC

## 2020-02-21 PROCEDURE — 94640 AIRWAY INHALATION TREATMENT: CPT | Mod: HCNC

## 2020-02-21 PROCEDURE — 99900026 HC AIRWAY MAINTENANCE (STAT): Mod: HCNC

## 2020-02-21 PROCEDURE — 82803 BLOOD GASES ANY COMBINATION: CPT | Mod: HCNC

## 2020-02-21 PROCEDURE — 25000003 PHARM REV CODE 250: Mod: HCNC | Performed by: NURSE PRACTITIONER

## 2020-02-21 PROCEDURE — 20000000 HC ICU ROOM: Mod: HCNC

## 2020-02-21 PROCEDURE — 25000242 PHARM REV CODE 250 ALT 637 W/ HCPCS: Mod: HCNC | Performed by: NURSE PRACTITIONER

## 2020-02-21 PROCEDURE — 25000003 PHARM REV CODE 250: Mod: HCNC | Performed by: PSYCHIATRY & NEUROLOGY

## 2020-02-21 PROCEDURE — 99900035 HC TECH TIME PER 15 MIN (STAT): Mod: HCNC

## 2020-02-21 PROCEDURE — 94150 VITAL CAPACITY TEST: CPT | Mod: HCNC

## 2020-02-21 PROCEDURE — 94003 VENT MGMT INPAT SUBQ DAY: CPT | Mod: HCNC

## 2020-02-21 PROCEDURE — 25000003 PHARM REV CODE 250: Mod: HCNC | Performed by: PHYSICIAN ASSISTANT

## 2020-02-21 PROCEDURE — S0028 INJECTION, FAMOTIDINE, 20 MG: HCPCS | Mod: HCNC | Performed by: PHYSICIAN ASSISTANT

## 2020-02-21 PROCEDURE — 85730 THROMBOPLASTIN TIME PARTIAL: CPT | Mod: HCNC

## 2020-02-21 PROCEDURE — 85025 COMPLETE CBC W/AUTO DIFF WBC: CPT | Mod: HCNC

## 2020-02-21 PROCEDURE — 83735 ASSAY OF MAGNESIUM: CPT | Mod: HCNC

## 2020-02-21 RX ADMIN — IPRATROPIUM BROMIDE AND ALBUTEROL SULFATE 3 ML: .5; 3 SOLUTION RESPIRATORY (INHALATION) at 11:02

## 2020-02-21 RX ADMIN — AMANTADINE HYDROCHLORIDE 100 MG: 50 SOLUTION ORAL at 01:02

## 2020-02-21 RX ADMIN — LEVETIRACETAM 1500 MG: 1500 INJECTION, SOLUTION INTRAVENOUS at 08:02

## 2020-02-21 RX ADMIN — IPRATROPIUM BROMIDE AND ALBUTEROL SULFATE 3 ML: .5; 3 SOLUTION RESPIRATORY (INHALATION) at 04:02

## 2020-02-21 RX ADMIN — SODIUM CHLORIDE 30 MG/ML INHALATION SOLUTION 4 ML: 30 SOLUTION INHALANT at 03:02

## 2020-02-21 RX ADMIN — ESCITALOPRAM OXALATE 20 MG: 10 TABLET ORAL at 08:02

## 2020-02-21 RX ADMIN — SODIUM CHLORIDE 30 MG/ML INHALATION SOLUTION 4 ML: 30 SOLUTION INHALANT at 11:02

## 2020-02-21 RX ADMIN — ACETAMINOPHEN 650 MG: 325 TABLET ORAL at 06:02

## 2020-02-21 RX ADMIN — FAMOTIDINE 20 MG: 10 INJECTION, SOLUTION INTRAVENOUS at 08:02

## 2020-02-21 RX ADMIN — IPRATROPIUM BROMIDE AND ALBUTEROL SULFATE 3 ML: .5; 3 SOLUTION RESPIRATORY (INHALATION) at 03:02

## 2020-02-21 RX ADMIN — SODIUM CHLORIDE 30 MG/ML INHALATION SOLUTION 4 ML: 30 SOLUTION INHALANT at 08:02

## 2020-02-21 RX ADMIN — MICONAZOLE NITRATE: 20 OINTMENT TOPICAL at 08:02

## 2020-02-21 RX ADMIN — ATORVASTATIN CALCIUM 40 MG: 20 TABLET, FILM COATED ORAL at 08:02

## 2020-02-21 RX ADMIN — HEPARIN SODIUM 17 UNITS/KG/HR: 10000 INJECTION, SOLUTION INTRAVENOUS at 06:02

## 2020-02-21 RX ADMIN — CHLORHEXIDINE GLUCONATE 0.12% ORAL RINSE 15 ML: 1.2 LIQUID ORAL at 08:02

## 2020-02-21 RX ADMIN — IPRATROPIUM BROMIDE AND ALBUTEROL SULFATE 3 ML: .5; 3 SOLUTION RESPIRATORY (INHALATION) at 08:02

## 2020-02-21 RX ADMIN — SODIUM CHLORIDE 30 MG/ML INHALATION SOLUTION 4 ML: 30 SOLUTION INHALANT at 04:02

## 2020-02-21 RX ADMIN — HEPARIN SODIUM 16 UNITS/KG/HR: 10000 INJECTION, SOLUTION INTRAVENOUS at 10:02

## 2020-02-21 RX ADMIN — MICONAZOLE NITRATE: 20 OINTMENT TOPICAL at 10:02

## 2020-02-21 NOTE — ASSESSMENT & PLAN NOTE
Agree with general surgery goal target vent settings of 40/5, may be able to proceed with slightly higher settings. Will discuss timing with staff.

## 2020-02-21 NOTE — SUBJECTIVE & OBJECTIVE
HPI        64M c h/o DVT/PE (on chronic anticoagulation, Eliquis currently), HLD, DM, seizures on keppra, and aortic atherosclerosis who presented to OSH after found down for about an hour yesterday morning. He was [place] [facility] [place].  He was unresponsive upon arrival to ED and subsequently intubated. At outside ED, he was noted to have left lung collapse and maxillofacial fractures. ENT consulted for evaluation of facial fractures.      Interval History:  On vent since 2/2, unable to wean. ENT called for tracheostomy evaluation    Medications:  Continuous Infusions:   dexmedetomidine (PRECEDEX) infusion 0.4 mcg/kg/hr (02/20/20 1805)    heparin (porcine) in D5W 12 Units/kg/hr (02/20/20 1805)     Scheduled Meds:   albuterol-ipratropium  3 mL Nebulization Q4H    amantadine HCL  100 mg Per NG tube BID    atorvastatin  40 mg Per NG tube QHS    chlorhexidine  15 mL Mouth/Throat BID    escitalopram oxalate  20 mg Per NG tube Daily    famotidine (PF)  20 mg Intravenous BID    levetiracetam IVPB  1,500 mg Intravenous Q12H    miconazole nitrate 2%   Topical (Top) BID    sodium chloride 3%  4 mL Nebulization Q4H     PRN Meds:acetaminophen, Dextrose 10% Bolus, fentaNYL, glucagon (human recombinant), insulin aspart U-100, labetalol, magnesium oxide, magnesium oxide, ondansetron, potassium chloride 10%, potassium chloride 10%, potassium chloride 10%, potassium, sodium phosphates, potassium, sodium phosphates, potassium, sodium phosphates, sodium chloride 0.9%     Review of patient's allergies indicates:   Allergen Reactions    No known drug allergies      Objective:     Vital Signs (24h Range):  Temp:  [98.3 °F (36.8 °C)-100 °F (37.8 °C)] 98.6 °F (37 °C)  Pulse:  [] 65  Resp:  [15-28] 15  SpO2:  [91 %-100 %] 98 %  BP: ()/(52-81) 119/56       Physical Exam  Sedated, intubated  Oral ett  Neck soft, trachea palpable, skin intact, non-erythematous  On ventilator    Vent Mode:  A/C  Oxygen Concentration (%):  [40-45] 45  Resp Rate Total:  [15 br/min-22 br/min] 15 br/min  Vt Set:  [470 mL] 470 mL  PEEP/CPAP:  [5 cmH20-10 cmH20] 5 cmH20  Mean Airway Pressure:  [8.6 cmH20-15 cmH20] 8.6 cmH20

## 2020-02-21 NOTE — PROGRESS NOTES
[facility]  Otorhinolaryngology-Head & Neck Surgery  Progress Note    Subjective:     Post-Op Info:  Procedure(s) (LRB):  CREATION, TRACHEOSTOMY (N/A)  EGD, WITH PEG TUBE INSERTION (N/A)   Day of Surgery  Hospital Day: 19     HPI        64M c h/o DVT/PE (on chronic anticoagulation, Eliquis currently), HLD, DM, seizures on keppra, and aortic atherosclerosis who presented to OSH after found down for about an hour yesterday morning. He was [place] to [facility] [place] level of care.  He was unresponsive upon arrival to ED and subsequently intubated. At outside ED, he was noted to have left lung collapse and maxillofacial fractures. ENT consulted for evaluation of facial fractures.      Interval History:  On vent since 2/2, unable to wean. ENT called for tracheostomy evaluation    Medications:  Continuous Infusions:   dexmedetomidine (PRECEDEX) infusion 0.4 mcg/kg/hr (02/20/20 1805)    heparin (porcine) in D5W 12 Units/kg/hr (02/20/20 1805)     Scheduled Meds:   albuterol-ipratropium  3 mL Nebulization Q4H    amantadine HCL  100 mg Per NG tube BID    atorvastatin  40 mg Per NG tube QHS    chlorhexidine  15 mL Mouth/Throat BID    escitalopram oxalate  20 mg Per NG tube Daily    famotidine (PF)  20 mg Intravenous BID    levetiracetam IVPB  1,500 mg Intravenous Q12H    miconazole nitrate 2%   Topical (Top) BID    sodium chloride 3%  4 mL Nebulization Q4H     PRN Meds:acetaminophen, Dextrose 10% Bolus, fentaNYL, glucagon (human recombinant), insulin aspart U-100, labetalol, magnesium oxide, magnesium oxide, ondansetron, potassium chloride 10%, potassium chloride 10%, potassium chloride 10%, potassium, sodium phosphates, potassium, sodium phosphates, potassium, sodium phosphates, sodium chloride 0.9%     Review of patient's allergies indicates:   Allergen Reactions    No known drug allergies      Objective:     Vital Signs (24h Range):  Temp:  [98.3 °F (36.8 °C)-100 °F (37.8 °C)] 98.6 °F (37  °C)  Pulse:  [] 65  Resp:  [15-28] 15  SpO2:  [91 %-100 %] 98 %  BP: ()/(52-81) 119/56       Physical Exam  Sedated, intubated  Oral ett  Neck soft, trachea palpable, skin intact, non-erythematous  On ventilator    Vent Mode: A/C  Oxygen Concentration (%):  [40-45] 45  Resp Rate Total:  [15 br/min-22 br/min] 15 br/min  Vt Set:  [470 mL] 470 mL  PEEP/CPAP:  [5 cmH20-10 cmH20] 5 cmH20  Mean Airway Pressure:  [8.6 cmH20-15 cmH20] 8.6 cmH20      Assessment/Plan:     Acute respiratory failure with hypoxia  Agree with general surgery goal target vent settings of 40/5, may be able to proceed with slightly higher settings. Will discuss timing with staff.           Yahir Landry, DO  Otorhinolaryngology-Head & Neck Surgery  [facility]  02/20/2020

## 2020-02-21 NOTE — PLAN OF CARE
POC reviewed with pt at 0400. Pt intubated unable to voice understanding. Questions and concerns addressed. No acute events overnight. Wrist restraints renewed for pulling lines. VSS. Pt progressing toward goals. Will continue to monitor. See flowsheets for full assessment and VS info

## 2020-02-22 LAB
ALBUMIN SERPL BCP-MCNC: 3.1 G/DL (ref 3.5–5.2)
ALLENS TEST: ABNORMAL
ALP SERPL-CCNC: 75 U/L (ref 55–135)
ALT SERPL W/O P-5'-P-CCNC: 19 U/L (ref 10–44)
AMMONIA PLAS-SCNC: 54 UMOL/L (ref 10–50)
ANION GAP SERPL CALC-SCNC: 10 MMOL/L (ref 8–16)
APTT BLDCRRT: 30.8 SEC (ref 21–32)
APTT BLDCRRT: 33.5 SEC (ref 21–32)
APTT BLDCRRT: 38.9 SEC (ref 21–32)
APTT BLDCRRT: 58 SEC (ref 21–32)
AST SERPL-CCNC: 25 U/L (ref 10–40)
BASOPHILS # BLD AUTO: 0.03 K/UL (ref 0–0.2)
BASOPHILS NFR BLD: 0.4 % (ref 0–1.9)
BILIRUB SERPL-MCNC: 0.4 MG/DL (ref 0.1–1)
BUN SERPL-MCNC: 12 MG/DL (ref 8–23)
CALCIUM SERPL-MCNC: 8.9 MG/DL (ref 8.7–10.5)
CHLORIDE SERPL-SCNC: 107 MMOL/L (ref 95–110)
CO2 SERPL-SCNC: 25 MMOL/L (ref 23–29)
CREAT SERPL-MCNC: 0.8 MG/DL (ref 0.5–1.4)
DELSYS: ABNORMAL
DIFFERENTIAL METHOD: ABNORMAL
EOSINOPHIL # BLD AUTO: 0.1 K/UL (ref 0–0.5)
EOSINOPHIL NFR BLD: 2.1 % (ref 0–8)
ERYTHROCYTE [DISTWIDTH] IN BLOOD BY AUTOMATED COUNT: 15.6 % (ref 11.5–14.5)
EST. GFR  (AFRICAN AMERICAN): >60 ML/MIN/1.73 M^2
EST. GFR  (NON AFRICAN AMERICAN): >60 ML/MIN/1.73 M^2
FIO2: 40
GLUCOSE SERPL-MCNC: 108 MG/DL (ref 70–110)
HCO3 UR-SCNC: 25.4 MMOL/L (ref 24–28)
HCT VFR BLD AUTO: 29.1 % (ref 40–54)
HGB BLD-MCNC: 9.5 G/DL (ref 14–18)
IMM GRANULOCYTES # BLD AUTO: 0.03 K/UL (ref 0–0.04)
IMM GRANULOCYTES NFR BLD AUTO: 0.4 % (ref 0–0.5)
LYMPHOCYTES # BLD AUTO: 1.3 K/UL (ref 1–4.8)
LYMPHOCYTES NFR BLD: 19.2 % (ref 18–48)
MAGNESIUM SERPL-MCNC: 2.1 MG/DL (ref 1.6–2.6)
MAP: 7.2
MCH RBC QN AUTO: 32.9 PG (ref 27–31)
MCHC RBC AUTO-ENTMCNC: 32.6 G/DL (ref 32–36)
MCV RBC AUTO: 101 FL (ref 82–98)
MIN VOL: 5.4
MODE: ABNORMAL
MONOCYTES # BLD AUTO: 0.9 K/UL (ref 0.3–1)
MONOCYTES NFR BLD: 13.6 % (ref 4–15)
NEUTROPHILS # BLD AUTO: 4.3 K/UL (ref 1.8–7.7)
NEUTROPHILS NFR BLD: 64.3 % (ref 38–73)
NRBC BLD-RTO: 0 /100 WBC
PCO2 BLDA: 39.5 MMHG (ref 35–45)
PEEP: 5
PH SMN: 7.42 [PH] (ref 7.35–7.45)
PHOSPHATE SERPL-MCNC: 3.7 MG/DL (ref 2.7–4.5)
PLATELET # BLD AUTO: 272 K/UL (ref 150–350)
PMV BLD AUTO: 12.9 FL (ref 9.2–12.9)
PO2 BLDA: 70 MMHG (ref 80–100)
POC BE: 1 MMOL/L
POC SATURATED O2: 94 % (ref 95–100)
POC TCO2: 27 MMOL/L (ref 23–27)
POCT GLUCOSE: 111 MG/DL (ref 70–110)
POCT GLUCOSE: 127 MG/DL (ref 70–110)
POCT GLUCOSE: 130 MG/DL (ref 70–110)
POCT GLUCOSE: 142 MG/DL (ref 70–110)
POTASSIUM SERPL-SCNC: 3.6 MMOL/L (ref 3.5–5.1)
PROT SERPL-MCNC: 6.6 G/DL (ref 6–8.4)
PS: 10
RBC # BLD AUTO: 2.89 M/UL (ref 4.6–6.2)
SAMPLE: ABNORMAL
SITE: ABNORMAL
SODIUM SERPL-SCNC: 142 MMOL/L (ref 136–145)
SP02: 98
SPONT RATE: 12
VOL: 472
WBC # BLD AUTO: 6.71 K/UL (ref 3.9–12.7)

## 2020-02-22 PROCEDURE — 25000003 PHARM REV CODE 250: Mod: HCNC | Performed by: PHYSICIAN ASSISTANT

## 2020-02-22 PROCEDURE — 99900035 HC TECH TIME PER 15 MIN (STAT): Mod: HCNC

## 2020-02-22 PROCEDURE — 94640 AIRWAY INHALATION TREATMENT: CPT | Mod: HCNC

## 2020-02-22 PROCEDURE — S0028 INJECTION, FAMOTIDINE, 20 MG: HCPCS | Mod: HCNC | Performed by: PHYSICIAN ASSISTANT

## 2020-02-22 PROCEDURE — 94003 VENT MGMT INPAT SUBQ DAY: CPT | Mod: HCNC

## 2020-02-22 PROCEDURE — 20000000 HC ICU ROOM: Mod: HCNC

## 2020-02-22 PROCEDURE — 99233 PR SUBSEQUENT HOSPITAL CARE,LEVL III: ICD-10-PCS | Mod: HCNC,,, | Performed by: NURSE PRACTITIONER

## 2020-02-22 PROCEDURE — 85025 COMPLETE CBC W/AUTO DIFF WBC: CPT | Mod: HCNC

## 2020-02-22 PROCEDURE — 36600 WITHDRAWAL OF ARTERIAL BLOOD: CPT | Mod: HCNC

## 2020-02-22 PROCEDURE — 82140 ASSAY OF AMMONIA: CPT | Mod: HCNC

## 2020-02-22 PROCEDURE — 99233 SBSQ HOSP IP/OBS HIGH 50: CPT | Mod: HCNC,,, | Performed by: NURSE PRACTITIONER

## 2020-02-22 PROCEDURE — 99900026 HC AIRWAY MAINTENANCE (STAT): Mod: HCNC

## 2020-02-22 PROCEDURE — 63600175 PHARM REV CODE 636 W HCPCS: Mod: HCNC | Performed by: NURSE PRACTITIONER

## 2020-02-22 PROCEDURE — 63600175 PHARM REV CODE 636 W HCPCS: Mod: HCNC | Performed by: STUDENT IN AN ORGANIZED HEALTH CARE EDUCATION/TRAINING PROGRAM

## 2020-02-22 PROCEDURE — 25000003 PHARM REV CODE 250: Mod: HCNC | Performed by: NURSE PRACTITIONER

## 2020-02-22 PROCEDURE — 95718 EEG PHYS/QHP 2-12 HR W/VEEG: CPT | Mod: HCNC,,, | Performed by: PSYCHIATRY & NEUROLOGY

## 2020-02-22 PROCEDURE — 83735 ASSAY OF MAGNESIUM: CPT | Mod: HCNC

## 2020-02-22 PROCEDURE — 25000242 PHARM REV CODE 250 ALT 637 W/ HCPCS: Mod: HCNC | Performed by: NURSE PRACTITIONER

## 2020-02-22 PROCEDURE — 25000003 PHARM REV CODE 250: Mod: HCNC | Performed by: PSYCHIATRY & NEUROLOGY

## 2020-02-22 PROCEDURE — 94761 N-INVAS EAR/PLS OXIMETRY MLT: CPT | Mod: HCNC

## 2020-02-22 PROCEDURE — 27200966 HC CLOSED SUCTION SYSTEM: Mod: HCNC

## 2020-02-22 PROCEDURE — 80053 COMPREHEN METABOLIC PANEL: CPT | Mod: HCNC

## 2020-02-22 PROCEDURE — 84100 ASSAY OF PHOSPHORUS: CPT | Mod: HCNC

## 2020-02-22 PROCEDURE — 85730 THROMBOPLASTIN TIME PARTIAL: CPT | Mod: 91,HCNC

## 2020-02-22 PROCEDURE — 82800 BLOOD PH: CPT | Mod: HCNC

## 2020-02-22 PROCEDURE — 95718 PR EEG, W/VIDEO, CONT RECORD, I&R, 2-12 HRS: ICD-10-PCS | Mod: HCNC,,, | Performed by: PSYCHIATRY & NEUROLOGY

## 2020-02-22 PROCEDURE — 85730 THROMBOPLASTIN TIME PARTIAL: CPT | Mod: HCNC

## 2020-02-22 PROCEDURE — 27000221 HC OXYGEN, UP TO 24 HOURS: Mod: HCNC

## 2020-02-22 PROCEDURE — 82803 BLOOD GASES ANY COMBINATION: CPT | Mod: HCNC

## 2020-02-22 RX ORDER — LEVETIRACETAM 10 MG/ML
1000 INJECTION INTRAVASCULAR EVERY 12 HOURS
Status: DISCONTINUED | OUTPATIENT
Start: 2020-02-22 | End: 2020-02-24

## 2020-02-22 RX ADMIN — SODIUM CHLORIDE 30 MG/ML INHALATION SOLUTION 4 ML: 30 SOLUTION INHALANT at 07:02

## 2020-02-22 RX ADMIN — LEVETIRACETAM 1000 MG: 10 INJECTION INTRAVENOUS at 09:02

## 2020-02-22 RX ADMIN — ACETAMINOPHEN 650 MG: 325 TABLET ORAL at 09:02

## 2020-02-22 RX ADMIN — POTASSIUM CHLORIDE 40 MEQ: 20 SOLUTION ORAL at 05:02

## 2020-02-22 RX ADMIN — FAMOTIDINE 20 MG: 10 INJECTION, SOLUTION INTRAVENOUS at 09:02

## 2020-02-22 RX ADMIN — MICONAZOLE NITRATE: 20 OINTMENT TOPICAL at 09:02

## 2020-02-22 RX ADMIN — CHLORHEXIDINE GLUCONATE 0.12% ORAL RINSE 15 ML: 1.2 LIQUID ORAL at 09:02

## 2020-02-22 RX ADMIN — IPRATROPIUM BROMIDE AND ALBUTEROL SULFATE 3 ML: .5; 3 SOLUTION RESPIRATORY (INHALATION) at 11:02

## 2020-02-22 RX ADMIN — IPRATROPIUM BROMIDE AND ALBUTEROL SULFATE 3 ML: .5; 3 SOLUTION RESPIRATORY (INHALATION) at 07:02

## 2020-02-22 RX ADMIN — HEPARIN SODIUM 18 UNITS/KG/HR: 10000 INJECTION, SOLUTION INTRAVENOUS at 09:02

## 2020-02-22 RX ADMIN — ATORVASTATIN CALCIUM 40 MG: 20 TABLET, FILM COATED ORAL at 09:02

## 2020-02-22 RX ADMIN — IPRATROPIUM BROMIDE AND ALBUTEROL SULFATE 3 ML: .5; 3 SOLUTION RESPIRATORY (INHALATION) at 03:02

## 2020-02-22 RX ADMIN — LEVETIRACETAM 1500 MG: 1500 INJECTION, SOLUTION INTRAVENOUS at 09:02

## 2020-02-22 RX ADMIN — SODIUM CHLORIDE 30 MG/ML INHALATION SOLUTION 4 ML: 30 SOLUTION INHALANT at 03:02

## 2020-02-22 RX ADMIN — AMANTADINE HYDROCHLORIDE 100 MG: 50 SOLUTION ORAL at 03:02

## 2020-02-22 RX ADMIN — ESCITALOPRAM OXALATE 20 MG: 10 TABLET ORAL at 10:02

## 2020-02-22 RX ADMIN — SODIUM CHLORIDE 30 MG/ML INHALATION SOLUTION 4 ML: 30 SOLUTION INHALANT at 11:02

## 2020-02-22 NOTE — PLAN OF CARE
POC reviewed with pt and family at 1500. Pt's family verbalized understanding. Questions and concerns addressed. No acute events today. Pt progressing toward goals. Precedex, heparin, and TF remain. Vent settings weaned as tolerated. Tylenol admin x1 for fever. Bath completed. Will continue to monitor. See flowsheets for full assessment and VS info.

## 2020-02-22 NOTE — ASSESSMENT & PLAN NOTE
- Incidentally seen on MRI w small scattered areas of subacute/remote infarcts.  Cont current secondary prevention measures.  Neuro checks q1h  SBP < 180  - CTA head and neck with no LVO  - Continue heparin gtt    Atorvastatin 40mg daily  Amantadine 100mg bid  PT/OT

## 2020-02-22 NOTE — ASSESSMENT & PLAN NOTE
Unable to wean off the vent, but tolerating SBT  Awaiting trach/PEG  Vent Mode: Spont  Oxygen Concentration (%):  [40] 40  Resp Rate Total:  [12 br/min-85 br/min] 19 br/min  Vt Set:  [470 mL] 470 mL  PEEP/CPAP:  [5 cmH20] 5 cmH20  Pressure Support:  [0 cmH20-10 cmH20] 10 cmH20  Mean Airway Pressure:  [6 cmH20-10 cmH20] 8.1 cmH20   Daily CXR/ABG  duonebs q4h

## 2020-02-22 NOTE — PROGRESS NOTES
[facility]  Neurocritical Care  Progress Note    Admit Date: 2/2/2020  Service Date: 02/21/2020  Length of Stay: 19    Subjective:     Chief Complaint: Seizure    History of Present Illness: Pt is a 64 y.o. Male with PMHx of of DVT, PE, DM, and seizures (on 1500 keppra at home) who presents to the ED via EMS with complaint of a seizure that occurred this morning. Pt has long standing history of seizures and was found down by wife on cement around 0800, LKN 0700. Patient compliant with keppra per wife and last seizure was in November. At that time his keppra dose was increased. Patient has had cough recently but no other symptoms. Patient is on AC for DVT/PE and was recently switched from Coumadin to Eliquis. He was given 10 mg Versed during EMS ride to outside facility for continued seizure activity. He was unresponsive upon arrival to ED and subsequently intubated. CTH without any acute changes, CT max/face with multiple facial fractures, and CXR with consolidation and atelectasis/collapsed lung. Patient transferred to [facility] to higher level neurologic monitoring and continuous EEG.     Hospital Course: 2/2/2020: Admit to [facility]. GS and ENT consulted. MX facial fractures and concerns of bowel ischemia on CT  2/3 will rehook to EEG for 24h. Remains NPO. US LE pending. Monitor lactic [id number] 2L  IV. Wean levophed as tolerated after bolus  2/4: remains on small amount of pressors, cultures remain negative, cont abx, cont current AEDs, EEG overnight negative for seizure activity, repeat ct abdomen today  2/5: minimal dose pressors, advance TFs. Tolerating SBT, possible trial of extubation in am  2/6: need for pressors will likely cease when able to come off sedation, currently still requires mechanical ventilation with pO2 value of 60 on 40% fiO2 and ps 10  02/08/2020 exam still poor, getting MRI brain   02/09/2020 Not tolerating CPAP, placed on AC   02/10/2020 NAEO  02/11/2020 NAEO  02/12/2020 CPAP  trial this am   02/13/2020 NAEON. On heparin drip with stable exam.   02/14/2020 NAEON, f/u EEG (on Keppra). Will give modafinil trial today.   02/15/2020: Stable overnight. Little improvement with modafinil. Heparin gtt stopped for LP procedure. Meningitic coverage started.  02/16/2020: LP performed overnight. Heparin gtt restarted. Antibiotics stopped. Continue acyclovir. Patient more awake today. Increased free water flushes. Started amantadine.  02/17/2020 NAEO. Planning a family meeting to discuss trach/peg   02/18/2020 family still discussing trach/peg. Required increased PEEP overnight   02/19/2020 vent requirements stable, awaiting trach/PEG  02/20/2020  Trach/PEG pending  ENT consulted due to vent requirements  Weaning vent as tolerated, now down to PEEP of 8 and 45%  Neuro exam waxing and waning but stable  2/21/2020 No significant events over night. Tolerating spon. Breathing trial today. More alert today. Attempted breathing parameters.pt not following well poor attempts at NIF and VC. Will reassess tomorrow.     Interval History: No significant events over night. Tolerating spon. Breathing trial today. More alert today. Attempted breathing parameters.pt not following well poor attempts at NIF and VC. Will reassess tomorrow.       Review of Systems:Unable to obtain a complete ROS due to level of consciousness. And intubated    Vitals:   Temp: 99.6 °F (37.6 °C)  Pulse: 70  Rhythm: normal sinus rhythm  BP: (!) 119/58  MAP (mmHg): 84  Resp: 19  SpO2: 100 %  Oxygen Concentration (%): 40  O2 Device (Oxygen Therapy): ventilator  Vent Mode: Spont  Set Rate: 15 BPM  Vt Set: 470 mL  Pressure Support: 10 cmH20  PEEP/CPAP: 5 cmH20  Peak Airway Pressure: 15 cmH2O  Mean Airway Pressure: 8.1 cmH20  Plateau Pressure: 25 cmH20    Temp  Min: 98.7 °F (37.1 °C)  Max: 100.1 °F (37.8 °C)  Pulse  Min: 62  Max: 96  BP  Min: 95/53  Max: 135/64  MAP (mmHg)  Min: 70  Max: 94  Resp  Min: 6  Max: 28  SpO2  Min: 95 %  Max: 100  %  Oxygen Concentration (%)  Min: 40  Max: 40    02/20 0701 - 02/21 0700  In: 1006 [I.V.:311]  Out: 1380 [Urine:1305]   Unmeasured Output  Urine Occurrence: 1  Stool Occurrence: 1  Pad Count: 2     Examination:   Constitutional: Well-nourished and -developed. No apparent distress.   Eyes: Conjunctiva clear, anicteric. Lids no lesions.  Head/Ears/Nose/Mouth/Throat/Neck: Moist mucous membranes. External ears, nose atraumatic.   Cardiovascular: Regular rhythm. No murmurs. No leg edema.  Respiratory:Mech. Ventilation bilat breath sounds clear to ausc.  Gastrointestinal: No hernia. Soft, nondistended, nontender. + bowel sounds.    Neurologic:  -GCS E4V1M6  -Alert. Intubatedt. Follows simple  commands.  -Cranial nervesPERRL 3+ + cough, gag, corneals  -Motor ZAMUDIO spon and to command  -Sensation bilat intact to soft touch  Unable to test orientation, language, memory, judgment, insight, fund of knowledge, coordination, gait due to level of consciousness.    Medications:   Continuous  dexmedetomidine (PRECEDEX) infusion Last Rate: 0.6 mcg/kg/hr (02/21/20 1902)   heparin (porcine) in D5W Last Rate: 17.011 Units/kg/hr (02/21/20 1902)   Scheduled  albuterol-ipratropium 3 mL Q4H   amantadine  mg BID   atorvastatin 40 mg QHS   chlorhexidine 15 mL BID   escitalopram oxalate 20 mg Daily   famotidine (PF) 20 mg BID   levetiracetam IVPB 1,500 mg Q12H   miconazole nitrate 2%  BID   sodium chloride 3% 4 mL Q4H   PRN  acetaminophen 650 mg Q6H PRN   Dextrose 10% Bolus 12.5 g PRN   fentaNYL 50 mcg Q2H PRN   glucagon (human recombinant) 1 mg PRN   insulin aspart U-100 1-10 Units Q6H PRN   labetalol 10 mg Q4H PRN   magnesium oxide 800 mg PRN   magnesium oxide 800 mg PRN   ondansetron 4 mg Q8H PRN   potassium chloride 10% 40 mEq PRN   potassium chloride 10% 40 mEq PRN   potassium chloride 10% 60 mEq PRN   potassium, sodium phosphates 2 packet PRN   potassium, sodium phosphates 2 packet PRN   potassium, sodium phosphates 2 packet PRN    sodium chloride 0.9% 10 mL PRN      Today I independently reviewed pertinent medications, lines/drains/airways, imaging, laboratory results, microbiology results, notably:     ISTAT: Recent Labs   Lab 02/21/20  0443   PH 7.437   PCO2 33.5*   PO2 83   POCSATURATED 97   HCO3 22.6*   BE -2   POCTCO2 24   SAMPLE ARTERIAL      Chem: Recent Labs   Lab 02/21/20  0110      K 4.6      CO2 22*   GLU 95   BUN 17   CREATININE 0.8   ESTGFRAFRICA >60.0   EGFRNONAA >60.0   CALCIUM 8.5*   MG 2.2   PHOS 3.4   ANIONGAP 12   PROT 6.9   ALBUMIN 3.1*   BILITOT 0.5   ALKPHOS 69   AST 34   ALT 20     Heme: Recent Labs   Lab 02/21/20  0110   WBC 8.02   HGB 9.2*   HCT 29.6*        Endo:   Recent Labs   Lab 02/21/20  1145 02/21/20  1805   POCTGLUCOSE 106 106      Assessment/Plan:     Neuro  * Seizure  Continue keppra 1500mg q12h    Acute encephalopathy  Multifactorial    Trach/PEG pending  ENT consulted due to vent requirements  Weaning vent as tolerated, now down to PEEP of 8 and 45%  Neuro exam waxing and waning but stable  2/21 more alert today, tolerating SBT, attempted breathing parameters but not following well, poor attempts at NIF and VC  Will reass again tomorrow    Altered mental status  Multifactorial          Stroke  - Incidentally seen on MRI w small scattered areas of subacute/remote infarcts.  Cont current secondary prevention measures.  Neuro checks q1h  SBP < 180  - CTA head and neck with no LVO  - Continue heparin gtt    Atorvastatin 40mg daily  Amantadine 100mg bid  PT/OT    Psychiatric  Depression with anxiety  Continue escitalopram 20mg daily    Derm  Alteration in skin integrity  Wound care following    Pulmonary  Acute respiratory failure with hypoxia  Unable to wean off the vent, but tolerating SBT  Awaiting trach/PEG  Vent Mode: Spont  Oxygen Concentration (%):  [40] 40  Resp Rate Total:  [12 br/min-85 br/min] 19 br/min  Vt Set:  [470 mL] 470 mL  PEEP/CPAP:  [5 cmH20] 5 cmH20  Pressure Support:   [0 cmH20-10 cmH20] 10 cmH20  Mean Airway Pressure:  [6 cmH20-10 cmH20] 8.1 cmH20   Daily CXR/ABG  duonebs q4h      Hematology  History of DVT (deep vein thrombosis)  On  heparin gtt       Orthopedic  Multiple closed fractures of facial bone  ENT consulted            The patient is being Prophylaxed for:  Venous Thromboembolism with: Mechanical or Chemical  Stress Ulcer with: H2B  Ventilator Pneumonia with: chlorhexidine oral care    Activity Orders          Diet NPO: NPO starting at 02/20 0001        Full Code     I have spent 35 min with this patient, with over 50% of this time spent coordinating care and speaking with the family    Tammie Bob NP  Neurocritical Care  [facility]

## 2020-02-22 NOTE — ASSESSMENT & PLAN NOTE
Multifactorial    Trach/PEG pending  ENT consulted due to vent requirements  Weaning vent as tolerated, now down to PEEP of 8 and 45%  Neuro exam waxing and waning but stable  2/21 more alert today, tolerating SBT, attempted breathing parameters but not following well, poor attempts at NIF and VC  Will reass again tomorrow

## 2020-02-23 LAB
ALBUMIN SERPL BCP-MCNC: 3.2 G/DL (ref 3.5–5.2)
ALLENS TEST: ABNORMAL
ALP SERPL-CCNC: 86 U/L (ref 55–135)
ALT SERPL W/O P-5'-P-CCNC: 23 U/L (ref 10–44)
ANION GAP SERPL CALC-SCNC: 12 MMOL/L (ref 8–16)
APTT BLDCRRT: 23.2 SEC (ref 21–32)
APTT BLDCRRT: 33.2 SEC (ref 21–32)
APTT BLDCRRT: 45 SEC (ref 21–32)
AST SERPL-CCNC: 29 U/L (ref 10–40)
BASOPHILS # BLD AUTO: 0.03 K/UL (ref 0–0.2)
BASOPHILS NFR BLD: 0.3 % (ref 0–1.9)
BILIRUB SERPL-MCNC: 0.3 MG/DL (ref 0.1–1)
BUN SERPL-MCNC: 13 MG/DL (ref 8–23)
CALCIUM SERPL-MCNC: 8.8 MG/DL (ref 8.7–10.5)
CHLORIDE SERPL-SCNC: 108 MMOL/L (ref 95–110)
CO2 SERPL-SCNC: 22 MMOL/L (ref 23–29)
CREAT SERPL-MCNC: 0.8 MG/DL (ref 0.5–1.4)
DELSYS: ABNORMAL
DIFFERENTIAL METHOD: ABNORMAL
EOSINOPHIL # BLD AUTO: 0.1 K/UL (ref 0–0.5)
EOSINOPHIL NFR BLD: 1.1 % (ref 0–8)
ERYTHROCYTE [DISTWIDTH] IN BLOOD BY AUTOMATED COUNT: 15.9 % (ref 11.5–14.5)
EST. GFR  (AFRICAN AMERICAN): >60 ML/MIN/1.73 M^2
EST. GFR  (NON AFRICAN AMERICAN): >60 ML/MIN/1.73 M^2
FIO2: 40
GLUCOSE SERPL-MCNC: 107 MG/DL (ref 70–110)
HCO3 UR-SCNC: 26.7 MMOL/L (ref 24–28)
HCT VFR BLD AUTO: 30.9 % (ref 40–54)
HGB BLD-MCNC: 9.6 G/DL (ref 14–18)
IMM GRANULOCYTES # BLD AUTO: 0.04 K/UL (ref 0–0.04)
IMM GRANULOCYTES NFR BLD AUTO: 0.4 % (ref 0–0.5)
LYMPHOCYTES # BLD AUTO: 1.3 K/UL (ref 1–4.8)
LYMPHOCYTES NFR BLD: 12.2 % (ref 18–48)
MAGNESIUM SERPL-MCNC: 1.9 MG/DL (ref 1.6–2.6)
MCH RBC QN AUTO: 31.9 PG (ref 27–31)
MCHC RBC AUTO-ENTMCNC: 31.1 G/DL (ref 32–36)
MCV RBC AUTO: 103 FL (ref 82–98)
MODE: ABNORMAL
MONOCYTES # BLD AUTO: 1.3 K/UL (ref 0.3–1)
MONOCYTES NFR BLD: 11.7 % (ref 4–15)
NEUTROPHILS # BLD AUTO: 7.9 K/UL (ref 1.8–7.7)
NEUTROPHILS NFR BLD: 74.3 % (ref 38–73)
NRBC BLD-RTO: 0 /100 WBC
PCO2 BLDA: 37.7 MMHG (ref 35–45)
PH SMN: 7.46 [PH] (ref 7.35–7.45)
PHOSPHATE SERPL-MCNC: 2.9 MG/DL (ref 2.7–4.5)
PLATELET # BLD AUTO: 295 K/UL (ref 150–350)
PMV BLD AUTO: 12.8 FL (ref 9.2–12.9)
PO2 BLDA: 67 MMHG (ref 80–100)
POC BE: 3 MMOL/L
POC SATURATED O2: 94 % (ref 95–100)
POC TCO2: 28 MMOL/L (ref 23–27)
POCT GLUCOSE: 108 MG/DL (ref 70–110)
POCT GLUCOSE: 110 MG/DL (ref 70–110)
POCT GLUCOSE: 135 MG/DL (ref 70–110)
POCT GLUCOSE: 98 MG/DL (ref 70–110)
POTASSIUM SERPL-SCNC: 4.2 MMOL/L (ref 3.5–5.1)
PROT SERPL-MCNC: 6.9 G/DL (ref 6–8.4)
RBC # BLD AUTO: 3.01 M/UL (ref 4.6–6.2)
SAMPLE: ABNORMAL
SITE: ABNORMAL
SODIUM SERPL-SCNC: 142 MMOL/L (ref 136–145)
WBC # BLD AUTO: 10.67 K/UL (ref 3.9–12.7)

## 2020-02-23 PROCEDURE — 94640 AIRWAY INHALATION TREATMENT: CPT | Mod: HCNC

## 2020-02-23 PROCEDURE — 84100 ASSAY OF PHOSPHORUS: CPT | Mod: HCNC

## 2020-02-23 PROCEDURE — 63600175 PHARM REV CODE 636 W HCPCS: Mod: HCNC | Performed by: NURSE PRACTITIONER

## 2020-02-23 PROCEDURE — 27000221 HC OXYGEN, UP TO 24 HOURS: Mod: HCNC

## 2020-02-23 PROCEDURE — 80053 COMPREHEN METABOLIC PANEL: CPT | Mod: HCNC

## 2020-02-23 PROCEDURE — 99233 SBSQ HOSP IP/OBS HIGH 50: CPT | Mod: HCNC,,, | Performed by: NURSE PRACTITIONER

## 2020-02-23 PROCEDURE — S0028 INJECTION, FAMOTIDINE, 20 MG: HCPCS | Mod: HCNC | Performed by: PHYSICIAN ASSISTANT

## 2020-02-23 PROCEDURE — 85025 COMPLETE CBC W/AUTO DIFF WBC: CPT | Mod: HCNC

## 2020-02-23 PROCEDURE — 31720 CLEARANCE OF AIRWAYS: CPT | Mod: HCNC

## 2020-02-23 PROCEDURE — 85730 THROMBOPLASTIN TIME PARTIAL: CPT | Mod: 91,HCNC

## 2020-02-23 PROCEDURE — 99900026 HC AIRWAY MAINTENANCE (STAT): Mod: HCNC

## 2020-02-23 PROCEDURE — 99900035 HC TECH TIME PER 15 MIN (STAT): Mod: HCNC

## 2020-02-23 PROCEDURE — 25000003 PHARM REV CODE 250: Mod: HCNC | Performed by: PHYSICIAN ASSISTANT

## 2020-02-23 PROCEDURE — 94761 N-INVAS EAR/PLS OXIMETRY MLT: CPT | Mod: HCNC

## 2020-02-23 PROCEDURE — 36600 WITHDRAWAL OF ARTERIAL BLOOD: CPT | Mod: HCNC

## 2020-02-23 PROCEDURE — 20000000 HC ICU ROOM: Mod: HCNC

## 2020-02-23 PROCEDURE — 25000003 PHARM REV CODE 250: Mod: HCNC | Performed by: PSYCHIATRY & NEUROLOGY

## 2020-02-23 PROCEDURE — 85730 THROMBOPLASTIN TIME PARTIAL: CPT | Mod: HCNC

## 2020-02-23 PROCEDURE — 94003 VENT MGMT INPAT SUBQ DAY: CPT | Mod: HCNC

## 2020-02-23 PROCEDURE — 83735 ASSAY OF MAGNESIUM: CPT | Mod: HCNC

## 2020-02-23 PROCEDURE — 82803 BLOOD GASES ANY COMBINATION: CPT | Mod: HCNC

## 2020-02-23 PROCEDURE — 99233 PR SUBSEQUENT HOSPITAL CARE,LEVL III: ICD-10-PCS | Mod: HCNC,,, | Performed by: NURSE PRACTITIONER

## 2020-02-23 PROCEDURE — 25000003 PHARM REV CODE 250: Mod: HCNC | Performed by: NURSE PRACTITIONER

## 2020-02-23 PROCEDURE — 25000242 PHARM REV CODE 250 ALT 637 W/ HCPCS: Mod: HCNC | Performed by: NURSE PRACTITIONER

## 2020-02-23 RX ADMIN — SODIUM CHLORIDE 30 MG/ML INHALATION SOLUTION 4 ML: 30 SOLUTION INHALANT at 03:02

## 2020-02-23 RX ADMIN — IPRATROPIUM BROMIDE AND ALBUTEROL SULFATE 3 ML: .5; 3 SOLUTION RESPIRATORY (INHALATION) at 07:02

## 2020-02-23 RX ADMIN — IPRATROPIUM BROMIDE AND ALBUTEROL SULFATE 3 ML: .5; 3 SOLUTION RESPIRATORY (INHALATION) at 01:02

## 2020-02-23 RX ADMIN — SODIUM CHLORIDE 30 MG/ML INHALATION SOLUTION 15 ML: 30 SOLUTION INHALANT at 11:02

## 2020-02-23 RX ADMIN — CHLORHEXIDINE GLUCONATE 0.12% ORAL RINSE 15 ML: 1.2 LIQUID ORAL at 08:02

## 2020-02-23 RX ADMIN — SODIUM CHLORIDE 30 MG/ML INHALATION SOLUTION 15 ML: 30 SOLUTION INHALANT at 08:02

## 2020-02-23 RX ADMIN — IPRATROPIUM BROMIDE AND ALBUTEROL SULFATE 3 ML: .5; 3 SOLUTION RESPIRATORY (INHALATION) at 04:02

## 2020-02-23 RX ADMIN — FENTANYL CITRATE 50 MCG: 50 INJECTION INTRAMUSCULAR; INTRAVENOUS at 12:02

## 2020-02-23 RX ADMIN — LEVETIRACETAM 1000 MG: 10 INJECTION INTRAVENOUS at 09:02

## 2020-02-23 RX ADMIN — LEVETIRACETAM 1000 MG: 10 INJECTION INTRAVENOUS at 08:02

## 2020-02-23 RX ADMIN — IPRATROPIUM BROMIDE AND ALBUTEROL SULFATE 3 ML: .5; 3 SOLUTION RESPIRATORY (INHALATION) at 11:02

## 2020-02-23 RX ADMIN — ATORVASTATIN CALCIUM 40 MG: 20 TABLET, FILM COATED ORAL at 08:02

## 2020-02-23 RX ADMIN — IPRATROPIUM BROMIDE AND ALBUTEROL SULFATE 3 ML: .5; 3 SOLUTION RESPIRATORY (INHALATION) at 03:02

## 2020-02-23 RX ADMIN — FAMOTIDINE 20 MG: 10 INJECTION, SOLUTION INTRAVENOUS at 08:02

## 2020-02-23 RX ADMIN — SODIUM CHLORIDE 30 MG/ML INHALATION SOLUTION 4 ML: 30 SOLUTION INHALANT at 07:02

## 2020-02-23 RX ADMIN — SODIUM CHLORIDE 30 MG/ML INHALATION SOLUTION 15 ML: 30 SOLUTION INHALANT at 04:02

## 2020-02-23 RX ADMIN — ESCITALOPRAM OXALATE 20 MG: 10 TABLET ORAL at 08:02

## 2020-02-23 RX ADMIN — AMANTADINE HYDROCHLORIDE 100 MG: 50 SOLUTION ORAL at 02:02

## 2020-02-23 RX ADMIN — IPRATROPIUM BROMIDE AND ALBUTEROL SULFATE 3 ML: .5; 3 SOLUTION RESPIRATORY (INHALATION) at 08:02

## 2020-02-23 RX ADMIN — MICONAZOLE NITRATE: 20 OINTMENT TOPICAL at 08:02

## 2020-02-23 RX ADMIN — SODIUM CHLORIDE 30 MG/ML INHALATION SOLUTION 15 ML: 30 SOLUTION INHALANT at 01:02

## 2020-02-23 RX ADMIN — ACETAMINOPHEN 650 MG: 325 TABLET ORAL at 08:02

## 2020-02-23 NOTE — CARE UPDATE
Pt extubated following review of parameters.  Presently, he is well saturated and unlabored on a 3 lpm nasal cannula.  Will continue to monitor.

## 2020-02-23 NOTE — PROCEDURES
ICU EEG/VIDEO MONITORING REPORT    Edu Choi  [id number]  1955    DATE OF SERVICE: 2/22-23/2020    DATE OF ADMISSION: 2/2/2020  7:24 PM    ADMITTING PROVIDER: Ricardo Yee MD    METHODOLOGY-cap   Electroencephalographic (EEG) recording is with electrodes placed according to the International 10-20 placement system.  Thirty two (32) channels of digital signal are simultaneously recorded from the scalp and may include EKG, EMG, and/or eye monitors.   Recording band pass was 0.1 to 512 hz.  Digital video recording of the patient is simultaneously recorded with the EEG.  The nursing staff report clinical symptoms and may press an event button when the patient has symptoms of clinical interest to the treating physicians.  EEG and video recording is stored and archived in digital format.  The entire recording is visually reviewed and the times identified by computer analysis as being spikes or seizures are reviewed again.  Activation procedures which include photic stimulation, hyperventilation and instructing patients to perform simple task are done in selected patients.   Compresses spectral analysis (CSA) is also performed on the activity recorded from each individual channel.  This is displayed as a power display of frequencies from 0 to 30 Hz over time.   The CSA analysis is done and displayed continuously.  This is reviewed for asymmetries in power between homologous areas of the scalp and for presence of changes in power which canbe seen when seizures occur.  Sections of suspected abnormalities on the CSA is then compared with the original EEG recording.     [employer] software was also utilized in the review of this study.  This software suite analyzes the EEG recording in multiple domains.  Coherence and rhythmicity is computed to identify EEG sections which may contain organized seizures.  Each channel undergoes analysis to detect presence of spike and sharp waves which have special and  morphological characteristic of epileptic activity.  The routine EEG recording is converted from spacial into frequency domain.  This is then displayed comparing homologous areas to identify areas of significant asymmetry.  Algorithm to identify non-cortically generated artifact is used to separate eye movement, EMG and other artifact from the EEG.      Recording Times  Start on 2/22/2020, 18:06  Stop on 2/23/2020, 09:38    A total of 15 hours and 32 minutes of EEG was recorded.    EEG FINDINGS  Background activity:   The background rhythm was characterized by high amplitude (>100uV) theta-alpha > delta (4-8 Hz) activity with a 8 Hz posterior dominant alpha rhythm.   Symmetry and continuity: the background was continuous and symmetric  Intermittent electrode artifacts obscure the study.    Sleep:   Not seen.    Activation procedures:   Not done    Abnormal activity:   No epileptiform discharges, periodic discharges, lateralized rhythmic delta activity or electrographic seizures were seen.    EKG:   - unable to assess    IMPRESSION:   This is an abnormal cap EEG due to the mild to moderate generalized slowing seen, suggestive of mild to moderate diffuse or multifocal cerebral dysfunction.  No epileptiform activity or electrographic seizures seen.    CLINICAL CORRELATION IS RECOMMENDED.    Rebecca Porter MD, RENEE(), FACNS, FARUSH.  Neurology-Epilepsy.  [facility].

## 2020-02-23 NOTE — PLAN OF CARE
POC reviewed with pt and family at 1400. Pt unable to verbalize understanding. Precedex titrated off. CT of head taken. Continuous EEG applied. Ammonia level taken. Heparin drip titrated. Bath given. Questions and concerns addressed. No acute events today. Pt progressing toward goals. Will continue to monitor. See flowsheets for full assessment and VS info.

## 2020-02-23 NOTE — ASSESSMENT & PLAN NOTE
decreased keppra 1000mg q12h  Placed on EEG cap over night. Had episode where he  Was not tracking, not attentive, not following commands along with a hypotensive episode.

## 2020-02-23 NOTE — ASSESSMENT & PLAN NOTE
Unable to wean off the vent, but tolerating SBT  Awaiting trach/PEG  Vent Mode: Spont  Oxygen Concentration (%):  [40] 40  Resp Rate Total:  [12 br/min-53 br/min] 26 br/min  PEEP/CPAP:  [5 cmH20] 5 cmH20  Pressure Support:  [7 cmH20-10 cmH20] 7 cmH20  Mean Airway Pressure:  [7.2 cmH20-8.7 cmH20] 7.9 cmH20   Daily CXR/ABG  duonebs q4h

## 2020-02-23 NOTE — PROGRESS NOTES
[facility]  Neurocritical Care  Progress Note    Admit Date: 2/2/2020  Service Date: 02/22/2020  Length of Stay: 20    Subjective:     Chief Complaint: Seizure    History of Present Illness: Pt is a 64 y.o. Male with PMHx of of DVT, PE, DM, and seizures (on 1500 keppra at home) who presents to the ED via EMS with complaint of a seizure that occurred this morning. Pt has long standing history of seizures and was found down by wife on cement around 0800, LKN 0700. Patient compliant with keppra per wife and last seizure was in November. At that time his keppra dose was increased. Patient has had cough recently but no other symptoms. Patient is on AC for DVT/PE and was recently switched from Coumadin to Eliquis. He was given 10 mg Versed during EMS ride to outside facility for continued seizure activity. He was unresponsive upon arrival to ED and subsequently intubated. CTH without any acute changes, CT max/face with multiple facial fractures, and CXR with consolidation and atelectasis/collapsed lung. Patient transferred to [facility] to higher level neurologic monitoring and continuous EEG.     Hospital Course: 2/2/2020: Admit to [facility]. GS and ENT consulted. MX facial fractures and concerns of bowel ischemia on CT  2/3 will rehook to EEG for 24h. Remains NPO. US LE pending. Monitor lactic [id number] 2L  IV. Wean levophed as tolerated after bolus  2/4: remains on small amount of pressors, cultures remain negative, cont abx, cont current AEDs, EEG overnight negative for seizure activity, repeat ct abdomen today  2/5: minimal dose pressors, advance TFs. Tolerating SBT, possible trial of extubation in am  2/6: need for pressors will likely cease when able to come off sedation, currently still requires mechanical ventilation with pO2 value of 60 on 40% fiO2 and ps 10  02/08/2020 exam still poor, getting MRI brain   02/09/2020 Not tolerating CPAP, placed on AC   02/10/2020 NAEO  02/11/2020 NAEO  02/12/2020 CPAP  trial this am   02/13/2020 NAEON. On heparin drip with stable exam.   02/14/2020 NAEON, f/u EEG (on Keppra). Will give modafinil trial today.   02/15/2020: Stable overnight. Little improvement with modafinil. Heparin gtt stopped for LP procedure. Meningitic coverage started.  02/16/2020: LP performed overnight. Heparin gtt restarted. Antibiotics stopped. Continue acyclovir. Patient more awake today. Increased free water flushes. Started amantadine.  02/17/2020 NAEO. Planning a family meeting to discuss trach/peg   02/18/2020 family still discussing trach/peg. Required increased PEEP overnight   02/19/2020 vent requirements stable, awaiting trach/PEG  02/20/2020  Trach/PEG pending  ENT consulted due to vent requirements  Weaning vent as tolerated, now down to PEEP of 8 and 45%  Neuro exam waxing and waning but stable  2/21/2020 No significant events over night. Tolerating spon. Breathing trial today. More alert today. Attempted breathing parameters.pt not following well poor attempts at NIF and VC. Will reassess tomorrow.   2/22 no significant events over night. Tolerating SBT. Pt more sleepy today. precedex turned off.  Keppra decreased to 1g q12. Not as responsive this afternoon. Not following commands, not attentive, not tracking. Sent for CT, no new changes noted. Placed on EEG cap will monitor over night    Interval History: no significant events over night. Tolerating SBT. Pt more sleepy today. precedex turned off.  Keppra decreased to 1g q12. Not as responsive this afternoon. Not following commands, not attentive, not tracking. Sent for CT, no new changes noted. Placed on EEG cap will monitor over night    Review of Systems:Unable to obtain a complete ROS due to level of consciousness.     Vitals:   Temp: 99.1 °F (37.3 °C)  Pulse: 100  Rhythm: normal sinus rhythm  BP: (!) 119/56  MAP (mmHg): 81  Resp: 13  SpO2: 100 %  Oxygen Concentration (%): 40  O2 Device (Oxygen Therapy): ventilator  Vent Mode:  Spont  Pressure Support: 7 cmH20  PEEP/CPAP: 5 cmH20  Peak Airway Pressure: 13 cmH2O  Mean Airway Pressure: 7.9 cmH20  Plateau Pressure: 25 cmH20    Temp  Min: 99.1 °F (37.3 °C)  Max: 100.2 °F (37.9 °C)  Pulse  Min: 66  Max: 104  BP  Min: 86/47  Max: 121/58  MAP (mmHg)  Min: 62  Max: 83  Resp  Min: 10  Max: 27  SpO2  Min: 95 %  Max: 100 %  Oxygen Concentration (%)  Min: 40  Max: 40    02/21 0701 - 02/22 0700  In: 2111.2 [I.V.:436.2]  Out: 2170 [Urine:2070]   Unmeasured Output  Urine Occurrence: 1  Stool Occurrence: 1  Pad Count: 2     Examination:   Constitutional: Well-nourished and -developed. No apparent distress.   Eyes: Conjunctiva clear, anicteric. Lids no lesions.  Head/Ears/Nose/Mouth/Throat/Neck: Moist mucous membranes. External ears, nose atraumatic.   Cardiovascular: Regular rhythm. No murmurs. No leg edema.  Respiratory:Mech. Ventilation bilat breath sounds clear to ausc.  Gastrointestinal: No hernia. Soft, nondistended, nontender. + bowel sounds.     Neurologic:  -GCS E4V1M6  -Alert. Intubatedt. Follows simple  commands.  -Cranial nervesPERRL 3+ + cough, gag, corneals  -Motor ZAMUDIO spon and to command  -Sensation bilat intact to soft touch  Unable to test orientation, language, memory, judgment, insight, fund of knowledge, coordination, gait due to level of consciousness.    Medications:   Continuous  heparin (porcine) in D5W Last Rate: 17.965 Units/kg/hr (02/22/20 2002)   Scheduled  albuterol-ipratropium 3 mL Q4H   amantadine  mg BID   atorvastatin 40 mg QHS   chlorhexidine 15 mL BID   escitalopram oxalate 20 mg Daily   famotidine (PF) 20 mg BID   levetiracetam IVPB 1,000 mg Q12H   miconazole nitrate 2%  BID   sodium chloride 3% 4 mL Q4H   PRN  acetaminophen 650 mg Q6H PRN   Dextrose 10% Bolus 12.5 g PRN   fentaNYL 50 mcg Q2H PRN   glucagon (human recombinant) 1 mg PRN   insulin aspart U-100 1-10 Units Q6H PRN   labetalol 10 mg Q4H PRN   magnesium oxide 800 mg PRN   magnesium oxide 800 mg PRN    ondansetron 4 mg Q8H PRN   potassium chloride 10% 40 mEq PRN   potassium chloride 10% 40 mEq PRN   potassium chloride 10% 60 mEq PRN   potassium, sodium phosphates 2 packet PRN   potassium, sodium phosphates 2 packet PRN   potassium, sodium phosphates 2 packet PRN   sodium chloride 0.9% 10 mL PRN      Today I independently reviewed pertinent medications, lines/drains/airways, imaging, laboratory results, microbiology results, notably:     ISTAT: Recent Labs   Lab 02/22/20  0517   PH 7.416   PCO2 39.5   PO2 70*   POCSATURATED 94*   HCO3 25.4   BE 1   POCTCO2 27   SAMPLE ARTERIAL      Chem: Recent Labs   Lab 02/22/20  0128      K 3.6      CO2 25      BUN 12   CREATININE 0.8   ESTGFRAFRICA >60.0   EGFRNONAA >60.0   CALCIUM 8.9   MG 2.1   PHOS 3.7   ANIONGAP 10   PROT 6.6   ALBUMIN 3.1*   BILITOT 0.4   ALKPHOS 75   AST 25   ALT 19     Heme: Recent Labs   Lab 02/22/20  0128   WBC 6.71   HGB 9.5*   HCT 29.1*        Endo:   Recent Labs   Lab 02/22/20  1209 02/22/20  1320 02/22/20  1806   POCTGLUCOSE 111* 142* 127*      Assessment/Plan:     Neuro  * Seizure  decreased keppra 1000mg q12h  Placed on EEG cap over night. Had episode where he  Was not tracking, not attentive, not following commands along with a hypotensive episode.    Acute encephalopathy  Multifactorial    Trach/PEG pending  ENT consulted due to vent requirements  Weaning vent as tolerated, now down to PEEP of 8 and 45%  Neuro exam waxing and waning but stable  2/21 more alert today, tolerating SBT, attempted breathing parameters but not following well, poor attempts at NIF and VC  Will reass again tomorrow    Altered mental status  Multifactorial          Stroke  - Incidentally seen on MRI w small scattered areas of subacute/remote infarcts.  Cont current secondary prevention measures.  Neuro checks q1h  SBP < 180  - CTA head and neck with no LVO  - Continue heparin gtt    Atorvastatin 40mg daily  Amantadine 100mg  bid  PT/OT    Psychiatric  Depression with anxiety  Continue escitalopram 20mg daily    Derm  Alteration in skin integrity  Wound care following    Pulmonary  Acute respiratory failure with hypoxia  Unable to wean off the vent, but tolerating SBT  Awaiting trach/PEG  Vent Mode: Spont  Oxygen Concentration (%):  [40] 40  Resp Rate Total:  [12 br/min-53 br/min] 26 br/min  PEEP/CPAP:  [5 cmH20] 5 cmH20  Pressure Support:  [7 cmH20-10 cmH20] 7 cmH20  Mean Airway Pressure:  [7.2 cmH20-8.7 cmH20] 7.9 cmH20   Daily CXR/ABG  duonebs q4h      Cardiac/Vascular  Idiopathic hypotension  MAP < 65   bolused with 500cc IVF NS.  And was responsive.    Hematology  History of DVT (deep vein thrombosis)  On  heparin gtt             The patient is being Prophylaxed for:  Venous Thromboembolism with: Mechanical or Chemical  Stress Ulcer with: H2B  Ventilator Pneumonia with: chlorhexidine oral care    Activity Orders          Diet NPO: NPO starting at 02/20 0001        Full Code     I have spent 35 min with this patient, with over 50% of this time spent coordinating care and speaking with the family    Tammie Bob NP  Neurocritical Care  [facility]

## 2020-02-23 NOTE — PLAN OF CARE
POC reviewed with pt and family at 1400. Pt unable to verbalize understanding. Pt extubated today. Questions and concerns addressed. No acute events today. Pt progressing toward goals. Will continue to monitor. See flowsheets for full assessment and VS info.

## 2020-02-23 NOTE — NURSING
Pt extubated with RNx1, RTx1, MDx1. No acute events, pt stable, will continue to monitor and notify as needed

## 2020-02-24 LAB
ALBUMIN SERPL BCP-MCNC: 3.2 G/DL (ref 3.5–5.2)
ALP SERPL-CCNC: 84 U/L (ref 55–135)
ALT SERPL W/O P-5'-P-CCNC: 20 U/L (ref 10–44)
ANION GAP SERPL CALC-SCNC: 9 MMOL/L (ref 8–16)
APTT BLDCRRT: 38.2 SEC (ref 21–32)
APTT BLDCRRT: 38.9 SEC (ref 21–32)
APTT BLDCRRT: 51.4 SEC (ref 21–32)
AST SERPL-CCNC: 22 U/L (ref 10–40)
BASOPHILS # BLD AUTO: 0.03 K/UL (ref 0–0.2)
BASOPHILS NFR BLD: 0.2 % (ref 0–1.9)
BILIRUB SERPL-MCNC: 0.4 MG/DL (ref 0.1–1)
BUN SERPL-MCNC: 18 MG/DL (ref 8–23)
CALCIUM SERPL-MCNC: 9.2 MG/DL (ref 8.7–10.5)
CHLORIDE SERPL-SCNC: 106 MMOL/L (ref 95–110)
CO2 SERPL-SCNC: 26 MMOL/L (ref 23–29)
CREAT SERPL-MCNC: 0.8 MG/DL (ref 0.5–1.4)
DIFFERENTIAL METHOD: ABNORMAL
EOSINOPHIL # BLD AUTO: 0.1 K/UL (ref 0–0.5)
EOSINOPHIL NFR BLD: 0.9 % (ref 0–8)
ERYTHROCYTE [DISTWIDTH] IN BLOOD BY AUTOMATED COUNT: 16 % (ref 11.5–14.5)
EST. GFR  (AFRICAN AMERICAN): >60 ML/MIN/1.73 M^2
EST. GFR  (NON AFRICAN AMERICAN): >60 ML/MIN/1.73 M^2
GLUCOSE SERPL-MCNC: 120 MG/DL (ref 70–110)
HCT VFR BLD AUTO: 31.2 % (ref 40–54)
HGB BLD-MCNC: 9.9 G/DL (ref 14–18)
IMM GRANULOCYTES # BLD AUTO: 0.05 K/UL (ref 0–0.04)
IMM GRANULOCYTES NFR BLD AUTO: 0.4 % (ref 0–0.5)
LYMPHOCYTES # BLD AUTO: 1.3 K/UL (ref 1–4.8)
LYMPHOCYTES NFR BLD: 10.2 % (ref 18–48)
MAGNESIUM SERPL-MCNC: 1.9 MG/DL (ref 1.6–2.6)
MCH RBC QN AUTO: 32.5 PG (ref 27–31)
MCHC RBC AUTO-ENTMCNC: 31.7 G/DL (ref 32–36)
MCV RBC AUTO: 102 FL (ref 82–98)
MONOCYTES # BLD AUTO: 1 K/UL (ref 0.3–1)
MONOCYTES NFR BLD: 7.8 % (ref 4–15)
NEUTROPHILS # BLD AUTO: 10.5 K/UL (ref 1.8–7.7)
NEUTROPHILS NFR BLD: 80.5 % (ref 38–73)
NRBC BLD-RTO: 0 /100 WBC
PHOSPHATE SERPL-MCNC: 2.8 MG/DL (ref 2.7–4.5)
PLATELET # BLD AUTO: 284 K/UL (ref 150–350)
PMV BLD AUTO: 12.6 FL (ref 9.2–12.9)
POCT GLUCOSE: 118 MG/DL (ref 70–110)
POCT GLUCOSE: 129 MG/DL (ref 70–110)
POCT GLUCOSE: 130 MG/DL (ref 70–110)
POTASSIUM SERPL-SCNC: 4.1 MMOL/L (ref 3.5–5.1)
PROT SERPL-MCNC: 7.1 G/DL (ref 6–8.4)
RBC # BLD AUTO: 3.05 M/UL (ref 4.6–6.2)
SODIUM SERPL-SCNC: 141 MMOL/L (ref 136–145)
WBC # BLD AUTO: 13 K/UL (ref 3.9–12.7)

## 2020-02-24 PROCEDURE — 31720 CLEARANCE OF AIRWAYS: CPT | Mod: HCNC

## 2020-02-24 PROCEDURE — 85730 THROMBOPLASTIN TIME PARTIAL: CPT | Mod: HCNC

## 2020-02-24 PROCEDURE — 25000003 PHARM REV CODE 250: Mod: HCNC | Performed by: NURSE PRACTITIONER

## 2020-02-24 PROCEDURE — 63600175 PHARM REV CODE 636 W HCPCS: Mod: HCNC | Performed by: NURSE PRACTITIONER

## 2020-02-24 PROCEDURE — 25000242 PHARM REV CODE 250 ALT 637 W/ HCPCS: Mod: HCNC | Performed by: NURSE PRACTITIONER

## 2020-02-24 PROCEDURE — 80053 COMPREHEN METABOLIC PANEL: CPT | Mod: HCNC

## 2020-02-24 PROCEDURE — 85025 COMPLETE CBC W/AUTO DIFF WBC: CPT | Mod: HCNC

## 2020-02-24 PROCEDURE — 99233 PR SUBSEQUENT HOSPITAL CARE,LEVL III: ICD-10-PCS | Mod: HCNC,,, | Performed by: NURSE PRACTITIONER

## 2020-02-24 PROCEDURE — 83735 ASSAY OF MAGNESIUM: CPT | Mod: HCNC

## 2020-02-24 PROCEDURE — 94640 AIRWAY INHALATION TREATMENT: CPT | Mod: HCNC

## 2020-02-24 PROCEDURE — 20000000 HC ICU ROOM: Mod: HCNC

## 2020-02-24 PROCEDURE — 63600175 PHARM REV CODE 636 W HCPCS: Mod: HCNC | Performed by: STUDENT IN AN ORGANIZED HEALTH CARE EDUCATION/TRAINING PROGRAM

## 2020-02-24 PROCEDURE — 99233 SBSQ HOSP IP/OBS HIGH 50: CPT | Mod: HCNC,,, | Performed by: NURSE PRACTITIONER

## 2020-02-24 PROCEDURE — 85730 THROMBOPLASTIN TIME PARTIAL: CPT | Mod: 91,HCNC

## 2020-02-24 PROCEDURE — 94761 N-INVAS EAR/PLS OXIMETRY MLT: CPT | Mod: HCNC

## 2020-02-24 PROCEDURE — 27000221 HC OXYGEN, UP TO 24 HOURS: Mod: HCNC

## 2020-02-24 PROCEDURE — 84100 ASSAY OF PHOSPHORUS: CPT | Mod: HCNC

## 2020-02-24 RX ORDER — IPRATROPIUM BROMIDE AND ALBUTEROL SULFATE 2.5; .5 MG/3ML; MG/3ML
3 SOLUTION RESPIRATORY (INHALATION) EVERY 4 HOURS
Status: DISCONTINUED | OUTPATIENT
Start: 2020-02-24 | End: 2020-02-28 | Stop reason: HOSPADM

## 2020-02-24 RX ORDER — ACETAMINOPHEN 500 MG
1000 TABLET ORAL EVERY 6 HOURS
Status: COMPLETED | OUTPATIENT
Start: 2020-02-24 | End: 2020-02-25

## 2020-02-24 RX ADMIN — ACETAMINOPHEN 1000 MG: 500 TABLET ORAL at 11:02

## 2020-02-24 RX ADMIN — LEVETIRACETAM 1000 MG: 10 INJECTION INTRAVENOUS at 08:02

## 2020-02-24 RX ADMIN — ACETAMINOPHEN 1000 MG: 500 TABLET ORAL at 02:02

## 2020-02-24 RX ADMIN — IPRATROPIUM BROMIDE AND ALBUTEROL SULFATE 3 ML: .5; 3 SOLUTION RESPIRATORY (INHALATION) at 08:02

## 2020-02-24 RX ADMIN — MICONAZOLE NITRATE: 20 OINTMENT TOPICAL at 09:02

## 2020-02-24 RX ADMIN — AMANTADINE HYDROCHLORIDE 100 MG: 50 SOLUTION ORAL at 02:02

## 2020-02-24 RX ADMIN — ATORVASTATIN CALCIUM 40 MG: 20 TABLET, FILM COATED ORAL at 08:02

## 2020-02-24 RX ADMIN — MICONAZOLE NITRATE: 20 OINTMENT TOPICAL at 08:02

## 2020-02-24 RX ADMIN — IPRATROPIUM BROMIDE AND ALBUTEROL SULFATE 3 ML: .5; 3 SOLUTION RESPIRATORY (INHALATION) at 12:02

## 2020-02-24 RX ADMIN — IPRATROPIUM BROMIDE AND ALBUTEROL SULFATE 3 ML: .5; 3 SOLUTION RESPIRATORY (INHALATION) at 03:02

## 2020-02-24 RX ADMIN — LEVETIRACETAM 750 MG: 100 INJECTION, SOLUTION, CONCENTRATE INTRAVENOUS at 08:02

## 2020-02-24 RX ADMIN — SODIUM CHLORIDE 30 MG/ML INHALATION SOLUTION 4 ML: 30 SOLUTION INHALANT at 08:02

## 2020-02-24 RX ADMIN — ACETAMINOPHEN 650 MG: 325 TABLET ORAL at 08:02

## 2020-02-24 RX ADMIN — ESCITALOPRAM OXALATE 20 MG: 10 TABLET ORAL at 08:02

## 2020-02-24 RX ADMIN — SODIUM CHLORIDE 30 MG/ML INHALATION SOLUTION 4 ML: 30 SOLUTION INHALANT at 03:02

## 2020-02-24 RX ADMIN — HEPARIN SODIUM 21 UNITS/KG/HR: 10000 INJECTION, SOLUTION INTRAVENOUS at 05:02

## 2020-02-24 NOTE — PROGRESS NOTES
[facility]  Neurocritical Care  Progress Note    Admit Date: 2/2/2020  Service Date: 02/24/2020  Length of Stay: 22    Subjective:     Chief Complaint: Seizure    History of Present Illness: Pt is a 64 y.o. Male with PMHx of of DVT, PE, DM, and seizures (on 1500 keppra at home) who presents to the ED via EMS with complaint of a seizure that occurred this morning. Pt has long standing history of seizures and was found down by wife on cement around 0800, LKN 0700. Patient compliant with keppra per wife and last seizure was in November. At that time his keppra dose was increased. Patient has had cough recently but no other symptoms. Patient is on AC for DVT/PE and was recently switched from Coumadin to Eliquis. He was given 10 mg Versed during EMS ride to outside facility for continued seizure activity. He was unresponsive upon arrival to ED and subsequently intubated. CTH without any acute changes, CT max/face with multiple facial fractures, and CXR with consolidation and atelectasis/collapsed lung. Patient transferred to [facility] to higher level neurologic monitoring and continuous EEG.     Hospital Course: 2/2/2020: Admit to [facility]. GS and ENT consulted. MX facial fractures and concerns of bowel ischemia on CT  2/3 will rehook to EEG for 24h. Remains NPO. US LE pending. Monitor lactic [id number] 2L  IV. Wean levophed as tolerated after bolus  2/4: remains on small amount of pressors, cultures remain negative, cont abx, cont current AEDs, EEG overnight negative for seizure activity, repeat ct abdomen today  2/5: minimal dose pressors, advance TFs. Tolerating SBT, possible trial of extubation in am  2/6: need for pressors will likely cease when able to come off sedation, currently still requires mechanical ventilation with pO2 value of 60 on 40% fiO2 and ps 10  02/08/2020 exam still poor, getting MRI brain   02/09/2020 Not tolerating CPAP, placed on AC   02/10/2020 NAEO  02/11/2020 NAEO  02/12/2020 CPAP  trial this am   02/13/2020 NAEON. On heparin drip with stable exam.   02/14/2020 NAEON, f/u EEG (on Keppra). Will give modafinil trial today.   02/15/2020: Stable overnight. Little improvement with modafinil. Heparin gtt stopped for LP procedure. Meningitic coverage started.  02/16/2020: LP performed overnight. Heparin gtt restarted. Antibiotics stopped. Continue acyclovir. Patient more awake today. Increased free water flushes. Started amantadine.  02/17/2020 NAEO. Planning a family meeting to discuss trach/peg   02/18/2020 family still discussing trach/peg. Required increased PEEP overnight   02/19/2020 vent requirements stable, awaiting trach/PEG  02/20/2020  Trach/PEG pending  ENT consulted due to vent requirements  Weaning vent as tolerated, now down to PEEP of 8 and 45%  Neuro exam waxing and waning but stable  2/21/2020 No significant events over night. Tolerating spon. Breathing trial today. More alert today. Attempted breathing parameters.pt not following well poor attempts at NIF and VC. Will reassess tomorrow.   2/22 no significant events over night. Tolerating SBT. Pt more sleepy today. precedex turned off.  Keppra decreased to 1g q12. Not as responsive this afternoon. Not following commands, not attentive, not tracking. Sent for [facility], no new changes noted. Placed on EEG cap will monitor over night  2/23 No significant events over night. EEG cap showed no seizures . EEG dcd. Pt more alert today. Tracking responding to wife. toelrating SBT. TF off NGT to suction. + cuff leak good breathing parameters. Extubated to NC 3L  No s/s stridor or difficulty breathing.  02/24/2020: switched TF to Glucerna per nutation reccs, decrease keppra to 750mg  BID, cardiac chair today, schedule Tylenol 1 gm q6h for 24 h, d/c 3% nebs        Review of Systems  Unable to obtain a complete ROS due to level of consciousness.  Objective:     Vitals:  Temp: 99.2 °F (37.3 °C)  Pulse: 97  Rhythm: normal sinus rhythm  BP: (!)  109/55  MAP (mmHg): 77  Resp: (!) 27  SpO2: 98 %  O2 Device (Oxygen Therapy): nasal cannula    Temp  Min: 98.6 °F (37 °C)  Max: 100.3 °F (37.9 °C)  Pulse  Min: 92  Max: 408  BP  Min: 98/55  Max: 127/59  MAP (mmHg)  Min: 68  Max: 86  Resp  Min: 18  Max: 32  SpO2  Min: 93 %  Max: 100 %    02/23 0701 - 02/24 0700  In: 2236.4 [I.V.:301.4]  Out: 1895 [Urine:1545]   Unmeasured Output  Urine Occurrence: 1  Stool Occurrence: 1  Pad Count: 2       Physical Exam  GA: comfortable, no acute distress.   HEENT: No scleral icterus or JVD.   Pulmonary: Course to auscultation A/L.   Cardiac: RRR S1 & S2 w/o rubs/murmurs/gallops.   Abdominal: Bowel sounds present x 4. No appreciable hepatosplenomegaly.  Skin: No jaundice, rashes, or visible lesions.  Psych: normal affect  Neuro:  --GCS: E4 V4 M6  --Mental Status:  Opens eyes spont, follows simple commands  --Pupils 3mm, PERRL.   --Corneal reflex, gag, cough intact.  --ZAMUDIO spont    Medications:  Continuous  heparin (porcine) in D5W Last Rate: 20.986 Units/kg/hr (02/24/20 1401)   Scheduled  acetaminophen 1,000 mg Q6H   albuterol-ipratropium 3 mL Q4H   amantadine  mg BID   atorvastatin 40 mg QHS   escitalopram oxalate 20 mg Daily   levetiracetam IVPB 750 mg Q12H   miconazole nitrate 2%  BID   PRN  acetaminophen 650 mg Q6H PRN   Dextrose 10% Bolus 12.5 g PRN   fentaNYL 50 mcg Q2H PRN   glucagon (human recombinant) 1 mg PRN   insulin aspart U-100 1-10 Units Q6H PRN   labetalol 10 mg Q4H PRN   magnesium oxide 800 mg PRN   magnesium oxide 800 mg PRN   ondansetron 4 mg Q8H PRN   potassium chloride 10% 40 mEq PRN   potassium chloride 10% 40 mEq PRN   potassium chloride 10% 60 mEq PRN   potassium, sodium phosphates 2 packet PRN   potassium, sodium phosphates 2 packet PRN   potassium, sodium phosphates 2 packet PRN   sodium chloride 0.9% 10 mL PRN     Today I personally reviewed pertinent medications, lines/drains/airways, imaging, laboratory results,     Diet  Diet  NPO      Assessment/Plan:     Neuro  * Seizure  decreased keppra 750mg q12h      Acute encephalopathy  Multifactorial    Patient extubated on NC  Likely will need PEG tube    Altered mental status  Multifactorial          Stroke  - Incidentally seen on MRI w small scattered areas of subacute/remote infarcts.  Cont current secondary prevention measures.  Neuro checks q1h  SBP < 180  - CTA head and neck with no LVO  - Continue heparin gtt    Atorvastatin 40mg daily  Amantadine 100mg bid  PT/OT    Psychiatric  Depression with anxiety  Continue escitalopram 20mg daily    Derm  Alteration in skin integrity  Wound care following  Skin assessment q shift  Turn q2h    Pulmonary  Aspiration pneumonia  All cx are negative, dc abx     Acute respiratory failure with hypoxia  2/23 Extubated to 3L NC   no s/s stridor or difficulty breathing  Monitor CXR/ABG  duonebs q4h      Cardiac/Vascular  Idiopathic hypotension  MAP < 65  Not on any hypertensives    Hematology  Chronic anticoagulation  Continue Heparin gtt     History of DVT (deep vein thrombosis)  On  heparin gtt       Orthopedic  Multiple closed fractures of facial bone  ENT consulted            The patient is being Prophylaxed for:  Venous Thromboembolism with: Chemical  Stress Ulcer with: None  Ventilator Pneumonia with: not applicable    Activity Orders          Diet NPO: NPO starting at 02/20 0001        Full Code    Yani Alva NP  Neurocritical Care  [facility]

## 2020-02-24 NOTE — PLAN OF CARE
POC reviewed with pt and family at 1400. Family verbalized understanding. Questions and concerns addressed. No acute events today. Bath completed. Tube feeds switched to glucerna per order. Heparin gtt per nomogram. Pt progressing toward goals. Will continue to monitor. See flowsheets for full assessment and VS info.    Problem: Wound  Goal: Optimal Wound Healing  Intervention: Promote Effective Wound Healing  Flowsheets (Taken 2/24/2020 9488)  Oral Nutrition Promotion: calorie dense liquids provided; medicated; physical activity promoted; rest periods promoted  Sleep/Rest Enhancement: awakenings minimized; consistent schedule promoted; family presence promoted; music provided; regular sleep/rest pattern promoted; relaxation techniques promoted  Pain Management Interventions: breathing exercises; care clustered; pillow support provided; position adjusted

## 2020-02-24 NOTE — PLAN OF CARE
POC reviewed with pt at 0500. Pt verbalized understanding. Questions and concerns addressed. Nasal trumpet inserted for NT suction. Pt has thick secretions. Heparin drip titrated. No acute events overnight. Pt progressing toward goals. Will continue to monitor. See flowsheets for full assessment and VS info

## 2020-02-24 NOTE — NURSING
Pt's L pupil sluggish per pupillometer with NPi 1.8 and R pupil brisk NPi 3.5.  Remaining neuro exam remains unchanged, MD Astrid at bedside for rounds, no new orders at this time.

## 2020-02-24 NOTE — PROGRESS NOTES
"[facility]  Adult Nutrition  Progress Note    SUMMARY       Recommendations    1. Recommend modifying TF to Glucerna 1.5, goal rate of 45mL/hr.    -Will provide 1620kcal, 89g protein, and 820mL fluid.   2. If able, initiate oral diet - Diabetic diet with texture per SLP. Will monitor.   Goals: Pt to receive nutrition by RD follow up  Nutrition Goal Status: goal met  Communication of RD Recs: other (comment)(POC)    Reason for Assessment    Reason For Assessment: RD follow-up  Diagnosis: seizures  Relevant Medical History: HLD, DM, seizures, DVT/PE  Interdisciplinary Rounds: attended  General Information Comments: Pt extubated yesterday. Has been tolerating TF at goal rate since restarting. On 2/3- Family at bedside providing nutrition hx. Per family, pt with adequate po intake/appetite PTA. No weight loss per family, weight stable approx 145-150lb > 7 years. Pt overweight and nourished without indications of malnutrition at this time.  Nutrition Discharge Planning: unable to determine at this time    Nutrition/Diet History    Spiritual, Cultural Beliefs, Nondenominational Practices, Values that Affect Care: no  Factors Affecting Nutritional Intake: NPO    Anthropometrics    Temp: 100.3 °F (37.9 °C)  Height: 5' 4" (162.6 cm)  Height (inches): 64 in  Weight Method: Bed Scale  Weight: 68.5 kg (151 lb)  Weight (lb): 151 lb  Ideal Body Weight (IBW), Male: 130 lb  % Ideal Body Weight, Male (lb): 116.15 %  BMI (Calculated): 25.9  BMI Grade: 25 - 29.9 - overweight     Lab/Procedures/Meds    Pertinent Labs Reviewed: reviewed  Pertinent Labs Comments: noted  Pertinent Medications Reviewed: reviewed    Estimated/Assessed Needs    Weight Used For Calorie Calculations: 68.5 kg (151 lb 0.2 oz)  Energy Calorie Requirements (kcal): 1720  Energy Need Method: [place] Jeor(1.25 PAL)  Protein Requirements: 68-82g (1-1.2g/kg)  Weight Used For Protein Calculations: 68.5 kg (151 lb 0.2 oz)  Fluid Requirements (mL): 1mL/kcal " or per MD  RDA Method (mL): 1720  CHO Requirement: 227g CHO daily    Nutrition Prescription Ordered    Current Diet Order: NPO  Nutrition Order Comments: Tolerating at goal  Current Nutrition Support Formula Ordered: Impact Peptide 1.5  Current Nutrition Support Rate Ordered: 50 (ml)  Current Nutrition Support Frequency Ordered: mL/hr    Evaluation of Received Nutrient/Fluid Intake    Enteral Calories (kcal): 1800  Enteral Protein (gm): 113  Enteral (Free Water) Fluid (mL): 924  % Kcal Needs: 105  % Protein Needs: 138  IV Fluid (mL): 0  I/O: +5.6L since 2/10  Energy Calories Required: meeting needs  Protein Required: exceeds needs  Fluid Required: other (see comments)(per MD)  Tolerance: tolerating  % Intake of Estimated Energy Needs: 75 - 100 %  % Meal Intake: NPO    Nutrition Risk    Level of Risk/Frequency of Follow-up: (2X/week)     Assessment and Plan    Nutrition Problem  Inadequate energy intake     Related to (etiology):   NPO     Signs and Symptoms (as evidenced by):   Pt receiving < 75% EEN and EPN      Interventions(treatment strategy):  Collaboration of care with providers     Nutrition Diagnosis Status:   Resolved     Monitor and Evaluation    Food and Nutrient Intake: energy intake, food and beverage intake, enteral nutrition intake  Food and Nutrient Adminstration: diet order, enteral and parenteral nutrition administration  Anthropometric Measurements: weight, weight change, body mass index  Biochemical Data, Medical Tests and Procedures: electrolyte and renal panel, inflammatory profile, gastrointestinal profile, glucose/endocrine profile, lipid profile  Nutrition-Focused Physical Findings: overall appearance     Nutrition Follow-Up    RD Follow-up?: Yes

## 2020-02-24 NOTE — PROGRESS NOTES
[facility]  Neurocritical Care  Progress Note    Admit Date: 2/2/2020  Service Date: 02/23/2020  Length of Stay: 21    Subjective:     Chief Complaint: Seizure    History of Present Illness: Pt is a 64 y.o. Male with PMHx of of DVT, PE, DM, and seizures (on 1500 keppra at home) who presents to the ED via EMS with complaint of a seizure that occurred this morning. Pt has long standing history of seizures and was found down by wife on cement around 0800, LKN 0700. Patient compliant with keppra per wife and last seizure was in November. At that time his keppra dose was increased. Patient has had cough recently but no other symptoms. Patient is on AC for DVT/PE and was recently switched from Coumadin to Eliquis. He was given 10 mg Versed during EMS ride to outside facility for continued seizure activity. He was unresponsive upon arrival to ED and subsequently intubated. CTH without any acute changes, CT max/face with multiple facial fractures, and CXR with consolidation and atelectasis/collapsed lung. Patient transferred to [facility] to higher level neurologic monitoring and continuous EEG.     Hospital Course: 2/2/2020: Admit to [facility]. GS and ENT consulted. MX facial fractures and concerns of bowel ischemia on CT  2/3 will rehook to EEG for 24h. Remains NPO. US LE pending. Monitor lactic [id number] 2L  IV. Wean levophed as tolerated after bolus  2/4: remains on small amount of pressors, cultures remain negative, cont abx, cont current AEDs, EEG overnight negative for seizure activity, repeat ct abdomen today  2/5: minimal dose pressors, advance TFs. Tolerating SBT, possible trial of extubation in am  2/6: need for pressors will likely cease when able to come off sedation, currently still requires mechanical ventilation with pO2 value of 60 on 40% fiO2 and ps 10  02/08/2020 exam still poor, getting MRI brain   02/09/2020 Not tolerating CPAP, placed on AC   02/10/2020 NAEO  02/11/2020 NAEO  02/12/2020 CPAP  trial this am   02/13/2020 NAEON. On heparin drip with stable exam.   02/14/2020 NAEON, f/u EEG (on Keppra). Will give modafinil trial today.   02/15/2020: Stable overnight. Little improvement with modafinil. Heparin gtt stopped for LP procedure. Meningitic coverage started.  02/16/2020: LP performed overnight. Heparin gtt restarted. Antibiotics stopped. Continue acyclovir. Patient more awake today. Increased free water flushes. Started amantadine.  02/17/2020 NAEO. Planning a family meeting to discuss trach/peg   02/18/2020 family still discussing trach/peg. Required increased PEEP overnight   02/19/2020 vent requirements stable, awaiting trach/PEG  02/20/2020  Trach/PEG pending  ENT consulted due to vent requirements  Weaning vent as tolerated, now down to PEEP of 8 and 45%  Neuro exam waxing and waning but stable  2/21/2020 No significant events over night. Tolerating spon. Breathing trial today. More alert today. Attempted breathing parameters.pt not following well poor attempts at NIF and VC. Will reassess tomorrow.   2/22 no significant events over night. Tolerating SBT. Pt more sleepy today. precedex turned off.  Keppra decreased to 1g q12. Not as responsive this afternoon. Not following commands, not attentive, not tracking. Sent for [facility], no new changes noted. Placed on EEG cap will monitor over night  2/23 No significant events over night. EEG cap showed no seizures . EEG dcd. Pt more alert today. Tracking responding to wife. toelrating SBT. TF off NGT to suction. + cuff leak good breathing parameters. Extubated to NC 3L  No s/s stridor or difficulty breathing.    Interval History: No significant events over night. EEG cap showed no seizures . EEG dcd. Pt more alert today. Tracking responding to wife. toelrating SBT. TF off NGT to suction. + cuff leak good breathing parameters. Extubated to NC 3L  No s/s stridor or difficulty breathing.        Review of Systems:  Unable to obtain a complete ROS due to level  of consciousness.     Vitals:   Temp: 98.9 °F (37.2 °C)  Pulse: 108  Rhythm: normal sinus rhythm  BP: 102/62  MAP (mmHg): 75  Resp: 20  SpO2: 97 %  Oxygen Concentration (%): 40  O2 Device (Oxygen Therapy): nasal cannula  Vent Mode: Spont  Pressure Support: 7 cmH20  PEEP/CPAP: 5 cmH20  Peak Airway Pressure: 13 cmH2O  Mean Airway Pressure: 7.6 cmH20  Plateau Pressure: 25 cmH20    Temp  Min: 98.7 °F (37.1 °C)  Max: 100 °F (37.8 °C)  Pulse  Min: 92  Max: 121  BP  Min: 98/55  Max: 135/68  MAP (mmHg)  Min: 68  Max: 93  Resp  Min: 13  Max: 35  SpO2  Min: 93 %  Max: 100 %  Oxygen Concentration (%)  Min: 40  Max: 40    02/22 0701 - 02/23 0700  In: 2515.9 [I.V.:425.9]  Out: 2905 [Urine:2205]   Unmeasured Output  Urine Occurrence: 1  Stool Occurrence: 1  Pad Count: 2     Examination:   .Constitutional: Well-nourished and -developed. No apparent distress.   Eyes: Conjunctiva clear, anicteric. Lids no lesions.  Head/Ears/Nose/Mouth/Throat/Neck: Moist mucous membranes. External ears, nose atraumatic.   Cardiovascular: Regular rhythm. No murmurs. No leg edema.  Respiratory:Mech. Ventilation bilat breath sounds clear to ausc.  Gastrointestinal: No hernia. Soft, nondistended, nontender. + bowel sounds.     Neurologic:  -GCS E4V1M6  -Alert. Intubatedt. Follows simple  commands.  -Cranial nervesPERRL 3+ + cough, gag, corneals  -Motor ZAMUDIO spon and to command  -Sensation bilat intact to soft touch  Unable to test orientation, language, memory, judgment, insight, fund of knowledge, coordination, gait due to level of consciousness.    Medications:   Continuous  heparin (porcine) in D5W Last Rate: 19.078 Units/kg/hr (02/23/20 2102)   Scheduled  albuterol-ipratropium 3 mL Q4H   amantadine  mg BID   atorvastatin 40 mg QHS   escitalopram oxalate 20 mg Daily   levetiracetam IVPB 1,000 mg Q12H   miconazole nitrate 2%  BID   sodium chloride 3% 4 mL Q4H   PRN  acetaminophen 650 mg Q6H PRN   Dextrose 10% Bolus 12.5 g PRN   fentaNYL 50 mcg  Q2H PRN   glucagon (human recombinant) 1 mg PRN   insulin aspart U-100 1-10 Units Q6H PRN   labetalol 10 mg Q4H PRN   magnesium oxide 800 mg PRN   magnesium oxide 800 mg PRN   ondansetron 4 mg Q8H PRN   potassium chloride 10% 40 mEq PRN   potassium chloride 10% 40 mEq PRN   potassium chloride 10% 60 mEq PRN   potassium, sodium phosphates 2 packet PRN   potassium, sodium phosphates 2 packet PRN   potassium, sodium phosphates 2 packet PRN   sodium chloride 0.9% 10 mL PRN      Today I independently reviewed pertinent medications, lines/drains/airways, imaging, laboratory results, microbiology results, notably:     ISTAT: Recent Labs   Lab 02/23/20  0351   PH 7.458*   PCO2 37.7   PO2 67*   POCSATURATED 94*   HCO3 26.7   BE 3   POCTCO2 28*   SAMPLE ARTERIAL      Chem: Recent Labs   Lab 02/23/20  0115      K 4.2      CO2 22*      BUN 13   CREATININE 0.8   ESTGFRAFRICA >60.0   EGFRNONAA >60.0   CALCIUM 8.8   MG 1.9   PHOS 2.9   ANIONGAP 12   PROT 6.9   ALBUMIN 3.2*   BILITOT 0.3   ALKPHOS 86   AST 29   ALT 23     Heme: Recent Labs   Lab 02/23/20  0115   WBC 10.67   HGB 9.6*   HCT 30.9*        Endo:   Recent Labs   Lab 02/23/20  0043 02/23/20  1153 02/23/20  1714   POCTGLUCOSE 108 135* 110      Assessment/Plan:     Neuro  * Seizure  decreased keppra 1000mg q12h  2/22 Placed on EEG cap over night.  No seizures noted eeg dcd    Altered mental status  Multifactorial          Stroke  - Incidentally seen on MRI w small scattered areas of subacute/remote infarcts.  Cont current secondary prevention measures.  Neuro checks q1h  SBP < 180  - CTA head and neck with no LVO  - Continue heparin gtt    Atorvastatin 40mg daily  Amantadine 100mg bid  PT/OT    Psychiatric  Depression with anxiety  Continue escitalopram 20mg daily    Derm  Alteration in skin integrity  Wound care following    Pulmonary  Acute respiratory failure with hypoxia  2/23 Extubated to 3L NC   no s/s stridor or difficulty breathing  Monitor  CXR/ABG  duonebs q4h      Hematology  Chronic anticoagulation  Continue Heparin gtt           The patient is being Prophylaxed for:  Venous Thromboembolism with: Mechanical or Chemical  Stress Ulcer with: H2B  Ventilator Pneumonia with: not applicable    Activity Orders          Diet NPO: NPO starting at 02/20 0001        Full Code     I have spent 35 min with this patient, with over 50% of this time spent coordinating care and speaking with the family    Tammie Bob NP  Neurocritical Care  [facility]

## 2020-02-24 NOTE — PLAN OF CARE
Recommendations    1. Recommend modifying TF to Glucerna 1.5, goal rate of 45mL/hr.    -Will provide 1620kcal, 89g protein, and 820mL fluid.   2. If able, initiate oral diet - Diabetic diet with texture per SLP. Will monitor.

## 2020-02-24 NOTE — SUBJECTIVE & OBJECTIVE
Review of Systems  Unable to obtain a complete ROS due to level of consciousness.  Objective:     Vitals:  Temp: 99.2 °F (37.3 °C)  Pulse: 97  Rhythm: normal sinus rhythm  BP: (!) 109/55  MAP (mmHg): 77  Resp: (!) 27  SpO2: 98 %  O2 Device (Oxygen Therapy): nasal cannula    Temp  Min: 98.6 °F (37 °C)  Max: 100.3 °F (37.9 °C)  Pulse  Min: 92  Max: 408  BP  Min: 98/55  Max: 127/59  MAP (mmHg)  Min: 68  Max: 86  Resp  Min: 18  Max: 32  SpO2  Min: 93 %  Max: 100 %    02/23 0701 - 02/24 0700  In: 2236.4 [I.V.:301.4]  Out: 1895 [Urine:1545]   Unmeasured Output  Urine Occurrence: 1  Stool Occurrence: 1  Pad Count: 2       Physical Exam  GA: comfortable, no acute distress.   HEENT: No scleral icterus or JVD.   Pulmonary: Course to auscultation A/L.   Cardiac: RRR S1 & S2 w/o rubs/murmurs/gallops.   Abdominal: Bowel sounds present x 4. No appreciable hepatosplenomegaly.  Skin: No jaundice, rashes, or visible lesions.  Psych: normal affect  Neuro:  --GCS: E4 V4 M6  --Mental Status:  Opens eyes spont, follows simple commands  --Pupils 3mm, PERRL.   --Corneal reflex, gag, cough intact.  --ZAMUDIO spont    Medications:  Continuous  heparin (porcine) in D5W Last Rate: 20.986 Units/kg/hr (02/24/20 1401)   Scheduled  acetaminophen 1,000 mg Q6H   albuterol-ipratropium 3 mL Q4H   amantadine  mg BID   atorvastatin 40 mg QHS   escitalopram oxalate 20 mg Daily   levetiracetam IVPB 750 mg Q12H   miconazole nitrate 2%  BID   PRN  acetaminophen 650 mg Q6H PRN   Dextrose 10% Bolus 12.5 g PRN   fentaNYL 50 mcg Q2H PRN   glucagon (human recombinant) 1 mg PRN   insulin aspart U-100 1-10 Units Q6H PRN   labetalol 10 mg Q4H PRN   magnesium oxide 800 mg PRN   magnesium oxide 800 mg PRN   ondansetron 4 mg Q8H PRN   potassium chloride 10% 40 mEq PRN   potassium chloride 10% 40 mEq PRN   potassium chloride 10% 60 mEq PRN   potassium, sodium phosphates 2 packet PRN   potassium, sodium phosphates 2 packet PRN   potassium, sodium phosphates 2  packet PRN   sodium chloride 0.9% 10 mL PRN     Today I personally reviewed pertinent medications, lines/drains/airways, imaging, laboratory results,     Diet  Diet NPO

## 2020-02-25 LAB
ALBUMIN SERPL BCP-MCNC: 2.6 G/DL (ref 3.5–5.2)
ALLENS TEST: ABNORMAL
ALP SERPL-CCNC: 75 U/L (ref 55–135)
ALT SERPL W/O P-5'-P-CCNC: 15 U/L (ref 10–44)
ANION GAP SERPL CALC-SCNC: 10 MMOL/L (ref 8–16)
APTT BLDCRRT: 42.3 SEC (ref 21–32)
APTT BLDCRRT: 57.9 SEC (ref 21–32)
AST SERPL-CCNC: 18 U/L (ref 10–40)
BASOPHILS # BLD AUTO: 0.02 K/UL (ref 0–0.2)
BASOPHILS NFR BLD: 0.2 % (ref 0–1.9)
BILIRUB SERPL-MCNC: 0.3 MG/DL (ref 0.1–1)
BUN SERPL-MCNC: 17 MG/DL (ref 8–23)
CALCIUM SERPL-MCNC: 8.1 MG/DL (ref 8.7–10.5)
CHLORIDE SERPL-SCNC: 108 MMOL/L (ref 95–110)
CO2 SERPL-SCNC: 23 MMOL/L (ref 23–29)
CREAT SERPL-MCNC: 0.7 MG/DL (ref 0.5–1.4)
DELSYS: ABNORMAL
DIFFERENTIAL METHOD: ABNORMAL
EOSINOPHIL # BLD AUTO: 0.2 K/UL (ref 0–0.5)
EOSINOPHIL NFR BLD: 1.8 % (ref 0–8)
ERYTHROCYTE [DISTWIDTH] IN BLOOD BY AUTOMATED COUNT: 16 % (ref 11.5–14.5)
EST. GFR  (AFRICAN AMERICAN): >60 ML/MIN/1.73 M^2
EST. GFR  (NON AFRICAN AMERICAN): >60 ML/MIN/1.73 M^2
FIO2: 50
FLOW: 15
GLUCOSE SERPL-MCNC: 114 MG/DL (ref 70–110)
HCO3 UR-SCNC: 28.7 MMOL/L (ref 24–28)
HCT VFR BLD AUTO: 30.3 % (ref 40–54)
HGB BLD-MCNC: 9.7 G/DL (ref 14–18)
IMM GRANULOCYTES # BLD AUTO: 0.06 K/UL (ref 0–0.04)
IMM GRANULOCYTES NFR BLD AUTO: 0.5 % (ref 0–0.5)
LYMPHOCYTES # BLD AUTO: 1.4 K/UL (ref 1–4.8)
LYMPHOCYTES NFR BLD: 11.5 % (ref 18–48)
MAGNESIUM SERPL-MCNC: 1.6 MG/DL (ref 1.6–2.6)
MCH RBC QN AUTO: 32.4 PG (ref 27–31)
MCHC RBC AUTO-ENTMCNC: 32 G/DL (ref 32–36)
MCV RBC AUTO: 101 FL (ref 82–98)
MODE: ABNORMAL
MONOCYTES # BLD AUTO: 0.8 K/UL (ref 0.3–1)
MONOCYTES NFR BLD: 6.7 % (ref 4–15)
NEUTROPHILS # BLD AUTO: 9.7 K/UL (ref 1.8–7.7)
NEUTROPHILS NFR BLD: 79.3 % (ref 38–73)
NRBC BLD-RTO: 0 /100 WBC
PCO2 BLDA: 39.1 MMHG (ref 35–45)
PH SMN: 7.47 [PH] (ref 7.35–7.45)
PHOSPHATE SERPL-MCNC: 3.2 MG/DL (ref 2.7–4.5)
PLATELET # BLD AUTO: 264 K/UL (ref 150–350)
PMV BLD AUTO: 13.8 FL (ref 9.2–12.9)
PO2 BLDA: 65 MMHG (ref 80–100)
POC BE: 5 MMOL/L
POC SATURATED O2: 94 % (ref 95–100)
POC TCO2: 30 MMOL/L (ref 23–27)
POCT GLUCOSE: 112 MG/DL (ref 70–110)
POCT GLUCOSE: 114 MG/DL (ref 70–110)
POCT GLUCOSE: 142 MG/DL (ref 70–110)
POTASSIUM SERPL-SCNC: 3.4 MMOL/L (ref 3.5–5.1)
POTASSIUM SERPL-SCNC: 4.3 MMOL/L (ref 3.5–5.1)
PROT SERPL-MCNC: 6 G/DL (ref 6–8.4)
RBC # BLD AUTO: 2.99 M/UL (ref 4.6–6.2)
SAMPLE: ABNORMAL
SITE: ABNORMAL
SODIUM SERPL-SCNC: 141 MMOL/L (ref 136–145)
WBC # BLD AUTO: 12.17 K/UL (ref 3.9–12.7)

## 2020-02-25 PROCEDURE — 25000003 PHARM REV CODE 250: Mod: HCNC | Performed by: NURSE PRACTITIONER

## 2020-02-25 PROCEDURE — 94640 AIRWAY INHALATION TREATMENT: CPT | Mod: HCNC

## 2020-02-25 PROCEDURE — 36600 WITHDRAWAL OF ARTERIAL BLOOD: CPT | Mod: HCNC

## 2020-02-25 PROCEDURE — 20000000 HC ICU ROOM: Mod: HCNC

## 2020-02-25 PROCEDURE — 31720 CLEARANCE OF AIRWAYS: CPT | Mod: HCNC

## 2020-02-25 PROCEDURE — 99233 PR SUBSEQUENT HOSPITAL CARE,LEVL III: ICD-10-PCS | Mod: HCNC,,, | Performed by: NURSE PRACTITIONER

## 2020-02-25 PROCEDURE — 25000242 PHARM REV CODE 250 ALT 637 W/ HCPCS: Mod: HCNC | Performed by: NURSE PRACTITIONER

## 2020-02-25 PROCEDURE — 85025 COMPLETE CBC W/AUTO DIFF WBC: CPT | Mod: HCNC

## 2020-02-25 PROCEDURE — 99233 SBSQ HOSP IP/OBS HIGH 50: CPT | Mod: HCNC,,, | Performed by: NURSE PRACTITIONER

## 2020-02-25 PROCEDURE — 85730 THROMBOPLASTIN TIME PARTIAL: CPT | Mod: 91,HCNC

## 2020-02-25 PROCEDURE — 82803 BLOOD GASES ANY COMBINATION: CPT | Mod: HCNC

## 2020-02-25 PROCEDURE — 83735 ASSAY OF MAGNESIUM: CPT | Mod: HCNC

## 2020-02-25 PROCEDURE — 94667 MNPJ CHEST WALL 1ST: CPT | Mod: HCNC

## 2020-02-25 PROCEDURE — 99900035 HC TECH TIME PER 15 MIN (STAT): Mod: HCNC

## 2020-02-25 PROCEDURE — 84132 ASSAY OF SERUM POTASSIUM: CPT | Mod: HCNC

## 2020-02-25 PROCEDURE — 63600175 PHARM REV CODE 636 W HCPCS: Mod: HCNC | Performed by: STUDENT IN AN ORGANIZED HEALTH CARE EDUCATION/TRAINING PROGRAM

## 2020-02-25 PROCEDURE — 27000221 HC OXYGEN, UP TO 24 HOURS: Mod: HCNC

## 2020-02-25 PROCEDURE — 82800 BLOOD PH: CPT | Mod: HCNC

## 2020-02-25 PROCEDURE — 94761 N-INVAS EAR/PLS OXIMETRY MLT: CPT | Mod: HCNC

## 2020-02-25 PROCEDURE — 80053 COMPREHEN METABOLIC PANEL: CPT | Mod: HCNC

## 2020-02-25 PROCEDURE — 63600175 PHARM REV CODE 636 W HCPCS: Mod: HCNC | Performed by: NURSE PRACTITIONER

## 2020-02-25 PROCEDURE — 84100 ASSAY OF PHOSPHORUS: CPT | Mod: HCNC

## 2020-02-25 RX ORDER — FUROSEMIDE 10 MG/ML
40 INJECTION INTRAMUSCULAR; INTRAVENOUS ONCE
Status: COMPLETED | OUTPATIENT
Start: 2020-02-25 | End: 2020-02-25

## 2020-02-25 RX ADMIN — LEVETIRACETAM 750 MG: 100 INJECTION, SOLUTION, CONCENTRATE INTRAVENOUS at 10:02

## 2020-02-25 RX ADMIN — Medication 800 MG: at 12:02

## 2020-02-25 RX ADMIN — ESCITALOPRAM OXALATE 20 MG: 10 TABLET ORAL at 10:02

## 2020-02-25 RX ADMIN — HEPARIN SODIUM 19 UNITS/KG/HR: 10000 INJECTION, SOLUTION INTRAVENOUS at 02:02

## 2020-02-25 RX ADMIN — IPRATROPIUM BROMIDE AND ALBUTEROL SULFATE 3 ML: .5; 3 SOLUTION RESPIRATORY (INHALATION) at 08:02

## 2020-02-25 RX ADMIN — Medication 800 MG: at 08:02

## 2020-02-25 RX ADMIN — IPRATROPIUM BROMIDE AND ALBUTEROL SULFATE 3 ML: .5; 3 SOLUTION RESPIRATORY (INHALATION) at 12:02

## 2020-02-25 RX ADMIN — ACETAMINOPHEN 1000 MG: 500 TABLET ORAL at 01:02

## 2020-02-25 RX ADMIN — IPRATROPIUM BROMIDE AND ALBUTEROL SULFATE 3 ML: .5; 3 SOLUTION RESPIRATORY (INHALATION) at 04:02

## 2020-02-25 RX ADMIN — POTASSIUM CHLORIDE 40 MEQ: 20 SOLUTION ORAL at 05:02

## 2020-02-25 RX ADMIN — Medication 800 MG: at 05:02

## 2020-02-25 RX ADMIN — MICONAZOLE NITRATE: 20 OINTMENT TOPICAL at 10:02

## 2020-02-25 RX ADMIN — AMANTADINE HYDROCHLORIDE 100 MG: 50 SOLUTION ORAL at 05:02

## 2020-02-25 RX ADMIN — AMANTADINE HYDROCHLORIDE 100 MG: 50 SOLUTION ORAL at 01:02

## 2020-02-25 RX ADMIN — POTASSIUM CHLORIDE 40 MEQ: 20 SOLUTION ORAL at 08:02

## 2020-02-25 RX ADMIN — LEVETIRACETAM 750 MG: 100 INJECTION, SOLUTION, CONCENTRATE INTRAVENOUS at 08:02

## 2020-02-25 RX ADMIN — IPRATROPIUM BROMIDE AND ALBUTEROL SULFATE 3 ML: .5; 3 SOLUTION RESPIRATORY (INHALATION) at 03:02

## 2020-02-25 RX ADMIN — ACETAMINOPHEN 1000 MG: 500 TABLET ORAL at 05:02

## 2020-02-25 RX ADMIN — MICONAZOLE NITRATE: 20 OINTMENT TOPICAL at 08:02

## 2020-02-25 RX ADMIN — ATORVASTATIN CALCIUM 40 MG: 20 TABLET, FILM COATED ORAL at 08:02

## 2020-02-25 RX ADMIN — FUROSEMIDE 40 MG: 10 INJECTION, SOLUTION INTRAMUSCULAR; INTRAVENOUS at 10:02

## 2020-02-25 NOTE — NURSING
Pt sats <85 on 2L/NC, pt suctioned placed on non-rebreather at 15L, RT and MD called to bedside. Copious secretions suctioned. Pt sats >90. PRN orders for NT suction and CPT with scheduled breathing treatments.

## 2020-02-25 NOTE — ASSESSMENT & PLAN NOTE
Multifactorial    Patient extubated on NC  -intubation watch, NT suctioning  Likely will need PEG tube

## 2020-02-25 NOTE — PLAN OF CARE
POC reviewed with pt and family at 0300. Family verbalized understanding. Questions and concerns addressed. No acute events overnight. Pt progressing toward goals. Will continue to monitor. See flowsheets for full assessment and VS info

## 2020-02-25 NOTE — RESPIRATORY THERAPY
This note also relates to the following rows which could not be included:  SpO2 - Cannot attach notes to unvalidated device data  Pulse - Cannot attach notes to unvalidated device data    RT called to bedside by silvano d/t SpO2 84%. Placed on Non-rebreather at 15LPM.   NT suction aggressively obtaining large amount blood streaked secretions. SpO2 rebounded to 98% and patient placed back on N.C. At 5LPM

## 2020-02-25 NOTE — PLAN OF CARE
POC reviewed with pt and family at 1400. Family verbalized understanding. Questions and concerns addressed. CXR completed, bath completed, glucerna at goal, heparin gtt therapeutic. Midline dressing changed. Pt progressing toward goals. Will continue to monitor. See flowsheets for full assessment and VS info.    Problem: Wound  Goal: Optimal Wound Healing  Intervention: Promote Effective Wound Healing  Flowsheets (Taken 2/25/2020 1723)  Oral Nutrition Promotion: medicated; calorie dense liquids provided; rest periods promoted  Sleep/Rest Enhancement: awakenings minimized; consistent schedule promoted; family presence promoted; regular sleep/rest pattern promoted  Pain Management Interventions: breathing exercises; care clustered; pillow support provided; position adjusted; quiet environment facilitated

## 2020-02-25 NOTE — SUBJECTIVE & OBJECTIVE
Review of Systems   Unable to obtain a complete ROS due to level of consciousness.   Objective:     Vitals:  Temp: 98.6 °F (37 °C)  Pulse: 97  Rhythm: normal sinus rhythm  BP: (!) 111/55  MAP (mmHg): 79  Resp: (!) 25  SpO2: (!) 93 %  O2 Device (Oxygen Therapy): nasal cannula    Temp  Min: 98.6 °F (37 °C)  Max: 99.8 °F (37.7 °C)  Pulse  Min: 87  Max: 105  BP  Min: 100/56  Max: 130/68  MAP (mmHg)  Min: 72  Max: 93  Resp  Min: 20  Max: 35  SpO2  Min: 84 %  Max: 100 %    02/24 0701 - 02/25 0700  In: 2215.6 [I.V.:370.6]  Out: 1050 [Urine:850]   Unmeasured Output  Urine Occurrence: 1  Stool Occurrence: 1  Pad Count: 2       Physical Exam  GA: comfortable, no acute distress.   HEENT: No scleral icterus or JVD.   Pulmonary: Course and diminished bibasilar to auscultation A//L.   Cardiac: RRR S1 & S2 w/o rubs/murmurs/gallops.   Abdominal: Bowel sounds present x 4. No appreciable hepatosplenomegaly.  Skin: No jaundice, rashes, or visible lesions.  Neuro:  --GCS: E4V4 M6  --Mental Status:  Awake, oriented to self only, confused, follows commands-intermittently, delayed responses  --Pupils 3mm, PERRL.   --Corneal reflex, gag, cough intact.  --ZAMUDIO spont    Medications:  Continuous  heparin (porcine) in D5W Last Rate: 19.078 Units/kg/hr (02/25/20 1501)   Scheduled  albuterol-ipratropium 3 mL Q4H   amantadine  mg BID   atorvastatin 40 mg QHS   escitalopram oxalate 20 mg Daily   levetiracetam IVPB 750 mg Q12H   miconazole nitrate 2%  BID   PRN  acetaminophen 650 mg Q6H PRN   Dextrose 10% Bolus 12.5 g PRN   fentaNYL 50 mcg Q2H PRN   glucagon (human recombinant) 1 mg PRN   insulin aspart U-100 1-10 Units Q6H PRN   labetalol 10 mg Q4H PRN   magnesium oxide 800 mg PRN   magnesium oxide 800 mg PRN   ondansetron 4 mg Q8H PRN   potassium chloride 10% 40 mEq PRN   potassium chloride 10% 40 mEq PRN   potassium chloride 10% 60 mEq PRN   potassium, sodium phosphates 2 packet PRN   potassium, sodium phosphates 2 packet PRN    potassium, sodium phosphates 2 packet PRN   sodium chloride 0.9% 10 mL PRN     Today I personally reviewed pertinent medications, lines/drains/airways, imaging, laboratory results,     Diet  Diet NPO

## 2020-02-25 NOTE — NURSING
1,000 mg acetaminophen due, bpa states pt will go over the max of 3,000 mg for 24 hours. Liver enzymes WNL. Ok per NCC NP to give scheduled dose.

## 2020-02-25 NOTE — PROGRESS NOTES
[facility]  Neurocritical Care  Progress Note    Admit Date: 2/2/2020  Service Date: 02/25/2020  Length of Stay: 23    Subjective:     Chief Complaint: Seizure    History of Present Illness: Pt is a 64 y.o. Male with PMHx of of DVT, PE, DM, and seizures (on 1500 keppra at home) who presents to the ED via EMS with complaint of a seizure that occurred this morning. Pt has long standing history of seizures and was found down by wife on cement around 0800, LKN 0700. Patient compliant with keppra per wife and last seizure was in November. At that time his keppra dose was increased. Patient has had cough recently but no other symptoms. Patient is on AC for DVT/PE and was recently switched from Coumadin to Eliquis. He was given 10 mg Versed during EMS ride to outside facility for continued seizure activity. He was unresponsive upon arrival to ED and subsequently intubated. CTH without any acute changes, CT max/face with multiple facial fractures, and CXR with consolidation and atelectasis/collapsed lung. Patient transferred to [facility] to higher level neurologic monitoring and continuous EEG.     Hospital Course: 2/2/2020: Admit to [facility]. GS and ENT consulted. MX facial fractures and concerns of bowel ischemia on CT  2/3 will rehook to EEG for 24h. Remains NPO. US LE pending. Monitor lactic [id number] 2L  IV. Wean levophed as tolerated after bolus  2/4: remains on small amount of pressors, cultures remain negative, cont abx, cont current AEDs, EEG overnight negative for seizure activity, repeat ct abdomen today  2/5: minimal dose pressors, advance TFs. Tolerating SBT, possible trial of extubation in am  2/6: need for pressors will likely cease when able to come off sedation, currently still requires mechanical ventilation with pO2 value of 60 on 40% fiO2 and ps 10  02/08/2020 exam still poor, getting MRI brain   02/09/2020 Not tolerating CPAP, placed on AC   02/10/2020 NAEO  02/11/2020 NAEO  02/12/2020 CPAP  trial this am   02/13/2020 NAEON. On heparin drip with stable exam.   02/14/2020 NAEON, f/u EEG (on Keppra). Will give modafinil trial today.   02/15/2020: Stable overnight. Little improvement with modafinil. Heparin gtt stopped for LP procedure. Meningitic coverage started.  02/16/2020: LP performed overnight. Heparin gtt restarted. Antibiotics stopped. Continue acyclovir. Patient more awake today. Increased free water flushes. Started amantadine.  02/17/2020 NAEO. Planning a family meeting to discuss trach/peg   02/18/2020 family still discussing trach/peg. Required increased PEEP overnight   02/19/2020 vent requirements stable, awaiting trach/PEG  02/20/2020  Trach/PEG pending  ENT consulted due to vent requirements  Weaning vent as tolerated, now down to PEEP of 8 and 45%  Neuro exam waxing and waning but stable  2/21/2020 No significant events over night. Tolerating spon. Breathing trial today. More alert today. Attempted breathing parameters.pt not following well poor attempts at NIF and VC. Will reassess tomorrow.   2/22 no significant events over night. Tolerating SBT. Pt more sleepy today. precedex turned off.  Keppra decreased to 1g q12. Not as responsive this afternoon. Not following commands, not attentive, not tracking. Sent for [facility], no new changes noted. Placed on EEG cap will monitor over night  2/23 No significant events over night. EEG cap showed no seizures . EEG dcd. Pt more alert today. Tracking responding to wife. toelrating SBT. TF off NGT to suction. + cuff leak good breathing parameters. Extubated to NC 3L  No s/s stridor or difficulty breathing.  02/24/2020: switched TF to Glucerna per nutation reccs, decrease keppra to 750mg  BID, cardiac chair today, schedule Tylenol 1 gm q6h for 24 h, d/c 3% nebs  02/25/2020: brief desaturation-NT suction and placed on nonbriether, CXR, 40 mg Lasix        Review of Systems   Unable to obtain a complete ROS due to level of consciousness.   Objective:      Vitals:  Temp: 98.6 °F (37 °C)  Pulse: 97  Rhythm: normal sinus rhythm  BP: (!) 111/55  MAP (mmHg): 79  Resp: (!) 25  SpO2: (!) 93 %  O2 Device (Oxygen Therapy): nasal cannula    Temp  Min: 98.6 °F (37 °C)  Max: 99.8 °F (37.7 °C)  Pulse  Min: 87  Max: 105  BP  Min: 100/56  Max: 130/68  MAP (mmHg)  Min: 72  Max: 93  Resp  Min: 20  Max: 35  SpO2  Min: 84 %  Max: 100 %    02/24 0701 - 02/25 0700  In: 2215.6 [I.V.:370.6]  Out: 1050 [Urine:850]   Unmeasured Output  Urine Occurrence: 1  Stool Occurrence: 1  Pad Count: 2       Physical Exam  GA: comfortable, no acute distress.   HEENT: No scleral icterus or JVD.   Pulmonary: Course and diminished bibasilar to auscultation A//L.   Cardiac: RRR S1 & S2 w/o rubs/murmurs/gallops.   Abdominal: Bowel sounds present x 4. No appreciable hepatosplenomegaly.  Skin: No jaundice, rashes, or visible lesions.  Neuro:  --GCS: E4V4 M6  --Mental Status:  Awake, oriented to self only, confused, follows commands-intermittently, delayed responses  --Pupils 3mm, PERRL.   --Corneal reflex, gag, cough intact.  --ZAMUDIO spont    Medications:  Continuous  heparin (porcine) in D5W Last Rate: 19.078 Units/kg/hr (02/25/20 1501)   Scheduled  albuterol-ipratropium 3 mL Q4H   amantadine  mg BID   atorvastatin 40 mg QHS   escitalopram oxalate 20 mg Daily   levetiracetam IVPB 750 mg Q12H   miconazole nitrate 2%  BID   PRN  acetaminophen 650 mg Q6H PRN   Dextrose 10% Bolus 12.5 g PRN   fentaNYL 50 mcg Q2H PRN   glucagon (human recombinant) 1 mg PRN   insulin aspart U-100 1-10 Units Q6H PRN   labetalol 10 mg Q4H PRN   magnesium oxide 800 mg PRN   magnesium oxide 800 mg PRN   ondansetron 4 mg Q8H PRN   potassium chloride 10% 40 mEq PRN   potassium chloride 10% 40 mEq PRN   potassium chloride 10% 60 mEq PRN   potassium, sodium phosphates 2 packet PRN   potassium, sodium phosphates 2 packet PRN   potassium, sodium phosphates 2 packet PRN   sodium chloride 0.9% 10 mL PRN     Today I personally reviewed  pertinent medications, lines/drains/airways, imaging, laboratory results,     Diet  Diet NPO      Assessment/Plan:     Neuro  * Seizure  keppra 750mg q12h      Acute encephalopathy  Multifactorial    Patient extubated on NC  -intubation watch, NT suctioning  Likely will need PEG tube    Altered mental status  Multifactorial          Stroke  - Incidentally seen on MRI w small scattered areas of subacute/remote infarcts.  Cont current secondary prevention measures.  Neuro checks q1h  SBP < 180  - CTA head and neck with no LVO  - Continue heparin gtt    Atorvastatin 40mg daily  Amantadine 100mg bid  PT/OT    Psychiatric  Depression with anxiety  Continue escitalopram 20mg daily    Derm  Alteration in skin integrity  Wound care following  Skin assessment q shift  Turn q2h    Pulmonary  Aspiration pneumonia  All cx are negative, dc abx     Cardiac/Vascular  Idiopathic hypotension  MAP < 65  Not on any hypertensives    Hematology  History of DVT (deep vein thrombosis)  On  heparin gtt             The patient is being Prophylaxed for:  Venous Thromboembolism with: Chemical  Stress Ulcer with: None  Ventilator Pneumonia with: not applicable    Activity Orders          Diet NPO: NPO starting at 02/20 0001        Full Code    Yani Alva NP  Neurocritical Care  [facility]

## 2020-02-25 NOTE — RESPIRATORY THERAPY
This note also relates to the following rows which could not be included:  SpO2 - Cannot attach notes to unvalidated device data  Pulse - Cannot attach notes to unvalidated device data  Resp - Cannot attach notes to unvalidated device data  Placed back on Non-rebreather d/t low pulse ox reading.

## 2020-02-26 LAB
ALBUMIN SERPL BCP-MCNC: 2.5 G/DL (ref 3.5–5.2)
ALLENS TEST: ABNORMAL
ALP SERPL-CCNC: 88 U/L (ref 55–135)
ALT SERPL W/O P-5'-P-CCNC: 18 U/L (ref 10–44)
ANION GAP SERPL CALC-SCNC: 10 MMOL/L (ref 8–16)
APTT BLDCRRT: 39.1 SEC (ref 21–32)
AST SERPL-CCNC: 25 U/L (ref 10–40)
BACTERIA #/AREA URNS AUTO: ABNORMAL /HPF
BASOPHILS # BLD AUTO: 0.04 K/UL (ref 0–0.2)
BASOPHILS NFR BLD: 0.4 % (ref 0–1.9)
BILIRUB SERPL-MCNC: 0.4 MG/DL (ref 0.1–1)
BILIRUB UR QL STRIP: NEGATIVE
BUN SERPL-MCNC: 17 MG/DL (ref 8–23)
CALCIUM SERPL-MCNC: 8 MG/DL (ref 8.7–10.5)
CHLORIDE SERPL-SCNC: 109 MMOL/L (ref 95–110)
CLARITY UR REFRACT.AUTO: ABNORMAL
CO2 SERPL-SCNC: 23 MMOL/L (ref 23–29)
COLOR UR AUTO: YELLOW
CREAT SERPL-MCNC: 0.7 MG/DL (ref 0.5–1.4)
DELSYS: ABNORMAL
DIFFERENTIAL METHOD: ABNORMAL
EOSINOPHIL # BLD AUTO: 0.2 K/UL (ref 0–0.5)
EOSINOPHIL NFR BLD: 1.5 % (ref 0–8)
ERYTHROCYTE [DISTWIDTH] IN BLOOD BY AUTOMATED COUNT: 15.9 % (ref 11.5–14.5)
ERYTHROCYTE [SEDIMENTATION RATE] IN BLOOD BY WESTERGREN METHOD: 16 MM/H
EST. GFR  (AFRICAN AMERICAN): >60 ML/MIN/1.73 M^2
EST. GFR  (NON AFRICAN AMERICAN): >60 ML/MIN/1.73 M^2
FIO2: 70
GLUCOSE SERPL-MCNC: 102 MG/DL (ref 70–110)
GLUCOSE UR QL STRIP: NEGATIVE
HCO3 UR-SCNC: 30.3 MMOL/L (ref 24–28)
HCT VFR BLD AUTO: 33.5 % (ref 40–54)
HGB BLD-MCNC: 10.4 G/DL (ref 14–18)
HGB UR QL STRIP: NEGATIVE
HYALINE CASTS UR QL AUTO: 0 /LPF
IMM GRANULOCYTES # BLD AUTO: 0.04 K/UL (ref 0–0.04)
IMM GRANULOCYTES NFR BLD AUTO: 0.4 % (ref 0–0.5)
INR PPP: 1 (ref 0.8–1.2)
KETONES UR QL STRIP: NEGATIVE
LEUKOCYTE ESTERASE UR QL STRIP: NEGATIVE
LYMPHOCYTES # BLD AUTO: 1.3 K/UL (ref 1–4.8)
LYMPHOCYTES NFR BLD: 12 % (ref 18–48)
MAGNESIUM SERPL-MCNC: 1.8 MG/DL (ref 1.6–2.6)
MCH RBC QN AUTO: 31.7 PG (ref 27–31)
MCHC RBC AUTO-ENTMCNC: 31 G/DL (ref 32–36)
MCV RBC AUTO: 102 FL (ref 82–98)
MICROSCOPIC COMMENT: ABNORMAL
MODE: ABNORMAL
MONOCYTES # BLD AUTO: 0.8 K/UL (ref 0.3–1)
MONOCYTES NFR BLD: 7.9 % (ref 4–15)
NEUTROPHILS # BLD AUTO: 8.3 K/UL (ref 1.8–7.7)
NEUTROPHILS NFR BLD: 77.8 % (ref 38–73)
NITRITE UR QL STRIP: NEGATIVE
NRBC BLD-RTO: 0 /100 WBC
PCO2 BLDA: 46 MMHG (ref 35–45)
PEEP: 5
PH SMN: 7.43 [PH] (ref 7.35–7.45)
PH UR STRIP: 5 [PH] (ref 5–8)
PHOSPHATE SERPL-MCNC: 3.4 MG/DL (ref 2.7–4.5)
PLATELET # BLD AUTO: 277 K/UL (ref 150–350)
PMV BLD AUTO: 12.9 FL (ref 9.2–12.9)
PO2 BLDA: 89 MMHG (ref 80–100)
POC BE: 6 MMOL/L
POC SATURATED O2: 97 % (ref 95–100)
POC TCO2: 32 MMOL/L (ref 23–27)
POCT GLUCOSE: 109 MG/DL (ref 70–110)
POCT GLUCOSE: 121 MG/DL (ref 70–110)
POCT GLUCOSE: 127 MG/DL (ref 70–110)
POTASSIUM SERPL-SCNC: 3.4 MMOL/L (ref 3.5–5.1)
PROT SERPL-MCNC: 6 G/DL (ref 6–8.4)
PROT UR QL STRIP: ABNORMAL
PROTHROMBIN TIME: 10.6 SEC (ref 9–12.5)
RBC # BLD AUTO: 3.28 M/UL (ref 4.6–6.2)
RBC #/AREA URNS AUTO: 1 /HPF (ref 0–4)
SAMPLE: ABNORMAL
SITE: ABNORMAL
SODIUM SERPL-SCNC: 142 MMOL/L (ref 136–145)
SP GR UR STRIP: 1.02 (ref 1–1.03)
SP02: 100
SQUAMOUS #/AREA URNS AUTO: 0 /HPF
URN SPEC COLLECT METH UR: ABNORMAL
VT: 450
WBC # BLD AUTO: 10.69 K/UL (ref 3.9–12.7)
WBC #/AREA URNS AUTO: 4 /HPF (ref 0–5)

## 2020-02-26 PROCEDURE — 25000242 PHARM REV CODE 250 ALT 637 W/ HCPCS: Mod: HCNC | Performed by: NURSE PRACTITIONER

## 2020-02-26 PROCEDURE — 63600175 PHARM REV CODE 636 W HCPCS: Mod: HCNC | Performed by: NURSE PRACTITIONER

## 2020-02-26 PROCEDURE — 25000003 PHARM REV CODE 250: Mod: HCNC | Performed by: NURSE PRACTITIONER

## 2020-02-26 PROCEDURE — 94002 VENT MGMT INPAT INIT DAY: CPT | Mod: HCNC

## 2020-02-26 PROCEDURE — 85610 PROTHROMBIN TIME: CPT | Mod: HCNC

## 2020-02-26 PROCEDURE — 85025 COMPLETE CBC W/AUTO DIFF WBC: CPT | Mod: HCNC

## 2020-02-26 PROCEDURE — 80053 COMPREHEN METABOLIC PANEL: CPT | Mod: HCNC

## 2020-02-26 PROCEDURE — 63600175 PHARM REV CODE 636 W HCPCS: Mod: HCNC | Performed by: STUDENT IN AN ORGANIZED HEALTH CARE EDUCATION/TRAINING PROGRAM

## 2020-02-26 PROCEDURE — 85730 THROMBOPLASTIN TIME PARTIAL: CPT | Mod: HCNC

## 2020-02-26 PROCEDURE — 94761 N-INVAS EAR/PLS OXIMETRY MLT: CPT | Mod: HCNC

## 2020-02-26 PROCEDURE — 99900026 HC AIRWAY MAINTENANCE (STAT): Mod: HCNC

## 2020-02-26 PROCEDURE — 63600175 PHARM REV CODE 636 W HCPCS: Mod: HCNC | Performed by: PSYCHIATRY & NEUROLOGY

## 2020-02-26 PROCEDURE — 31720 CLEARANCE OF AIRWAYS: CPT | Mod: HCNC

## 2020-02-26 PROCEDURE — 31500 INSERT EMERGENCY AIRWAY: CPT | Mod: HCNC,,, | Performed by: PSYCHIATRY & NEUROLOGY

## 2020-02-26 PROCEDURE — 94003 VENT MGMT INPAT SUBQ DAY: CPT | Mod: HCNC

## 2020-02-26 PROCEDURE — 27000221 HC OXYGEN, UP TO 24 HOURS: Mod: HCNC

## 2020-02-26 PROCEDURE — 27100092 HC HIGH FLOW DELIVERY CANNULA: Mod: HCNC

## 2020-02-26 PROCEDURE — 99900035 HC TECH TIME PER 15 MIN (STAT): Mod: HCNC

## 2020-02-26 PROCEDURE — 81001 URINALYSIS AUTO W/SCOPE: CPT | Mod: HCNC

## 2020-02-26 PROCEDURE — 31500 INTUBATION: ICD-10-PCS | Mod: HCNC,,, | Performed by: PSYCHIATRY & NEUROLOGY

## 2020-02-26 PROCEDURE — 63600175 PHARM REV CODE 636 W HCPCS: Mod: HCNC

## 2020-02-26 PROCEDURE — 82803 BLOOD GASES ANY COMBINATION: CPT | Mod: HCNC

## 2020-02-26 PROCEDURE — 94640 AIRWAY INHALATION TREATMENT: CPT | Mod: HCNC

## 2020-02-26 PROCEDURE — 99231 PR SUBSEQUENT HOSPITAL CARE,LEVL I: ICD-10-PCS | Mod: HCNC,GC,, | Performed by: SURGERY

## 2020-02-26 PROCEDURE — 99291 CRITICAL CARE FIRST HOUR: CPT | Mod: 25,HCNC,GC, | Performed by: PSYCHIATRY & NEUROLOGY

## 2020-02-26 PROCEDURE — 27200966 HC CLOSED SUCTION SYSTEM: Mod: HCNC

## 2020-02-26 PROCEDURE — 27100171 HC OXYGEN HIGH FLOW UP TO 24 HOURS: Mod: HCNC

## 2020-02-26 PROCEDURE — 87040 BLOOD CULTURE FOR BACTERIA: CPT | Mod: HCNC

## 2020-02-26 PROCEDURE — 20000000 HC ICU ROOM: Mod: HCNC

## 2020-02-26 PROCEDURE — 84100 ASSAY OF PHOSPHORUS: CPT | Mod: HCNC

## 2020-02-26 PROCEDURE — 36600 WITHDRAWAL OF ARTERIAL BLOOD: CPT | Mod: HCNC

## 2020-02-26 PROCEDURE — 83735 ASSAY OF MAGNESIUM: CPT | Mod: HCNC

## 2020-02-26 PROCEDURE — 99291 PR CRITICAL CARE, E/M 30-74 MINUTES: ICD-10-PCS | Mod: 25,HCNC,GC, | Performed by: PSYCHIATRY & NEUROLOGY

## 2020-02-26 PROCEDURE — 99231 SBSQ HOSP IP/OBS SF/LOW 25: CPT | Mod: HCNC,GC,, | Performed by: SURGERY

## 2020-02-26 RX ORDER — ROCURONIUM BROMIDE 10 MG/ML
70 INJECTION, SOLUTION INTRAVENOUS ONCE
Status: COMPLETED | OUTPATIENT
Start: 2020-02-26 | End: 2020-02-26

## 2020-02-26 RX ORDER — FUROSEMIDE 10 MG/ML
40 INJECTION INTRAMUSCULAR; INTRAVENOUS ONCE
Status: COMPLETED | OUTPATIENT
Start: 2020-02-26 | End: 2020-02-26

## 2020-02-26 RX ORDER — VANCOMYCIN 1.75 GRAM/500 ML IN 0.9 % SODIUM CHLORIDE INTRAVENOUS
25 ONCE
Status: COMPLETED | OUTPATIENT
Start: 2020-02-26 | End: 2020-02-26

## 2020-02-26 RX ORDER — FAMOTIDINE 40 MG/5ML
20 POWDER, FOR SUSPENSION ORAL 2 TIMES DAILY
Status: DISCONTINUED | OUTPATIENT
Start: 2020-02-26 | End: 2020-02-27

## 2020-02-26 RX ORDER — VANCOMYCIN HCL IN 5 % DEXTROSE 1G/250ML
1000 PLASTIC BAG, INJECTION (ML) INTRAVENOUS
Status: DISCONTINUED | OUTPATIENT
Start: 2020-02-27 | End: 2020-02-27

## 2020-02-26 RX ORDER — CHLORHEXIDINE GLUCONATE ORAL RINSE 1.2 MG/ML
15 SOLUTION DENTAL 2 TIMES DAILY
Status: DISCONTINUED | OUTPATIENT
Start: 2020-02-26 | End: 2020-02-28 | Stop reason: HOSPADM

## 2020-02-26 RX ORDER — PROPOFOL 10 MG/ML
10 INJECTION, EMULSION INTRAVENOUS CONTINUOUS
Status: DISCONTINUED | OUTPATIENT
Start: 2020-02-26 | End: 2020-02-28

## 2020-02-26 RX ORDER — ETOMIDATE 2 MG/ML
14 INJECTION INTRAVENOUS ONCE
Status: COMPLETED | OUTPATIENT
Start: 2020-02-26 | End: 2020-02-26

## 2020-02-26 RX ORDER — ETOMIDATE 2 MG/ML
INJECTION INTRAVENOUS
Status: COMPLETED
Start: 2020-02-26 | End: 2020-02-26

## 2020-02-26 RX ORDER — SUCCINYLCHOLINE CHLORIDE 20 MG/ML
INJECTION INTRAMUSCULAR; INTRAVENOUS
Status: DISCONTINUED
Start: 2020-02-26 | End: 2020-02-26 | Stop reason: WASHOUT

## 2020-02-26 RX ORDER — ROCURONIUM BROMIDE 10 MG/ML
INJECTION, SOLUTION INTRAVENOUS
Status: COMPLETED
Start: 2020-02-26 | End: 2020-02-26

## 2020-02-26 RX ORDER — PROPOFOL 10 MG/ML
INJECTION, EMULSION INTRAVENOUS
Status: COMPLETED
Start: 2020-02-26 | End: 2020-02-26

## 2020-02-26 RX ADMIN — IPRATROPIUM BROMIDE AND ALBUTEROL SULFATE 3 ML: .5; 3 SOLUTION RESPIRATORY (INHALATION) at 12:02

## 2020-02-26 RX ADMIN — Medication 800 MG: at 05:02

## 2020-02-26 RX ADMIN — ROCURONIUM BROMIDE 70 MG: 10 INJECTION, SOLUTION INTRAVENOUS at 09:02

## 2020-02-26 RX ADMIN — AMANTADINE HYDROCHLORIDE 100 MG: 50 SOLUTION ORAL at 02:02

## 2020-02-26 RX ADMIN — HEPARIN SODIUM 19 UNITS/KG/HR: 10000 INJECTION, SOLUTION INTRAVENOUS at 08:02

## 2020-02-26 RX ADMIN — POTASSIUM CHLORIDE 40 MEQ: 20 SOLUTION ORAL at 11:02

## 2020-02-26 RX ADMIN — VANCOMYCIN HYDROCHLORIDE 1750 MG: 100 INJECTION, POWDER, LYOPHILIZED, FOR SOLUTION INTRAVENOUS at 04:02

## 2020-02-26 RX ADMIN — ATORVASTATIN CALCIUM 40 MG: 20 TABLET, FILM COATED ORAL at 08:02

## 2020-02-26 RX ADMIN — FAMOTIDINE 20 MG: 40 POWDER, FOR SUSPENSION ORAL at 08:02

## 2020-02-26 RX ADMIN — ETOMIDATE 14 MG: 2 INJECTION INTRAVENOUS at 09:02

## 2020-02-26 RX ADMIN — LEVETIRACETAM 750 MG: 100 INJECTION, SOLUTION, CONCENTRATE INTRAVENOUS at 09:02

## 2020-02-26 RX ADMIN — CHLORHEXIDINE GLUCONATE 0.12% ORAL RINSE 15 ML: 1.2 LIQUID ORAL at 09:02

## 2020-02-26 RX ADMIN — LEVETIRACETAM 750 MG: 100 INJECTION, SOLUTION, CONCENTRATE INTRAVENOUS at 08:02

## 2020-02-26 RX ADMIN — Medication 800 MG: at 11:02

## 2020-02-26 RX ADMIN — IPRATROPIUM BROMIDE AND ALBUTEROL SULFATE 3 ML: .5; 3 SOLUTION RESPIRATORY (INHALATION) at 11:02

## 2020-02-26 RX ADMIN — AMANTADINE HYDROCHLORIDE 100 MG: 50 SOLUTION ORAL at 05:02

## 2020-02-26 RX ADMIN — CHLORHEXIDINE GLUCONATE 0.12% ORAL RINSE 15 ML: 1.2 LIQUID ORAL at 08:02

## 2020-02-26 RX ADMIN — PROPOFOL 20 MG: 10 INJECTION, EMULSION INTRAVENOUS at 09:02

## 2020-02-26 RX ADMIN — FAMOTIDINE 20 MG: 40 POWDER, FOR SUSPENSION ORAL at 11:02

## 2020-02-26 RX ADMIN — PIPERACILLIN AND TAZOBACTAM 4.5 G: 4; .5 INJECTION, POWDER, LYOPHILIZED, FOR SOLUTION INTRAVENOUS; PARENTERAL at 06:02

## 2020-02-26 RX ADMIN — IPRATROPIUM BROMIDE AND ALBUTEROL SULFATE 3 ML: .5; 3 SOLUTION RESPIRATORY (INHALATION) at 03:02

## 2020-02-26 RX ADMIN — PROPOFOL 10 MCG/KG/MIN: 10 INJECTION, EMULSION INTRAVENOUS at 09:02

## 2020-02-26 RX ADMIN — POTASSIUM CHLORIDE 40 MEQ: 20 SOLUTION ORAL at 05:02

## 2020-02-26 RX ADMIN — HEPARIN SODIUM 19 UNITS/KG/HR: 10000 INJECTION, SOLUTION INTRAVENOUS at 11:02

## 2020-02-26 RX ADMIN — FUROSEMIDE 40 MG: 10 INJECTION, SOLUTION INTRAMUSCULAR; INTRAVENOUS at 01:02

## 2020-02-26 RX ADMIN — IPRATROPIUM BROMIDE AND ALBUTEROL SULFATE 3 ML: .5; 3 SOLUTION RESPIRATORY (INHALATION) at 08:02

## 2020-02-26 RX ADMIN — MICONAZOLE NITRATE: 20 OINTMENT TOPICAL at 09:02

## 2020-02-26 RX ADMIN — MICONAZOLE NITRATE: 20 OINTMENT TOPICAL at 08:02

## 2020-02-26 RX ADMIN — ESCITALOPRAM OXALATE 20 MG: 10 TABLET ORAL at 11:02

## 2020-02-26 RX ADMIN — ACETAMINOPHEN 650 MG: 325 TABLET ORAL at 04:02

## 2020-02-26 NOTE — PROGRESS NOTES
[facility]  Neurocritical Care  Progress Note    Admit Date: 2/2/2020  Service Date: 02/26/2020  Length of Stay: 24    Subjective:     Chief Complaint: Seizure    History of Present Illness: Pt is a 64 y.o. Male with PMHx of of DVT, PE, DM, and seizures (on 1500 keppra at home) who presents to the ED via EMS with complaint of a seizure that occurred this morning. Pt has long standing history of seizures and was found down by wife on cement around 0800, LKN 0700. Patient compliant with keppra per wife and last seizure was in November. At that time his keppra dose was increased. Patient has had cough recently but no other symptoms. Patient is on AC for DVT/PE and was recently switched from Coumadin to Eliquis. He was given 10 mg Versed during EMS ride to outside facility for continued seizure activity. He was unresponsive upon arrival to ED and subsequently intubated. CTH without any acute changes, CT max/face with multiple facial fractures, and CXR with consolidation and atelectasis/collapsed lung. Patient transferred to [facility] to higher level neurologic monitoring and continuous EEG.     Hospital Course: 2/2/2020: Admit to [facility]. GS and ENT consulted. MX facial fractures and concerns of bowel ischemia on CT  2/3 will rehook to EEG for 24h. Remains NPO. US LE pending. Monitor lactic [id number] 2L  IV. Wean levophed as tolerated after bolus  2/4: remains on small amount of pressors, cultures remain negative, cont abx, cont current AEDs, EEG overnight negative for seizure activity, repeat ct abdomen today  2/5: minimal dose pressors, advance TFs. Tolerating SBT, possible trial of extubation in am  2/6: need for pressors will likely cease when able to come off sedation, currently still requires mechanical ventilation with pO2 value of 60 on 40% fiO2 and ps 10  02/08/2020 exam still poor, getting MRI brain   02/09/2020 Not tolerating CPAP, placed on AC   02/10/2020 NAEO  02/11/2020 NAEO  02/12/2020 CPAP  trial this am   02/13/2020 NAEON. On heparin drip with stable exam.   02/14/2020 NAEON, f/u EEG (on Keppra). Will give modafinil trial today.   02/15/2020: Stable overnight. Little improvement with modafinil. Heparin gtt stopped for LP procedure. Meningitic coverage started.  02/16/2020: LP performed overnight. Heparin gtt restarted. Antibiotics stopped. Continue acyclovir. Patient more awake today. Increased free water flushes. Started amantadine.  02/17/2020 NAEO. Planning a family meeting to discuss trach/peg   02/18/2020 family still discussing trach/peg. Required increased PEEP overnight   02/19/2020 vent requirements stable, awaiting trach/PEG  02/20/2020  Trach/PEG pending  ENT consulted due to vent requirements  Weaning vent as tolerated, now down to PEEP of 8 and 45%  Neuro exam waxing and waning but stable  2/21/2020 No significant events over night. Tolerating spon. Breathing trial today. More alert today. Attempted breathing parameters.pt not following well poor attempts at NIF and VC. Will reassess tomorrow.   2/22 no significant events over night. Tolerating SBT. Pt more sleepy today. precedex turned off.  Keppra decreased to 1g q12. Not as responsive this afternoon. Not following commands, not attentive, not tracking. Sent for [facility], no new changes noted. Placed on EEG cap will monitor over night  2/23 No significant events over night. EEG cap showed no seizures . EEG dcd. Pt more alert today. Tracking responding to wife. toelrating SBT. TF off NGT to suction. + cuff leak good breathing parameters. Extubated to NC 3L  No s/s stridor or difficulty breathing.  02/24/2020: switched TF to Glucerna per nutation reccs, decrease keppra to 750mg  BID, cardiac chair today, schedule Tylenol 1 gm q6h for 24 h, d/c 3% nebs  02/25/2020: brief desaturation-NT suction and placed on nonbriether, CXR, 40 mg Lasix  02/26/2020 Had to be intubated this am due to increased hypoxia and lethargy      Interval History:  >4  elements OR status of 3 inpatient conditions    Review of Systems   Unable to perform ROS: Mental status change     2 systems OR Unable to obtain a complete ROS due to level of consciousness.  Objective:     Vitals:  Temp: 100.1 °F (37.8 °C)  Pulse: 100  Rhythm: normal sinus rhythm  BP: (!) 91/53  MAP (mmHg): 68  Resp: (!) 21  SpO2: 98 %  Oxygen Concentration (%): 50  O2 Device (Oxygen Therapy): ventilator  Vent Mode: A/C  Set Rate: 17 BPM  Vt Set: 450 mL  PEEP/CPAP: 5 cmH20  Peak Airway Pressure: 24 cmH2O  Mean Airway Pressure: 3.5 cmH20  Plateau Pressure: 0 cmH20    Temp  Min: 97.8 °F (36.6 °C)  Max: 100.1 °F (37.8 °C)  Pulse  Min: 90  Max: 126  BP  Min: 90/55  Max: 195/95  MAP (mmHg)  Min: 68  Max: 101  Resp  Min: 15  Max: 30  SpO2  Min: 92 %  Max: 100 %  Oxygen Concentration (%)  Min: 50  Max: 100    02/25 0701 - 02/26 0700  In: 2728 [I.V.:348]  Out: 3575 [Urine:2975]   Unmeasured Output  Urine Occurrence: 1  Stool Occurrence: 1  Pad Count: 2       Physical Exam    Constitutional: No apparent distress.   Eyes: Conjunctiva clear, anicteric. Lids no lesions. Small abrasion under left eye   Head/Ears/Nose/Mouth/Throat/Neck: Moist mucous membranes. External ears, nose atraumatic.   Cardiovascular: Regular rhythm. ESM  Respiratory: clear  Gastrointestinal: No hernia. Soft, nondistended, nontender. + bowel sounds.      Neurologic Examination:    -Mental Status: Open eyes to voice. Doesn't follow commands   -Cranial nerves: Pupils equal, round, and reactive bilateral. Extraocular movements intact with VOR. Intact corneal reflexes bilateral, intact cough reflex -Motor: Moves all ext spon     Medications:  Continuous    heparin (porcine) in D5W Last Rate: 19 Units/kg/hr (02/26/20 1200)   propofol Last Rate: 25 mcg/kg/min (02/26/20 1300)   Scheduled    albuterol-ipratropium 3 mL Q4H   amantadine  mg BID   atorvastatin 40 mg QHS   chlorhexidine 15 mL BID   escitalopram oxalate 20 mg Daily   famotidine 20 mg BID    levetiracetam IVPB 750 mg Q12H   miconazole nitrate 2%  BID   PRN    acetaminophen 650 mg Q6H PRN   Dextrose 10% Bolus 12.5 g PRN   fentaNYL 50 mcg Q2H PRN   glucagon (human recombinant) 1 mg PRN   insulin aspart U-100 1-10 Units Q6H PRN   labetalol 10 mg Q4H PRN   magnesium oxide 800 mg PRN   magnesium oxide 800 mg PRN   ondansetron 4 mg Q8H PRN   potassium chloride 10% 40 mEq PRN   potassium chloride 10% 40 mEq PRN   potassium chloride 10% 60 mEq PRN   potassium, sodium phosphates 2 packet PRN   potassium, sodium phosphates 2 packet PRN   potassium, sodium phosphates 2 packet PRN   sodium chloride 0.9% 10 mL PRN     Today I personally reviewed pertinent medications, lines/drains/airways, imaging, cardiology results, laboratory results, microbiology results, notably:    Diet  Diet NPO  Diet NPO        Assessment/Plan:     Neuro  * Seizure  keppra 750mg q12h      Altered mental status  Multifactorial          Stroke  - Incidentally seen on MRI w small scattered areas of subacute/remote infarcts.  Cont current secondary prevention measures.      Psychiatric  Depression with anxiety  Continue escitalopram 20mg daily    Derm  Alteration in skin integrity  Wound care following  Skin assessment q shift  Turn q2h    Pulmonary  Acute respiratory failure with hypoxia  re intubated this am due to hypoxia   Plans for trach and peg  Discussed with the family at bedside       Hematology  History of DVT (deep vein thrombosis)  On  heparin gtt       GI  Oropharyngeal dysphagia  Will need peg           The patient is being Prophylaxed for:  Venous Thromboembolism with: Mechanical or Chemical  Stress Ulcer with: H2B  Ventilator Pneumonia with: chlorhexidine oral care    Activity Orders          Diet NPO: NPO starting at 02/20 0001        Full Code    Uninterrupted Critical Care/Counseling Time (not including procedures): 35 minutes     Lisa yWatt MD  Neurocritical Care  [facility]

## 2020-02-26 NOTE — PROGRESS NOTES
Pharmacokinetic Initial Assessment: IV Vancomycin    Assessment/Plan:    - Initiate intravenous vancomycin with loading dose of 1750 mg once  - Maintenance dose 1000 mg Q12H  - Goal trough 10-20 mcg/mL empirically  - Draw trough prior to fourth dose on 2/28 at 04:00    Pharmacy will continue to follow and monitor vancomycin.Please call with any questions regarding this assessment.     Thank you for the consult,   Melissa Scherer, PharmD, [facility]  Neurocritical Care Pharmacist  [id number]               Patient brief summary:  Edu Choi is a 64 y.o. male initiated on antimicrobial therapy with IV Vancomycin for treatment of suspected pneumonia    Drug Allergies:   Review of patient's allergies indicates:   Allergen Reactions    No known drug allergies        Actual Body Weight:   68.5 kg    Renal Function:   Estimated Creatinine Clearance: 86.9 mL/min (based on SCr of 0.7 mg/dL).,     Dialysis Method (if applicable):  N/A    CBC (last 72 hours):  Recent Labs   Lab Result Units 02/24/20  0118 02/25/20  0125 02/26/20  0142   WBC K/uL 13.00* 12.17 10.69   Hemoglobin g/dL 9.9* 9.7* 10.4*   Hematocrit % 31.2* 30.3* 33.5*   Platelets K/uL 284 264 277   Gran% % 80.5* 79.3* 77.8*   Lymph% % 10.2* 11.5* 12.0*   Mono% % 7.8 6.7 7.9   Eosinophil% % 0.9 1.8 1.5   Basophil% % 0.2 0.2 0.4   Differential Method  Automated Automated Automated       Metabolic Panel (last 72 hours):  Recent Labs   Lab Result Units 02/24/20  0118 02/25/20  0340 02/25/20  1734 02/26/20  0142   Sodium mmol/L 141 141  --  142   Potassium mmol/L 4.1 3.4* 4.3 3.4*   Chloride mmol/L 106 108  --  109   CO2 mmol/L 26 23  --  23   Glucose mg/dL 120* 114*  --  102   BUN, Bld mg/dL 18 17  --  17   Creatinine mg/dL 0.8 0.7  --  0.7   Albumin g/dL 3.2* 2.6*  --  2.5*   Total Bilirubin mg/dL 0.4 0.3  --  0.4   Alkaline Phosphatase U/L 84 75  --  88   AST U/L 22 18  --  25   ALT U/L 20 15  --  18   Magnesium mg/dL 1.9 1.6  --  1.8   Phosphorus mg/dL 2.8 3.2  --  3.4        Drug levels (last 3 results):  No results for input(s): VANCOMYCINRA, VANCOMYCINPE, VANCOMYCINTR in the last 72 hours.    Microbiologic Results:  Microbiology Results (last 7 days)     Procedure Component Value Units Date/Time    Blood culture [[id number]] Collected:  02/26/20 1634    Order Status:  Sent Specimen:  Blood from Peripheral, Hand, Right Updated:  02/26/20 1656    Blood culture [[id number]] Collected:  02/26/20 1643    Order Status:  Sent Specimen:  Blood from Peripheral, Wrist, Left Updated:  02/26/20 1655    Culture, Respiratory with Gram Stain [[id number]]     Order Status:  No result Specimen:  Respiratory     CSF culture [[id number]] Collected:  02/15/20 2247    Order Status:  Completed Specimen:  CSF (Spinal Fluid) from CSF Tap, Tube 3 Updated:  02/21/20 0710     CSF CULTURE No Growth     Gram Stain Result Cytospin indicates:      No WBC's      No organisms seen    Fungus culture [[id number]] Collected:  02/15/20 2247    Order Status:  Completed Specimen:  CSF (Spinal Fluid) from CSF Tap, Tube 3 Updated:  02/20/20 1009     Fungus (Mycology) Culture Culture in progress

## 2020-02-26 NOTE — PLAN OF CARE
POC reviewed with family at 1700. Pt's family verbalized understanding. Questions and concerns addressed.   See flowsheets for full assessment and VS info.     Pt reintubated per Dr. Wyatt this am , pt tolerated well,   Discussion with family for trach and peg with family and consult with general surgery for surgery, OR case for tomorrow am scheduled  Pt pan cultured per orders for temperature of 100.2, vanc and zosyn started,

## 2020-02-26 NOTE — CARE UPDATE
Pt intubated due to increased O2 requirements. Pt intubated with an 8.0 ETT secured w 26cm at the lip. Per Dr. Wyatt, pt was a slightly difficult airway. Pt placed on the vent at the documented vent settings. Will continue to monitor.

## 2020-02-26 NOTE — ASSESSMENT & PLAN NOTE
re intubated this am due to hypoxia   Plans for trach and peg  Discussed with the family at bedside

## 2020-02-26 NOTE — CONSULTS
GENERAL SURGERY PROGRESS NOTE    Patient well-known to the General Surgery service. See prior Consult Note dated 2/18/2020 and timed 7:56 PM. Tracheostomy and PEG tube placement postponed x 2 due to increased ventilatory support requirements prior to planned procedure. In the interim, patient was extubated on 2/23/2020 and was reportedly more alert. He has since become more lethargic, however, and was re-intubated earlier today. Neuro ICU has asked for us to resume plans for tracheostomy and PEG tube placement. He is currently on acceptable and low vent settings. Heparin gtt infusing. Family is in agreement with proceeding. Tentative plan for OR tomorrow. Hold heparin gtt and enteral feeds at midnight.      Darnell Alvarado MD  Surgery Resident, PGY-V  Pager: [phone]  2/26/2020 2:34 PM

## 2020-02-26 NOTE — PLAN OF CARE
Patient intubated this AM per MD.  Not medically stable for discharge.       02/26/20 1211   Discharge Reassessment   Assessment Type Discharge Planning Reassessment   Provided patient/caregiver education on the expected discharge date and the discharge plan No   Do you have any problems affording any of your prescribed medications? No   Discharge Plan A Long-term acute care facility (LTAC)   Discharge Plan B Skilled Nursing Facility   DME Needed Upon Discharge  other (see comments)  (tbd)   Patient choice form signed by patient/caregiver N/A   Anticipated Discharge Disposition LTAC   Can the patient/caregiver answer the patient profile reliably? No, cognitively impaired   How does the patient rate their overall health at the present time? Poor   Describe the patient's ability to walk at the present time. Does not walk or unable to take any steps at all   How often would a person be available to care for the patient? Whenever needed   Number of comorbid conditions (as recorded on the chart) Three   Post-Acute Status   Post-Acute Authorization Placement   Post-Acute Placement Status Awaiting Internal Medical Clearance   Discharge Delays (!) Change in Medical Condition  (Patient intubated this am per MD)       Stefanie Ponce RN, CCRN-K, [facility]  Neuro-Critical Care   [phone]

## 2020-02-26 NOTE — ASSESSMENT & PLAN NOTE
- Incidentally seen on MRI w small scattered areas of subacute/remote infarcts.  Cont current secondary prevention measures.

## 2020-02-26 NOTE — PLAN OF CARE
POC reviewed with pt at 0020. No acute events overnight. Pt progressing toward goals. Will continue to monitor. See flowsheets for full assessment and VS info

## 2020-02-26 NOTE — PROCEDURES
"Edu Choi is a 64 y.o. male patient.    Temp: 100.1 °F (37.8 °C) (20 0700)  Pulse: 100 (20 1300)  Resp: (!) 21 (20 1300)  BP: (!) 91/53 (20 1200)  SpO2: 98 % (20 1300)  Weight: 68.5 kg (151 lb) (20 09)  Height: 5' 4" (162.6 cm) (20 09)       Intubation  Date/Time: 2020 1:25 PM  Location procedure was performed: [facility] NEURO CRITICAL CARE  Performed by: Lisa Wyatt MD  Authorized by: Lisa Wyatt MD   Consent Done: Yes  Consent: Verbal consent obtained.  Consent given by: spouse  Patient identity confirmed: , name, MRN and provided demographic data  Indications: respiratory distress and  airway protection  Intubation method: direct  Sedatives: etomidate  Paralytic: vecuronium  Laryngoscope size: Mac 4  Tube size: 8.0 mm  Number of attempts: 1  Cricoid pressure: yes  Cords visualized: yes  Post-procedure assessment: chest rise and CO2 detector  Breath sounds: rales/crackles  Cuff inflated: yes  ETT to lip: 26 cm  Tube secured with: adhesive tape, bite block and ETT guo  Chest x-ray interpreted by me.  Chest x-ray findings: endotracheal tube in appropriate position  Patient tolerance: Patient tolerated the procedure well with no immediate complications  Complications: No          Lisa Wyatt  2020  "

## 2020-02-27 PROBLEM — R79.89 ELEVATED SERUM CREATININE: Status: ACTIVE | Noted: 2020-02-27

## 2020-02-27 LAB
ALBUMIN SERPL BCP-MCNC: 3.2 G/DL (ref 3.5–5.2)
ALLENS TEST: ABNORMAL
ALP SERPL-CCNC: 93 U/L (ref 55–135)
ALT SERPL W/O P-5'-P-CCNC: 27 U/L (ref 10–44)
ANION GAP SERPL CALC-SCNC: 12 MMOL/L (ref 8–16)
ANION GAP SERPL CALC-SCNC: 14 MMOL/L (ref 8–16)
APTT BLDCRRT: 26.4 SEC (ref 21–32)
AST SERPL-CCNC: 49 U/L (ref 10–40)
BASOPHILS # BLD AUTO: 0.05 K/UL (ref 0–0.2)
BASOPHILS NFR BLD: 0.5 % (ref 0–1.9)
BILIRUB SERPL-MCNC: 0.6 MG/DL (ref 0.1–1)
BUN SERPL-MCNC: 29 MG/DL (ref 8–23)
BUN SERPL-MCNC: 31 MG/DL (ref 8–23)
CALCIUM SERPL-MCNC: 10.3 MG/DL (ref 8.7–10.5)
CALCIUM SERPL-MCNC: 9.6 MG/DL (ref 8.7–10.5)
CHLORIDE SERPL-SCNC: 103 MMOL/L (ref 95–110)
CHLORIDE SERPL-SCNC: 104 MMOL/L (ref 95–110)
CO2 SERPL-SCNC: 26 MMOL/L (ref 23–29)
CO2 SERPL-SCNC: 27 MMOL/L (ref 23–29)
CREAT SERPL-MCNC: 1.7 MG/DL (ref 0.5–1.4)
CREAT SERPL-MCNC: 1.9 MG/DL (ref 0.5–1.4)
DELSYS: ABNORMAL
DIFFERENTIAL METHOD: ABNORMAL
EOSINOPHIL # BLD AUTO: 0.3 K/UL (ref 0–0.5)
EOSINOPHIL NFR BLD: 2.8 % (ref 0–8)
ERYTHROCYTE [DISTWIDTH] IN BLOOD BY AUTOMATED COUNT: 16 % (ref 11.5–14.5)
ERYTHROCYTE [SEDIMENTATION RATE] IN BLOOD BY WESTERGREN METHOD: 17 MM/H
EST. GFR  (AFRICAN AMERICAN): 41.5 ML/MIN/1.73 M^2
EST. GFR  (AFRICAN AMERICAN): 47.5 ML/MIN/1.73 M^2
EST. GFR  (NON AFRICAN AMERICAN): 35.9 ML/MIN/1.73 M^2
EST. GFR  (NON AFRICAN AMERICAN): 41.1 ML/MIN/1.73 M^2
FIO2: 50
GLUCOSE SERPL-MCNC: 107 MG/DL (ref 70–110)
GLUCOSE SERPL-MCNC: 112 MG/DL (ref 70–110)
HCO3 UR-SCNC: 27.5 MMOL/L (ref 24–28)
HCT VFR BLD AUTO: 34.4 % (ref 40–54)
HGB BLD-MCNC: 10.7 G/DL (ref 14–18)
IMM GRANULOCYTES # BLD AUTO: 0.05 K/UL (ref 0–0.04)
IMM GRANULOCYTES NFR BLD AUTO: 0.5 % (ref 0–0.5)
INR PPP: 1.1 (ref 0.8–1.2)
LYMPHOCYTES # BLD AUTO: 1.4 K/UL (ref 1–4.8)
LYMPHOCYTES NFR BLD: 13.8 % (ref 18–48)
MAGNESIUM SERPL-MCNC: 2.5 MG/DL (ref 1.6–2.6)
MCH RBC QN AUTO: 32.1 PG (ref 27–31)
MCHC RBC AUTO-ENTMCNC: 31.1 G/DL (ref 32–36)
MCV RBC AUTO: 103 FL (ref 82–98)
MODE: ABNORMAL
MONOCYTES # BLD AUTO: 1.1 K/UL (ref 0.3–1)
MONOCYTES NFR BLD: 11.1 % (ref 4–15)
NEUTROPHILS # BLD AUTO: 7.3 K/UL (ref 1.8–7.7)
NEUTROPHILS NFR BLD: 71.3 % (ref 38–73)
NRBC BLD-RTO: 0 /100 WBC
PCO2 BLDA: 44.3 MMHG (ref 35–45)
PEEP: 5
PH SMN: 7.4 [PH] (ref 7.35–7.45)
PHOSPHATE SERPL-MCNC: 4.4 MG/DL (ref 2.7–4.5)
PLATELET # BLD AUTO: 303 K/UL (ref 150–350)
PMV BLD AUTO: 12.3 FL (ref 9.2–12.9)
PO2 BLDA: 102 MMHG (ref 80–100)
POC BE: 3 MMOL/L
POC SATURATED O2: 98 % (ref 95–100)
POC TCO2: 29 MMOL/L (ref 23–27)
POCT GLUCOSE: 109 MG/DL (ref 70–110)
POCT GLUCOSE: 114 MG/DL (ref 70–110)
POTASSIUM SERPL-SCNC: 4.9 MMOL/L (ref 3.5–5.1)
POTASSIUM SERPL-SCNC: 4.9 MMOL/L (ref 3.5–5.1)
PROT SERPL-MCNC: 7.2 G/DL (ref 6–8.4)
PROTHROMBIN TIME: 11 SEC (ref 9–12.5)
RBC # BLD AUTO: 3.33 M/UL (ref 4.6–6.2)
SAMPLE: ABNORMAL
SITE: ABNORMAL
SODIUM SERPL-SCNC: 142 MMOL/L (ref 136–145)
SODIUM SERPL-SCNC: 144 MMOL/L (ref 136–145)
SP02: 98
VT: 450
WBC # BLD AUTO: 10.2 K/UL (ref 3.9–12.7)

## 2020-02-27 PROCEDURE — 63600175 PHARM REV CODE 636 W HCPCS: Mod: HCNC | Performed by: NURSE PRACTITIONER

## 2020-02-27 PROCEDURE — 31600 PLANNED TRACHEOSTOMY: CPT | Mod: 59,HCNC,, | Performed by: SURGERY

## 2020-02-27 PROCEDURE — 94640 AIRWAY INHALATION TREATMENT: CPT | Mod: HCNC

## 2020-02-27 PROCEDURE — 27201112: Mod: HCNC

## 2020-02-27 PROCEDURE — 20000000 HC ICU ROOM: Mod: HCNC

## 2020-02-27 PROCEDURE — 63600175 PHARM REV CODE 636 W HCPCS: Mod: HCNC | Performed by: STUDENT IN AN ORGANIZED HEALTH CARE EDUCATION/TRAINING PROGRAM

## 2020-02-27 PROCEDURE — 25000003 PHARM REV CODE 250: Mod: HCNC | Performed by: NURSE PRACTITIONER

## 2020-02-27 PROCEDURE — D9220A PRA ANESTHESIA: Mod: HCNC,,, | Performed by: ANESTHESIOLOGY

## 2020-02-27 PROCEDURE — 25000003 PHARM REV CODE 250: Mod: HCNC | Performed by: STUDENT IN AN ORGANIZED HEALTH CARE EDUCATION/TRAINING PROGRAM

## 2020-02-27 PROCEDURE — 27200966 HC CLOSED SUCTION SYSTEM: Mod: HCNC

## 2020-02-27 PROCEDURE — 87070 CULTURE OTHR SPECIMN AEROBIC: CPT | Mod: HCNC

## 2020-02-27 PROCEDURE — 82803 BLOOD GASES ANY COMBINATION: CPT | Mod: HCNC

## 2020-02-27 PROCEDURE — 37000008 HC ANESTHESIA 1ST 15 MINUTES: Mod: HCNC | Performed by: SURGERY

## 2020-02-27 PROCEDURE — 99900026 HC AIRWAY MAINTENANCE (STAT): Mod: HCNC

## 2020-02-27 PROCEDURE — 94003 VENT MGMT INPAT SUBQ DAY: CPT | Mod: HCNC

## 2020-02-27 PROCEDURE — 99291 CRITICAL CARE FIRST HOUR: CPT | Mod: HCNC,GC,, | Performed by: PSYCHIATRY & NEUROLOGY

## 2020-02-27 PROCEDURE — 99291 PR CRITICAL CARE, E/M 30-74 MINUTES: ICD-10-PCS | Mod: HCNC,GC,, | Performed by: PSYCHIATRY & NEUROLOGY

## 2020-02-27 PROCEDURE — 25000242 PHARM REV CODE 250 ALT 637 W/ HCPCS: Mod: HCNC | Performed by: NURSE PRACTITIONER

## 2020-02-27 PROCEDURE — 36600 WITHDRAWAL OF ARTERIAL BLOOD: CPT | Mod: HCNC

## 2020-02-27 PROCEDURE — 43246 EGD PLACE GASTROSTOMY TUBE: CPT | Mod: 51,HCNC,, | Performed by: SURGERY

## 2020-02-27 PROCEDURE — 80053 COMPREHEN METABOLIC PANEL: CPT | Mod: HCNC

## 2020-02-27 PROCEDURE — 83735 ASSAY OF MAGNESIUM: CPT | Mod: HCNC

## 2020-02-27 PROCEDURE — 25000003 PHARM REV CODE 250: Mod: HCNC | Performed by: PSYCHIATRY & NEUROLOGY

## 2020-02-27 PROCEDURE — 84100 ASSAY OF PHOSPHORUS: CPT | Mod: HCNC

## 2020-02-27 PROCEDURE — 80048 BASIC METABOLIC PNL TOTAL CA: CPT | Mod: HCNC

## 2020-02-27 PROCEDURE — 94668 MNPJ CHEST WALL SBSQ: CPT | Mod: HCNC

## 2020-02-27 PROCEDURE — 94761 N-INVAS EAR/PLS OXIMETRY MLT: CPT | Mod: HCNC

## 2020-02-27 PROCEDURE — 37000009 HC ANESTHESIA EA ADD 15 MINS: Mod: HCNC | Performed by: SURGERY

## 2020-02-27 PROCEDURE — 36000706: Mod: HCNC | Performed by: SURGERY

## 2020-02-27 PROCEDURE — 99900035 HC TECH TIME PER 15 MIN (STAT): Mod: HCNC

## 2020-02-27 PROCEDURE — 43246 PR EGD, FLEX, W/PLCMT, GASTROSTOMY TUBE: ICD-10-PCS | Mod: 51,HCNC,, | Performed by: SURGERY

## 2020-02-27 PROCEDURE — 27201423 OPTIME MED/SURG SUP & DEVICES STERILE SUPPLY: Mod: HCNC | Performed by: SURGERY

## 2020-02-27 PROCEDURE — 85610 PROTHROMBIN TIME: CPT | Mod: HCNC

## 2020-02-27 PROCEDURE — 85025 COMPLETE CBC W/AUTO DIFF WBC: CPT | Mod: HCNC

## 2020-02-27 PROCEDURE — 87205 SMEAR GRAM STAIN: CPT | Mod: HCNC

## 2020-02-27 PROCEDURE — 27000221 HC OXYGEN, UP TO 24 HOURS: Mod: HCNC

## 2020-02-27 PROCEDURE — 63600175 PHARM REV CODE 636 W HCPCS: Mod: HCNC | Performed by: PSYCHIATRY & NEUROLOGY

## 2020-02-27 PROCEDURE — 36000707: Mod: HCNC | Performed by: SURGERY

## 2020-02-27 PROCEDURE — 27800903 OPTIME MED/SURG SUP & DEVICES OTHER IMPLANTS: Mod: HCNC | Performed by: SURGERY

## 2020-02-27 PROCEDURE — 85730 THROMBOPLASTIN TIME PARTIAL: CPT | Mod: HCNC

## 2020-02-27 PROCEDURE — 31600 PR TRACHEOSTOMY, PLANNED: ICD-10-PCS | Mod: 59,HCNC,, | Performed by: SURGERY

## 2020-02-27 PROCEDURE — D9220A PRA ANESTHESIA: ICD-10-PCS | Mod: HCNC,,, | Performed by: ANESTHESIOLOGY

## 2020-02-27 RX ORDER — MIDAZOLAM HYDROCHLORIDE 1 MG/ML
INJECTION, SOLUTION INTRAMUSCULAR; INTRAVENOUS
Status: DISCONTINUED | OUTPATIENT
Start: 2020-02-27 | End: 2020-02-27

## 2020-02-27 RX ORDER — DEXAMETHASONE SODIUM PHOSPHATE 4 MG/ML
INJECTION, SOLUTION INTRA-ARTICULAR; INTRALESIONAL; INTRAMUSCULAR; INTRAVENOUS; SOFT TISSUE
Status: DISCONTINUED | OUTPATIENT
Start: 2020-02-27 | End: 2020-02-27

## 2020-02-27 RX ORDER — VANCOMYCIN HCL IN 5 % DEXTROSE 1G/250ML
1000 PLASTIC BAG, INJECTION (ML) INTRAVENOUS
Status: DISCONTINUED | OUTPATIENT
Start: 2020-02-28 | End: 2020-02-28

## 2020-02-27 RX ORDER — ESCITALOPRAM OXALATE 10 MG/1
20 TABLET ORAL DAILY
Status: DISCONTINUED | OUTPATIENT
Start: 2020-02-28 | End: 2020-02-28 | Stop reason: HOSPADM

## 2020-02-27 RX ORDER — FAMOTIDINE 40 MG/5ML
20 POWDER, FOR SUSPENSION ORAL DAILY
Status: DISCONTINUED | OUTPATIENT
Start: 2020-02-27 | End: 2020-02-27

## 2020-02-27 RX ORDER — FAMOTIDINE 20 MG/1
20 TABLET, FILM COATED ORAL DAILY
Status: DISCONTINUED | OUTPATIENT
Start: 2020-02-28 | End: 2020-02-28 | Stop reason: HOSPADM

## 2020-02-27 RX ORDER — ACETAMINOPHEN 325 MG/1
650 TABLET ORAL EVERY 6 HOURS PRN
Status: DISCONTINUED | OUTPATIENT
Start: 2020-02-27 | End: 2020-02-28

## 2020-02-27 RX ORDER — ONDANSETRON 2 MG/ML
4 INJECTION INTRAMUSCULAR; INTRAVENOUS DAILY PRN
Status: CANCELLED | OUTPATIENT
Start: 2020-02-27

## 2020-02-27 RX ORDER — FENTANYL CITRATE 50 UG/ML
25 INJECTION, SOLUTION INTRAMUSCULAR; INTRAVENOUS EVERY 5 MIN PRN
Status: CANCELLED | OUTPATIENT
Start: 2020-02-27

## 2020-02-27 RX ORDER — AMANTADINE HYDROCHLORIDE 50 MG/5ML
100 SOLUTION ORAL 2 TIMES DAILY
Status: DISCONTINUED | OUTPATIENT
Start: 2020-02-28 | End: 2020-02-28 | Stop reason: HOSPADM

## 2020-02-27 RX ORDER — ATORVASTATIN CALCIUM 20 MG/1
40 TABLET, FILM COATED ORAL NIGHTLY
Status: DISCONTINUED | OUTPATIENT
Start: 2020-02-27 | End: 2020-02-28 | Stop reason: HOSPADM

## 2020-02-27 RX ORDER — SODIUM CHLORIDE 0.9 % (FLUSH) 0.9 %
10 SYRINGE (ML) INJECTION
Status: CANCELLED | OUTPATIENT
Start: 2020-02-27

## 2020-02-27 RX ORDER — KETAMINE HCL IN 0.9 % NACL 50 MG/5 ML
SYRINGE (ML) INTRAVENOUS
Status: DISCONTINUED | OUTPATIENT
Start: 2020-02-27 | End: 2020-02-27

## 2020-02-27 RX ORDER — ROCURONIUM BROMIDE 10 MG/ML
INJECTION, SOLUTION INTRAVENOUS
Status: DISCONTINUED | OUTPATIENT
Start: 2020-02-27 | End: 2020-02-27

## 2020-02-27 RX ADMIN — MICONAZOLE NITRATE: 20 OINTMENT TOPICAL at 09:02

## 2020-02-27 RX ADMIN — Medication 20 MG: at 09:02

## 2020-02-27 RX ADMIN — SODIUM CHLORIDE 1000 ML: 0.9 INJECTION, SOLUTION INTRAVENOUS at 10:02

## 2020-02-27 RX ADMIN — FENTANYL CITRATE 25 MCG: 50 INJECTION INTRAMUSCULAR; INTRAVENOUS at 09:02

## 2020-02-27 RX ADMIN — MIDAZOLAM HYDROCHLORIDE 2 MG: 1 INJECTION, SOLUTION INTRAMUSCULAR; INTRAVENOUS at 09:02

## 2020-02-27 RX ADMIN — IPRATROPIUM BROMIDE AND ALBUTEROL SULFATE 3 ML: .5; 3 SOLUTION RESPIRATORY (INHALATION) at 12:02

## 2020-02-27 RX ADMIN — PIPERACILLIN AND TAZOBACTAM 4.5 G: 4; .5 INJECTION, POWDER, LYOPHILIZED, FOR SOLUTION INTRAVENOUS; PARENTERAL at 01:02

## 2020-02-27 RX ADMIN — IPRATROPIUM BROMIDE AND ALBUTEROL SULFATE 3 ML: .5; 3 SOLUTION RESPIRATORY (INHALATION) at 07:02

## 2020-02-27 RX ADMIN — IPRATROPIUM BROMIDE AND ALBUTEROL SULFATE 3 ML: .5; 3 SOLUTION RESPIRATORY (INHALATION) at 03:02

## 2020-02-27 RX ADMIN — LEVETIRACETAM 750 MG: 100 INJECTION, SOLUTION, CONCENTRATE INTRAVENOUS at 08:02

## 2020-02-27 RX ADMIN — CHLORHEXIDINE GLUCONATE 0.12% ORAL RINSE 15 ML: 1.2 LIQUID ORAL at 08:02

## 2020-02-27 RX ADMIN — PIPERACILLIN AND TAZOBACTAM 4.5 G: 4; .5 INJECTION, POWDER, LYOPHILIZED, FOR SOLUTION INTRAVENOUS; PARENTERAL at 05:02

## 2020-02-27 RX ADMIN — DEXAMETHASONE SODIUM PHOSPHATE 4 MG: 4 INJECTION, SOLUTION INTRAMUSCULAR; INTRAVENOUS at 09:02

## 2020-02-27 RX ADMIN — AMANTADINE HYDROCHLORIDE 100 MG: 50 SOLUTION ORAL at 06:02

## 2020-02-27 RX ADMIN — ROCURONIUM BROMIDE 20 MG: 10 INJECTION, SOLUTION INTRAVENOUS at 09:02

## 2020-02-27 RX ADMIN — ATORVASTATIN CALCIUM 40 MG: 20 TABLET, FILM COATED ORAL at 08:02

## 2020-02-27 RX ADMIN — IPRATROPIUM BROMIDE AND ALBUTEROL SULFATE 3 ML: .5; 3 SOLUTION RESPIRATORY (INHALATION) at 11:02

## 2020-02-27 RX ADMIN — ROCURONIUM BROMIDE 30 MG: 10 INJECTION, SOLUTION INTRAVENOUS at 09:02

## 2020-02-27 RX ADMIN — IPRATROPIUM BROMIDE AND ALBUTEROL SULFATE 3 ML: .5; 3 SOLUTION RESPIRATORY (INHALATION) at 04:02

## 2020-02-27 RX ADMIN — FENTANYL CITRATE 50 MCG: 50 INJECTION INTRAMUSCULAR; INTRAVENOUS at 10:02

## 2020-02-27 RX ADMIN — VANCOMYCIN HYDROCHLORIDE 1000 MG: 1 INJECTION, POWDER, LYOPHILIZED, FOR SOLUTION INTRAVENOUS at 05:02

## 2020-02-27 RX ADMIN — PIPERACILLIN AND TAZOBACTAM 4.5 G: 4; .5 INJECTION, POWDER, LYOPHILIZED, FOR SOLUTION INTRAVENOUS; PARENTERAL at 09:02

## 2020-02-27 NOTE — PLAN OF CARE
Post-PEG tube placement instructions:  PEG placed at 1000 on 2/27/2020  Leave PEG tube to gravity after surgery.  Ok to use tube for medications 6 hours after surgery. Clamp tube for 30 mins after medication administration.  Tube feeds to be started 24 hrs after PEG tube has been placed.  Please call for bleeding or malfunction of PEG tube.      Eagle Keita MD  General Surgery, PGY-2  [phone]

## 2020-02-27 NOTE — PROGRESS NOTES
[facility]  Neurocritical Care  Progress Note    Admit Date: 2/2/2020  Service Date: 02/27/2020  Length of Stay: 25    Subjective:     Chief Complaint: Seizure    History of Present Illness: Pt is a 64 y.o. Male with PMHx of of DVT, PE, DM, and seizures (on 1500 keppra at home) who presents to the ED via EMS with complaint of a seizure that occurred this morning. Pt has long standing history of seizures and was found down by wife on cement around 0800, LKN 0700. Patient compliant with keppra per wife and last seizure was in November. At that time his keppra dose was increased. Patient has had cough recently but no other symptoms. Patient is on AC for DVT/PE and was recently switched from Coumadin to Eliquis. He was given 10 mg Versed during EMS ride to outside facility for continued seizure activity. He was unresponsive upon arrival to ED and subsequently intubated. CTH without any acute changes, CT max/face with multiple facial fractures, and CXR with consolidation and atelectasis/collapsed lung. Patient transferred to [facility] to higher level neurologic monitoring and continuous EEG.     Hospital Course: 2/2/2020: Admit to [facility]. GS and ENT consulted. MX facial fractures and concerns of bowel ischemia on CT  2/3 will rehook to EEG for 24h. Remains NPO. US LE pending. Monitor lactic [id number] 2L  IV. Wean levophed as tolerated after bolus  2/4: remains on small amount of pressors, cultures remain negative, cont abx, cont current AEDs, EEG overnight negative for seizure activity, repeat ct abdomen today  2/5: minimal dose pressors, advance TFs. Tolerating SBT, possible trial of extubation in am  2/6: need for pressors will likely cease when able to come off sedation, currently still requires mechanical ventilation with pO2 value of 60 on 40% fiO2 and ps 10  02/08/2020 exam still poor, getting MRI brain   02/09/2020 Not tolerating CPAP, placed on AC   02/10/2020 NAEO  02/11/2020 NAEO  02/12/2020 CPAP  trial this am   02/13/2020 NAEON. On heparin drip with stable exam.   02/14/2020 NAEON, f/u EEG (on Keppra). Will give modafinil trial today.   02/15/2020: Stable overnight. Little improvement with modafinil. Heparin gtt stopped for LP procedure. Meningitic coverage started.  02/16/2020: LP performed overnight. Heparin gtt restarted. Antibiotics stopped. Continue acyclovir. Patient more awake today. Increased free water flushes. Started amantadine.  02/17/2020 NAEO. Planning a family meeting to discuss trach/peg   02/18/2020 family still discussing trach/peg. Required increased PEEP overnight   02/19/2020 vent requirements stable, awaiting trach/PEG  02/20/2020  Trach/PEG pending  ENT consulted due to vent requirements  Weaning vent as tolerated, now down to PEEP of 8 and 45%  Neuro exam waxing and waning but stable  2/21/2020 No significant events over night. Tolerating spon. Breathing trial today. More alert today. Attempted breathing parameters.pt not following well poor attempts at NIF and VC. Will reassess tomorrow.   2/22 no significant events over night. Tolerating SBT. Pt more sleepy today. precedex turned off.  Keppra decreased to 1g q12. Not as responsive this afternoon. Not following commands, not attentive, not tracking. Sent for [facility], no new changes noted. Placed on EEG cap will monitor over night  2/23 No significant events over night. EEG cap showed no seizures . EEG dcd. Pt more alert today. Tracking responding to wife. toelrating SBT. TF off NGT to suction. + cuff leak good breathing parameters. Extubated to NC 3L  No s/s stridor or difficulty breathing.  02/24/2020: switched TF to Glucerna per nutation reccs, decrease keppra to 750mg  BID, cardiac chair today, schedule Tylenol 1 gm q6h for 24 h, d/c 3% nebs  02/25/2020: brief desaturation-NT suction and placed on nonbriether, CXR, 40 mg Lasix  02/26/2020 Had to be intubated this am due to increased hypoxia and lethargy  \  02/27/2020 Trach/PEG today      Interval History:  >4 elements OR status of 3 inpatient conditions    Review of Systems   Unable to perform ROS: Mental status change     2 systems OR Unable to obtain a complete ROS due to level of consciousness.  Objective:     Vitals:  Temp: 99 °F (37.2 °C)  Pulse: 96  Rhythm: normal sinus rhythm  BP: 112/74  MAP (mmHg): 87  Resp: (!) 27  SpO2: 98 %  Oxygen Concentration (%): 50  O2 Device (Oxygen Therapy): ventilator  Vent Mode: A/C  Set Rate: 17 BPM  Vt Set: 450 mL  PEEP/CPAP: 5 cmH20  Peak Airway Pressure: 24 cmH2O  Mean Airway Pressure: 9.4 cmH20  Plateau Pressure: 0 cmH20    Temp  Min: 98.5 °F (36.9 °C)  Max: 100.2 °F (37.9 °C)  Pulse  Min: 77  Max: 119  BP  Min: 89/54  Max: 127/60  MAP (mmHg)  Min: 66  Max: 87  Resp  Min: 17  Max: 29  SpO2  Min: 95 %  Max: 100 %  Oxygen Concentration (%)  Min: 50  Max: 60    02/26 0701 - 02/27 0700  In: 1318 [I.V.:248]  Out: 895 [Urine:695]   Unmeasured Output  Urine Occurrence: 1  Stool Occurrence: 1  Pad Count: 2       Physical Exam    Constitutional: No apparent distress.   Eyes: Conjunctiva clear, anicteric. Lids no lesions. Small abrasion under left eye   Head/Ears/Nose/Mouth/Throat/Neck: Moist mucous membranes. External ears, nose atraumatic.   Cardiovascular: Regular rhythm. ESM  Respiratory: clear  Gastrointestinal: No hernia. Soft, nondistended, nontender. + bowel sounds.      Neurologic Examination:    -Mental Status: Open eyes to voice. Doesn't follow commands   -Cranial nerves: Pupils equal, round, and reactive bilateral. Extraocular movements intact with VOR. Intact corneal reflexes bilateral, intact cough reflex -Motor: Moves all ext spon     Medications:  Continuous    heparin (porcine) in D5W Last Rate: Stopped (02/27/20 0000)   propofol Last Rate: Stopped (02/26/20 1400)   Scheduled    albuterol-ipratropium 3 mL Q4H   amantadine  mg BID   atorvastatin 40 mg QHS   chlorhexidine 15 mL BID   escitalopram oxalate 20 mg Daily   famotidine 20 mg Daily    levetiracetam IVPB 750 mg Q12H   miconazole nitrate 2%  BID   piperacillin-tazobactam 4.5 g in dextrose 5 % 100 mL IVPB (ready to mix system) 4.5 g Q8H   [START ON 2/28/2020] vancomycin (VANCOCIN) IVPB 1,000 mg Q24H   PRN    acetaminophen 650 mg Q6H PRN   Dextrose 10% Bolus 12.5 g PRN   fentaNYL 50 mcg Q2H PRN   glucagon (human recombinant) 1 mg PRN   insulin aspart U-100 1-10 Units Q6H PRN   labetalol 10 mg Q4H PRN   ondansetron 4 mg Q8H PRN   sodium chloride 0.9% 10 mL PRN     Today I personally reviewed pertinent medications, lines/drains/airways, imaging, cardiology results, laboratory results, microbiology results, notably:    Diet  Diet NPO  Diet NPO        Assessment/Plan:     Neuro  * Seizure  keppra 750mg q12h      Stroke  - Incidentally seen on MRI w small scattered areas of subacute/remote infarcts.  Cont current secondary prevention measures.      Psychiatric  Depression with anxiety  Continue escitalopram 20mg daily    Derm  Alteration in skin integrity  Wound care following  Skin assessment q shift  Turn q2h    Pulmonary  Aspiration pneumonitis  Con abx and f/u cx  Renally adjusted doses       Acute respiratory failure with hypoxia  Trach/peg  Get CXR and ABG      Hematology  Chronic anticoagulation  Will restart heparin gtt after Trach/Peg and consider starting PO AC in am     History of DVT (deep vein thrombosis)  Will restart heparin gtt after Trach/Peg and consider starting PO AC in am       GI  Oropharyngeal dysphagia  peg     Other  Elevated serum creatinine  Repeat CMP  Avoid nephrotoxins  Check urine electrolytes            The patient is being Prophylaxed for:  Venous Thromboembolism with: Mechanical or Chemical  Stress Ulcer with: H2B  Ventilator Pneumonia with: chlorhexidine oral care    Activity Orders          Diet NPO: NPO starting at 02/20 0001        Full Code    Uninterrupted Critical Care/Counseling Time (not including procedures): 35 minutes     Lisa Wyatt MD  Neurocritical  Care [facility]

## 2020-02-27 NOTE — PROGRESS NOTES
Pharmacist Renal Dose Adjustment Note    Edu Choi is a 64 y.o. male being treated with famotidine for stress ulcer prophylaxis.    Patient Data:    Vital Signs (Most Recent):  Temp: 99 °F (37.2 °C) (02/27/20 0700)  Pulse: 92 (02/27/20 0700)  Resp: (!) 23 (02/27/20 0700)  BP: (!) 99/57 (02/27/20 0700)  SpO2: 98 % (02/27/20 0700)   Vital Signs (72h Range):  Temp:  [97.8 °F (36.6 °C)-100.3 °F (37.9 °C)]   Pulse:  []   Resp:  [15-35]   BP: ()/(50-95)   SpO2:  [84 %-100 %]      Recent Labs   Lab 02/25/20  0340 02/26/20  0142 02/27/20  0231   CREATININE 0.7 0.7 1.9*     Serum creatinine: 1.9 mg/dL (H) 02/27/20 0231  Estimated creatinine clearance: 32 mL/min (A)    Famotidine 20 mg BID will be changed to daily per protocol as creatinine clearance is now <50 mL/minute.     Please call with questions.    Malka Couch, PharmD, BCCCP  Neurocritical Care Clinical Pharmacist  Spectralink: [phone]

## 2020-02-27 NOTE — PLAN OF CARE
GENERAL SURGERY     Patient seen and examined. No acute events overnight. Remained afebrile.   On minimal vent settings this morning  Tube feeds held since midnight    Vent Mode: A/C  Oxygen Concentration (%):  [] 50  Resp Rate Total:  [16 br/min-28 br/min] 26 br/min  Vt Set:  [450 mL-500 mL] 450 mL  PEEP/CPAP:  [5 cmH20] 5 cmH20  Mean Airway Pressure:  [3.5 gjC02-90 cmH20] 11 cmH20    To OR for tracheostomy and PEG placement.   Please call if any change in clinical status.     Johnathon Davidson MD  General Surgery PGYIII  [phone]

## 2020-02-27 NOTE — PLAN OF CARE
POC reviewed with pt and family at 1500. Family verbalized understanding. Questions and concerns addressed. Pt had  PEG and Tracheostomy placement today, is tolerating well. Peg draining to gravity for 24 hours post surgery, Ok to give meds. Will d/c heparin drip to start home anticoagulation regimen. Pt progressing toward goals. Will continue to monitor. See flowsheets for full assessment and VS info.

## 2020-02-27 NOTE — PLAN OF CARE
POC reviewed with pt at 0500. Pt unable to verbalize understanding due to being intubated. No acute events overnight. Pt progressing toward goals. Pt NPO since 0000 for trach/PEG in AM. Heparin gtt stopped at 0000 as well. Will continue to monitor. See flowsheets for full assessment and VS info

## 2020-02-27 NOTE — PLAN OF CARE
02/27/20 1605   Post-Acute Status   Post-Acute Authorization Placement   Post-Acute Placement Status Pending Service Contract  ([facility])     LONNY advised by Breana with [facility] she has submitted for insurance auth.     LONNY advised by RN Pt family is at bedside with questions.    LONNY met with Pt wife and Pt daughter at bedside. Addressed questions and discussed possible timeline for dc to LTAC.    Lashaun Estevez LCSW  Neurocritical Care   [facility]  [phone]

## 2020-02-27 NOTE — PROGRESS NOTES
Pt. Returned from OR. Trach and peg in place. Trach is intact and has dry blood around incision site. Peg site is clean dry and intact.

## 2020-02-27 NOTE — PROGRESS NOTES
Pharmacokinetic Assessment Follow Up: IV Vancomycin    Vancomycin Regimen Plan:  · Patient's serum creatinine acutely increased to >2x baseline (0.7 --> 1.9 mg/dL) overnight. UOP also dropped from 1.8 mL/kg/hour to 0.4 mL/kg/hr.  · Recommend empirically reducing vancomycin dosing frequency to every 24 hours and keeping dose of 1,000 mg.  · Check vancomycin random with AM labs on Friday 2/28. This will be close to a 24 hour level.    Drug levels (last 3 results):  No results for input(s): VANCOMYCINRA, VANCOMYCINPE, VANCOMYCINTR, VANCOTROUGH in the last 72 hours.    Pharmacy will continue to follow and monitor vancomycin. Please contact pharmacy for questions regarding this assessment.    Thank you for the consult,   Malka Couch, PharmD, [facility]  Neurocritical Care Clinical Pharmacist  Spectralink: [phone]  _____________________________________________________________________________________________________     Patient brief summary:  Edu Choi is a 64 y.o. male initiated on antimicrobial therapy with IV Vancomycin for treatment of lower respiratory infection    The patient's current regimen is 1,000 mg IV every 24 hours.    Drug Allergies:   Review of patient's allergies indicates:   Allergen Reactions    No known drug allergies        Actual Body Weight:   68.5 kg    Renal Function:   Estimated Creatinine Clearance: 32 mL/min (A) (based on SCr of 1.9 mg/dL (H)).,     Dialysis Method (if applicable):  N/A TIANNA    CBC (last 72 hours):  Recent Labs   Lab Result Units 02/25/20  0125 02/26/20  0142 02/27/20  0231   WBC K/uL 12.17 10.69 10.20   Hemoglobin g/dL 9.7* 10.4* 10.7*   Hematocrit % 30.3* 33.5* 34.4*   Platelets K/uL 264 277 303   Gran% % 79.3* 77.8* 71.3   Lymph% % 11.5* 12.0* 13.8*   Mono% % 6.7 7.9 11.1   Eosinophil% % 1.8 1.5 2.8   Basophil% % 0.2 0.4 0.5   Differential Method  Automated Automated Automated       Metabolic Panel (last 72 hours):  Recent Labs   Lab Result Units 02/25/20  1834  02/25/20  1734 02/26/20  0142 02/26/20  1730 02/27/20  0231   Sodium mmol/L 141  --  142  --  142   Potassium mmol/L 3.4* 4.3 3.4*  --  4.9   Chloride mmol/L 108  --  109  --  104   CO2 mmol/L 23  --  23  --  26   Glucose mg/dL 114*  --  102  --  107   Glucose, UA   --   --   --  Negative  --    BUN, Bld mg/dL 17  --  17  --  31*   Creatinine mg/dL 0.7  --  0.7  --  1.9*   Albumin g/dL 2.6*  --  2.5*  --  3.2*   Total Bilirubin mg/dL 0.3  --  0.4  --  0.6   Alkaline Phosphatase U/L 75  --  88  --  93   AST U/L 18  --  25  --  49*   ALT U/L 15  --  18  --  27   Magnesium mg/dL 1.6  --  1.8  --  2.5   Phosphorus mg/dL 3.2  --  3.4  --  4.4       Vancomycin Administrations:  vancomycin given in the last 96 hours                   vancomycin in dextrose 5 % 1 gram/250 mL IVPB 1,000 mg (mg) 1,000 mg New Bag 02/27/20 0500    vancomycin 1750 mg in 0.9% sodium chloride 500 mL IVPB (mg) 1,750 mg New Bag 02/26/20 1643                Microbiologic Results:  Microbiology Results (last 7 days)     Procedure Component Value Units Date/Time    Culture, Respiratory with Gram Stain [[id number]] Collected:  02/27/20 0507    Order Status:  Sent Specimen:  Respiratory from Sputum Updated:  02/27/20 0602    Blood culture [[id number]] Collected:  02/26/20 1634    Order Status:  Completed Specimen:  Blood from Peripheral, Hand, Right Updated:  02/27/20 0115     Blood Culture, Routine No Growth to date    Narrative:       Blood cultures x 2 different sites. 4 bottles total. Please  draw cultures before administering antibiotics.    Blood culture [[id number]] Collected:  02/26/20 1643    Order Status:  Completed Specimen:  Blood from Peripheral, Wrist, Left Updated:  02/27/20 0115     Blood Culture, Routine No Growth to date    Narrative:       Blood cultures from 2 different sites. 4 bottles total.  Please draw before starting antibiotics.    CSF culture [[id number]] Collected:  02/15/20 8012    Order Status:  Completed Specimen:  CSF (Spinal  Fluid) from CSF Tap, Tube 3 Updated:  02/21/20 0710     CSF CULTURE No Growth     Gram Stain Result Cytospin indicates:      No WBC's      No organisms seen    Fungus culture [[id number]] Collected:  02/15/20 2247    Order Status:  Completed Specimen:  CSF (Spinal Fluid) from CSF Tap, Tube 3 Updated:  02/20/20 1009     Fungus (Mycology) Culture Culture in progress

## 2020-02-27 NOTE — TRANSFER OF CARE
"Anesthesia Transfer of Care Note    Patient: Edu Choi    Procedure(s) Performed: Procedure(s) (LRB):  CREATION, TRACHEOSTOMY (N/A)  EGD, WITH PEG TUBE INSERTION (N/A)    Patient location: ICU    Anesthesia Type: general    Transport from OR: Transported from OR on 6-10 L/min O2 by face mask with adequate spontaneous ventilation. Transported from OR intubated on 100% O2 by AMBU with assisted ventilation    Post pain: adequate analgesia    Post assessment: no apparent anesthetic complications    Post vital signs: stable    Level of consciousness: awake    Nausea/Vomiting: no nausea/vomiting    Complications: none    Transfer of care protocol was followedComments: Pt transported to ICU with AMBU bag breathing via trach      Last vitals:   Visit Vitals  BP (!) 112/56 (BP Location: Right arm, Patient Position: Lying)   Pulse 88   Temp 37.2 °C (99 °F) (Axillary)   Resp (!) 24   Ht 5' 4" (1.626 m)   Wt 68.5 kg (151 lb)   SpO2 100%   BMI 25.92 kg/m²     "

## 2020-02-27 NOTE — ANESTHESIA POSTPROCEDURE EVALUATION
Anesthesia Post Evaluation    Patient: Edu Choi    Procedure(s) Performed: Procedure(s) (LRB):  CREATION, TRACHEOSTOMY (N/A)  EGD, WITH PEG TUBE INSERTION (N/A)    Final Anesthesia Type: general    Patient location during evaluation: ICU  Patient participation: No - Unable to Participate, Intubation  Level of consciousness: sedated  Post-procedure vital signs: reviewed and stable  Pain management: adequate  Airway patency: patent    PONV status at discharge: No PONV  Anesthetic complications: no      Cardiovascular status: stable  Respiratory status: ventilator  Hydration status: euvolemic  Follow-up not needed.          Vitals Value Taken Time   /62 2/27/2020 12:00 PM   Temp 37.2 °C (99 °F) 2/27/2020  7:00 AM   Pulse 96 2/27/2020 11:23 AM   Resp 21 2/27/2020 11:23 AM   SpO2 98 % 2/27/2020 11:23 AM         No case tracking events are documented in the log.      Pain/Atul Score: Pain Rating Prior to Med Admin: 0 (2/26/2020  4:18 PM)

## 2020-02-27 NOTE — SUBJECTIVE & OBJECTIVE
Interval History:  >4 elements OR status of 3 inpatient conditions    Review of Systems   Unable to perform ROS: Mental status change     2 systems OR Unable to obtain a complete ROS due to level of consciousness.  Objective:     Vitals:  Temp: 99 °F (37.2 °C)  Pulse: 96  Rhythm: normal sinus rhythm  BP: 112/74  MAP (mmHg): 87  Resp: (!) 27  SpO2: 98 %  Oxygen Concentration (%): 50  O2 Device (Oxygen Therapy): ventilator  Vent Mode: A/C  Set Rate: 17 BPM  Vt Set: 450 mL  PEEP/CPAP: 5 cmH20  Peak Airway Pressure: 24 cmH2O  Mean Airway Pressure: 9.4 cmH20  Plateau Pressure: 0 cmH20    Temp  Min: 98.5 °F (36.9 °C)  Max: 100.2 °F (37.9 °C)  Pulse  Min: 77  Max: 119  BP  Min: 89/54  Max: 127/60  MAP (mmHg)  Min: 66  Max: 87  Resp  Min: 17  Max: 29  SpO2  Min: 95 %  Max: 100 %  Oxygen Concentration (%)  Min: 50  Max: 60    02/26 0701 - 02/27 0700  In: 1318 [I.V.:248]  Out: 895 [Urine:695]   Unmeasured Output  Urine Occurrence: 1  Stool Occurrence: 1  Pad Count: 2       Physical Exam    Constitutional: No apparent distress.   Eyes: Conjunctiva clear, anicteric. Lids no lesions. Small abrasion under left eye   Head/Ears/Nose/Mouth/Throat/Neck: Moist mucous membranes. External ears, nose atraumatic.   Cardiovascular: Regular rhythm. ESM  Respiratory: clear  Gastrointestinal: No hernia. Soft, nondistended, nontender. + bowel sounds.      Neurologic Examination:    -Mental Status: Open eyes to voice. Doesn't follow commands   -Cranial nerves: Pupils equal, round, and reactive bilateral. Extraocular movements intact with VOR. Intact corneal reflexes bilateral, intact cough reflex -Motor: Moves all ext spon     Medications:  Continuous    heparin (porcine) in D5W Last Rate: Stopped (02/27/20 0000)   propofol Last Rate: Stopped (02/26/20 1400)   Scheduled    albuterol-ipratropium 3 mL Q4H   amantadine  mg BID   atorvastatin 40 mg QHS   chlorhexidine 15 mL BID   escitalopram oxalate 20 mg Daily   famotidine 20 mg Daily    levetiracetam IVPB 750 mg Q12H   miconazole nitrate 2%  BID   piperacillin-tazobactam 4.5 g in dextrose 5 % 100 mL IVPB (ready to mix system) 4.5 g Q8H   [START ON 2/28/2020] vancomycin (VANCOCIN) IVPB 1,000 mg Q24H   PRN    acetaminophen 650 mg Q6H PRN   Dextrose 10% Bolus 12.5 g PRN   fentaNYL 50 mcg Q2H PRN   glucagon (human recombinant) 1 mg PRN   insulin aspart U-100 1-10 Units Q6H PRN   labetalol 10 mg Q4H PRN   ondansetron 4 mg Q8H PRN   sodium chloride 0.9% 10 mL PRN     Today I personally reviewed pertinent medications, lines/drains/airways, imaging, cardiology results, laboratory results, microbiology results, notably:    Diet  Diet NPO  Diet NPO

## 2020-02-27 NOTE — OP NOTE
DATE: 02/27/2020    PREOPERATIVE DIAGNOSES:   1. Seizures   2. Respiratory failure.   3. Dysphagia.    POSTOPERATIVE DIAGNOSES:   1. Seizures  2. Respiratory failure.   3. Dysphagia.     PROCEDURES PERFORMED:   1. Percutaneous tracheostomy.   2. Bronchoscopy through tracheostomy.  3. Esophagogastroduodenoscopy with percutaneous endoscopic gastrostomy.    ATTENDING SURGEON: Jose Antonio Peterson MD     HOUSESTAFF SURGEON: Eagle Keita MD    ANESTHESIA: General.     INDICATIONS: Edu Choi is a 64 y.o.male admitted to [facility] with seizures. The patient has failed attempts to wean from the ventilator. We have recommended to the family that the patient would benefit from placement of a percutaneous tracheostomy tube for the anticipated prolonged need for mechanical ventilation. We also recommend a percutaneous gastrostomy tube for the patient's dysphagia. We did obtain informed consent from the patient's family who expressed understanding of the risks and benefits and gave consent to proceed.     PROCEDURE: The procedure was performed at the bedside in the operating room. The patient was identified and monitored throughout. Appropriate position with neck in extension; anterior neck was prepped and draped. We identified our tracheal landmarks, made a 2 cm midline cervical incision. Subcutaneous tissue was bluntly dissected, and appropriate position for the tracheostomy tube was noted. Using the bronchoscope, we withdrew the endotracheal tube to above the level of tracheostomy. The trachea was then cannulated with a hollow bore needle. Aspiration of air confirmed cannulation of the trachea. The guidewire was then placed and confirmed within the trachea by bronchoscopy. Under direct visualization, a tracheostomy was created by serial dilation; first with the punch dilator and then the Blue Rhino dilator. A #8 Shiley tracheostomy was loaded onto the appropriate size loading dilator and placed into the trachea  by Seldinger technique. The cuff of the tracheostomy tube was inflated and Bronchoscopy through the tracheostomy confirmed appropriate position with visualization of the christoph. The endotracheal tube was removed and the tracheostomy tube was secured in place using Velcro tracheostomy tape and 2-0 Prolene sutures.    The patient's abdomen was prepped and draped. An upper endoscope was introduced into the oropharynx and guided down into the esophagus and stomach. The stomach was insufflated with air. We intubated the duodenum and examined the first and second portions; no abnormalities were noted. The visualized antrum and fundus noted to be within normal limits. We withdrew the endoscope into the stomach and identified an appropriate position for gastrostomy tube placement 2 finger-breadths below the left subcostal margin. Palpation of the anterior abdominal wall at this point was visualized endoscopically and transillumination from the endoscope was visualized through the anterior abdominal wall. We made a small transverse skin incision. At this point, the stomach was cannulated with a catheter loaded on a needle. A guidewire was passed through the catheter and was grasped with an endoscopic snare. The endoscope, snare, and guidewire were all withdrawn from the patient's mouth. A 20-Romansh gastrostomy tube was loaded onto the guidewire and pulled through the anterior abdominal wall via Seldinger technique. Repeat endoscopy was performed with the gastrostomy tube at the 4 cm charlie at the skin. There was no blanching of the gastric mucosa, and when the tube was twisted, the button did not grab the mucosa. The insufflation in the stomach was evacuated, and the endoscope was withdrawn. Visualized esophagus was within normal limits. The gastrostomy tube was secured in place using the supplied devices and connected to a bag for gravity drainage. The patient was in critical but stable conidiont at the end of the case. I was  present for the procedure in its entirety.    ESTIMATED BLOOD LOSS: 10 mL     FINDINGS: A #8 Shiley tracheostomy and 20-Portuguese percutaneous gastrostomy   placed without apparent complication.     SPECIMEN: None.     DRAINS: None.     COMPLICATIONS: None.

## 2020-02-28 VITALS
RESPIRATION RATE: 15 BRPM | DIASTOLIC BLOOD PRESSURE: 56 MMHG | HEIGHT: 64 IN | WEIGHT: 151 LBS | BODY MASS INDEX: 25.78 KG/M2 | SYSTOLIC BLOOD PRESSURE: 114 MMHG | HEART RATE: 81 BPM | TEMPERATURE: 99 F | OXYGEN SATURATION: 97 %

## 2020-02-28 PROBLEM — R65.21 SEPTIC SHOCK: Status: RESOLVED | Noted: 2020-02-07 | Resolved: 2020-02-28

## 2020-02-28 PROBLEM — A41.9 SEPTIC SHOCK: Status: RESOLVED | Noted: 2020-02-07 | Resolved: 2020-02-28

## 2020-02-28 PROBLEM — Z93.1 PEG (PERCUTANEOUS ENDOSCOPIC GASTROSTOMY) STATUS: Status: ACTIVE | Noted: 2020-02-28

## 2020-02-28 PROBLEM — Z93.0 TRACHEOSTOMY IN PLACE: Status: ACTIVE | Noted: 2020-02-28

## 2020-02-28 PROBLEM — S06.30AA TRAUMATIC INTRAVENTRICULAR HEMORRHAGE: Status: ACTIVE | Noted: 2020-02-28

## 2020-02-28 PROBLEM — I63.411 CEREBRAL INFARCTION DUE TO EMBOLISM OF RIGHT MIDDLE CEREBRAL ARTERY: Status: ACTIVE | Noted: 2020-02-28

## 2020-02-28 PROBLEM — G40.909 SEIZURE DISORDER: Status: ACTIVE | Noted: 2020-02-02

## 2020-02-28 PROBLEM — S02.92XA MULTIPLE CLOSED FRACTURES OF FACIAL BONE: Status: RESOLVED | Noted: 2020-02-02 | Resolved: 2020-02-28

## 2020-02-28 PROBLEM — D63.8 ANEMIA OF CHRONIC DISEASE: Status: ACTIVE | Noted: 2020-02-28

## 2020-02-28 PROBLEM — S06.6X9A: Status: ACTIVE | Noted: 2020-02-28

## 2020-02-28 PROBLEM — G40.901 STATUS EPILEPTICUS: Status: ACTIVE | Noted: 2020-02-28

## 2020-02-28 LAB
ALBUMIN SERPL BCP-MCNC: 2.9 G/DL (ref 3.5–5.2)
ALP SERPL-CCNC: 78 U/L (ref 55–135)
ALT SERPL W/O P-5'-P-CCNC: 25 U/L (ref 10–44)
ANION GAP SERPL CALC-SCNC: 9 MMOL/L (ref 8–16)
APTT BLDCRRT: <21 SEC (ref 21–32)
AST SERPL-CCNC: 44 U/L (ref 10–40)
BASOPHILS # BLD AUTO: 0.05 K/UL (ref 0–0.2)
BASOPHILS NFR BLD: 0.5 % (ref 0–1.9)
BILIRUB SERPL-MCNC: 0.6 MG/DL (ref 0.1–1)
BUN SERPL-MCNC: 22 MG/DL (ref 8–23)
CALCIUM SERPL-MCNC: 9 MG/DL (ref 8.7–10.5)
CHLORIDE SERPL-SCNC: 104 MMOL/L (ref 95–110)
CO2 SERPL-SCNC: 26 MMOL/L (ref 23–29)
CREAT SERPL-MCNC: 1.2 MG/DL (ref 0.5–1.4)
DIFFERENTIAL METHOD: ABNORMAL
EOSINOPHIL # BLD AUTO: 0.1 K/UL (ref 0–0.5)
EOSINOPHIL NFR BLD: 1.2 % (ref 0–8)
ERYTHROCYTE [DISTWIDTH] IN BLOOD BY AUTOMATED COUNT: 15.5 % (ref 11.5–14.5)
EST. GFR  (AFRICAN AMERICAN): >60 ML/MIN/1.73 M^2
EST. GFR  (NON AFRICAN AMERICAN): >60 ML/MIN/1.73 M^2
GLUCOSE SERPL-MCNC: 98 MG/DL (ref 70–110)
HCT VFR BLD AUTO: 29.6 % (ref 40–54)
HGB BLD-MCNC: 9.2 G/DL (ref 14–18)
IMM GRANULOCYTES # BLD AUTO: 0.06 K/UL (ref 0–0.04)
IMM GRANULOCYTES NFR BLD AUTO: 0.6 % (ref 0–0.5)
INR PPP: 1.1 (ref 0.8–1.2)
LYMPHOCYTES # BLD AUTO: 1.5 K/UL (ref 1–4.8)
LYMPHOCYTES NFR BLD: 14.3 % (ref 18–48)
MAGNESIUM SERPL-MCNC: 2.3 MG/DL (ref 1.6–2.6)
MCH RBC QN AUTO: 32.4 PG (ref 27–31)
MCHC RBC AUTO-ENTMCNC: 31.1 G/DL (ref 32–36)
MCV RBC AUTO: 104 FL (ref 82–98)
MONOCYTES # BLD AUTO: 1.4 K/UL (ref 0.3–1)
MONOCYTES NFR BLD: 13.6 % (ref 4–15)
NEUTROPHILS # BLD AUTO: 7.2 K/UL (ref 1.8–7.7)
NEUTROPHILS NFR BLD: 69.8 % (ref 38–73)
NRBC BLD-RTO: 0 /100 WBC
PHOSPHATE SERPL-MCNC: 3 MG/DL (ref 2.7–4.5)
PLATELET # BLD AUTO: 265 K/UL (ref 150–350)
PMV BLD AUTO: 11.3 FL (ref 9.2–12.9)
POCT GLUCOSE: 104 MG/DL (ref 70–110)
POCT GLUCOSE: 105 MG/DL (ref 70–110)
POCT GLUCOSE: 108 MG/DL (ref 70–110)
POTASSIUM SERPL-SCNC: 4.1 MMOL/L (ref 3.5–5.1)
PROT SERPL-MCNC: 6.9 G/DL (ref 6–8.4)
PROTHROMBIN TIME: 11.1 SEC (ref 9–12.5)
RBC # BLD AUTO: 2.84 M/UL (ref 4.6–6.2)
SODIUM SERPL-SCNC: 139 MMOL/L (ref 136–145)
VANCOMYCIN SERPL-MCNC: 15.2 UG/ML
WBC # BLD AUTO: 10.35 K/UL (ref 3.9–12.7)

## 2020-02-28 PROCEDURE — 63600175 PHARM REV CODE 636 W HCPCS: Mod: HCNC | Performed by: NURSE PRACTITIONER

## 2020-02-28 PROCEDURE — 97803 MED NUTRITION INDIV SUBSEQ: CPT | Mod: HCNC

## 2020-02-28 PROCEDURE — 94761 N-INVAS EAR/PLS OXIMETRY MLT: CPT | Mod: HCNC

## 2020-02-28 PROCEDURE — 80053 COMPREHEN METABOLIC PANEL: CPT | Mod: HCNC

## 2020-02-28 PROCEDURE — 99233 SBSQ HOSP IP/OBS HIGH 50: CPT | Mod: HCNC,GC,, | Performed by: PSYCHIATRY & NEUROLOGY

## 2020-02-28 PROCEDURE — 63600175 PHARM REV CODE 636 W HCPCS: Mod: HCNC | Performed by: PSYCHIATRY & NEUROLOGY

## 2020-02-28 PROCEDURE — 80202 ASSAY OF VANCOMYCIN: CPT | Mod: HCNC

## 2020-02-28 PROCEDURE — 27200966 HC CLOSED SUCTION SYSTEM: Mod: HCNC

## 2020-02-28 PROCEDURE — 94640 AIRWAY INHALATION TREATMENT: CPT | Mod: HCNC

## 2020-02-28 PROCEDURE — 25000003 PHARM REV CODE 250: Mod: HCNC | Performed by: NURSE PRACTITIONER

## 2020-02-28 PROCEDURE — 83735 ASSAY OF MAGNESIUM: CPT | Mod: HCNC

## 2020-02-28 PROCEDURE — 84100 ASSAY OF PHOSPHORUS: CPT | Mod: HCNC

## 2020-02-28 PROCEDURE — 99233 PR SUBSEQUENT HOSPITAL CARE,LEVL III: ICD-10-PCS | Mod: HCNC,GC,, | Performed by: PSYCHIATRY & NEUROLOGY

## 2020-02-28 PROCEDURE — 85610 PROTHROMBIN TIME: CPT | Mod: HCNC

## 2020-02-28 PROCEDURE — 94003 VENT MGMT INPAT SUBQ DAY: CPT | Mod: HCNC

## 2020-02-28 PROCEDURE — 99900035 HC TECH TIME PER 15 MIN (STAT): Mod: HCNC

## 2020-02-28 PROCEDURE — 94150 VITAL CAPACITY TEST: CPT | Mod: HCNC

## 2020-02-28 PROCEDURE — 85730 THROMBOPLASTIN TIME PARTIAL: CPT | Mod: HCNC

## 2020-02-28 PROCEDURE — 85025 COMPLETE CBC W/AUTO DIFF WBC: CPT | Mod: HCNC

## 2020-02-28 PROCEDURE — 99900026 HC AIRWAY MAINTENANCE (STAT): Mod: HCNC

## 2020-02-28 PROCEDURE — 25000003 PHARM REV CODE 250: Mod: HCNC | Performed by: PSYCHIATRY & NEUROLOGY

## 2020-02-28 PROCEDURE — 27000221 HC OXYGEN, UP TO 24 HOURS: Mod: HCNC

## 2020-02-28 PROCEDURE — 25000242 PHARM REV CODE 250 ALT 637 W/ HCPCS: Mod: HCNC | Performed by: NURSE PRACTITIONER

## 2020-02-28 RX ORDER — LABETALOL HCL 20 MG/4 ML
10 SYRINGE (ML) INTRAVENOUS EVERY 4 HOURS PRN
Status: CANCELLED | OUTPATIENT
Start: 2020-02-28

## 2020-02-28 RX ORDER — LEVETIRACETAM 100 MG/ML
750 SOLUTION ORAL 2 TIMES DAILY
Qty: 450 ML | Refills: 11 | Status: SHIPPED | OUTPATIENT
Start: 2020-02-28 | End: 2021-02-27

## 2020-02-28 RX ORDER — LEVETIRACETAM 100 MG/ML
750 SOLUTION ORAL 2 TIMES DAILY
Status: DISCONTINUED | OUTPATIENT
Start: 2020-02-28 | End: 2020-02-28 | Stop reason: HOSPADM

## 2020-02-28 RX ORDER — INSULIN ASPART 100 [IU]/ML
1-10 INJECTION, SOLUTION INTRAVENOUS; SUBCUTANEOUS EVERY 6 HOURS PRN
Status: CANCELLED | OUTPATIENT
Start: 2020-02-28

## 2020-02-28 RX ORDER — ESCITALOPRAM OXALATE 10 MG/1
20 TABLET ORAL DAILY
Status: CANCELLED | OUTPATIENT
Start: 2020-02-29

## 2020-02-28 RX ORDER — AMOXICILLIN 250 MG
1 CAPSULE ORAL 2 TIMES DAILY
Status: DISCONTINUED | OUTPATIENT
Start: 2020-02-28 | End: 2020-02-28 | Stop reason: HOSPADM

## 2020-02-28 RX ORDER — ACETAMINOPHEN 325 MG/1
650 TABLET ORAL EVERY 6 HOURS PRN
Refills: 0
Start: 2020-02-28

## 2020-02-28 RX ORDER — AMANTADINE HYDROCHLORIDE 50 MG/5ML
100 SOLUTION ORAL 2 TIMES DAILY
Status: CANCELLED | OUTPATIENT
Start: 2020-02-28

## 2020-02-28 RX ORDER — TALC
6 POWDER (GRAM) TOPICAL NIGHTLY PRN
Status: DISCONTINUED | OUTPATIENT
Start: 2020-02-28 | End: 2020-02-28 | Stop reason: HOSPADM

## 2020-02-28 RX ORDER — IPRATROPIUM BROMIDE AND ALBUTEROL SULFATE 2.5; .5 MG/3ML; MG/3ML
3 SOLUTION RESPIRATORY (INHALATION) EVERY 4 HOURS
Qty: 1 BOX | Refills: 0 | Status: SHIPPED | OUTPATIENT
Start: 2020-02-28 | End: 2021-02-27

## 2020-02-28 RX ORDER — ONDANSETRON 2 MG/ML
4 INJECTION INTRAMUSCULAR; INTRAVENOUS EVERY 8 HOURS PRN
Status: CANCELLED | OUTPATIENT
Start: 2020-02-28

## 2020-02-28 RX ORDER — CHLORHEXIDINE GLUCONATE ORAL RINSE 1.2 MG/ML
15 SOLUTION DENTAL 2 TIMES DAILY
Status: CANCELLED | OUTPATIENT
Start: 2020-02-28

## 2020-02-28 RX ORDER — ATORVASTATIN CALCIUM 40 MG/1
40 TABLET, FILM COATED ORAL NIGHTLY
Qty: 90 TABLET | Refills: 3 | Status: ON HOLD | OUTPATIENT
Start: 2020-02-28 | End: 2020-03-29 | Stop reason: HOSPADM

## 2020-02-28 RX ORDER — FENTANYL CITRATE 50 UG/ML
50 INJECTION, SOLUTION INTRAMUSCULAR; INTRAVENOUS
Status: CANCELLED | OUTPATIENT
Start: 2020-02-28

## 2020-02-28 RX ORDER — FENTANYL CITRATE 50 UG/ML
50 INJECTION, SOLUTION INTRAMUSCULAR; INTRAVENOUS
Refills: 0 | Status: ON HOLD
Start: 2020-02-28 | End: 2020-03-29 | Stop reason: HOSPADM

## 2020-02-28 RX ORDER — ACETAMINOPHEN 325 MG/1
650 TABLET ORAL EVERY 6 HOURS PRN
Status: CANCELLED | OUTPATIENT
Start: 2020-02-28

## 2020-02-28 RX ORDER — ONDANSETRON 2 MG/ML
4 INJECTION INTRAMUSCULAR; INTRAVENOUS EVERY 8 HOURS PRN
Start: 2020-02-28

## 2020-02-28 RX ORDER — FAMOTIDINE 10 MG/ML
20 INJECTION INTRAVENOUS 2 TIMES DAILY
Status: CANCELLED | OUTPATIENT
Start: 2020-02-28

## 2020-02-28 RX ORDER — LEVETIRACETAM 100 MG/ML
750 SOLUTION ORAL 2 TIMES DAILY
Status: CANCELLED | OUTPATIENT
Start: 2020-02-28

## 2020-02-28 RX ORDER — LABETALOL HCL 20 MG/4 ML
10 SYRINGE (ML) INTRAVENOUS EVERY 4 HOURS PRN
Status: ON HOLD
Start: 2020-02-28 | End: 2020-03-29 | Stop reason: HOSPADM

## 2020-02-28 RX ORDER — ATORVASTATIN CALCIUM 20 MG/1
40 TABLET, FILM COATED ORAL NIGHTLY
Status: CANCELLED | OUTPATIENT
Start: 2020-02-28

## 2020-02-28 RX ORDER — AMANTADINE HYDROCHLORIDE 50 MG/5ML
100 SOLUTION ORAL 2 TIMES DAILY
Qty: 600 ML | Refills: 11 | Status: ON HOLD
Start: 2020-02-28 | End: 2020-03-29 | Stop reason: HOSPADM

## 2020-02-28 RX ORDER — ONDANSETRON 2 MG/ML
4 INJECTION INTRAMUSCULAR; INTRAVENOUS EVERY 8 HOURS PRN
Status: DISCONTINUED | OUTPATIENT
Start: 2020-02-28 | End: 2020-02-28 | Stop reason: HOSPADM

## 2020-02-28 RX ORDER — CHLORHEXIDINE GLUCONATE ORAL RINSE 1.2 MG/ML
15 SOLUTION DENTAL 2 TIMES DAILY
Refills: 0 | Status: ON HOLD
Start: 2020-02-28 | End: 2020-03-29 | Stop reason: HOSPADM

## 2020-02-28 RX ORDER — SODIUM CHLORIDE 0.9 % (FLUSH) 0.9 %
10 SYRINGE (ML) INJECTION
Status: CANCELLED | OUTPATIENT
Start: 2020-02-28

## 2020-02-28 RX ORDER — TALC
6 POWDER (GRAM) TOPICAL NIGHTLY PRN
Status: CANCELLED | OUTPATIENT
Start: 2020-02-28

## 2020-02-28 RX ORDER — FAMOTIDINE 20 MG/1
20 TABLET, FILM COATED ORAL DAILY
Qty: 30 TABLET | Refills: 11 | Status: ON HOLD | OUTPATIENT
Start: 2020-02-29 | End: 2020-03-29 | Stop reason: HOSPADM

## 2020-02-28 RX ORDER — ACETAMINOPHEN 325 MG/1
650 TABLET ORAL EVERY 6 HOURS PRN
Status: DISCONTINUED | OUTPATIENT
Start: 2020-02-28 | End: 2020-02-28 | Stop reason: HOSPADM

## 2020-02-28 RX ORDER — IPRATROPIUM BROMIDE AND ALBUTEROL SULFATE 2.5; .5 MG/3ML; MG/3ML
3 SOLUTION RESPIRATORY (INHALATION) EVERY 4 HOURS
Status: CANCELLED | OUTPATIENT
Start: 2020-02-28

## 2020-02-28 RX ORDER — INSULIN ASPART 100 [IU]/ML
1-10 INJECTION, SOLUTION INTRAVENOUS; SUBCUTANEOUS EVERY 6 HOURS PRN
Refills: 0 | Status: ON HOLD
Start: 2020-02-28 | End: 2020-03-29 | Stop reason: HOSPADM

## 2020-02-28 RX ORDER — FAMOTIDINE 20 MG/1
20 TABLET, FILM COATED ORAL DAILY
Status: CANCELLED | OUTPATIENT
Start: 2020-02-29

## 2020-02-28 RX ORDER — FAMOTIDINE 10 MG/ML
20 INJECTION INTRAVENOUS 2 TIMES DAILY
Status: DISCONTINUED | OUTPATIENT
Start: 2020-02-28 | End: 2020-02-28 | Stop reason: HOSPADM

## 2020-02-28 RX ORDER — GLUCAGON 1 MG
1 KIT INJECTION
Status: CANCELLED | OUTPATIENT
Start: 2020-02-28

## 2020-02-28 RX ORDER — AMOXICILLIN 250 MG
1 CAPSULE ORAL 2 TIMES DAILY
Status: CANCELLED | OUTPATIENT
Start: 2020-02-28

## 2020-02-28 RX ADMIN — IPRATROPIUM BROMIDE AND ALBUTEROL SULFATE 3 ML: .5; 3 SOLUTION RESPIRATORY (INHALATION) at 04:02

## 2020-02-28 RX ADMIN — FAMOTIDINE 20 MG: 20 TABLET ORAL at 08:02

## 2020-02-28 RX ADMIN — IPRATROPIUM BROMIDE AND ALBUTEROL SULFATE 3 ML: .5; 3 SOLUTION RESPIRATORY (INHALATION) at 08:02

## 2020-02-28 RX ADMIN — AMANTADINE HYDROCHLORIDE 100 MG: 50 SOLUTION ORAL at 06:02

## 2020-02-28 RX ADMIN — PIPERACILLIN AND TAZOBACTAM 4.5 G: 4; .5 INJECTION, POWDER, LYOPHILIZED, FOR SOLUTION INTRAVENOUS; PARENTERAL at 02:02

## 2020-02-28 RX ADMIN — FENTANYL CITRATE 50 MCG: 50 INJECTION INTRAMUSCULAR; INTRAVENOUS at 06:02

## 2020-02-28 RX ADMIN — LEVETIRACETAM 750 MG: 100 INJECTION, SOLUTION, CONCENTRATE INTRAVENOUS at 08:02

## 2020-02-28 RX ADMIN — VANCOMYCIN HYDROCHLORIDE 1250 MG: 1.25 INJECTION, POWDER, LYOPHILIZED, FOR SOLUTION INTRAVENOUS at 08:02

## 2020-02-28 RX ADMIN — MICONAZOLE NITRATE: 20 OINTMENT TOPICAL at 08:02

## 2020-02-28 RX ADMIN — FENTANYL CITRATE 50 MCG: 50 INJECTION INTRAMUSCULAR; INTRAVENOUS at 04:02

## 2020-02-28 RX ADMIN — ESCITALOPRAM OXALATE 20 MG: 10 TABLET ORAL at 08:02

## 2020-02-28 RX ADMIN — CHLORHEXIDINE GLUCONATE 0.12% ORAL RINSE 15 ML: 1.2 LIQUID ORAL at 08:02

## 2020-02-28 RX ADMIN — APIXABAN 5 MG: 2.5 TABLET, FILM COATED ORAL at 08:02

## 2020-02-28 RX ADMIN — PIPERACILLIN AND TAZOBACTAM 4.5 G: 4; .5 INJECTION, POWDER, LYOPHILIZED, FOR SOLUTION INTRAVENOUS; PARENTERAL at 09:02

## 2020-02-28 RX ADMIN — FENTANYL CITRATE 50 MCG: 50 INJECTION INTRAMUSCULAR; INTRAVENOUS at 12:02

## 2020-02-28 RX ADMIN — FENTANYL CITRATE 50 MCG: 50 INJECTION INTRAMUSCULAR; INTRAVENOUS at 02:02

## 2020-02-28 NOTE — ASSESSMENT & PLAN NOTE
- Incidentally seen on MRI w small scattered areas of subacute/remote infarcts  - Cont current secondary prevention measures

## 2020-02-28 NOTE — PLAN OF CARE
POC reviewed with pt at 0500. Pt showed no evidence of understanding.. No acute events overnight. Pt nodded to indicate pain and extremely restless throughout night, PRN Fentanyl administered per order. Heparin gtt remains off per order, plans to restart home Plavix today. Will continue to monitor. See flowsheets for full assessment and VS info

## 2020-02-28 NOTE — PLAN OF CARE
POC reviewed with pt and family at 1000. Pt unable to verbalize understanding. Questions and concerns addressed with family. No acute events today. Tolerating CPAP trial. PEG tube feedings started at 10 ml/hr at 1030.  Pt progressing toward goals. Will continue to monitor. See flowsheets for full assessment and VS info.    Report given to DIXON Penn, at [facility].  [facility].  Patient's spouse was present at time of transfer.

## 2020-02-28 NOTE — PROGRESS NOTES
[facility]  Neurocritical Care  Progress Note    Admit Date: 2/2/2020  Service Date: 02/28/2020  Length of Stay: 26    Subjective:     Chief Complaint: Seizure    History of Present Illness: Pt is a 64 y.o. Male with PMHx of of DVT, PE, DM, and seizures (on 1500 keppra at home) who presents to the ED via EMS with complaint of a seizure that occurred this morning. Pt has long standing history of seizures and was found down by wife on cement around 0800, LKN 0700. Patient compliant with keppra per wife and last seizure was in November. At that time his keppra dose was increased. Patient has had cough recently but no other symptoms. Patient is on AC for DVT/PE and was recently switched from Coumadin to Eliquis. He was given 10 mg Versed during EMS ride to outside facility for continued seizure activity. He was unresponsive upon arrival to ED and subsequently intubated. CTH without any acute changes, CT max/face with multiple facial fractures, and CXR with consolidation and atelectasis/collapsed lung. Patient transferred to [facility] to higher level neurologic monitoring and continuous EEG.     Hospital Course: 2/2/2020: Admit to [facility]. GS and ENT consulted. MX facial fractures and concerns of bowel ischemia on CT  2/3 will rehook to EEG for 24h. Remains NPO. US LE pending. Monitor lactic [id number] 2L  IV. Wean levophed as tolerated after bolus  2/4: remains on small amount of pressors, cultures remain negative, cont abx, cont current AEDs, EEG overnight negative for seizure activity, repeat ct abdomen today  2/5: minimal dose pressors, advance TFs. Tolerating SBT, possible trial of extubation in am  2/6: need for pressors will likely cease when able to come off sedation, currently still requires mechanical ventilation with pO2 value of 60 on 40% fiO2 and ps 10  02/08/2020 exam still poor, getting MRI brain   02/09/2020 Not tolerating CPAP, placed on AC   02/10/2020 NAEO  02/11/2020 NAEO  02/12/2020 CPAP  trial this am   02/13/2020 NAEON. On heparin drip with stable exam.   02/14/2020 NAEON, f/u EEG (on Keppra). Will give modafinil trial today.   02/15/2020: Stable overnight. Little improvement with modafinil. Heparin gtt stopped for LP procedure. Meningitic coverage started.  02/16/2020: LP performed overnight. Heparin gtt restarted. Antibiotics stopped. Continue acyclovir. Patient more awake today. Increased free water flushes. Started amantadine.  02/17/2020 NAEO. Planning a family meeting to discuss trach/peg   02/18/2020 family still discussing trach/peg. Required increased PEEP overnight   02/19/2020 vent requirements stable, awaiting trach/PEG  02/20/2020  Trach/PEG pending  ENT consulted due to vent requirements  Weaning vent as tolerated, now down to PEEP of 8 and 45%  Neuro exam waxing and waning but stable  2/21/2020 No significant events over night. Tolerating spon. Breathing trial today. More alert today. Attempted breathing parameters.pt not following well poor attempts at NIF and VC. Will reassess tomorrow.   2/22 no significant events over night. Tolerating SBT. Pt more sleepy today. precedex turned off.  Keppra decreased to 1g q12. Not as responsive this afternoon. Not following commands, not attentive, not tracking. Sent for [facility], no new changes noted. Placed on EEG cap will monitor over night  2/23 No significant events over night. EEG cap showed no seizures . EEG dcd. Pt more alert today. Tracking responding to wife. toelrating SBT. TF off NGT to suction. + cuff leak good breathing parameters. Extubated to NC 3L  No s/s stridor or difficulty breathing.  02/24/2020: switched TF to Glucerna per nutation reccs, decrease keppra to 750mg  BID, cardiac chair today, schedule Tylenol 1 gm q6h for 24 h, d/c 3% nebs  02/25/2020: brief desaturation-NT suction and placed on nonbriether, CXR, 40 mg Lasix  02/26/2020 Had to be intubated this am due to increased hypoxia and lethargy  \  02/27/2020 Trach/PEG today    02/28/2020 Stable for discharge to LTAC.       Interval History:  >4 elements OR status of 3 inpatient conditions    Review of Systems   Constitutional: Positive for fatigue.   HENT: Positive for trouble swallowing. Negative for congestion.    Respiratory: Negative.    Gastrointestinal: Negative for nausea and vomiting.     Objective:     Vitals:  Temp: 99.1 °F (37.3 °C)  Pulse: 81  Rhythm: normal sinus rhythm  BP: (!) 114/56  MAP (mmHg): 80  Resp: 15  SpO2: 97 %  Oxygen Concentration (%): 50  O2 Device (Oxygen Therapy): ventilator  Vent Mode: Spont  Set Rate: 17 BPM  Vt Set: 450 mL  Pressure Support: 5 cmH20  PEEP/CPAP: 5 cmH20  Peak Airway Pressure: 10 cmH2O  Mean Airway Pressure: 6.6 cmH20  Plateau Pressure: 0 cmH20    Temp  Min: 98.5 °F (36.9 °C)  Max: 99.3 °F (37.4 °C)  Pulse  Min: 77  Max: 101  BP  Min: 104/51  Max: 128/60  MAP (mmHg)  Min: 73  Max: 88  Resp  Min: 15  Max: 24  SpO2  Min: 94 %  Max: 100 %  Oxygen Concentration (%)  Min: 50  Max: 50    02/27 0701 - 02/28 0700  In: 2265 [I.V.:165]  Out: 1850 [Urine:1200; Drains:75]   Unmeasured Output  Urine Occurrence: 1  Stool Occurrence: 1  Pad Count: 1       Physical Exam    Constitutional: No apparent distress.   Eyes: Conjunctiva clear, anicteric. Lids no lesions. Small abrasion under left eye   Head/Ears/Nose/Mouth/Throat/Neck: Moist mucous membranes. External ears, nose atraumatic.   Cardiovascular: Regular rhythm. ESM  Respiratory: clear  Gastrointestinal: No hernia. Soft, nondistended, nontender. + bowel sounds.      Neurologic Examination:    -Mental Status: Open eyes to voice. Doesn't follow commands   -Cranial nerves: Pupils equal, round, and reactive bilateral. Extraocular movements intact with VOR. Intact corneal reflexes bilateral, intact cough reflex -Motor: Moves all ext spon   Trach and PEG in place. TF running, not yet at goal.     Medications:  Continuous  Scheduled  PRN    Today I personally reviewed pertinent medications,  lines/drains/airways, imaging, cardiology results, laboratory results, microbiology results,    Diet  No diet orders on file  No diet orders on file   TF ordered and running         Assessment/Plan:     Neuro  * Seizure  - Continue Keppra 750mg q12h      Acute encephalopathy  - Multifactorial  - Patient extubated on NC  - Intubation watch, NT suctioning  - PEG and Trach     Altered mental status  - Multifactorial          Stroke  - Incidentally seen on MRI w small scattered areas of subacute/remote infarcts  - Cont current secondary prevention measures    Psychiatric  Depression with anxiety  - Continue Escitalopram 20mg daily    Derm  Alteration in skin integrity  - Wound care following  - Skin assessment q shift  - Turn q2h    Pulmonary  Aspiration pneumonia  -  Abx restarted  - Continue in LTAC    Aspiration pneumonitis  - Con abx and f/u cx  - Renally adjusted doses       Acute respiratory failure with hypoxia  - s/p Trach/peg  - CXR and ABG      Cardiac/Vascular  Idiopathic hypotension  - MAP < 65  - Not on any hypertensives    Hematology  Chronic anticoagulation  - Apixaban 5 mg BID     History of DVT (deep vein thrombosis)  - Apixaban 5 mg BID       GI  Oropharyngeal dysphagia  - PEG     Orthopedic  Closed extensive facial fractures  - Evaluated by ENT    Multiple closed fractures of facial bone-resolved as of 2/28/2020  ENT consulted      Other  Elevated serum creatinine  - Monitor CMP  - Avoid nephrotoxins          The patient is being Prophylaxed for:  Venous Thromboembolism with: Mechanical or Chemical  Stress Ulcer with: H2B  Ventilator Pneumonia with: chlorhexidine oral care    Activity Orders          None        Full Code    Clarissa Moya MD  Neurocritical Care  [facility]

## 2020-02-28 NOTE — PROGRESS NOTES
GENERAL SURGERY PROGRESS NOTE    Patient seen and examined at bedside. No acute events overnight although alert and agitated this morning. Trach site well secured. Mechanically ventilated with minimal ventilatory support requirements. PEG tube to gravity with low volume gastric contents in collection bag. May resume enteral feeds at primary team's discretion. Please call with any questions or concerns.      Darnell Alvarado MD  Surgery Resident, PGY-V  Pager: [phone]  2/28/2020 5:16 AM

## 2020-02-28 NOTE — PLAN OF CARE
Patient discharged to [facility] via [facility]       02/28/20 1620   Final Note   Assessment Type Final Discharge Note   Anticipated Discharge Disposition LTAC   Hospital Follow Up  Appt(s) scheduled? No   Discharge plans and expectations educations in teach back method with documentation complete? Yes   Right Care Referral Info   Post Acute Recommendation Other   Referral Type LTAC   Facility Name [facility] [address] [place], [place] [address]       Stefanie Ponce RN, CCRN-K, [facility]  Neuro-Critical Care   X [phone]

## 2020-02-28 NOTE — PLAN OF CARE
02/28/20 1113   Post-Acute Status   Post-Acute Authorization Placement   Post-Acute Placement Status Set-up Complete  ([facility]     LONNY advised by MD team pt to d/c. LONNY setup transportation via [facility] order to [facility]. Approximate  time is 12:00 PM. Filiberto Toussaint pt will go to  [phone]. Report number is [phone].     Hui Cristina LMSW  Case Management Social Worker   [facility]

## 2020-02-28 NOTE — PROGRESS NOTES
Pharmacokinetic Assessment Follow Up: IV Vancomycin    Vancomycin Assessment/Plan:  · Patient's serum creatinine acutely increased to >2x baseline (0.7 --> 1.9 mg/dL) yesterday. Now down to 1.2 mg/dL and UOP improved to 0.7 mL/kg/hr.  · Random level with AM labs resulted at 15.2 mcg/mL. Schedule vancomycin 1,250 mg IV every 24 hours to start now.  · Vancomycin trough scheduled prior to the 3rd of this regimen at 0730 on Wilmer 3/1. However, may need to empirically increase vancomycin dose if renal function improves back to baseline. Will adjust dose and trough based on renal function trend.    Drug levels (last 3 results):  Recent Labs   Lab Result Units 02/28/20  0159   Vancomycin, Random ug/mL 15.2       Pharmacy will continue to follow and monitor vancomycin. Please contact pharmacy for questions regarding this assessment.    Thank you for the consult,   Malka Couch, PharmD, BCCCP  Neurocritical Care Clinical Pharmacist  Spectralink: [id number]  ____________________________________________________________________________________________________________________     Patient brief summary:  Edu Choi is a 64 y.o. male initiated on antimicrobial therapy with IV Vancomycin for treatment of lower respiratory infection    The patient's current regimen is 1,250 mg IV every 24 hours.    Drug Allergies:   Review of patient's allergies indicates:   Allergen Reactions    No known drug allergies        Actual Body Weight:   68.5 kg    Renal Function:   Estimated Creatinine Clearance: 50.7 mL/min (based on SCr of 1.2 mg/dL).,     Dialysis Method (if applicable):  N/A    CBC (last 72 hours):  Recent Labs   Lab Result Units 02/26/20  0142 02/27/20  0231 02/28/20  0159   WBC K/uL 10.69 10.20 10.35   Hemoglobin g/dL 10.4* 10.7* 9.2*   Hematocrit % 33.5* 34.4* 29.6*   Platelets K/uL 277 303 265   Gran% % 77.8* 71.3 69.8   Lymph% % 12.0* 13.8* 14.3*   Mono% % 7.9 11.1 13.6   Eosinophil% % 1.5 2.8 1.2   Basophil% % 0.4 0.5 0.5    Differential Method  Automated Automated Automated       Metabolic Panel (last 72 hours):  Recent Labs   Lab Result Units 02/25/20  1734 02/26/20  0142 02/26/20  1730 02/27/20  0231 02/27/20  1039 02/28/20  0159   Sodium mmol/L  --  142  --  142 144 139   Potassium mmol/L 4.3 3.4*  --  4.9 4.9 4.1   Chloride mmol/L  --  109  --  104 103 104   CO2 mmol/L  --  23  --  26 27 26   Glucose mg/dL  --  102  --  107 112* 98   Glucose, UA   --   --  Negative  --   --   --    BUN, Bld mg/dL  --  17  --  31* 29* 22   Creatinine mg/dL  --  0.7  --  1.9* 1.7* 1.2   Albumin g/dL  --  2.5*  --  3.2*  --  2.9*   Total Bilirubin mg/dL  --  0.4  --  0.6  --  0.6   Alkaline Phosphatase U/L  --  88  --  93  --  78   AST U/L  --  25  --  49*  --  44*   ALT U/L  --  18  --  27  --  25   Magnesium mg/dL  --  1.8  --  2.5  --  2.3   Phosphorus mg/dL  --  3.4  --  4.4  --  3.0       Vancomycin Administrations:  vancomycin given in the last 96 hours                   vancomycin in dextrose 5 % 1 gram/250 mL IVPB 1,000 mg (mg) 1,000 mg New Bag 02/27/20 0500    vancomycin 1750 mg in 0.9% sodium chloride 500 mL IVPB (mg) 1,750 mg New Bag 02/26/20 1643                Microbiologic Results:  Microbiology Results (last 7 days)     Procedure Component Value Units Date/Time    Blood culture [[id number]] Collected:  02/26/20 1643    Order Status:  Completed Specimen:  Blood from Peripheral, Wrist, Left Updated:  02/27/20 1812     Blood Culture, Routine No Growth to date      No Growth to date    Narrative:       Blood cultures from 2 different sites. 4 bottles total.  Please draw before starting antibiotics.    Blood culture [[id number]] Collected:  02/26/20 1634    Order Status:  Completed Specimen:  Blood from Peripheral, Hand, Right Updated:  02/27/20 1812     Blood Culture, Routine No Growth to date      No Growth to date    Narrative:       Blood cultures x 2 different sites. 4 bottles total. Please  draw cultures before administering  antibiotics.    Culture, Respiratory with Gram Stain [[id number]] Collected:  02/27/20 8426    Order Status:  Completed Specimen:  Respiratory from Sputum Updated:  02/27/20 9922     Gram Stain (Respiratory) <10 epithelial cells per low power field.     Gram Stain (Respiratory) Rare WBC's     Gram Stain (Respiratory) No organisms seen

## 2020-02-28 NOTE — DISCHARGE SUMMARY
[facility]  Neurocritical Care  Discharge Summary    Admit Date: 2/2/2020    Service Date: 02/28/2020    Discharge Date: 2/28/2020    Length of Stay: 26    Final Active Diagnoses:    Diagnosis Date Noted POA    PRINCIPAL PROBLEM:  Seizure [R56.9] 02/02/2020 Yes    Elevated serum creatinine [R79.89] 02/27/2020 No    Acute encephalopathy [G93.40] 02/20/2020 Yes    Oropharyngeal dysphagia [R13.12]  Yes    Altered mental status [R41.82] 02/14/2020 Yes    Alteration in skin integrity [R23.9] 02/11/2020 Yes    Stroke [I63.9] 02/09/2020 Yes    Aspiration pneumonia [J69.0] 02/07/2020 Yes    Closed extensive facial fractures [S02.92XA] 02/02/2020 Yes    Aspiration pneumonitis [J69.0] 02/02/2020 Yes    Idiopathic hypotension [I95.0] 12/22/2017 Yes    Chronic anticoagulation [Z79.01] 05/30/2016 Not Applicable    Acute respiratory failure with hypoxia [J96.01] 05/04/2015 Yes    History of DVT (deep vein thrombosis) [Z86.718] 04/30/2015 Not Applicable    Depression with anxiety [F41.8] 06/20/2014 Yes      Problems Resolved During this Admission:    Diagnosis Date Noted Date Resolved POA    Septic shock [A41.9, R65.21] 02/07/2020 02/28/2020 Yes    Large bowel ischemia [K55.9] 02/02/2020 02/05/2020 Yes    Multiple closed fractures of facial bone [S02.92XA] 02/02/2020 02/28/2020 Yes      History of Present Illness: Pt is a 64 y.o. Male with PMHx of of DVT, PE, DM, and seizures (on 1500 keppra at home) who presents to the ED via EMS with complaint of a seizure that occurred this morning. Pt has long standing history of seizures and was found down by wife on cement around 0800, LKN 0700. Patient compliant with keppra per wife and last seizure was in November. At that time his keppra dose was increased. Patient has had cough recently but no other symptoms. Patient is on AC for DVT/PE and was recently switched from Coumadin to Eliquis. He was given 10 mg Versed during EMS ride to outside facility  for continued seizure activity. He was unresponsive upon arrival to ED and subsequently intubated. CTH without any acute changes, CT max/face with multiple facial fractures, and CXR with consolidation and atelectasis/collapsed lung. Patient transferred to [facility] to higher level neurologic monitoring and continuous EEG.     Hospital Course by Event: 2/2/2020: Admit to [facility]. GS and ENT consulted. MX facial fractures and concerns of bowel ischemia on CT  2/3 will rehook to EEG for 24h. Remains NPO. US LE pending. Monitor lactic [id number] 2L  IV. Wean levophed as tolerated after bolus  2/4: remains on small amount of pressors, cultures remain negative, cont abx, cont current AEDs, EEG overnight negative for seizure activity, repeat ct abdomen today  2/5: minimal dose pressors, advance TFs. Tolerating SBT, possible trial of extubation in am  2/6: need for pressors will likely cease when able to come off sedation, currently still requires mechanical ventilation with pO2 value of 60 on 40% fiO2 and ps 10  02/08/2020 exam still poor, getting MRI brain   02/09/2020 Not tolerating CPAP, placed on AC   02/10/2020 NAEO  02/11/2020 NAEO  02/12/2020 CPAP trial this am   02/13/2020 NAEON. On heparin drip with stable exam.   02/14/2020 NAEON, f/u EEG (on Keppra). Will give modafinil trial today.   02/15/2020: Stable overnight. Little improvement with modafinil. Heparin gtt stopped for LP procedure. Meningitic coverage started.  02/16/2020: LP performed overnight. Heparin gtt restarted. Antibiotics stopped. Continue acyclovir. Patient more awake today. Increased free water flushes. Started amantadine.  02/17/2020 NAEO. Planning a family meeting to discuss trach/peg   02/18/2020 family still discussing trach/peg. Required increased PEEP overnight   02/19/2020 vent requirements stable, awaiting trach/PEG  02/20/2020  Trach/PEG pending  ENT consulted due to vent requirements  Weaning vent as tolerated, now down to PEEP of 8 and 45%  Neuro  exam waxing and waning but stable  2/21/2020 No significant events over night. Tolerating spon. Breathing trial today. More alert today. Attempted breathing parameters.pt not following well poor attempts at NIF and VC. Will reassess tomorrow.   2/22 no significant events over night. Tolerating SBT. Pt more sleepy today. precedex turned off.  Keppra decreased to 1g q12. Not as responsive this afternoon. Not following commands, not attentive, not tracking. Sent for [facility], no new changes noted. Placed on EEG cap will monitor over night  2/23 No significant events over night. EEG cap showed no seizures . EEG dcd. Pt more alert today. Tracking responding to wife. toelrating SBT. TF off NGT to suction. + cuff leak good breathing parameters. Extubated to NC 3L  No s/s stridor or difficulty breathing.  02/24/2020: switched TF to Glucerna per nutation reccs, decrease keppra to 750mg  BID, cardiac chair today, schedule Tylenol 1 gm q6h for 24 h, d/c 3% nebs  02/25/2020: brief desaturation-NT suction and placed on nonbriether, CXR, 40 mg Lasix  02/26/2020 Had to be intubated this am due to increased hypoxia and lethargy  \  02/27/2020 Trach/PEG today   02/28/2020 Stable for discharge to LTAC.       Hospital Course by Problem:   * Seizure  - Continue Keppra 750mg q12h      Elevated serum creatinine  - Monitor CMP  - Avoid nephrotoxins    Acute encephalopathy  - Multifactorial  - Patient extubated on NC  - Intubation watch, NT suctioning  - PEG and Trach     Oropharyngeal dysphagia  - PEG     Altered mental status  - Multifactorial          Alteration in skin integrity  - Wound care following  - Skin assessment q shift  - Turn q2h    Stroke  - Incidentally seen on MRI w small scattered areas of subacute/remote infarcts  - Cont current secondary prevention measures    Aspiration pneumonia  -  Abx restarted  - Continue in LTAC    Aspiration pneumonitis  - Con abx and f/u cx  - Renally adjusted doses       Closed extensive facial  fractures  - Evaluated by ENT    Idiopathic hypotension  - MAP < 65  - Not on any hypertensives    Chronic anticoagulation  - Apixaban 5 mg BID     Acute respiratory failure with hypoxia  - s/p Trach/peg  - CXR and ABG      History of DVT (deep vein thrombosis)  - Apixaban 5 mg BID       Depression with anxiety  - Continue Escitalopram 20mg daily      Significant Results:  Imaging:  CT (2/22/2020)  No acute intracranial hemorrhage or new abnormal areas of hypoattenuation to suggest a major vascular distribution infarct.  Known recent infarcts, best seen on prior MRI, are not visualized by CT.  MRI may be attempted for follow-up.  Essentially complete resolution of trace subarachnoid hemorrhage within the right sylvian fissure.  Trace amount of intraventricular blood products with stable hydrocephalus.  Advanced chronic microvascular ischemic change and remote infarcts, as above.  Diffuse paranasal sinus disease, as above.    Laboratory:  Lab Results   Component Value Date    HGBA1C 6.1 (H) 02/02/2020    CHOL 114 (L) 02/02/2020    HDL 45 02/02/2020    LDLCALC 56.4 (L) 02/02/2020    TRIG 63 02/02/2020    TSH 0.520 02/02/2020       Consultations:  IP CONSULT TO PHYSICAL MEDICINE REHAB  IP CONSULT TO REGISTERED DIETITIAN/NUTRITIONIST  IP CONSULT TO ENT  IP CONSULT TO GENERAL SURGERY  WOUND CARE CONSULT  IP CONSULT TO MIDLINE TEAM  WOUND CARE CONSULT  PHARMACY TO DOSE VANCOMYCIN CONSULT  WOUND CARE CONSULT  WOUND CARE CONSULT  IP CONSULT TO MIDLINE TEAM  PHARMACY TO DOSE VANCOMYCIN CONSULT  IP CONSULT TO MIDLINE TEAM  IP CONSULT TO GENERAL SURGERY  IP CONSULT TO MIDLINE TEAM  IP CONSULT TO GENERAL SURGERY      Procedures:   Procedure(s) (LRB):  CREATION, TRACHEOSTOMY (N/A)  EGD, WITH PEG TUBE INSERTION (N/A) by Jose Antonio Peterson MD.    Medications:    Edu Choi   Home Medication Instructions [id number]    Printed on:02/28/20 5190   Medication Information                      acetaminophen (TYLENOL) 325 MG  tablet  2 tablets (650 mg total) by Per G Tube route every 6 (six) hours as needed for Temperature greater than (99.5).             albuterol-ipratropium (DUO-NEB) 2.5 mg-0.5 mg/3 mL nebulizer solution  Take 3 mLs by nebulization every 4 (four) hours. Rescue             amantadine HCL (SYMMETREL) 50 mg/5 mL Soln  10 mLs (100 mg total) by Per G Tube route 2 (two) times daily.             apixaban (ELIQUIS) 5 mg Tab  1 tablet (5 mg total) by Per G Tube route 2 (two) times daily.             atorvastatin (LIPITOR) 40 MG tablet  1 tablet (40 mg total) by Per G Tube route every evening.             chlorhexidine (PERIDEX) 0.12 % solution  Use as directed 15 mLs in the mouth or throat 2 (two) times daily. for 14 days             escitalopram oxalate (LEXAPRO) 20 MG tablet  TAKE 1 TABLET EVERY DAY             famotidine (PEPCID) 20 MG tablet  1 tablet (20 mg total) by Per G Tube route once daily.             fentaNYL (SUBLIMAZE) 50 mcg/mL injection  Inject 1 mL (50 mcg total) into the vein every 2 (two) hours as needed (PRN Severe Pain (6-10)).             FLUZONE HIGH-DOSE 2019-20, PF, 180 mcg/0.5 mL Syrg               insulin aspart U-100 (NOVOLOG) 100 unit/mL (3 mL) InPn pen  Inject 1-10 Units into the skin every 6 (six) hours as needed (Hyperglycemia).             labetalol 20 mg/4 mL (5 mg/mL) Syrg  Inject 2 mLs (10 mg total) into the vein every 4 (four) hours as needed (180).             lancets 30 gauge Misc  1 lancet by Misc.(Non-Drug; Combo Route) route once daily.             levetiracetam oral soln 500 mg/5 mL (5 mL) Soln  7.5 mLs (750 mg total) by Per G Tube route 2 (two) times daily.             miconazole nitrate 2% (MICOTIN) 2 % Oint  Apply topically 2 (two) times daily.             ondansetron 4 mg/2 mL Soln  Inject 4 mg into the vein every 8 (eight) hours as needed.             piperacillin sodium/tazobactam (PIPERACILLIN-TAZOBACTAM 4.5G/100ML D5W IVPB, READY TO MIX,)  Inject 100 mLs (4.5 g total) into the  vein every 8 (eight) hours.             TRUE METRIX GLUCOSE TEST STRIP Strp  1 strip by Misc.(Non-Drug; Combo Route) route once daily.             vancomycin HCl (VANCOMYCIN 1.25 G/250 ML D5W, READY TO MIX,)  Inject 274 mLs (1,370 mg total) into the vein as needed.               Diet:  TF of Glucerna 1.5 at 45 mL/hr    Activity: As tolerated.     Disposition: Discharged to LTAC  in stable condition.    Follow Up Plan:  As above    This discharge took more than 30 minutes to complete.    Clarissa Moya MD  Neurocritical Care  [facility]

## 2020-02-28 NOTE — PROGRESS NOTES
"[facility]  Adult Nutrition  Progress Note    SUMMARY       Recommendations  As medically able, recommend resuming TF of Glucerna 1.5 at 45 mL/hr - meets needs.   RD to monitor.    Goals: Pt to receive nutrition by RD follow up  Nutrition Goal Status: goal met  Communication of RD Recs: other (comment)(POC)    Reason for Assessment  Reason For Assessment: RD follow-up  Diagnosis: seizures  Relevant Medical History: HLD, DM, seizures, DVT/PE  Interdisciplinary Rounds: did not attend  General Information Comments: Reintubated 2/26. S/p trach & PEG placement yesterday. Currently trach/vent. Plan to resume TF today. Patient continues to appear well nourished/overweight, had been tolerating TF prior to hold for procedure, and has maintained stable weight. Patient does not meet criteria for malnutrition.  Nutrition Discharge Planning: Adequate nutrition via TF.    Nutrition Risk Screen  Nutrition Risk Screen: dysphagia or difficulty swallowing, tube feeding or parenteral nutrition    Nutrition/Diet History  Spiritual, Cultural Beliefs, Bahai Practices, Values that Affect Care: no  Food Allergies: NKFA  Factors Affecting Nutritional Intake: NPO, on mechanical ventilation    Anthropometrics  Temp: 99.3 °F (37.4 °C)  Height: 5' 4" (162.6 cm)  Height (inches): 64 in  Weight Method: Bed Scale  Weight: 68.5 kg (151 lb)  Weight (lb): 151 lb  Ideal Body Weight (IBW), Male: 130 lb  % Ideal Body Weight, Male (lb): 116.15 %  BMI (Calculated): 25.9  BMI Grade: 25 - 29.9 - overweight    Lab/Procedures/Meds  Pertinent Labs Reviewed: reviewed  Pertinent Labs Comments: Alb 2.9  Pertinent Medications Reviewed: reviewed  Pertinent Medications Comments: famotidine    Estimated/Assessed Needs  Weight Used For Calorie Calculations: 68.5 kg (151 lb 0.2 oz)  Energy Calorie Requirements (kcal): 1697 kcal/day  Energy Need Method: [facility]  Protein Requirements: 82-96 g/day(1.2-1.4 g/kg)  Weight Used For Protein " Calculations: 68.5 kg (151 lb 0.2 oz)  Fluid Requirements (mL): 1mL/kcal or per MD  Estimated Fluid Requirement Method: RDA Method  RDA Method (mL): 1697  CHO Requirement: 212g/day    Nutrition Prescription Ordered  Current Diet Order: NPO  Current Nutrition Support Formula Ordered: Glucerna 1.5  Current Nutrition Support Rate Ordered: 45 (ml)  Current Nutrition Support Frequency Ordered: mL/hr    Evaluation of Received Nutrient/Fluid Intake  Enteral Calories (kcal): 1620  Enteral Protein (gm): 89  Enteral (Free Water) Fluid (mL): 820  % Kcal Needs: 95%  % Protein Needs: 100%  I/O: +18.6L since admit  Energy Calories Required: meeting needs  Protein Required: meeting needs  Fluid Required: other (see comments)(per MD)  Comments: LBM 2/28  Tolerance: (not restarted yet)  % Intake of Estimated Energy Needs: 75 - 100 % (once TF resumed)  % Meal Intake: NPO    Nutrition Risk  Level of Risk/Frequency of Follow-up: high(2x/week)     Assessment and Plan  Nutrition Problem  Inadequate energy intake     Related to (etiology):   NPO     Signs and Symptoms (as evidenced by):   Pt receiving < 75% EEN and EPN      Interventions(treatment strategy):  Collaboration of care with providers     Nutrition Diagnosis Status:   Resolved    Monitor and Evaluation  Food and Nutrient Intake: energy intake, food and beverage intake, enteral nutrition intake  Food and Nutrient Adminstration: diet order, enteral and parenteral nutrition administration  Anthropometric Measurements: weight, weight change, body mass index  Biochemical Data, Medical Tests and Procedures: electrolyte and renal panel, inflammatory profile, gastrointestinal profile, glucose/endocrine profile, lipid profile  Nutrition-Focused Physical Findings: overall appearance     Nutrition Follow-Up  RD Follow-up?: Yes

## 2020-02-28 NOTE — SUBJECTIVE & OBJECTIVE
Interval History:  >4 elements OR status of 3 inpatient conditions    Review of Systems   Constitutional: Positive for fatigue.   HENT: Positive for trouble swallowing. Negative for congestion.    Respiratory: Negative.    Gastrointestinal: Negative for nausea and vomiting.     Objective:     Vitals:  Temp: 99.1 °F (37.3 °C)  Pulse: 81  Rhythm: normal sinus rhythm  BP: (!) 114/56  MAP (mmHg): 80  Resp: 15  SpO2: 97 %  Oxygen Concentration (%): 50  O2 Device (Oxygen Therapy): ventilator  Vent Mode: Spont  Set Rate: 17 BPM  Vt Set: 450 mL  Pressure Support: 5 cmH20  PEEP/CPAP: 5 cmH20  Peak Airway Pressure: 10 cmH2O  Mean Airway Pressure: 6.6 cmH20  Plateau Pressure: 0 cmH20    Temp  Min: 98.5 °F (36.9 °C)  Max: 99.3 °F (37.4 °C)  Pulse  Min: 77  Max: 101  BP  Min: 104/51  Max: 128/60  MAP (mmHg)  Min: 73  Max: 88  Resp  Min: 15  Max: 24  SpO2  Min: 94 %  Max: 100 %  Oxygen Concentration (%)  Min: 50  Max: 50    02/27 0701 - 02/28 0700  In: 2265 [I.V.:165]  Out: 1850 [Urine:1200; Drains:75]   Unmeasured Output  Urine Occurrence: 1  Stool Occurrence: 1  Pad Count: 1       Physical Exam    Constitutional: No apparent distress.   Eyes: Conjunctiva clear, anicteric. Lids no lesions. Small abrasion under left eye   Head/Ears/Nose/Mouth/Throat/Neck: Moist mucous membranes. External ears, nose atraumatic.   Cardiovascular: Regular rhythm. ESM  Respiratory: clear  Gastrointestinal: No hernia. Soft, nondistended, nontender. + bowel sounds.      Neurologic Examination:    -Mental Status: Open eyes to voice. Doesn't follow commands   -Cranial nerves: Pupils equal, round, and reactive bilateral. Extraocular movements intact with VOR. Intact corneal reflexes bilateral, intact cough reflex -Motor: Moves all ext spon   Trach and PEG in place. TF running, not yet at goal.     Medications:  Continuous  Scheduled  PRN    Today I personally reviewed pertinent medications, lines/drains/airways, imaging, cardiology results, laboratory  results, microbiology results,    Diet  No diet orders on file  No diet orders on file   TF ordered and running

## 2020-02-29 LAB
BACTERIA SPEC AEROBE CULT: NORMAL
BACTERIA SPEC AEROBE CULT: NORMAL
GRAM STN SPEC: NORMAL

## 2020-03-02 PROBLEM — E63.9 INADEQUATE DIETARY ENERGY INTAKE: Status: ACTIVE | Noted: 2020-03-02

## 2020-03-02 LAB
BACTERIA BLD CULT: NORMAL
BACTERIA BLD CULT: NORMAL

## 2020-03-16 LAB — FUNGUS SPEC CULT: NORMAL

## 2020-03-29 PROBLEM — G40.901 STATUS EPILEPTICUS: Status: RESOLVED | Noted: 2020-02-28 | Resolved: 2020-03-29

## 2020-04-19 LAB
ACID FAST MOD KINY STN SPEC: NORMAL
MYCOBACTERIUM SPEC QL CULT: NORMAL

## 2020-10-05 ENCOUNTER — PATIENT MESSAGE (OUTPATIENT)
Dept: ADMINISTRATIVE | Facility: HOSPITAL | Age: 65
End: 2020-10-05

## 2022-03-18 NOTE — NURSING
Pt transported to CT via bed with portable vent, telemetry, and ambu bag with RNx1 and RTx1. No acute events occurred. Pt stable and back in room 9085, will continue to monitor.   O-Z Flap Text: The defect edges were debeveled with a #15 scalpel blade.  Given the location of the defect, shape of the defect and the proximity to free margins an O-Z flap was deemed most appropriate.  Using a sterile surgical marker, an appropriate transposition flap was drawn incorporating the defect and placing the expected incisions within the relaxed skin tension lines where possible. The area thus outlined was incised deep to adipose tissue with a #15 scalpel blade.  The skin margins were undermined to an appropriate distance in all directions utilizing iris scissors.

## 2022-05-25 NOTE — PLAN OF CARE
POC reviewed with pt at 0500. Pt verbalized understanding. Questions and concerns addressed. Heparin drip titrated and aptt monitored. No acute events overnight. Pt progressing toward goals. Will continue to monitor. See flowsheets for full assessment and VS info      Albendazole Pregnancy And Lactation Text: This medication is Pregnancy Category C and it isn't known if it is safe during pregnancy. It is also excreted in breast milk.

## 2022-09-23 NOTE — SUBJECTIVE & OBJECTIVE
Interval History:  >4 elements OR status of 3 inpatient conditions    Review of Systems   Unable to perform ROS: Mental status change     2 systems OR Unable to obtain a complete ROS due to level of consciousness.  Objective:     Vitals:  Temp: 99.1 °F (37.3 °C)  Pulse: (!) 126  Rhythm: sinus tachycardia  BP: 134/68  MAP (mmHg): 94  Resp: (!) 29  SpO2: (!) 92 %  Oxygen Concentration (%): 50  O2 Device (Oxygen Therapy): ventilator  Vent Mode: Spont  Pressure Support: 8 cmH20  PEEP/CPAP: 6 cmH20  Peak Airway Pressure: 14 cmH2O  Mean Airway Pressure: 9.5 cmH20  Plateau Pressure: 0 cmH20    Temp  Min: 98.4 °F (36.9 °C)  Max: 99.1 °F (37.3 °C)  Pulse  Min: 79  Max: 126  BP  Min: 102/55  Max: 134/68  MAP (mmHg)  Min: 72  Max: 94  Resp  Min: 17  Max: 29  SpO2  Min: 77 %  Max: 99 %  Oxygen Concentration (%)  Min: 50  Max: 60    02/16 0701 - 02/17 0700  In: 3528.5 [I.V.:178.5]  Out: 1670 [Urine:1670]   Unmeasured Output  Urine Occurrence: 1  Stool Occurrence: 1  Pad Count: 2       Physical Exam    Constitutional: No apparent distress.   Eyes: Conjunctiva clear, anicteric. Lids no lesions. Small abrasion under left eye   Head/Ears/Nose/Mouth/Throat/Neck: Moist mucous membranes. External ears, nose atraumatic.   Cardiovascular: Regular rhythm. ESM  Respiratory: clear  Gastrointestinal: No hernia. Soft, nondistended, nontender. + bowel sounds.      Neurologic Examination:    -Mental Status: Open eyes to voice. Doesn't follow commands   -Cranial nerves: Pupils equal, round, and reactive bilateral. Extraocular movements intact with VOR. Intact corneal reflexes bilateral, intact cough reflex -Motor: Moves all ext spon     Medications:  Continuous    heparin (porcine) in D5W Last Rate: 16 Units/kg/hr (02/17/20 1305)   Scheduled    acyclovir 10 mg/kg (Ideal) Q8H   albuterol-ipratropium 3 mL Q6H   amantadine  mg BID   atorvastatin 40 mg QHS   chlorhexidine 15 mL BID   escitalopram oxalate 20 mg Daily   famotidine (PF) 20 mg  Sidney Person is a 75 Rachell Morrow MD       BID   levetiracetam IVPB 1,500 mg Q12H   miconazole nitrate 2%  BID   PRN    acetaminophen 650 mg Q6H PRN   Dextrose 10% Bolus 12.5 g PRN   fentaNYL 25 mcg Q2H PRN   glucagon (human recombinant) 1 mg PRN   insulin aspart U-100 1-10 Units Q6H PRN   labetalol 10 mg Q4H PRN   lorazepam 2 mg Q1H PRN   magnesium oxide 800 mg PRN   magnesium oxide 800 mg PRN   ondansetron 4 mg Q8H PRN   potassium chloride 10% 40 mEq PRN   potassium chloride 10% 40 mEq PRN   potassium chloride 10% 60 mEq PRN   potassium, sodium phosphates 2 packet PRN   potassium, sodium phosphates 2 packet PRN   potassium, sodium phosphates 2 packet PRN   sodium chloride 0.9% 10 mL PRN     Today I personally reviewed pertinent medications, lines/drains/airways, imaging, cardiology results, laboratory results, microbiology results, notably:    Diet  Diet NPO  Diet NPO

## 2025-05-05 NOTE — PLAN OF CARE
POC reviewed with pt at 0500. Pt verbalized understanding. Questions and concerns addressed. Pt aptt therapeutic. Heparin titrated. Linens changed. Fentanyl given for agitation and elevated HR. No acute events overnight. Pt progressing toward goals. Will continue to monitor. See flowsheets for full assessment and VS info      2 = difficulty understanding and speaking (not related to language barrier)

## 2025-06-05 NOTE — ASSESSMENT & PLAN NOTE
----- Message from Oneida Herrera NP sent at 6/4/2025  4:06 PM CDT -----  Please inform Terrance Yang that the following lab tests are normal: Kidney function.  Also, there was no protein found in urine.  Liver function tests are also normal.  Thanks,   Oneida   63 yo male with PMHx of of DVT/PE (on chronic anticoagulation, Eliquis currently), HLD, DM, seizures on keppra, and aortic atherosclerosis who was [place] [facility] [place]. Found to have R colon wall thickening and portal venous gas, concern for ischemia.     - Lactate normal, leukocytosis resolving. Abdominal exam unchanged  - Continue to hold tube feeds for now  - Will want to repeat CT scan prior to restarting feeds  - Please call for any change in exam or hemodynamic status

## (undated) DEVICE — NDL N SERIES MICRO-DISSECTION

## (undated) DEVICE — KIT PEG PULL STANDARD 20F

## (undated) DEVICE — ELECTRODE REM PLYHSV RETURN 9

## (undated) DEVICE — LUBRICANT SURGILUBE 2 OZ

## (undated) DEVICE — SEE MEDLINE ITEM 157117

## (undated) DEVICE — SEE MEDLINE ITEM 146313

## (undated) DEVICE — DRAPE THYROID WITH ARMBOARD

## (undated) DEVICE — SPONGE IV DRAIN 4X4 STERILE

## (undated) DEVICE — SEE MEDLINE ITEM 152487

## (undated) DEVICE — GOWN SURGICAL X-LARGE

## (undated) DEVICE — SUT PROLENE 2-0 30 SH

## (undated) DEVICE — BLADE SURG CARBON STEEL SZ11

## (undated) DEVICE — TRAY MINOR GEN SURG

## (undated) DEVICE — CHLORAPREP 10.5 ML APPLICATOR

## (undated) DEVICE — SUT PROLENE 0-36 V-7

## (undated) DEVICE — URINARY DRAINAGE BAG

## (undated) DEVICE — SEE MEDLINE ITEM 152622

## (undated) DEVICE — SUT SILK 2-0 SH 18IN BLACK

## (undated) DEVICE — TRAY TRACH BLUE RHINO 8

## (undated) DEVICE — SEE MEDLINE ITEM 146417

## (undated) DEVICE — SUT 3-0 12-18IN SILK